# Patient Record
Sex: MALE | Race: WHITE | HISPANIC OR LATINO | Employment: OTHER | URBAN - METROPOLITAN AREA
[De-identification: names, ages, dates, MRNs, and addresses within clinical notes are randomized per-mention and may not be internally consistent; named-entity substitution may affect disease eponyms.]

---

## 2017-02-12 ENCOUNTER — APPOINTMENT (EMERGENCY)
Dept: RADIOLOGY | Facility: HOSPITAL | Age: 42
End: 2017-02-12
Payer: COMMERCIAL

## 2017-02-12 ENCOUNTER — HOSPITAL ENCOUNTER (EMERGENCY)
Facility: HOSPITAL | Age: 42
Discharge: HOME | End: 2017-02-13
Attending: EMERGENCY MEDICINE | Admitting: EMERGENCY MEDICINE
Payer: COMMERCIAL

## 2017-02-12 VITALS
BODY MASS INDEX: 26.63 KG/M2 | RESPIRATION RATE: 18 BRPM | WEIGHT: 170 LBS | SYSTOLIC BLOOD PRESSURE: 128 MMHG | TEMPERATURE: 98.6 F | HEART RATE: 61 BPM | DIASTOLIC BLOOD PRESSURE: 75 MMHG | OXYGEN SATURATION: 96 %

## 2017-02-12 DIAGNOSIS — K52.9 ENTERITIS: Primary | ICD-10-CM

## 2017-02-12 LAB
ALBUMIN SERPL BCP-MCNC: 3.5 G/DL (ref 3.5–5)
ALP SERPL-CCNC: 75 U/L (ref 46–116)
ALT SERPL W P-5'-P-CCNC: 17 U/L (ref 12–78)
ANION GAP SERPL CALCULATED.3IONS-SCNC: 8 MMOL/L (ref 4–13)
AST SERPL W P-5'-P-CCNC: 8 U/L (ref 5–45)
BACTERIA UR QL AUTO: ABNORMAL /HPF
BASOPHILS # BLD AUTO: 0.05 THOUSANDS/ΜL (ref 0–0.1)
BASOPHILS NFR BLD AUTO: 0 % (ref 0–1)
BILIRUB SERPL-MCNC: 0.42 MG/DL (ref 0.2–1)
BILIRUB UR QL STRIP: NEGATIVE
BUN SERPL-MCNC: 16 MG/DL (ref 5–25)
CALCIUM SERPL-MCNC: 8.8 MG/DL (ref 8.3–10.1)
CHLORIDE SERPL-SCNC: 110 MMOL/L (ref 100–108)
CLARITY UR: CLEAR
CO2 SERPL-SCNC: 24 MMOL/L (ref 21–32)
COLOR UR: YELLOW
CREAT SERPL-MCNC: 1.49 MG/DL (ref 0.6–1.3)
EOSINOPHIL # BLD AUTO: 0.66 THOUSAND/ΜL (ref 0–0.61)
EOSINOPHIL NFR BLD AUTO: 6 % (ref 0–6)
ERYTHROCYTE [DISTWIDTH] IN BLOOD BY AUTOMATED COUNT: 15.1 % (ref 11.6–15.1)
GFR SERPL CREATININE-BSD FRML MDRD: 52.5 ML/MIN/1.73SQ M
GLUCOSE SERPL-MCNC: 101 MG/DL (ref 65–140)
GLUCOSE UR STRIP-MCNC: NEGATIVE MG/DL
HCT VFR BLD AUTO: 40.6 % (ref 36.5–49.3)
HGB BLD-MCNC: 13.7 G/DL (ref 12–17)
HGB UR QL STRIP.AUTO: ABNORMAL
HYALINE CASTS #/AREA URNS LPF: ABNORMAL /LPF
KETONES UR STRIP-MCNC: ABNORMAL MG/DL
LEUKOCYTE ESTERASE UR QL STRIP: NEGATIVE
LIPASE SERPL-CCNC: 118 U/L (ref 73–393)
LYMPHOCYTES # BLD AUTO: 3.04 THOUSANDS/ΜL (ref 0.6–4.47)
LYMPHOCYTES NFR BLD AUTO: 26 % (ref 14–44)
MCH RBC QN AUTO: 28.8 PG (ref 26.8–34.3)
MCHC RBC AUTO-ENTMCNC: 33.7 G/DL (ref 31.4–37.4)
MCV RBC AUTO: 86 FL (ref 82–98)
MONOCYTES # BLD AUTO: 0.74 THOUSAND/ΜL (ref 0.17–1.22)
MONOCYTES NFR BLD AUTO: 6 % (ref 4–12)
NEUTROPHILS # BLD AUTO: 7.29 THOUSANDS/ΜL (ref 1.85–7.62)
NEUTS SEG NFR BLD AUTO: 62 % (ref 43–75)
NITRITE UR QL STRIP: NEGATIVE
NON-SQ EPI CELLS URNS QL MICRO: ABNORMAL /HPF
NRBC BLD AUTO-RTO: 0 /100 WBCS
PH UR STRIP.AUTO: 7 [PH] (ref 4.5–8)
PLATELET # BLD AUTO: 330 THOUSANDS/UL (ref 149–390)
PMV BLD AUTO: 9.2 FL (ref 8.9–12.7)
POTASSIUM SERPL-SCNC: 4 MMOL/L (ref 3.5–5.3)
PROT SERPL-MCNC: 6.9 G/DL (ref 6.4–8.2)
PROT UR STRIP-MCNC: NEGATIVE MG/DL
RBC # BLD AUTO: 4.75 MILLION/UL (ref 3.88–5.62)
RBC #/AREA URNS AUTO: ABNORMAL /HPF
SODIUM SERPL-SCNC: 142 MMOL/L (ref 136–145)
SP GR UR STRIP.AUTO: 1.02 (ref 1–1.03)
SPECIMEN SOURCE: NORMAL
TROPONIN I BLD-MCNC: 0 NG/ML (ref 0–0.08)
UROBILINOGEN UR QL STRIP.AUTO: 0.2 E.U./DL
WBC # BLD AUTO: 11.8 THOUSAND/UL (ref 4.31–10.16)
WBC #/AREA URNS AUTO: ABNORMAL /HPF

## 2017-02-12 PROCEDURE — 80053 COMPREHEN METABOLIC PANEL: CPT

## 2017-02-12 PROCEDURE — 81001 URINALYSIS AUTO W/SCOPE: CPT

## 2017-02-12 PROCEDURE — 96361 HYDRATE IV INFUSION ADD-ON: CPT

## 2017-02-12 PROCEDURE — 84484 ASSAY OF TROPONIN QUANT: CPT

## 2017-02-12 PROCEDURE — 96374 THER/PROPH/DIAG INJ IV PUSH: CPT

## 2017-02-12 PROCEDURE — 85025 COMPLETE CBC W/AUTO DIFF WBC: CPT

## 2017-02-12 PROCEDURE — 36415 COLL VENOUS BLD VENIPUNCTURE: CPT

## 2017-02-12 PROCEDURE — 87086 URINE CULTURE/COLONY COUNT: CPT

## 2017-02-12 PROCEDURE — 74176 CT ABD & PELVIS W/O CONTRAST: CPT

## 2017-02-12 PROCEDURE — 96375 TX/PRO/DX INJ NEW DRUG ADDON: CPT

## 2017-02-12 PROCEDURE — 83690 ASSAY OF LIPASE: CPT

## 2017-02-12 RX ORDER — ONDANSETRON 2 MG/ML
4 INJECTION INTRAMUSCULAR; INTRAVENOUS ONCE
Status: COMPLETED | OUTPATIENT
Start: 2017-02-12 | End: 2017-02-12

## 2017-02-12 RX ADMIN — ONDANSETRON 4 MG: 2 INJECTION INTRAMUSCULAR; INTRAVENOUS at 22:25

## 2017-02-12 RX ADMIN — SODIUM CHLORIDE 1000 ML: 0.9 INJECTION, SOLUTION INTRAVENOUS at 22:25

## 2017-02-12 RX ADMIN — HYDROMORPHONE HYDROCHLORIDE 1 MG: 1 INJECTION, SOLUTION INTRAMUSCULAR; INTRAVENOUS; SUBCUTANEOUS at 22:25

## 2017-02-13 PROCEDURE — 99284 EMERGENCY DEPT VISIT MOD MDM: CPT

## 2017-02-13 RX ORDER — DICYCLOMINE HCL 20 MG
20 TABLET ORAL EVERY 6 HOURS
Qty: 120 TABLET | Refills: 0 | Status: SHIPPED | OUTPATIENT
Start: 2017-02-13 | End: 2017-03-15

## 2017-02-13 RX ORDER — ACETAMINOPHEN 500 MG
500 TABLET ORAL EVERY 6 HOURS PRN
Qty: 30 TABLET | Refills: 0 | Status: SHIPPED | OUTPATIENT
Start: 2017-02-13 | End: 2017-03-15

## 2017-02-13 RX ORDER — DICYCLOMINE HCL 20 MG
20 TABLET ORAL ONCE
Status: DISCONTINUED | OUTPATIENT
Start: 2017-02-13 | End: 2017-02-13

## 2017-02-14 LAB — BACTERIA UR CULT: NORMAL

## 2017-03-21 ENCOUNTER — HOSPITAL ENCOUNTER (EMERGENCY)
Facility: HOSPITAL | Age: 42
Discharge: HOME | End: 2017-03-21
Attending: EMERGENCY MEDICINE
Payer: COMMERCIAL

## 2017-03-21 ENCOUNTER — APPOINTMENT (EMERGENCY)
Dept: RADIOLOGY | Facility: HOSPITAL | Age: 42
End: 2017-03-21
Payer: COMMERCIAL

## 2017-03-21 VITALS
BODY MASS INDEX: 25.06 KG/M2 | DIASTOLIC BLOOD PRESSURE: 57 MMHG | RESPIRATION RATE: 18 BRPM | HEART RATE: 58 BPM | SYSTOLIC BLOOD PRESSURE: 99 MMHG | WEIGHT: 160 LBS | OXYGEN SATURATION: 99 % | TEMPERATURE: 96.1 F

## 2017-03-21 DIAGNOSIS — R10.9 ABDOMINAL PAIN: Primary | ICD-10-CM

## 2017-03-21 DIAGNOSIS — R10.9 LEFT FLANK PAIN: ICD-10-CM

## 2017-03-21 LAB
ALBUMIN SERPL BCP-MCNC: 3.3 G/DL (ref 3.5–5)
ALP SERPL-CCNC: 77 U/L (ref 46–116)
ALT SERPL W P-5'-P-CCNC: 25 U/L (ref 12–78)
ANION GAP SERPL CALCULATED.3IONS-SCNC: 5 MMOL/L (ref 4–13)
AST SERPL W P-5'-P-CCNC: 9 U/L (ref 5–45)
BACTERIA UR QL AUTO: ABNORMAL /HPF
BASOPHILS # BLD AUTO: 0.03 THOUSANDS/ΜL (ref 0–0.1)
BASOPHILS NFR BLD AUTO: 0 % (ref 0–1)
BILIRUB SERPL-MCNC: 0.14 MG/DL (ref 0.2–1)
BILIRUB UR QL STRIP: NEGATIVE
BUN SERPL-MCNC: 15 MG/DL (ref 5–25)
CALCIUM SERPL-MCNC: 8.9 MG/DL (ref 8.3–10.1)
CHLORIDE SERPL-SCNC: 109 MMOL/L (ref 100–108)
CLARITY UR: CLEAR
CLARITY, POC: CLEAR
CO2 SERPL-SCNC: 29 MMOL/L (ref 21–32)
COLOR UR: YELLOW
COLOR, POC: YELLOW
CREAT SERPL-MCNC: 1.44 MG/DL (ref 0.6–1.3)
EOSINOPHIL # BLD AUTO: 0.24 THOUSAND/ΜL (ref 0–0.61)
EOSINOPHIL NFR BLD AUTO: 3 % (ref 0–6)
ERYTHROCYTE [DISTWIDTH] IN BLOOD BY AUTOMATED COUNT: 14.7 % (ref 11.6–15.1)
GFR SERPL CREATININE-BSD FRML MDRD: 54.6 ML/MIN/1.73SQ M
GLUCOSE SERPL-MCNC: 81 MG/DL (ref 65–140)
GLUCOSE UR STRIP-MCNC: NEGATIVE MG/DL
HCT VFR BLD AUTO: 43.3 % (ref 36.5–49.3)
HGB BLD-MCNC: 14.3 G/DL (ref 12–17)
HGB UR QL STRIP.AUTO: ABNORMAL
HYALINE CASTS #/AREA URNS LPF: ABNORMAL /LPF
KETONES UR STRIP-MCNC: NEGATIVE MG/DL
LEUKOCYTE ESTERASE UR QL STRIP: NEGATIVE
LIPASE SERPL-CCNC: 136 U/L (ref 73–393)
LYMPHOCYTES # BLD AUTO: 2.66 THOUSANDS/ΜL (ref 0.6–4.47)
LYMPHOCYTES NFR BLD AUTO: 36 % (ref 14–44)
MCH RBC QN AUTO: 28.3 PG (ref 26.8–34.3)
MCHC RBC AUTO-ENTMCNC: 33 G/DL (ref 31.4–37.4)
MCV RBC AUTO: 86 FL (ref 82–98)
MONOCYTES # BLD AUTO: 0.43 THOUSAND/ΜL (ref 0.17–1.22)
MONOCYTES NFR BLD AUTO: 6 % (ref 4–12)
NEUTROPHILS # BLD AUTO: 4.05 THOUSANDS/ΜL (ref 1.85–7.62)
NEUTS SEG NFR BLD AUTO: 55 % (ref 43–75)
NITRITE UR QL STRIP: NEGATIVE
NON-SQ EPI CELLS URNS QL MICRO: ABNORMAL /HPF
NRBC BLD AUTO-RTO: 0 /100 WBCS
PH UR STRIP.AUTO: 6 [PH] (ref 4.5–8)
PLATELET # BLD AUTO: 276 THOUSANDS/UL (ref 149–390)
PMV BLD AUTO: 9.2 FL (ref 8.9–12.7)
POTASSIUM SERPL-SCNC: 4.2 MMOL/L (ref 3.5–5.3)
PROT SERPL-MCNC: 7.1 G/DL (ref 6.4–8.2)
PROT UR STRIP-MCNC: NEGATIVE MG/DL
RBC # BLD AUTO: 5.06 MILLION/UL (ref 3.88–5.62)
RBC #/AREA URNS AUTO: ABNORMAL /HPF
SODIUM SERPL-SCNC: 143 MMOL/L (ref 136–145)
SP GR UR STRIP.AUTO: 1.02 (ref 1–1.03)
UROBILINOGEN UR QL STRIP.AUTO: 0.2 E.U./DL
WBC # BLD AUTO: 7.42 THOUSAND/UL (ref 4.31–10.16)
WBC #/AREA URNS AUTO: ABNORMAL /HPF

## 2017-03-21 PROCEDURE — 96376 TX/PRO/DX INJ SAME DRUG ADON: CPT

## 2017-03-21 PROCEDURE — 36415 COLL VENOUS BLD VENIPUNCTURE: CPT | Performed by: EMERGENCY MEDICINE

## 2017-03-21 PROCEDURE — 81001 URINALYSIS AUTO W/SCOPE: CPT

## 2017-03-21 PROCEDURE — 96361 HYDRATE IV INFUSION ADD-ON: CPT

## 2017-03-21 PROCEDURE — 74176 CT ABD & PELVIS W/O CONTRAST: CPT

## 2017-03-21 PROCEDURE — 85025 COMPLETE CBC W/AUTO DIFF WBC: CPT | Performed by: EMERGENCY MEDICINE

## 2017-03-21 PROCEDURE — 83690 ASSAY OF LIPASE: CPT | Performed by: EMERGENCY MEDICINE

## 2017-03-21 PROCEDURE — 96374 THER/PROPH/DIAG INJ IV PUSH: CPT

## 2017-03-21 PROCEDURE — 81002 URINALYSIS NONAUTO W/O SCOPE: CPT | Performed by: EMERGENCY MEDICINE

## 2017-03-21 PROCEDURE — 80053 COMPREHEN METABOLIC PANEL: CPT | Performed by: EMERGENCY MEDICINE

## 2017-03-21 PROCEDURE — 96375 TX/PRO/DX INJ NEW DRUG ADDON: CPT

## 2017-03-21 PROCEDURE — 99284 EMERGENCY DEPT VISIT MOD MDM: CPT

## 2017-03-21 PROCEDURE — 87086 URINE CULTURE/COLONY COUNT: CPT

## 2017-03-21 RX ORDER — DICYCLOMINE HCL 20 MG
20 TABLET ORAL 2 TIMES DAILY
Qty: 60 TABLET | Refills: 0 | Status: SHIPPED | OUTPATIENT
Start: 2017-03-21 | End: 2018-08-15

## 2017-03-21 RX ORDER — ONDANSETRON 4 MG/1
4 TABLET, FILM COATED ORAL EVERY 6 HOURS
Qty: 120 TABLET | Refills: 0 | Status: SHIPPED | OUTPATIENT
Start: 2017-03-21 | End: 2018-08-15

## 2017-03-21 RX ORDER — LIDOCAINE 50 MG/G
1 PATCH TOPICAL ONCE
Status: COMPLETED | OUTPATIENT
Start: 2017-03-21 | End: 2017-03-21

## 2017-03-21 RX ORDER — ONDANSETRON 2 MG/ML
4 INJECTION INTRAMUSCULAR; INTRAVENOUS ONCE
Status: COMPLETED | OUTPATIENT
Start: 2017-03-21 | End: 2017-03-21

## 2017-03-21 RX ORDER — ACETAMINOPHEN 325 MG/1
650 TABLET ORAL ONCE
Status: COMPLETED | OUTPATIENT
Start: 2017-03-21 | End: 2017-03-21

## 2017-03-21 RX ADMIN — SODIUM CHLORIDE 1000 ML: 0.9 INJECTION, SOLUTION INTRAVENOUS at 19:26

## 2017-03-21 RX ADMIN — ONDANSETRON 4 MG: 2 INJECTION INTRAMUSCULAR; INTRAVENOUS at 22:36

## 2017-03-21 RX ADMIN — ONDANSETRON 4 MG: 2 INJECTION INTRAMUSCULAR; INTRAVENOUS at 19:27

## 2017-03-21 RX ADMIN — ACETAMINOPHEN 650 MG: 325 TABLET, FILM COATED ORAL at 20:26

## 2017-03-21 RX ADMIN — HYDROMORPHONE HYDROCHLORIDE 1 MG: 1 INJECTION, SOLUTION INTRAMUSCULAR; INTRAVENOUS; SUBCUTANEOUS at 22:37

## 2017-03-21 RX ADMIN — LIDOCAINE 1 PATCH: 50 PATCH CUTANEOUS at 20:26

## 2017-03-22 LAB — BACTERIA UR CULT: NORMAL

## 2017-05-04 ENCOUNTER — HOSPITAL ENCOUNTER (EMERGENCY)
Facility: HOSPITAL | Age: 42
Discharge: HOME | End: 2017-05-04
Admitting: EMERGENCY MEDICINE
Payer: COMMERCIAL

## 2017-05-04 ENCOUNTER — APPOINTMENT (EMERGENCY)
Dept: RADIOLOGY | Facility: HOSPITAL | Age: 42
End: 2017-05-04
Payer: COMMERCIAL

## 2017-05-04 VITALS
TEMPERATURE: 97.8 F | RESPIRATION RATE: 18 BRPM | WEIGHT: 160 LBS | HEART RATE: 97 BPM | SYSTOLIC BLOOD PRESSURE: 113 MMHG | BODY MASS INDEX: 25.06 KG/M2 | OXYGEN SATURATION: 100 % | DIASTOLIC BLOOD PRESSURE: 66 MMHG

## 2017-05-04 DIAGNOSIS — S86.911A STRAIN OF RIGHT KNEE: Primary | ICD-10-CM

## 2017-05-04 PROCEDURE — 99283 EMERGENCY DEPT VISIT LOW MDM: CPT

## 2017-05-04 PROCEDURE — 73564 X-RAY EXAM KNEE 4 OR MORE: CPT

## 2017-05-04 PROCEDURE — 96372 THER/PROPH/DIAG INJ SC/IM: CPT

## 2017-05-04 RX ORDER — KETOROLAC TROMETHAMINE 30 MG/ML
15 INJECTION, SOLUTION INTRAMUSCULAR; INTRAVENOUS ONCE
Status: COMPLETED | OUTPATIENT
Start: 2017-05-04 | End: 2017-05-04

## 2017-05-04 RX ADMIN — KETOROLAC TROMETHAMINE 15 MG: 30 INJECTION, SOLUTION INTRAMUSCULAR at 13:44

## 2017-06-24 ENCOUNTER — HOSPITAL ENCOUNTER (EMERGENCY)
Facility: HOSPITAL | Age: 42
Discharge: HOME | End: 2017-06-24
Attending: EMERGENCY MEDICINE
Payer: COMMERCIAL

## 2017-06-24 VITALS
WEIGHT: 170 LBS | TEMPERATURE: 97.9 F | HEART RATE: 80 BPM | RESPIRATION RATE: 16 BRPM | BODY MASS INDEX: 26.63 KG/M2 | DIASTOLIC BLOOD PRESSURE: 54 MMHG | OXYGEN SATURATION: 97 % | SYSTOLIC BLOOD PRESSURE: 102 MMHG

## 2017-06-24 DIAGNOSIS — K08.89 TOOTHACHE: Primary | ICD-10-CM

## 2017-06-24 PROCEDURE — 99282 EMERGENCY DEPT VISIT SF MDM: CPT

## 2017-06-24 RX ORDER — NAPROXEN 500 MG/1
500 TABLET ORAL
Qty: 30 TABLET | Refills: 0 | Status: SHIPPED | OUTPATIENT
Start: 2017-06-24 | End: 2018-08-15

## 2017-06-24 RX ORDER — IBUPROFEN 600 MG/1
600 TABLET ORAL ONCE
Status: COMPLETED | OUTPATIENT
Start: 2017-06-24 | End: 2017-06-24

## 2017-06-24 RX ORDER — BUPIVACAINE HYDROCHLORIDE 5 MG/ML
5 INJECTION, SOLUTION EPIDURAL; INTRACAUDAL ONCE
Status: COMPLETED | OUTPATIENT
Start: 2017-06-24 | End: 2017-06-24

## 2017-06-24 RX ADMIN — IBUPROFEN 600 MG: 600 TABLET, FILM COATED ORAL at 22:38

## 2017-06-24 RX ADMIN — BUPIVACAINE HYDROCHLORIDE 5 ML: 5 INJECTION, SOLUTION EPIDURAL; INTRACAUDAL at 22:38

## 2017-09-09 ENCOUNTER — HOSPITAL ENCOUNTER (EMERGENCY)
Facility: HOSPITAL | Age: 42
Discharge: HOME | End: 2017-09-09
Admitting: EMERGENCY MEDICINE
Payer: COMMERCIAL

## 2017-09-09 ENCOUNTER — APPOINTMENT (EMERGENCY)
Dept: RADIOLOGY | Facility: HOSPITAL | Age: 42
End: 2017-09-09
Payer: COMMERCIAL

## 2017-09-09 VITALS
TEMPERATURE: 97.9 F | HEART RATE: 86 BPM | OXYGEN SATURATION: 98 % | DIASTOLIC BLOOD PRESSURE: 56 MMHG | WEIGHT: 165 LBS | HEIGHT: 67 IN | SYSTOLIC BLOOD PRESSURE: 106 MMHG | BODY MASS INDEX: 25.9 KG/M2 | RESPIRATION RATE: 17 BRPM

## 2017-09-09 DIAGNOSIS — M77.10 LATERAL EPICONDYLITIS  OF ELBOW: Primary | ICD-10-CM

## 2017-09-09 PROCEDURE — 73080 X-RAY EXAM OF ELBOW: CPT

## 2017-09-09 PROCEDURE — 99283 EMERGENCY DEPT VISIT LOW MDM: CPT

## 2017-09-09 RX ORDER — IBUPROFEN 600 MG/1
600 TABLET ORAL EVERY 6 HOURS PRN
Qty: 30 TABLET | Refills: 0 | Status: SHIPPED | OUTPATIENT
Start: 2017-09-09 | End: 2018-08-15

## 2017-10-20 ENCOUNTER — ALLSCRIPTS OFFICE VISIT (OUTPATIENT)
Dept: OTHER | Facility: OTHER | Age: 42
End: 2017-10-20

## 2017-10-20 DIAGNOSIS — M77.12 LATERAL EPICONDYLITIS OF LEFT ELBOW: ICD-10-CM

## 2017-10-20 DIAGNOSIS — M77.11 LATERAL EPICONDYLITIS OF RIGHT ELBOW: ICD-10-CM

## 2018-01-09 NOTE — MISCELLANEOUS
Message  Return to work or school:   Angel Wesley is under my professional care  He was seen in my office on 10/20/17       Avoid repetitive bilateral elbow and wrist motion and avoid lifting, pulling pushing over 25 pounds  Gaby Hylton       Signatures   Electronically signed by :  WILLA Turk ; Oct 20 2017 11:54AM EST                       (Author)
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
## 2018-01-13 NOTE — MISCELLANEOUS
Message   Recorded as Task   Date: 11/16/2016 03:28 PM, Created By: Simuel Hashimoto   Task Name: Med Renewal   Assigned To: Sheldon nurse,Team   Regarding Patient: Mark Napoles, Status: Active   CommentJigna Calloway - 16 Nov 2016 3:28 PM     TASK CREATED  PATIENT NEEDS THIS MED  HE SAYS HE IS IN ALOT OF PAIN AND DR ANDRE INFORMED HE HAS TO CALL US TO GET THIS MED REFILLED  HE INFORMS HE JUST WANTS THEM TO HOLD HIM OFF UNTIL THE SURGERY  PATIENT IS VERY UPSET  DR Simin Robbins STATED HE WAS GOING TO CALL OUR CLINIC  WE HAVE NOT YET RECIEVED A CALL  CAN WE REFILL UNTIL DATE OF SURGERY? Simuel Hashimoto - 61 Nov 2016 3:29 PM     TASK EDITED   Carlosroland VermaGlobe - 16 Nov 2016 4:13 PM     TASK REPLIED TO: Previously Assigned To Sheldon nurse,Team                      As per discussion,and after revie of urologist records patient was advised no more pain meds, was given a script that was supposed to last untl day of surgery 11/23  I agree no more narcotics as will need after surgery, no tramadol as is on  meds  Pt reports too much pain but in office reported to me was moving all about the office  I agree to try toradol 10 mg pill to bridge until surgery  Michael Chahal - 16 Nov 2016 4:47 PM     TASK EDITED               [facility] on [address]  and Toradol is not covered by insurance and would be around $52 out of pocket  Celestine Wagoner made aware of this and pt  reports unable to afford the Toradol  Celestine Wagoner willing to prescribed 7 pills of Percocet with directions to take 1 daily  Pt  made aware of this and that script will not be written for any more pills and that this will be the last script Celestine Wagoner will be writing for pain medication  This was interpreted in [place] by [place]  I went back to the nurse station as Celestine Wagoner was writing Rx for Percocet and pt  go through to the back and was demanding to speak with the doctor and get other medicaton prescribed   Pt  made aware that he was not permitted back here and that I will need to call security if he does not go back out into the waiting room  Pt  did walk back out to the waiting room with me, but continued to be loud, and was cursing, and slamming his hands on the counter  Pt  stated, "call security, I don't care I will go to alf " Pt  made aware if this behavior continues I may need to call security  Script for Percocet given to pt  Pt  provided me with his photo ID and signed for Rx  Pt  reminded of pre-op clearance appt  on Monday with Merle Horton  Pt  reminded that he will not be getting any further pain medication from us  Pt  made aware again Rx is for 1 pill once a day  Pt  walked out of office yelling something in [place]  Active Problems    1  Bowel trouble (569 9) (K63 9)   2  Depression with anxiety (300 4) (F41 8)   3  Hydronephrosis (591) (N13 30)   4  Mental disorder (300 9) (F99)   5  Need for influenza vaccination (V04 81) (Z23)   6  Other mechanical complication of nephrostomy catheter, subsequent encounter (996 39)   (T83 092D)   7  Renal mass (593 9) (N28 89)   8  Right flank pain (789 09) (R10 9)   9  Stomach problems (536 9) (K31 9)    Current Meds   1  Hydrocodone-Acetaminophen 5-325 MG Oral Tablet (Norco); One or 2 tablets every 4-6   hours as needed for pain the;   Therapy: 46SSA4431 to (Evaluate:25Nov2016); Last Rx:10Nov2016 Ordered   2  Hydrocodone-Acetaminophen 5-325 MG Oral Tablet; TAKE 1 TABLET Bedtime PRN   pain; Therapy: 38YFR2291 to (Evaluate:23Nov2016); Last Rx:16Nov2016 Ordered   3  Ketorolac Tromethamine 10 MG Oral Tablet; TAKE 1 TABLET EVERY 8 HOURS AS   NEEDED FOR PAIN;   Therapy: 93YBH5359 to (Amanda Arauz)  Requested for: 90GXB6578; Last   Rx:16Nov2016 Ordered    Allergies    1   No Known Drug Allergies    Signatures   Electronically signed by : Robert Hassan, ; Nov 17 2016  9:14AM EST                       (Author)

## 2018-01-14 VITALS
SYSTOLIC BLOOD PRESSURE: 129 MMHG | WEIGHT: 165 LBS | HEART RATE: 82 BPM | DIASTOLIC BLOOD PRESSURE: 85 MMHG | HEIGHT: 67 IN | BODY MASS INDEX: 25.9 KG/M2
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
## 2018-01-15 NOTE — MISCELLANEOUS
Message   Recorded as Task   Date: 11/27/2016 02:20 PM, Created By: System   Task Name: Hospital LELIA   Assigned To: Mavis Chan   Regarding Patient: Margo Clayton, Status: Active   Comment:    System - 27 Nov 2016 2:20 PM     Patient discharged from hospital   Patient Name: Rayne Trimble  Patient YOB: 1975  Discharge Date: 11/27/2016  Facility: [facility] - 28 Nov 2016 8:11 AM     TASK REASSIGNED: Previously Assigned To Chris Paul - 57 Nov 2016 4:21 PM     TASK EDITED  One Chiki Denson  11/23 - 11/27  Dx: Right renal mass    Pt  had laparoscopic right nephrectomy on 11/23  Called pt  using Spontaneously  # [phone]  Spoke with pt  Pt  immediately stated that his medication was not helping him  When asked what medication pt  reported by Hydrocodone  Pt  made aware he needs to call Dr Rebecca Valle office to discuss pain medication as they did the surgery and prescribed it to him  Pt  made aware our office will NOT be prescribing him any pain medication  Pt  is aware of f/u appt  with Dr Uzma Valera on 12/6 @ 11:30 am  Pt  reports was able to get his abx Augmentin at pharmacy and is taking it as prescribed  Pt  c/o of feeling cold, and pain to his right side  Pt  denies fevers  Pt  made aware if he does start with fevers and chills pt  instructed to call Dr Sincere Merritt office  Pt  did not have any further questions or concerns at this time  No LELIA f/u needed with PCP at this time  Active Problems    1  Bowel trouble (569 9) (K63 9)   2  Depression with anxiety (300 4) (F41 8)   3  Hydronephrosis (591) (N13 30)   4  Mental disorder (300 9) (F99)   5  Need for influenza vaccination (V04 81) (Z23)   6  Other mechanical complication of nephrostomy catheter, subsequent encounter (996 39)   (T83 092D)   7  Pre-operative exam (V72 84) (Z01 818)   8  Renal mass (593 9) (N28 89)   9  Right flank pain (789 09) (R10 9)   10  Stomach problems (536 9) (K31 9)    Current Meds   1  Hydrocodone-Acetaminophen 5-325 MG Oral Tablet (Norco); One or 2 tablets every 4-6   hours as needed for pain the;   Therapy: 93MGS8669 to (Evaluate:25Nov2016); Last Rx:10Nov2016 Ordered   2  Hydrocodone-Acetaminophen 5-325 MG Oral Tablet; TAKE 1 TABLET Bedtime PRN   pain; Therapy: 64LNO8649 to (Evaluate:23Nov2016); Last Rx:16Nov2016 Ordered   3  Ketorolac Tromethamine 10 MG Oral Tablet; TAKE 1 TABLET EVERY 8 HOURS AS   NEEDED FOR PAIN;   Therapy: 51PAA3447 to (Jackson Reason)  Requested for: 98DDK6559; Last   Rx:16Nov2016 Ordered   4  Oxycodone-Acetaminophen 5-325 MG Oral Tablet (Percocet); TAKE 1 TO 2 TABLETS   EVERY 4 HOURS AS NEEDED FOR PAIN  NO MORE THAN 8 TABLETS PER   DAY; Therapy: 44ICZ4337 to (Evaluate:24Nov2016); Last Rx:21Nov2016 Ordered    Allergies    1   No Known Drug Allergies    Signatures   Electronically signed by : Laci Drummond, ; Nov 28 2016  4:21PM EST                       (Author)

## 2018-01-16 NOTE — MISCELLANEOUS
Message   Recorded as Task   Date: 09/29/2016 02:59 PM, Created By: System   Task Name: Hospital LELIA   Assigned To: Mavis Chan   Regarding Patient: Mariely Rico, Status: In Progress   Comment:    System - 29 Sep 2016 2:59 PM     Patient discharged from hospital   Patient Name: Destiny Ryan  Patient YOB: 1975  Discharge Date: 9/29/2016  [facility]ility: [facility] - 74 Sep 2016 9:16 AM     TASK REASSIGNED: Previously Assigned To Nj Soria - 30 Sep 2016 9:54 AM     TASK REASSIGNED: Previously Assigned To Natalie Rendon - 94 Oct 2016 3:15 PM     TASK EDITED                 Called pt  no answer  Message left to call office  I also left message reminding pt  of appt  tomorrow 10/4 @ 8:25 am  (urology appt )    [facility]  9/25 - 9/29  Dx: Renal contusion    S/P ureteral stent    Pt  needs to f/u with urology and PCP    abnormal CT scan     Pt  given abx  Mavis Chan - 03 Oct 2016 3:15 PM     TASK IN PROGRESS   [place] - 04 Oct 2016 9:17 AM     TASK EDITED                 Pt  in office today for urology appt  pt  is in alot of pain and does not want to talk anymore  Urologist is sending pt  back to hospital to the ER  (Please see urology office note) Pt  made aware in [place] by [facility] that he needs to schedule a f/u appt  with Marie Bennett his PCP  Pt  does not want to schedule appt  now as he does not know how long he will be in the hospital  Pt  reports will call office to schedule f/u appt  with Kathy  Active Problems    1  Acute exacerbation of chronic low back pain (724 2,338 19,338 29) (M54 5,G89 29)   2  Acute shoulder pain, left (719 41) (M25 512)   3  Depression with anxiety (300 4) (F41 8)   4  Dyspepsia (536 8) (K30)   5  Fatigue (780 79) (R53 83)   6  Heart burn (787 1) (R12)   7  Need for influenza vaccination (V04 81) (Z23)   8   Sciatica of left side associated with disorder of lumbosacral spine (724 3) (M53 9)    Current Meds   1  Citalopram Hydrobromide 20 MG Oral Tablet; TAKE 1 TABLET DAILY AS DIRECTED; Therapy: 86IAU6150 to (Evaluate:12Apr2015)  Requested for: 11XJD8232; Last   Rx:46Opj7683 Ordered   2  Cyclobenzaprine HCl - 10 MG Oral Tablet; TAKE ONE TABLET BY MOUTH AT BEDTIME   AS NEEDED FOR MUSCLE SPASM; Therapy: 50VAO0267 to (Raynell Duane)  Requested for: 72BNL5640; Last   Rx:85Zae9886 Ordered   3  Hydrocodone-Acetaminophen 7 5-325 MG Oral Tablet; TAKE 1 TABLET Bedtime; Therapy: 88NAM0606 to (Evaluate:20Oct2015); Last Rx:42Njs6793 Ordered   4  Naproxen 500 MG Oral Tablet; TAKE 1 TABLET EVERY 12 HOURS WITH FOOD AS   NEEDED; Therapy: 57KXY9020 to (Norvel Bence)  Requested for: 86XVJ5511; Last   Rx:12Wwc7875 Ordered   5  Naproxen 500 MG Oral Tablet; TAKE 1 TABLET EVERY 12 HOURS WITH FOOD AS   NEEDED; Therapy: 92AKH3022 to (Fareed Mcnally)  Requested for: 81FLU9481; Last   Rx:[id number] Ordered    Allergies    1   No Known Drug Allergies    Signatures   Electronically signed by : Rosalee Ferguson, ; Oct  4 2016  9:17AM EST                       (Author)

## 2018-08-15 ENCOUNTER — HOSPITAL ENCOUNTER (EMERGENCY)
Facility: HOSPITAL | Age: 43
Discharge: HOME | End: 2018-08-15
Attending: EMERGENCY MEDICINE | Admitting: EMERGENCY MEDICINE
Payer: COMMERCIAL

## 2018-08-15 VITALS
HEART RATE: 93 BPM | RESPIRATION RATE: 20 BRPM | TEMPERATURE: 96.5 F | WEIGHT: 160 LBS | SYSTOLIC BLOOD PRESSURE: 123 MMHG | DIASTOLIC BLOOD PRESSURE: 79 MMHG | BODY MASS INDEX: 25.06 KG/M2 | OXYGEN SATURATION: 98 %

## 2018-08-15 DIAGNOSIS — R10.9 CHRONIC LEFT FLANK PAIN: Primary | ICD-10-CM

## 2018-08-15 DIAGNOSIS — K21.00 REFLUX ESOPHAGITIS: ICD-10-CM

## 2018-08-15 DIAGNOSIS — G89.29 CHRONIC LEFT FLANK PAIN: Primary | ICD-10-CM

## 2018-08-15 LAB
ALBUMIN SERPL BCP-MCNC: 3.5 G/DL (ref 3.5–5)
ALP SERPL-CCNC: 84 U/L (ref 46–116)
ALT SERPL W P-5'-P-CCNC: 37 U/L (ref 12–78)
ANION GAP SERPL CALCULATED.3IONS-SCNC: 7 MMOL/L (ref 4–13)
AST SERPL W P-5'-P-CCNC: 16 U/L (ref 5–45)
ATRIAL RATE: 79 BPM
BASOPHILS # BLD AUTO: 0.04 THOUSANDS/ΜL (ref 0–0.1)
BASOPHILS NFR BLD AUTO: 1 % (ref 0–1)
BILIRUB SERPL-MCNC: 0.22 MG/DL (ref 0.2–1)
BUN SERPL-MCNC: 14 MG/DL (ref 5–25)
CALCIUM SERPL-MCNC: 9.2 MG/DL (ref 8.3–10.1)
CHLORIDE SERPL-SCNC: 107 MMOL/L (ref 100–108)
CO2 SERPL-SCNC: 24 MMOL/L (ref 21–32)
CREAT SERPL-MCNC: 1.31 MG/DL (ref 0.6–1.3)
EOSINOPHIL # BLD AUTO: 0.33 THOUSAND/ΜL (ref 0–0.61)
EOSINOPHIL NFR BLD AUTO: 4 % (ref 0–6)
ERYTHROCYTE [DISTWIDTH] IN BLOOD BY AUTOMATED COUNT: 14.6 % (ref 11.6–15.1)
GFR SERPL CREATININE-BSD FRML MDRD: 68 ML/MIN/1.73SQ M
GLUCOSE SERPL-MCNC: 92 MG/DL (ref 65–140)
HCT VFR BLD AUTO: 47.8 % (ref 36.5–49.3)
HGB BLD-MCNC: 15.4 G/DL (ref 12–17)
IMM GRANULOCYTES # BLD AUTO: 0.04 THOUSAND/UL (ref 0–0.2)
IMM GRANULOCYTES NFR BLD AUTO: 1 % (ref 0–2)
LIPASE SERPL-CCNC: 199 U/L (ref 73–393)
LYMPHOCYTES # BLD AUTO: 2.7 THOUSANDS/ΜL (ref 0.6–4.47)
LYMPHOCYTES NFR BLD AUTO: 31 % (ref 14–44)
MCH RBC QN AUTO: 28.5 PG (ref 26.8–34.3)
MCHC RBC AUTO-ENTMCNC: 32.2 G/DL (ref 31.4–37.4)
MCV RBC AUTO: 89 FL (ref 82–98)
MONOCYTES # BLD AUTO: 0.85 THOUSAND/ΜL (ref 0.17–1.22)
MONOCYTES NFR BLD AUTO: 10 % (ref 4–12)
NEUTROPHILS # BLD AUTO: 4.84 THOUSANDS/ΜL (ref 1.85–7.62)
NEUTS SEG NFR BLD AUTO: 53 % (ref 43–75)
NRBC BLD AUTO-RTO: 0 /100 WBCS
P AXIS: 40 DEGREES
PLATELET # BLD AUTO: 334 THOUSANDS/UL (ref 149–390)
PMV BLD AUTO: 9.1 FL (ref 8.9–12.7)
POTASSIUM SERPL-SCNC: 3.9 MMOL/L (ref 3.5–5.3)
PR INTERVAL: 154 MS
PROT SERPL-MCNC: 7.6 G/DL (ref 6.4–8.2)
QRS AXIS: 46 DEGREES
QRSD INTERVAL: 92 MS
QT INTERVAL: 370 MS
QTC INTERVAL: 424 MS
RBC # BLD AUTO: 5.4 MILLION/UL (ref 3.88–5.62)
SODIUM SERPL-SCNC: 138 MMOL/L (ref 136–145)
T WAVE AXIS: 54 DEGREES
VENTRICULAR RATE: 79 BPM
WBC # BLD AUTO: 8.8 THOUSAND/UL (ref 4.31–10.16)

## 2018-08-15 PROCEDURE — 85025 COMPLETE CBC W/AUTO DIFF WBC: CPT | Performed by: EMERGENCY MEDICINE

## 2018-08-15 PROCEDURE — 99284 EMERGENCY DEPT VISIT MOD MDM: CPT

## 2018-08-15 PROCEDURE — 93005 ELECTROCARDIOGRAM TRACING: CPT

## 2018-08-15 PROCEDURE — 83690 ASSAY OF LIPASE: CPT | Performed by: EMERGENCY MEDICINE

## 2018-08-15 PROCEDURE — 93010 ELECTROCARDIOGRAM REPORT: CPT | Performed by: INTERNAL MEDICINE

## 2018-08-15 PROCEDURE — 36415 COLL VENOUS BLD VENIPUNCTURE: CPT

## 2018-08-15 PROCEDURE — 80053 COMPREHEN METABOLIC PANEL: CPT | Performed by: EMERGENCY MEDICINE

## 2018-08-15 RX ORDER — SUCRALFATE 1 G/1
1 TABLET ORAL 4 TIMES DAILY
Qty: 120 TABLET | Refills: 0 | Status: SHIPPED | OUTPATIENT
Start: 2018-08-15 | End: 2019-04-24

## 2018-08-15 RX ORDER — OMEPRAZOLE 20 MG/1
40 CAPSULE, DELAYED RELEASE ORAL DAILY
Qty: 60 CAPSULE | Refills: 0 | Status: SHIPPED | OUTPATIENT
Start: 2018-08-15 | End: 2019-04-24

## 2018-08-15 RX ORDER — LIDOCAINE 50 MG/G
1 PATCH TOPICAL DAILY
Qty: 10 PATCH | Refills: 0 | Status: SHIPPED | OUTPATIENT
Start: 2018-08-15 | End: 2019-04-24

## 2018-08-15 RX ORDER — SUCRALFATE ORAL 1 G/10ML
1000 SUSPENSION ORAL ONCE
Status: COMPLETED | OUTPATIENT
Start: 2018-08-15 | End: 2018-08-15

## 2018-08-15 RX ORDER — MAGNESIUM HYDROXIDE/ALUMINUM HYDROXICE/SIMETHICONE 120; 1200; 1200 MG/30ML; MG/30ML; MG/30ML
30 SUSPENSION ORAL ONCE
Status: COMPLETED | OUTPATIENT
Start: 2018-08-15 | End: 2018-08-15

## 2018-08-15 RX ADMIN — ALUMINUM HYDROXIDE, MAGNESIUM HYDROXIDE, AND SIMETHICONE 30 ML: 200; 200; 20 SUSPENSION ORAL at 14:17

## 2018-08-15 RX ADMIN — SUCRALFATE 1000 MG: 1 SUSPENSION ORAL at 14:18

## 2018-08-15 NOTE — DISCHARGE INSTRUCTIONS
Dolor abdominal, cuidados ambulatorios   INFORMACIÓN GENERAL:   El dolor abdominal  puede ser sordo, molesto o [place]  Puede sentir dolor en jose david ryan del abdomen o en todo el abdomen  El dolor puede deberse a ciertos estados nathan estreñimiento, sensibilidad o intoxicación alimentaria, infección o jose david obstrucción  Asimismo, el dolor abdominal puede deberse a jose david hernia, apendicitis o úlcera  La causa del dolor abdominal puede ser desconocida  Busque cuidados inmediatos para los siguientes síntomas:   · [place] dolor de pecho o falta de aliento    · Dolor pulsátil en el abdomen superior o en la parte inferior de la espalda que de repente es danika    · Dolor que se localiza en el abdomen inferior derecho y empeora con el movimiento    · Fiebre por encima de 100 4°F (38°C) o escalofríos pablo    · Vómito de todo lo que usted come y pily    · Dolor que no mejora o más wendie empeora lee las siguientes 8 a 12 horas    · Edgard en el vómito o heces que se daniela negras y alquitranadas    · La piel o el barrera de los ojos se vuelven amarillentos    · Grandes cantidades de sangrado vaginal que no es brito periodo menstrual  El tratamiento para el dolor abdominal  puede llegar a incluir medicamentos para calmar briot estómago, prevenir el vómito o disminuir el dolor  Programe jose david claire con brito proveedor de [employer] se le haya indicado: Anote rodolfo preguntas para que se acuerde de hacerlas lee rodolfo visitas  ACUERDOS SOBRE BRITO CUIDADO:   Usted tiene el derecho de participar en la planificación de brito cuidado  Aprenda todo lo que pueda sobre brito condición y nathan darle tratamiento  Discuta con rodolfo médicos rodolfo opciones de tratamiento para juntos decidir el cuidado que usted quiere recibir  Usted siempre tiene el derecho a rechazar brito tratamiento  Esta información es sólo para uso en educación  Brito intención no es darle un consejo médico sobre enfermedades o tratamientos   Colsulte con brito Cherylyn Plough farmacéutico antes de seguir cualquier régimen médico para saber si es seguro y efectivo para usted  © 2014 [address] [employer] is for End User's use only and may not be sold, redistributed or otherwise used for commercial purposes  All illustrations and images included in CareNotes® are the copyrighted property of A D A M , Inc  or Bharat Salter
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
## 2018-08-16 NOTE — ED PROVIDER NOTES
History  Chief Complaint   Patient presents with    Abdominal Pain     Pt c/o upper abdominal pain, trouble sleeping,  burping and burning up his throat  Started 4 days ago  Patient is a 57-year-old male presenting to the emergency department for burning epigastric pain that he feels in his throat as well, he states he has a history of reflux esophagitis, and states that currently this feels exactly like his prior episodes of reflux esophagitis  He states that he has tried antacids and that the to relieve his symptoms to really, however the return  He states that his pain occurs after eating, is worse when he lies flat, relieved with sitting up from bed tonight  He states his symptoms are better when he does not eat dinner  He denies right upper quadrant pain, lower abdominal pain, fever, chills, nausea, vomiting, diarrhea, constipation, melena, hematochezia  He states he does have a cough, it has been chronic, and is associated with a sour taste in his mouth  Patient was also complaining of back pain, states that this has been chronic for him, and ongoing for many years  Today it is no worse than it normally is  Past medical history includes solitary kidney, with nephrectomy due to traumatic injury of the right kidney, history of kidney stones, bipolar, depression, asthma, GERD  The patient is denying chest pain, shortness of breath, unilateral leg swelling, recent travel, he denies IV drug abuse, denies alcohol and NSAID use  The patient has no history of cardiac disease          History provided by:  Patient   used: No    Abdominal Pain   Pain location:  Epigastric  Pain quality: burning    Pain radiates to:  Does not radiate  Pain severity:  Unable to specify  Onset quality:  Gradual  Timing:  Intermittent  Progression:  Waxing and waning  Chronicity:  Chronic  Context: eating    Context: not alcohol use, not awakening from sleep, not diet changes, not laxative use, not medication withdrawal, not previous surgeries, not recent illness, not recent sexual activity, not recent travel, not retching, not sick contacts, not suspicious food intake and not trauma    Relieved by:  Belching, position changes and antacids  Worsened by:  Position changes (Lying flat )  Ineffective treatments:  None tried  Associated symptoms: belching and cough    Associated symptoms: no anorexia, no chest pain, no chills, no constipation, no diarrhea, no dysuria, no fatigue, no fever, no flatus, no hematemesis, no hematochezia, no hematuria, no melena, no nausea, no shortness of breath, no sore throat, no vaginal bleeding, no vaginal discharge and no vomiting    Risk factors: no alcohol abuse, no aspirin use, not elderly, has not had multiple surgeries, no NSAID use, not obese, not pregnant and no recent hospitalization        None       Past Medical History:   Diagnosis Date    Asthma     Back pain     Bipolar 1 disorder (HCC)     Depression     Gastritis     GERD (gastroesophageal reflux disease)     Kidney stones        Past Surgical History:   Procedure Laterality Date    ABDOMINAL SURGERY      CT CYSTOURETHROSCOPY,URETER CATHETER Right 9/27/2016    Procedure: CYSTOSCOPY WITH RETROGRADE PYELOGRAM;  Surgeon: Nowell Crigler, MD;  Location: [facility];  Service: Urology    CT LAP, RADICAL NEPHRECTOMY Right 11/23/2016    Procedure: HAND ASSISTED LAPAROSCOPIC NEPHRECTOMY, converted to open, lysis of adhesions,repair of  vena cava; Surgeon: Nowell Crigler, MD;  Location: [facility];  Service: Urology    URETERAL STENT PLACEMENT Right 9/27/2016    Procedure: INSERTION STENT URETERAL;  Surgeon: Nowell Crigler, MD;  Location: [facility];  Service:    [facility] VASCULAR SURGERY         Family History   Problem Relation Age of Onset    Stroke Mother     Heart disease Mother     HIV Father      I have reviewed and agree with the history as documented      Social History   Substance Use Topics    Smoking status: Current Every Day Smoker     Packs/day: 0 50     Years: 11 00    Smokeless tobacco: Never Used    Alcohol use No        Review of Systems   Constitutional: Negative  Negative for activity change, appetite change, chills, diaphoresis, fatigue, fever and unexpected weight change  HENT: Negative  Negative for congestion, facial swelling, hearing loss, mouth sores, nosebleeds, postnasal drip, rhinorrhea, sore throat, tinnitus, trouble swallowing and voice change  Eyes: Negative  Negative for photophobia and visual disturbance  Respiratory: Positive for cough  Negative for apnea, choking, chest tightness, shortness of breath, wheezing and stridor  Cardiovascular: Negative  Negative for chest pain, palpitations and leg swelling  Gastrointestinal: Positive for abdominal pain  Negative for anorexia, constipation, diarrhea, flatus, hematemesis, hematochezia, melena, nausea and vomiting  Endocrine: Negative  Genitourinary: Negative  Negative for decreased urine volume, difficulty urinating, discharge, dysuria, enuresis, flank pain, frequency, genital sores, hematuria, penile pain, penile swelling, scrotal swelling, testicular pain, urgency, vaginal bleeding and vaginal discharge  Musculoskeletal: Positive for back pain  Negative for arthralgias, gait problem, joint swelling, myalgias, neck pain and neck stiffness  Skin: Negative  Allergic/Immunologic: Negative  Neurological: Negative  Negative for dizziness, tremors, seizures, weakness, light-headedness, numbness and headaches  Hematological: Negative  Psychiatric/Behavioral: Negative          Physical Exam  ED Triage Vitals [08/15/18 1318]   Temperature Pulse Respirations Blood Pressure SpO2   (!) 96 5 °F (35 8 °C) 90 20 111/81 99 %      Temp Source Heart Rate Source Patient Position - Orthostatic VS BP Location FiO2 (%)   Tympanic Monitor Sitting Left arm --      Pain Score       5           Orthostatic Vital Signs  Vitals: 08/15/18 1318 08/15/18 1403 08/15/18 1404   BP: 111/81 113/74 123/79   Pulse: 90 90 93   Patient Position - Orthostatic VS: Sitting Lying Lying       Physical Exam   Constitutional: He is oriented to person, place, and time  Vital signs are normal  He appears well-developed and well-nourished  He is cooperative  Non-toxic appearance  He does not have a sickly appearance  He does not appear ill  No distress  He is not intubated  HENT:   Head: Normocephalic and atraumatic  Head is without raccoon's eyes, without Ro's sign, without laceration, without right periorbital erythema and without left periorbital erythema  Right Ear: Hearing and external ear normal  No hemotympanum  Left Ear: Hearing and external ear normal  No hemotympanum  Nose: Nose normal  No nasal deformity, septal deviation or nasal septal hematoma  No epistaxis  Mouth/Throat: Oropharynx is clear and moist  No oropharyngeal exudate  Eyes: Conjunctivae, EOM and lids are normal  Pupils are equal, round, and reactive to light  Right eye exhibits no discharge  Left eye exhibits no discharge  No scleral icterus  Neck: Normal range of motion  Neck supple  No JVD present  No spinous process tenderness and no muscular tenderness present  No neck rigidity  No tracheal deviation and normal range of motion present  Cardiovascular: Normal rate, regular rhythm, normal heart sounds and intact distal pulses  No extrasystoles are present  Exam reveals no gallop and no friction rub  No murmur heard  Pulses:       Radial pulses are 2+ on the right side, and 2+ on the left side  Femoral pulses are 2+ on the right side, and 2+ on the left side  Pulses equal bilaterally radial and femoral    Pulmonary/Chest: Effort normal and breath sounds normal  No accessory muscle usage or stridor  No apnea, no tachypnea and no bradypnea  He is not intubated  No respiratory distress  He has no decreased breath sounds  He has no wheezes   He has no rhonchi  He has no rales  He exhibits no tenderness  Abdominal: Soft  Normal appearance and bowel sounds are normal  He exhibits no distension and no mass  There is no hepatosplenomegaly  There is no tenderness  There is no rebound and no guarding  No hernia  No right upper quadrant pain, no tenderness to palpation of the epigastric region, no other abdominal pain  No ecchymoses or bruising of the flank or in the periumbilical region  No right lower quadrant pain  Musculoskeletal: Normal range of motion  He exhibits no edema, tenderness or deformity  Neurological: He is alert and oriented to person, place, and time  He has normal strength  No cranial nerve deficit or sensory deficit  He exhibits normal muscle tone  Coordination and gait normal  GCS eye subscore is 4  GCS verbal subscore is 5  GCS motor subscore is 6  Skin: Skin is warm, dry and intact  Capillary refill takes less than 2 seconds  No rash noted  He is not diaphoretic  No erythema  No pallor  Psychiatric: He has a normal mood and affect  His behavior is normal  He is not actively hallucinating  He is attentive  Nursing note and vitals reviewed        ED Medications  Medications   aluminum-magnesium hydroxide-simethicone (MYLANTA) 200-200-20 mg/5 mL oral suspension 30 mL (30 mL Oral Given 8/15/18 1417)   sucralfate (CARAFATE) oral suspension 1,000 mg (1,000 mg Oral Given 8/15/18 1418)       Diagnostic Studies  Results Reviewed     Procedure Component Value Units Date/Time    Comprehensive metabolic panel [[id number]]  (Abnormal) Collected:  08/15/18 1327    Lab Status:  Final result Specimen:  Blood from Arm, Right Updated:  08/15/18 1427     Sodium 138 mmol/L      Potassium 3 9 mmol/L      Chloride 107 mmol/L      CO2 24 mmol/L      Anion Gap 7 mmol/L      BUN 14 mg/dL      Creatinine 1 31 (H) mg/dL      Glucose 92 mg/dL      Calcium 9 2 mg/dL      AST 16 U/L      ALT 37 U/L      Alkaline Phosphatase 84 U/L      Total Protein 7 6 g/dL Albumin 3 5 g/dL      Total Bilirubin 0 22 mg/dL      eGFR 68 ml/min/1 73sq m     Narrative:         National Kidney Disease Education Program recommendations are as follows:  GFR calculation is accurate only with a steady state creatinine  Chronic Kidney disease less than 60 ml/min/1 73 sq  meters  Kidney failure less than 15 ml/min/1 73 sq  meters  Lipase [[id number]]  (Normal) Collected:  08/15/18 1327    Lab Status:  Final result Specimen:  Blood from Arm, Right Updated:  08/15/18 1427     Lipase 199 u/L     CBC and differential [[id number]] Collected:  08/15/18 1327    Lab Status:  Final result Specimen:  Blood from Arm, Right Updated:  08/15/18 1410     WBC 8 80 Thousand/uL      RBC 5 40 Million/uL      Hemoglobin 15 4 g/dL      Hematocrit 47 8 %      MCV 89 fL      MCH 28 5 pg      MCHC 32 2 g/dL      RDW 14 6 %      MPV 9 1 fL      Platelets 357 Thousands/uL      nRBC 0 /100 WBCs      Neutrophils Relative 53 %      Immat GRANS % 1 %      Lymphocytes Relative 31 %      Monocytes Relative 10 %      Eosinophils Relative 4 %      Basophils Relative 1 %      Neutrophils Absolute 4 84 Thousands/µL      Immature Grans Absolute 0 04 Thousand/uL      Lymphocytes Absolute 2 70 Thousands/µL      Monocytes Absolute 0 85 Thousand/µL      Eosinophils Absolute 0 33 Thousand/µL      Basophils Absolute 0 04 Thousands/µL                  No orders to display         Procedures  Procedures      Phone Consults  ED Phone Contact    ED Course                               MDM  Number of Diagnoses or Management Options  Chronic left flank pain: new and requires workup  Reflux esophagitis: new and requires workup  Diagnosis management comments: 1  Patient is a very poor historian, however he states that today his epigastric pain and burning he feels in his throat feels like it is esophageal reflux  He states today it is no different than it has been in the past   He has no cardiac history, no history of immunocompromised state  Carafate and Mylanta  2   Because the patient was complaining of epigastric pain, will obtain lipase, CMP, CBC  Labs are within normal limits  3   Patient complaining of back pain, which he has had chronically for several years  Today is no different than has been in the past   The patient's blood pressure is normotensive, he has bilateral equal pulses femoral and radial   He has no lower extremity weakness, no headache, no vision changes  No family history of aneurysms  4   Patient's symptoms of of epigastric pain, and burning in his 3rd, resolved with Carafate and Mylanta  Spoke to the patient that his labs are normal, and we will discharge home with Carafate, and a prescription for PPI  Told the patient we will also prescribe lidocaine patch for his back pain  At this point the patient became very agitated, and inquired whether not he was going to be receiving any p o  medications for his back pain  The patient has solitary kidney, told the patient that he should take Tylenol for his back pain, and probably avoid NSAIDs at this point due to solitary kidney  The patient became frustrated, it stated that the lighted derm patch probably would not help him  Informed the patient once again he would not be getting any prescription pain medication for his back chronic pain  Patient agrees to this plan  Discharge home w/ PCP follow up          Amount and/or Complexity of Data Reviewed  Clinical lab tests: ordered and reviewed  Tests in the medicine section of CPT®: ordered and reviewed  Review and summarize past medical records: yes  Discuss the patient with other providers: yes      CritCare Time    Disposition  Final diagnoses:   Chronic left flank pain   Reflux esophagitis     Time reflects when diagnosis was documented in both MDM as applicable and the Disposition within this note     Time User Action Codes Description Comment    8/15/2018  2:54 PM Staci Weeks [R13 9  G89 29] Chronic left flank pain 8/15/2018  2:54 PM Staci Adam Add [K21 0] Reflux esophagitis       ED Disposition     ED Disposition Condition Comment    Discharge  [address]  Pranav Mohamud discharge to home/self care  Condition at discharge: Stable        Follow-up Information     Follow up With Specialties Details Why [facility] Emergency Department Emergency Medicine Go to As needed, If symptoms worsen [address] [place] [address] ED, [address] [place] [address], [id number]          Discharge Medication List as of 8/15/2018  2:57 PM      START taking these medications    Details   lidocaine (LIDODERM) 5 % Place 1 patch on the skin daily for 10 days Remove & Discard patch within 12 hours or as directed by MD, Starting Wed 8/15/2018, Until Sat 8/25/2018, Print      omeprazole (PriLOSEC) 20 mg delayed release capsule Take 2 capsules (40 mg total) by mouth daily for 30 days, Starting Wed 8/15/2018, Until Fri 9/14/2018, Print      sucralfate (CARAFATE) 1 g tablet Take 1 tablet (1 g total) by mouth 4 (four) times a day for 30 days, Starting Wed 8/15/2018, Until Fri 9/14/2018, Print           No discharge procedures on file  ED Provider  Attending physically available and evaluated [address] Mohamud  I managed the patient along with the ED Attending      Electronically Signed by         Mar Ragland DO  08/16/18 [id number]

## 2019-01-03 ENCOUNTER — SEXUAL HEALTH (OUTPATIENT)
Dept: SURGERY | Facility: CLINIC | Age: 44
End: 2019-01-03

## 2019-01-03 NOTE — PATIENT INSTRUCTIONS
Urine collected for GC/CT testing  Blood collected for HIV/syphilis testing  Recommend safer sex practices including 100% condom use  Recommend regular STD testing  Follow up 1 week for results  Practice safe sex using a condom for each sexually encounter  Condoms offer the best protection against STD's by acting as a physical barrier to prevent the exchange of semen, vaginal fluids, and blood between partners  Condoms are not a 100% effective in protection  Reducing the number of sexual partners can also help to reduce the risk of the transmission of STD's along with regular STD screening

## 2019-01-03 NOTE — PROGRESS NOTES
Anibal Carr        CHIEF CONCERN(S)  Pt denies any s/s  Wants to be screened            Condom Used: No     Sexual Preference :  Female    Date of Last Sexual Exposure: 3/2018    # of Partners: Last 30 days 0, Last 90 days 0 and Last Year 1    Sexual Practices: Oral and Vaginal      1  CURRENT RISK BEHAVIOR(S)  Unprotected sex  Sex while under the influence        2  SAFER GOAL BEHAVIOR(S)  > PREVIOUS SUCCESSES  Monogamous relationship  Sex while sober            >  SAFER GOAL BEHAVIOR(S)  Protected sex  Monogamous relationship  Sex while sober          3   PERSON ACTION PLAN:  Protected sex  Monogamous relationship  Sex while sober  > BARRIERS:  > BENEFITS:  Less risk of HIV/STD  Less anxiety of HIV/STD            > ACTION STEPS:  Testing today  Screening yearly  Obtained consent for HIV          4  REFERRALS:

## 2019-01-03 NOTE — PROGRESS NOTES
Assessment/Plan:    Urine collected for GC/CT testing  Blood collected for HIV/syphilis testing  Recommend safer sex practices including 100% condom use  Recommend regular STD testing  Follow up 1 week for results  There are no diagnoses linked to this encounter  Subjective:      Patient ID: Chris Agustin is a 41 y o  male  Pt is being seen in the STD clinic today for concerns about STD and HIV  Pt has agreed to be tested for CT/GC, HIV, and syphilis  Pt had sex with a female from the streets prior to going to long-term and pt did not wear a condom  Pt has been  in long-term for 6 months  Pt was last tested for HIV in March 2018 and is worried he might have HIV  Pt denies having any further sexual experiences since going into long-term  Pt asked several questions regarding HIV, how it is transmitted, how the virus replications in the body, symptoms of HIV, treatment for HIV, and the differences between AIDS and HIV, what CD4 and viral load represent  CRNP stressed the importance of how living with HIV can be done safely and many patients do have monogamous relationships with suppressed viral loads  CRNP explained to the pt that someone can have HIV and live with but not die from it as long as they take medication daily for the rest of the life and follow with a physician  Pt reports "this female is out having sex with other people and is doing drugs "  The pt reports "I am going to tell her to come her a get tested  CRNP told pt there are resource and places that can help to get the pt the treatment, support, and services they need should they have HIV  Pt verbalized understanding and satisfaction with conversation  Pt is Bangladeshi speaking and an interpretor was present during the entire visit  The following portions of the patient's history were reviewed and updated as appropriate: allergies and past social history  Review of Systems   Genitourinary: Negative  Objective: There were no vitals taken for this visit  Physical Exam   Constitutional: He is oriented to person, place, and time  He appears well-developed and well-nourished  Musculoskeletal: Normal range of motion  Neurological: He is oriented to person, place, and time  Skin: Skin is dry  Psychiatric: He has a normal mood and affect  His behavior is normal    Nursing note reviewed

## 2019-01-10 ENCOUNTER — APPOINTMENT (OUTPATIENT)
Dept: SURGERY | Facility: CLINIC | Age: 44
End: 2019-01-10

## 2019-01-10 NOTE — PROGRESS NOTES
Visit to receive STI/HIV test results    Results given and explained to patient , all negative      Recommend regular follow up testing

## 2019-04-24 ENCOUNTER — HOSPITAL ENCOUNTER (EMERGENCY)
Facility: HOSPITAL | Age: 44
Discharge: HOME | End: 2019-04-24
Attending: EMERGENCY MEDICINE | Admitting: EMERGENCY MEDICINE

## 2019-04-24 ENCOUNTER — APPOINTMENT (EMERGENCY)
Dept: RADIOLOGY | Facility: HOSPITAL | Age: 44
End: 2019-04-24

## 2019-04-24 VITALS
WEIGHT: 160.05 LBS | OXYGEN SATURATION: 100 % | TEMPERATURE: 98.2 F | DIASTOLIC BLOOD PRESSURE: 69 MMHG | HEART RATE: 85 BPM | BODY MASS INDEX: 25.07 KG/M2 | SYSTOLIC BLOOD PRESSURE: 117 MMHG | RESPIRATION RATE: 16 BRPM

## 2019-04-24 DIAGNOSIS — L03.113 CELLULITIS OF HAND, RIGHT: Primary | ICD-10-CM
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
## 2019-04-24 PROCEDURE — 96372 THER/PROPH/DIAG INJ SC/IM: CPT

## 2019-04-24 PROCEDURE — 90715 TDAP VACCINE 7 YRS/> IM: CPT | Performed by: EMERGENCY MEDICINE

## 2019-04-24 PROCEDURE — 99283 EMERGENCY DEPT VISIT LOW MDM: CPT

## 2019-04-24 PROCEDURE — 73130 X-RAY EXAM OF HAND: CPT

## 2019-04-24 PROCEDURE — 99283 EMERGENCY DEPT VISIT LOW MDM: CPT | Performed by: EMERGENCY MEDICINE

## 2019-04-24 PROCEDURE — 90471 IMMUNIZATION ADMIN: CPT

## 2019-04-24 RX ORDER — DOXYCYCLINE HYCLATE 100 MG/1
100 CAPSULE ORAL ONCE
Status: COMPLETED | OUTPATIENT
Start: 2019-04-24 | End: 2019-04-24

## 2019-04-24 RX ORDER — ONDANSETRON 4 MG/1
4 TABLET, ORALLY DISINTEGRATING ORAL EVERY 6 HOURS PRN
Qty: 12 TABLET | Refills: 0 | Status: SHIPPED | OUTPATIENT
Start: 2019-04-24 | End: 2020-06-06 | Stop reason: ALTCHOICE

## 2019-04-24 RX ORDER — NAPROXEN 500 MG/1
500 TABLET ORAL 2 TIMES DAILY WITH MEALS
Qty: 10 TABLET | Refills: 0 | Status: SHIPPED | OUTPATIENT
Start: 2019-04-24 | End: 2020-06-06 | Stop reason: ALTCHOICE

## 2019-04-24 RX ORDER — KETOROLAC TROMETHAMINE 30 MG/ML
15 INJECTION, SOLUTION INTRAMUSCULAR; INTRAVENOUS ONCE
Status: COMPLETED | OUTPATIENT
Start: 2019-04-24 | End: 2019-04-24

## 2019-04-24 RX ORDER — ONDANSETRON HYDROCHLORIDE 4 MG/5ML
4 SOLUTION ORAL ONCE
Status: COMPLETED | OUTPATIENT
Start: 2019-04-24 | End: 2019-04-24

## 2019-04-24 RX ORDER — ACETAMINOPHEN 325 MG/1
975 TABLET ORAL ONCE
Status: COMPLETED | OUTPATIENT
Start: 2019-04-24 | End: 2019-04-24

## 2019-04-24 RX ORDER — DOXYCYCLINE HYCLATE 100 MG/1
100 CAPSULE ORAL 2 TIMES DAILY
Qty: 20 CAPSULE | Refills: 0 | Status: SHIPPED | OUTPATIENT
Start: 2019-04-24 | End: 2019-05-04

## 2019-04-24 RX ADMIN — ACETAMINOPHEN 975 MG: 325 TABLET, FILM COATED ORAL at 13:10

## 2019-04-24 RX ADMIN — KETOROLAC TROMETHAMINE 15 MG: 30 INJECTION, SOLUTION INTRAMUSCULAR at 13:10

## 2019-04-24 RX ADMIN — DOXYCYCLINE HYCLATE 100 MG: 100 CAPSULE ORAL at 13:44

## 2019-04-24 RX ADMIN — ONDANSETRON HYDROCHLORIDE 4 MG: 4 SOLUTION ORAL at 13:44

## 2019-04-24 RX ADMIN — TETANUS TOXOID, REDUCED DIPHTHERIA TOXOID AND ACELLULAR PERTUSSIS VACCINE, ADSORBED 0.5 ML: 5; 2.5; 8; 8; 2.5 SUSPENSION INTRAMUSCULAR at 13:45

## 2019-09-10 RX ORDER — METHOCARBAMOL 750 MG/1
750 TABLET, FILM COATED ORAL 4 TIMES DAILY PRN
COMMUNITY
Start: 2019-03-30 | End: 2020-06-06 | Stop reason: ALTCHOICE

## 2019-09-10 RX ORDER — DEXAMETHASONE SODIUM PHOSPHATE 4 MG/ML
INJECTION, SOLUTION INTRA-ARTICULAR; INTRALESIONAL; INTRAMUSCULAR; INTRAVENOUS; SOFT TISSUE
COMMUNITY
Start: 2017-10-20 | End: 2020-06-06 | Stop reason: ALTCHOICE

## 2019-09-10 RX ORDER — CYCLOBENZAPRINE HCL 5 MG
5 TABLET ORAL 3 TIMES DAILY PRN
Refills: 0 | COMMUNITY
Start: 2019-07-02 | End: 2020-06-06 | Stop reason: ALTCHOICE

## 2019-09-10 RX ORDER — BUPRENORPHINE AND NALOXONE 8; 2 MG/1; MG/1
FILM, SOLUBLE BUCCAL; SUBLINGUAL
Refills: 0 | COMMUNITY
Start: 2019-09-09 | End: 2020-06-06 | Stop reason: ALTCHOICE

## 2019-09-10 RX ORDER — IBUPROFEN 600 MG/1
600 TABLET ORAL 3 TIMES DAILY PRN
Refills: 0 | COMMUNITY
Start: 2019-07-02 | End: 2020-06-06 | Stop reason: ALTCHOICE

## 2019-09-10 RX ORDER — NALOXONE HYDROCHLORIDE 4 MG/.1ML
SPRAY NASAL
Refills: 0 | COMMUNITY
Start: 2019-09-09 | End: 2020-06-06 | Stop reason: ALTCHOICE

## 2020-01-10 ENCOUNTER — HOSPITAL ENCOUNTER (EMERGENCY)
Facility: HOSPITAL | Age: 45
Discharge: HOME | End: 2020-01-10
Attending: EMERGENCY MEDICINE | Admitting: EMERGENCY MEDICINE
Payer: COMMERCIAL

## 2020-01-10 ENCOUNTER — APPOINTMENT (EMERGENCY)
Dept: RADIOLOGY | Facility: HOSPITAL | Age: 45
End: 2020-01-10
Payer: COMMERCIAL

## 2020-01-10 VITALS
SYSTOLIC BLOOD PRESSURE: 131 MMHG | TEMPERATURE: 97.7 F | BODY MASS INDEX: 26 KG/M2 | OXYGEN SATURATION: 98 % | RESPIRATION RATE: 16 BRPM | WEIGHT: 166.01 LBS | DIASTOLIC BLOOD PRESSURE: 65 MMHG | HEART RATE: 95 BPM

## 2020-01-10 DIAGNOSIS — M25.512 LEFT SHOULDER PAIN: Primary | ICD-10-CM

## 2020-01-10 PROCEDURE — 99284 EMERGENCY DEPT VISIT MOD MDM: CPT | Performed by: PHYSICIAN ASSISTANT

## 2020-01-10 PROCEDURE — 73030 X-RAY EXAM OF SHOULDER: CPT

## 2020-01-10 PROCEDURE — 99283 EMERGENCY DEPT VISIT LOW MDM: CPT

## 2020-01-10 RX ORDER — ACETAMINOPHEN 500 MG
500 TABLET ORAL EVERY 6 HOURS PRN
Qty: 30 TABLET | Refills: 0 | Status: SHIPPED | OUTPATIENT
Start: 2020-01-10 | End: 2020-06-06 | Stop reason: ALTCHOICE

## 2020-01-10 NOTE — ED PROVIDER NOTES
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 Taking? NARCAN 4 MG/0 1ML LIQD   Yes No   Sig: TAKE 1 SPRAY INTRANASALLY ONCE ONE SPRAY IN NOSTRILS FOR SIGNS OF   (REFER TO PRESCRIPTION NOTES)     buprenorphine-naloxone (SUBOXONE) 8-2 mg   Yes No   Sig: dissolve 1 and 1/2 FILMS under the tongue once daily   cyclobenzaprine (FLEXERIL) 5 mg tablet   Yes No   Sig: Take 5 mg by mouth 3 (three) times a day as needed   dexamethasone (DECADRON) 4 mg/mL   Yes No   Sig: Inject as directed   ibuprofen (MOTRIN) 600 mg tablet   Yes No   Sig: Take 600 mg by mouth 3 (three) times a day as needed   methocarbamol (ROBAXIN) 750 mg tablet   Yes No   Sig: Take 750 mg by mouth 4 (four) times a day as needed   naproxen (NAPROSYN) 500 mg tablet   No No   Sig: Take 1 tablet (500 mg total) by mouth 2 (two) times a day with meals for 5 days   ondansetron (ZOFRAN-ODT) 4 mg disintegrating tablet   No No   Sig: Take 1 tablet (4 mg total) by mouth every 6 (six) hours as needed for nausea or vomiting for up to 3 days      Facility-Administered Medications: None       Past Medical History:   Diagnosis Date    Asthma     Back pain     Bipolar 1 disorder ([facility] [address] )     Depression     Gastritis     GERD (gastroesophageal reflux disease)     Kidney stones        Past Surgical History:   Procedure Laterality Date    ABDOMINAL SURGERY      UT CYSTOURETHROSCOPY,URETER CATHETER Right 9/27/2016    Procedure: CYSTOSCOPY WITH RETROGRADE PYELOGRAM;  Surgeon: Manuel Murphy MD;  [facility];  Service: Urology    UT LAP, RADICAL NEPHRECTOMY Right 11/23/2016    Procedure: HAND ASSISTED LAPAROSCOPIC NEPHRECTOMY, converted to open, lysis of adhesions,repair of  vena cava; Surgeon: Manuel Murphy MD;  [facility];  Service: Urology    URETERAL STENT PLACEMENT Right 9/27/2016    Procedure: INSERTION STENT URETERAL;  Surgeon: Manuel Murphy MD;  [facility];  Service:    [facility] VASCULAR SURGERY         Family History   Problem Relation Age of Onset    Stroke Mother     Heart disease Mother     HIV Father      I have reviewed and agree with the history as documented  Social History     Tobacco Use    Smoking status: Current Every Day Smoker     Packs/day: 1 00     Years: 11 00     Pack years: 11 00    Smokeless tobacco: Never Used   Substance Use Topics    Alcohol use: No    Drug use: Yes     Types: Marijuana        Review of Systems   Constitutional: Negative for chills, fatigue and fever  HENT: Negative for congestion, ear pain, rhinorrhea and sore throat  Eyes: Negative for redness  Respiratory: Negative for chest tightness and shortness of breath  Cardiovascular: Negative for chest pain and palpitations  Gastrointestinal: Negative for abdominal pain, nausea and vomiting  Genitourinary: Negative for dysuria and hematuria  Musculoskeletal: Positive for arthralgias  Skin: Negative for rash     Neurological: Negative for dizziness, syncope, light-headedness and numbness  Physical Exam  Physical Exam   Constitutional: He is oriented to person, place, and time  He appears well-developed and well-nourished  HENT:   Head: Normocephalic and atraumatic  Eyes: Pupils are equal, round, and reactive to light  Neck: Normal range of motion  Cardiovascular: Normal rate, regular rhythm and normal heart sounds  Pulmonary/Chest: Effort normal and breath sounds normal    Abdominal: Soft  There is no tenderness  There is no guarding  Musculoskeletal: Normal range of motion  He exhibits tenderness  He exhibits no edema or deformity  Arms:  Lymphadenopathy:     He has no cervical adenopathy  Neurological: He is alert and oriented to person, place, and time  Skin: Skin is warm and dry  Capillary refill takes less than 2 seconds  Psychiatric: He has a normal mood and affect  Nursing note and vitals reviewed  Vital Signs  ED Triage Vitals [01/10/20 0958]   Temperature Pulse Respirations Blood Pressure SpO2   97 7 °F (36 5 °C) 95 16 131/65 98 %      Temp Source Heart Rate Source Patient Position - Orthostatic VS BP Location FiO2 (%)   Tympanic Monitor Sitting Left arm --      Pain Score       5           Vitals:    01/10/20 0958   BP: 131/65   Pulse: 95   Patient Position - Orthostatic VS: Sitting         Visual Acuity      ED Medications  Medications - No data to display    Diagnostic Studies  Results Reviewed     None                 XR shoulder 2+ views LEFT   ED Interpretation by Emely Louise PA-C (01/10 1045)   Calcific tendonitis perhaps vs old avulsion fx superior to humerus  Patient advised to follow with orthopedics  No acute dislocations visualized      Final Result by Jennie Griffin MD (01/10 1108)      Mild osteoarthritis of the left glenohumeral joint without evidence of acute osseous abnormality    Possible loose body versus calcification or ossification ossification in the supraspinatus tendon  Finding noted on prior study as well               Workstation performed: [id number]                    Procedures  Procedures         ED Course                               MDM      Disposition  Final diagnoses:   Left shoulder pain     Time reflects when diagnosis was documented in both MDM as applicable and the Disposition within this note     Time User Action Codes Description Comment    1/10/2020 10:13 AM Negra Alcazar Add [M25 512] Left shoulder pain       ED Disposition     ED Disposition Condition Date/Time Comment    Discharge Good Fri Demar 10, 2020 10:12 AM Tima J  [address] discharge to home/self care              Follow-up Information     Follow up With Specialties Details Why Contact Info    Roslyn Padilla MD Orthopedic Surgery Schedule an appointment as soon as possible for a visit in 2 days  [address]  [phone]            Discharge Medication List as of 1/10/2020 10:24 AM      START taking these medications    Details   acetaminophen (TYLENOL) 500 mg tablet Take 1 tablet (500 mg total) by mouth every 6 (six) hours as needed for mild pain, Starting Fri 1/10/2020, Print         CONTINUE these medications which have NOT CHANGED    Details   buprenorphine-naloxone (SUBOXONE) 8-2 mg dissolve 1 and 1/2 FILMS under the tongue once daily, Historical Med      cyclobenzaprine (FLEXERIL) 5 mg tablet Take 5 mg by mouth 3 (three) times a day as needed, Starting Tue 7/2/2019, Historical Med      dexamethasone (DECADRON) 4 mg/mL Inject as directed, Starting Fri 10/20/2017, Historical Med      ibuprofen (MOTRIN) 600 mg tablet Take 600 mg by mouth 3 (three) times a day as needed, Starting Tue 7/2/2019, Historical Med      methocarbamol (ROBAXIN) 750 mg tablet Take 750 mg by mouth 4 (four) times a day as needed, Starting Sat 3/30/2019, Historical Med      naproxen (NAPROSYN) 500 mg tablet Take 1 tablet (500 mg total) by mouth 2 (two) times a day with meals for 5 days, Starting Wed 4/24/2019, Until Mon 4/29/2019, Print      NARCAN 4 MG/0 1ML LIQD TAKE 1 SPRAY INTRANASALLY ONCE ONE SPRAY IN NOSTRILS FOR SIGNS OF   (REFER TO PRESCRIPTION NOTES)  , Historical Med      ondansetron (ZOFRAN-ODT) 4 mg disintegrating tablet Take 1 tablet (4 mg total) by mouth every 6 (six) hours as needed for nausea or vomiting for up to 3 days, Starting Wed 4/24/2019, Until Sat 4/27/2019, Print           No discharge procedures on file      ED Provider  Electronically Signed by           Nabil Garnett PA-C  01/10/20 [phone]

## 2020-06-06 ENCOUNTER — APPOINTMENT (EMERGENCY)
Dept: CT IMAGING | Facility: HOSPITAL | Age: 45
End: 2020-06-06
Payer: COMMERCIAL

## 2020-06-06 ENCOUNTER — HOSPITAL ENCOUNTER (EMERGENCY)
Facility: HOSPITAL | Age: 45
Discharge: HOME | End: 2020-06-07
Attending: EMERGENCY MEDICINE | Admitting: EMERGENCY MEDICINE
Payer: COMMERCIAL

## 2020-06-06 DIAGNOSIS — N39.0 UTI (URINARY TRACT INFECTION): Primary | ICD-10-CM

## 2020-06-06 LAB
ALBUMIN SERPL BCP-MCNC: 4.3 G/DL (ref 3–5.2)
ALP SERPL-CCNC: 75 U/L (ref 43–122)
ALT SERPL W P-5'-P-CCNC: 14 U/L (ref 9–52)
ANION GAP SERPL CALCULATED.3IONS-SCNC: 9 MMOL/L (ref 5–14)
AST SERPL W P-5'-P-CCNC: 23 U/L (ref 17–59)
BACTERIA UR QL AUTO: ABNORMAL /HPF
BASOPHILS # BLD AUTO: 0.1 THOUSANDS/ΜL (ref 0–0.1)
BASOPHILS NFR BLD AUTO: 1 % (ref 0–1)
BILIRUB SERPL-MCNC: 0.4 MG/DL
BILIRUB UR QL STRIP: NEGATIVE
BUN SERPL-MCNC: 17 MG/DL (ref 5–25)
CALCIUM SERPL-MCNC: 9.9 MG/DL (ref 8.4–10.2)
CHLORIDE SERPL-SCNC: 106 MMOL/L (ref 97–108)
CLARITY UR: ABNORMAL
CO2 SERPL-SCNC: 23 MMOL/L (ref 22–30)
COLOR UR: YELLOW
CREAT SERPL-MCNC: 1.14 MG/DL (ref 0.7–1.5)
EOSINOPHIL # BLD AUTO: 0.2 THOUSAND/ΜL (ref 0–0.4)
EOSINOPHIL NFR BLD AUTO: 1 % (ref 0–6)
ERYTHROCYTE [DISTWIDTH] IN BLOOD BY AUTOMATED COUNT: 15.6 %
GFR SERPL CREATININE-BSD FRML MDRD: 79 ML/MIN/1.73SQ M
GLUCOSE SERPL-MCNC: 98 MG/DL (ref 70–99)
GLUCOSE UR STRIP-MCNC: NEGATIVE MG/DL
HCT VFR BLD AUTO: 41.6 % (ref 41–53)
HGB BLD-MCNC: 14.3 G/DL (ref 13.5–17.5)
HGB UR QL STRIP.AUTO: 250
KETONES UR STRIP-MCNC: ABNORMAL MG/DL
LEUKOCYTE ESTERASE UR QL STRIP: 500
LIPASE SERPL-CCNC: 107 U/L (ref 23–300)
LYMPHOCYTES # BLD AUTO: 2 THOUSANDS/ΜL (ref 0.5–4)
LYMPHOCYTES NFR BLD AUTO: 19 % (ref 25–45)
MCH RBC QN AUTO: 29.5 PG (ref 26–34)
MCHC RBC AUTO-ENTMCNC: 34.4 G/DL (ref 31–36)
MCV RBC AUTO: 86 FL (ref 80–100)
MONOCYTES # BLD AUTO: 0.9 THOUSAND/ΜL (ref 0.2–0.9)
MONOCYTES NFR BLD AUTO: 8 % (ref 1–10)
NEUTROPHILS # BLD AUTO: 7.6 THOUSANDS/ΜL (ref 1.8–7.8)
NEUTS SEG NFR BLD AUTO: 71 % (ref 45–65)
NITRITE UR QL STRIP: NEGATIVE
NON-SQ EPI CELLS URNS QL MICRO: ABNORMAL /HPF
PH UR STRIP.AUTO: 5 [PH]
PLATELET # BLD AUTO: 352 THOUSANDS/UL (ref 150–450)
PMV BLD AUTO: 7.1 FL (ref 8.9–12.7)
POTASSIUM SERPL-SCNC: 4.4 MMOL/L (ref 3.6–5)
PROT SERPL-MCNC: 7.9 G/DL (ref 5.9–8.4)
PROT UR STRIP-MCNC: ABNORMAL MG/DL
RBC # BLD AUTO: 4.86 MILLION/UL (ref 4.5–5.9)
RBC #/AREA URNS AUTO: ABNORMAL /HPF
SODIUM SERPL-SCNC: 138 MMOL/L (ref 137–147)
SP GR UR STRIP.AUTO: 1.02 (ref 1–1.04)
UROBILINOGEN UA: NEGATIVE MG/DL
WBC # BLD AUTO: 10.6 THOUSAND/UL (ref 4.5–11)
WBC #/AREA URNS AUTO: ABNORMAL /HPF

## 2020-06-06 PROCEDURE — 81003 URINALYSIS AUTO W/O SCOPE: CPT | Performed by: PHYSICIAN ASSISTANT

## 2020-06-06 PROCEDURE — 74176 CT ABD & PELVIS W/O CONTRAST: CPT

## 2020-06-06 PROCEDURE — 96374 THER/PROPH/DIAG INJ IV PUSH: CPT

## 2020-06-06 PROCEDURE — 87086 URINE CULTURE/COLONY COUNT: CPT | Performed by: PHYSICIAN ASSISTANT

## 2020-06-06 PROCEDURE — 99284 EMERGENCY DEPT VISIT MOD MDM: CPT

## 2020-06-06 PROCEDURE — 80053 COMPREHEN METABOLIC PANEL: CPT | Performed by: PHYSICIAN ASSISTANT

## 2020-06-06 PROCEDURE — 81001 URINALYSIS AUTO W/SCOPE: CPT | Performed by: PHYSICIAN ASSISTANT

## 2020-06-06 PROCEDURE — 99284 EMERGENCY DEPT VISIT MOD MDM: CPT | Performed by: PHYSICIAN ASSISTANT

## 2020-06-06 PROCEDURE — 85025 COMPLETE CBC W/AUTO DIFF WBC: CPT | Performed by: PHYSICIAN ASSISTANT

## 2020-06-06 PROCEDURE — 83690 ASSAY OF LIPASE: CPT | Performed by: PHYSICIAN ASSISTANT

## 2020-06-06 PROCEDURE — 36415 COLL VENOUS BLD VENIPUNCTURE: CPT | Performed by: PHYSICIAN ASSISTANT

## 2020-06-06 PROCEDURE — 96361 HYDRATE IV INFUSION ADD-ON: CPT

## 2020-06-06 RX ORDER — KETOROLAC TROMETHAMINE 30 MG/ML
15 INJECTION, SOLUTION INTRAMUSCULAR; INTRAVENOUS ONCE
Status: COMPLETED | OUTPATIENT
Start: 2020-06-06 | End: 2020-06-06

## 2020-06-06 RX ADMIN — SODIUM CHLORIDE 1000 ML: 0.9 INJECTION, SOLUTION INTRAVENOUS at 22:06

## 2020-06-06 RX ADMIN — KETOROLAC TROMETHAMINE 15 MG: 30 INJECTION, SOLUTION INTRAMUSCULAR at 22:07

## 2020-06-07 VITALS
DIASTOLIC BLOOD PRESSURE: 69 MMHG | TEMPERATURE: 96.3 F | SYSTOLIC BLOOD PRESSURE: 121 MMHG | HEART RATE: 71 BPM | WEIGHT: 145.44 LBS | RESPIRATION RATE: 18 BRPM | OXYGEN SATURATION: 98 % | BODY MASS INDEX: 22.78 KG/M2

## 2020-06-07 RX ORDER — CEPHALEXIN 250 MG/1
500 CAPSULE ORAL 4 TIMES DAILY
Qty: 56 CAPSULE | Refills: 0 | Status: SHIPPED | OUTPATIENT
Start: 2020-06-07 | End: 2020-06-14

## 2020-06-08 LAB — BACTERIA UR CULT: NORMAL

## 2021-11-29 ENCOUNTER — HOSPITAL ENCOUNTER (EMERGENCY)
Facility: HOSPITAL | Age: 46
Discharge: HOME | End: 2021-11-29
Attending: EMERGENCY MEDICINE
Payer: COMMERCIAL

## 2021-11-29 VITALS
OXYGEN SATURATION: 98 % | RESPIRATION RATE: 18 BRPM | HEIGHT: 67 IN | BODY MASS INDEX: 24.45 KG/M2 | SYSTOLIC BLOOD PRESSURE: 120 MMHG | HEART RATE: 112 BPM | WEIGHT: 155.8 LBS | DIASTOLIC BLOOD PRESSURE: 75 MMHG | TEMPERATURE: 101 F

## 2021-11-29 DIAGNOSIS — R50.9 FEVER: Primary | ICD-10-CM

## 2021-11-29 PROCEDURE — 99283 EMERGENCY DEPT VISIT LOW MDM: CPT

## 2021-11-29 PROCEDURE — U0005 INFEC AGEN DETEC AMPLI PROBE: HCPCS | Performed by: EMERGENCY MEDICINE

## 2021-11-29 PROCEDURE — 99284 EMERGENCY DEPT VISIT MOD MDM: CPT | Performed by: EMERGENCY MEDICINE

## 2021-11-29 PROCEDURE — U0003 INFECTIOUS AGENT DETECTION BY NUCLEIC ACID (DNA OR RNA); SEVERE ACUTE RESPIRATORY SYNDROME CORONAVIRUS 2 (SARS-COV-2) (CORONAVIRUS DISEASE [COVID-19]), AMPLIFIED PROBE TECHNIQUE, MAKING USE OF HIGH THROUGHPUT TECHNOLOGIES AS DESCRIBED BY CMS-2020-01-R: HCPCS | Performed by: EMERGENCY MEDICINE

## 2021-11-29 RX ORDER — MULTIVIT WITH MINERALS/LUTEIN
1000 TABLET ORAL 2 TIMES DAILY
Qty: 30 TABLET | Refills: 0 | Status: SHIPPED | OUTPATIENT
Start: 2021-11-29

## 2021-11-29 RX ORDER — IBUPROFEN 600 MG/1
600 TABLET ORAL ONCE
Status: COMPLETED | OUTPATIENT
Start: 2021-11-29 | End: 2021-11-29

## 2021-11-29 RX ORDER — ACETAMINOPHEN 325 MG/1
650 TABLET ORAL ONCE
Status: COMPLETED | OUTPATIENT
Start: 2021-11-29 | End: 2021-11-29

## 2021-11-29 RX ORDER — MELATONIN
1000 DAILY
Qty: 30 TABLET | Refills: 0 | Status: SHIPPED | OUTPATIENT
Start: 2021-11-29

## 2021-11-29 RX ORDER — ALBUTEROL SULFATE 90 UG/1
2 AEROSOL, METERED RESPIRATORY (INHALATION) ONCE
Status: COMPLETED | OUTPATIENT
Start: 2021-11-29 | End: 2021-11-29

## 2021-11-29 RX ADMIN — ACETAMINOPHEN 650 MG: 325 TABLET ORAL at 12:45

## 2021-11-29 RX ADMIN — IBUPROFEN 600 MG: 600 TABLET, FILM COATED ORAL at 12:46

## 2021-11-29 RX ADMIN — ALBUTEROL SULFATE 2 PUFF: 90 AEROSOL, METERED RESPIRATORY (INHALATION) at 12:48

## 2021-11-30 LAB — SARS-COV-2 RNA RESP QL NAA+PROBE: NEGATIVE

## 2021-12-01 ENCOUNTER — APPOINTMENT (EMERGENCY)
Dept: CT IMAGING | Facility: HOSPITAL | Age: 46
End: 2021-12-01
Payer: COMMERCIAL

## 2021-12-01 ENCOUNTER — HOSPITAL ENCOUNTER (EMERGENCY)
Facility: HOSPITAL | Age: 46
Discharge: HOME | End: 2021-12-01
Attending: EMERGENCY MEDICINE
Payer: COMMERCIAL

## 2021-12-01 VITALS
OXYGEN SATURATION: 95 % | RESPIRATION RATE: 12 BRPM | TEMPERATURE: 98.8 F | SYSTOLIC BLOOD PRESSURE: 102 MMHG | DIASTOLIC BLOOD PRESSURE: 55 MMHG | HEART RATE: 73 BPM

## 2021-12-01 DIAGNOSIS — J02.0 STREP PHARYNGITIS: ICD-10-CM

## 2021-12-01 DIAGNOSIS — J36 PERITONSILLAR ABSCESS: Primary | ICD-10-CM

## 2021-12-01 LAB
ALBUMIN SERPL BCP-MCNC: 3.9 G/DL (ref 3–5.2)
ALP SERPL-CCNC: 87 U/L (ref 43–122)
ALT SERPL W P-5'-P-CCNC: 26 U/L
ANION GAP SERPL CALCULATED.3IONS-SCNC: 4 MMOL/L (ref 5–14)
AST SERPL W P-5'-P-CCNC: 28 U/L (ref 17–59)
BILIRUB SERPL-MCNC: 0.46 MG/DL
BUN SERPL-MCNC: 13 MG/DL (ref 5–25)
CALCIUM SERPL-MCNC: 9.1 MG/DL (ref 8.4–10.2)
CHLORIDE SERPL-SCNC: 100 MMOL/L (ref 97–108)
CO2 SERPL-SCNC: 29 MMOL/L (ref 22–30)
CREAT SERPL-MCNC: 1.16 MG/DL (ref 0.7–1.5)
ERYTHROCYTE [DISTWIDTH] IN BLOOD BY AUTOMATED COUNT: 16 %
GFR SERPL CREATININE-BSD FRML MDRD: 76 ML/MIN/1.73SQ M
GLUCOSE SERPL-MCNC: 105 MG/DL (ref 70–99)
HCT VFR BLD AUTO: 36.1 % (ref 41–53)
HGB BLD-MCNC: 11.9 G/DL (ref 13.5–17.5)
LYMPHOCYTES # BLD AUTO: 19 % (ref 25–45)
LYMPHOCYTES # BLD AUTO: 2.95 THOUSAND/UL (ref 0.5–4)
MCH RBC QN AUTO: 28.8 PG (ref 26–34)
MCHC RBC AUTO-ENTMCNC: 32.9 G/DL (ref 31–36)
MCV RBC AUTO: 88 FL (ref 80–100)
MONOCYTES # BLD AUTO: 0.93 THOUSAND/UL (ref 0.2–0.9)
MONOCYTES NFR BLD AUTO: 6 % (ref 1–10)
NEUTS BAND NFR BLD MANUAL: 2 % (ref 0–8)
NEUTS SEG # BLD: 11.63 THOUSAND/UL (ref 1.8–7.8)
NEUTS SEG NFR BLD AUTO: 73 %
PLATELET # BLD AUTO: 313 THOUSANDS/UL (ref 150–450)
PLATELET BLD QL SMEAR: ADEQUATE
PMV BLD AUTO: 7.3 FL (ref 8.9–12.7)
POTASSIUM SERPL-SCNC: 4.7 MMOL/L (ref 3.6–5)
PROT SERPL-MCNC: 7.5 G/DL (ref 5.9–8.4)
RBC # BLD AUTO: 4.13 MILLION/UL (ref 4.5–5.9)
RBC MORPH BLD: NORMAL
S PYO DNA THROAT QL NAA+PROBE: DETECTED
SODIUM SERPL-SCNC: 133 MMOL/L (ref 137–147)
TOTAL CELLS COUNTED SPEC: 100
WBC # BLD AUTO: 15.5 THOUSAND/UL (ref 4.5–11)

## 2021-12-01 PROCEDURE — 96361 HYDRATE IV INFUSION ADD-ON: CPT

## 2021-12-01 PROCEDURE — 80053 COMPREHEN METABOLIC PANEL: CPT | Performed by: EMERGENCY MEDICINE

## 2021-12-01 PROCEDURE — 99284 EMERGENCY DEPT VISIT MOD MDM: CPT | Performed by: EMERGENCY MEDICINE

## 2021-12-01 PROCEDURE — 99284 EMERGENCY DEPT VISIT MOD MDM: CPT

## 2021-12-01 PROCEDURE — 36415 COLL VENOUS BLD VENIPUNCTURE: CPT | Performed by: EMERGENCY MEDICINE

## 2021-12-01 PROCEDURE — 70491 CT SOFT TISSUE NECK W/DYE: CPT

## 2021-12-01 PROCEDURE — 85027 COMPLETE CBC AUTOMATED: CPT | Performed by: EMERGENCY MEDICINE

## 2021-12-01 PROCEDURE — 85007 BL SMEAR W/DIFF WBC COUNT: CPT | Performed by: EMERGENCY MEDICINE

## 2021-12-01 PROCEDURE — 96375 TX/PRO/DX INJ NEW DRUG ADDON: CPT

## 2021-12-01 PROCEDURE — 96365 THER/PROPH/DIAG IV INF INIT: CPT

## 2021-12-01 PROCEDURE — 87651 STREP A DNA AMP PROBE: CPT | Performed by: EMERGENCY MEDICINE

## 2021-12-01 RX ORDER — CLINDAMYCIN PHOSPHATE 600 MG/50ML
600 INJECTION INTRAVENOUS ONCE
Status: COMPLETED | OUTPATIENT
Start: 2021-12-01 | End: 2021-12-01

## 2021-12-01 RX ORDER — IBUPROFEN 600 MG/1
600 TABLET ORAL EVERY 6 HOURS PRN
Qty: 30 TABLET | Refills: 0 | Status: SHIPPED | OUTPATIENT
Start: 2021-12-01

## 2021-12-01 RX ORDER — KETOROLAC TROMETHAMINE 30 MG/ML
15 INJECTION, SOLUTION INTRAMUSCULAR; INTRAVENOUS ONCE
Status: COMPLETED | OUTPATIENT
Start: 2021-12-01 | End: 2021-12-01

## 2021-12-01 RX ORDER — CLINDAMYCIN HYDROCHLORIDE 300 MG/1
300 CAPSULE ORAL EVERY 8 HOURS SCHEDULED
Qty: 21 CAPSULE | Refills: 0 | Status: SHIPPED | OUTPATIENT
Start: 2021-12-01 | End: 2021-12-08

## 2021-12-01 RX ORDER — DEXAMETHASONE SODIUM PHOSPHATE 4 MG/ML
10 INJECTION, SOLUTION INTRA-ARTICULAR; INTRALESIONAL; INTRAMUSCULAR; INTRAVENOUS; SOFT TISSUE ONCE
Status: COMPLETED | OUTPATIENT
Start: 2021-12-01 | End: 2021-12-01

## 2021-12-01 RX ADMIN — SODIUM CHLORIDE 1000 ML: 0.9 INJECTION, SOLUTION INTRAVENOUS at 09:38

## 2021-12-01 RX ADMIN — IOHEXOL 100 ML: 350 INJECTION, SOLUTION INTRAVENOUS at 10:54

## 2021-12-01 RX ADMIN — CLINDAMYCIN IN 5 PERCENT DEXTROSE 600 MG: 12 INJECTION, SOLUTION INTRAVENOUS at 09:38

## 2021-12-01 RX ADMIN — KETOROLAC TROMETHAMINE 15 MG: 30 INJECTION, SOLUTION INTRAMUSCULAR; INTRAVENOUS at 09:38

## 2021-12-01 RX ADMIN — DEXAMETHASONE SODIUM PHOSPHATE 10 MG: 4 INJECTION, SOLUTION INTRAMUSCULAR; INTRAVENOUS at 09:38

## 2022-08-21 ENCOUNTER — HOSPITAL ENCOUNTER (INPATIENT)
Facility: HOSPITAL | Age: 47
LOS: 1 days | Discharge: HOME | DRG: 917 | End: 2022-08-22
Attending: ANESTHESIOLOGY | Admitting: ANESTHESIOLOGY
Payer: COMMERCIAL

## 2022-08-21 ENCOUNTER — APPOINTMENT (OUTPATIENT)
Dept: RADIOLOGY | Facility: HOSPITAL | Age: 47
End: 2022-08-21
Payer: COMMERCIAL

## 2022-08-21 ENCOUNTER — HOSPITAL ENCOUNTER (EMERGENCY)
Facility: HOSPITAL | Age: 47
End: 2022-08-21
Attending: STUDENT IN AN ORGANIZED HEALTH CARE EDUCATION/TRAINING PROGRAM
Payer: COMMERCIAL

## 2022-08-21 VITALS
RESPIRATION RATE: 18 BRPM | TEMPERATURE: 97.9 F | DIASTOLIC BLOOD PRESSURE: 74 MMHG | BODY MASS INDEX: 21.82 KG/M2 | OXYGEN SATURATION: 98 % | SYSTOLIC BLOOD PRESSURE: 125 MMHG | WEIGHT: 139.33 LBS | HEART RATE: 81 BPM

## 2022-08-21 DIAGNOSIS — J96.90 RESPIRATORY FAILURE REQUIRING INTUBATION (HCC): Primary | ICD-10-CM

## 2022-08-21 DIAGNOSIS — R06.03 RESPIRATORY DISTRESS: ICD-10-CM

## 2022-08-21 DIAGNOSIS — T50.901A ACCIDENTAL OVERDOSE, INITIAL ENCOUNTER: ICD-10-CM

## 2022-08-21 DIAGNOSIS — R09.02 HYPOXIA: Primary | ICD-10-CM

## 2022-08-21 DIAGNOSIS — T50.901A OVERDOSE: ICD-10-CM

## 2022-08-21 LAB
ALBUMIN SERPL BCP-MCNC: 4.1 G/DL (ref 3.5–5)
ALP SERPL-CCNC: 59 U/L (ref 43–122)
ALT SERPL W P-5'-P-CCNC: 19 U/L
ANION GAP SERPL CALCULATED.3IONS-SCNC: 13 MMOL/L (ref 5–14)
ARTERIAL PATENCY WRIST A: YES
AST SERPL W P-5'-P-CCNC: 22 U/L (ref 17–59)
ATRIAL RATE: 91 BPM
BASE EXCESS BLDA CALC-SCNC: -3.1 MMOL/L
BASOPHILS # BLD AUTO: 0.01 THOUSANDS/ΜL (ref 0–0.1)
BASOPHILS NFR BLD AUTO: 0 % (ref 0–1)
BILIRUB SERPL-MCNC: 0.3 MG/DL (ref 0.2–1)
BUN SERPL-MCNC: 9 MG/DL (ref 5–25)
CALCIUM SERPL-MCNC: 8.5 MG/DL (ref 8.4–10.2)
CARDIAC TROPONIN I PNL SERPL HS: 3 NG/L
CHLORIDE SERPL-SCNC: 97 MMOL/L (ref 96–108)
CO2 SERPL-SCNC: 24 MMOL/L (ref 21–32)
CREAT SERPL-MCNC: 1.37 MG/DL (ref 0.7–1.5)
EOSINOPHIL # BLD AUTO: 0.03 THOUSAND/ΜL (ref 0–0.61)
EOSINOPHIL NFR BLD AUTO: 0 % (ref 0–6)
ERYTHROCYTE [DISTWIDTH] IN BLOOD BY AUTOMATED COUNT: 14.6 % (ref 11.6–15.1)
GFR SERPL CREATININE-BSD FRML MDRD: 62 ML/MIN/1.73SQ M
GLUCOSE SERPL-MCNC: 321 MG/DL (ref 70–99)
HCO3 BLDA-SCNC: 24.8 MMOL/L (ref 22–28)
HCT VFR BLD AUTO: 44 % (ref 36.5–49.3)
HGB BLD-MCNC: 13.2 G/DL (ref 12–17)
IMM GRANULOCYTES # BLD AUTO: 0.04 THOUSAND/UL (ref 0–0.2)
IMM GRANULOCYTES NFR BLD AUTO: 1 % (ref 0–2)
LYMPHOCYTES # BLD AUTO: 1.14 THOUSANDS/ΜL (ref 0.6–4.47)
LYMPHOCYTES NFR BLD AUTO: 15 % (ref 14–44)
MAGNESIUM SERPL-MCNC: 1.8 MG/DL (ref 1.6–2.3)
MCH RBC QN AUTO: 27.7 PG (ref 26.8–34.3)
MCHC RBC AUTO-ENTMCNC: 30 G/DL (ref 31.4–37.4)
MCV RBC AUTO: 92 FL (ref 82–98)
MONOCYTES # BLD AUTO: 0.39 THOUSAND/ΜL (ref 0.17–1.22)
MONOCYTES NFR BLD AUTO: 5 % (ref 4–12)
NEUTROPHILS # BLD AUTO: 5.82 THOUSANDS/ΜL (ref 1.85–7.62)
NEUTS SEG NFR BLD AUTO: 79 % (ref 43–75)
NRBC BLD AUTO-RTO: 0 /100 WBCS
O2 CT BLDA-SCNC: 18 ML/DL (ref 16–23)
OXYHGB MFR BLDA: 92.7 % (ref 94–97)
P AXIS: 55 DEGREES
PCO2 BLDA: 56.8 MM HG (ref 36–44)
PH BLDA: 7.26 [PH] (ref 7.35–7.45)
PLATELET # BLD AUTO: 273 THOUSANDS/UL (ref 149–390)
PMV BLD AUTO: 8.1 FL (ref 8.9–12.7)
PO2 BLDA: 84.5 MM HG (ref 75–129)
POTASSIUM SERPL-SCNC: 3.6 MMOL/L (ref 3.5–5.3)
PR INTERVAL: 138 MS
PROT SERPL-MCNC: 7 G/DL (ref 6.4–8.4)
QRS AXIS: 61 DEGREES
QRSD INTERVAL: 96 MS
QT INTERVAL: 380 MS
QTC INTERVAL: 467 MS
RBC # BLD AUTO: 4.76 MILLION/UL (ref 3.88–5.62)
SARS-COV-2 RNA RESP QL NAA+PROBE: NEGATIVE
SODIUM SERPL-SCNC: 134 MMOL/L (ref 135–147)
SPECIMEN SOURCE: ABNORMAL
T WAVE AXIS: 57 DEGREES
VENTRICULAR RATE: 91 BPM
WBC # BLD AUTO: 7.43 THOUSAND/UL (ref 4.31–10.16)

## 2022-08-21 PROCEDURE — U0005 INFEC AGEN DETEC AMPLI PROBE: HCPCS | Performed by: PHYSICIAN ASSISTANT

## 2022-08-21 PROCEDURE — 31500 INSERT EMERGENCY AIRWAY: CPT

## 2022-08-21 PROCEDURE — 99291 CRITICAL CARE FIRST HOUR: CPT | Performed by: PHYSICIAN ASSISTANT

## 2022-08-21 PROCEDURE — 36600 WITHDRAWAL OF ARTERIAL BLOOD: CPT

## 2022-08-21 PROCEDURE — 31500 INSERT EMERGENCY AIRWAY: CPT | Performed by: PHYSICIAN ASSISTANT

## 2022-08-21 PROCEDURE — 96375 TX/PRO/DX INJ NEW DRUG ADDON: CPT

## 2022-08-21 PROCEDURE — 80053 COMPREHEN METABOLIC PANEL: CPT | Performed by: PHYSICIAN ASSISTANT

## 2022-08-21 PROCEDURE — 93010 ELECTROCARDIOGRAM REPORT: CPT | Performed by: INTERNAL MEDICINE

## 2022-08-21 PROCEDURE — 85025 COMPLETE CBC W/AUTO DIFF WBC: CPT | Performed by: PHYSICIAN ASSISTANT

## 2022-08-21 PROCEDURE — U0003 INFECTIOUS AGENT DETECTION BY NUCLEIC ACID (DNA OR RNA); SEVERE ACUTE RESPIRATORY SYNDROME CORONAVIRUS 2 (SARS-COV-2) (CORONAVIRUS DISEASE [COVID-19]), AMPLIFIED PROBE TECHNIQUE, MAKING USE OF HIGH THROUGHPUT TECHNOLOGIES AS DESCRIBED BY CMS-2020-01-R: HCPCS | Performed by: PHYSICIAN ASSISTANT

## 2022-08-21 PROCEDURE — 36415 COLL VENOUS BLD VENIPUNCTURE: CPT | Performed by: PHYSICIAN ASSISTANT

## 2022-08-21 PROCEDURE — 94760 N-INVAS EAR/PLS OXIMETRY 1: CPT

## 2022-08-21 PROCEDURE — 94002 VENT MGMT INPAT INIT DAY: CPT

## 2022-08-21 PROCEDURE — 93005 ELECTROCARDIOGRAM TRACING: CPT

## 2022-08-21 PROCEDURE — 82805 BLOOD GASES W/O2 SATURATION: CPT | Performed by: STUDENT IN AN ORGANIZED HEALTH CARE EDUCATION/TRAINING PROGRAM

## 2022-08-21 PROCEDURE — 71045 X-RAY EXAM CHEST 1 VIEW: CPT

## 2022-08-21 PROCEDURE — 94640 AIRWAY INHALATION TREATMENT: CPT

## 2022-08-21 PROCEDURE — 83735 ASSAY OF MAGNESIUM: CPT | Performed by: PHYSICIAN ASSISTANT

## 2022-08-21 PROCEDURE — 96365 THER/PROPH/DIAG IV INF INIT: CPT

## 2022-08-21 PROCEDURE — 84484 ASSAY OF TROPONIN QUANT: CPT | Performed by: PHYSICIAN ASSISTANT

## 2022-08-21 PROCEDURE — 99291 CRITICAL CARE FIRST HOUR: CPT

## 2022-08-21 PROCEDURE — 5A1935Z RESPIRATORY VENTILATION, LESS THAN 24 CONSECUTIVE HOURS: ICD-10-PCS | Performed by: FAMILY MEDICINE

## 2022-08-21 RX ORDER — ETOMIDATE 2 MG/ML
20 INJECTION INTRAVENOUS ONCE
Status: COMPLETED | OUTPATIENT
Start: 2022-08-21 | End: 2022-08-21

## 2022-08-21 RX ORDER — ROCURONIUM BROMIDE 10 MG/ML
INJECTION, SOLUTION INTRAVENOUS
Status: COMPLETED
Start: 2022-08-21 | End: 2022-08-21

## 2022-08-21 RX ORDER — MIDAZOLAM HYDROCHLORIDE 2 MG/2ML
5 INJECTION, SOLUTION INTRAMUSCULAR; INTRAVENOUS ONCE
Status: COMPLETED | OUTPATIENT
Start: 2022-08-21 | End: 2022-08-21

## 2022-08-21 RX ORDER — FENTANYL CITRATE 50 UG/ML
50 INJECTION, SOLUTION INTRAMUSCULAR; INTRAVENOUS ONCE
Status: COMPLETED | OUTPATIENT
Start: 2022-08-21 | End: 2022-08-21

## 2022-08-21 RX ORDER — MAGNESIUM SULFATE HEPTAHYDRATE 40 MG/ML
INJECTION, SOLUTION INTRAVENOUS
Status: COMPLETED
Start: 2022-08-21 | End: 2022-08-21

## 2022-08-21 RX ORDER — PROPOFOL 10 MG/ML
5-50 INJECTION, EMULSION INTRAVENOUS
Status: DISCONTINUED | OUTPATIENT
Start: 2022-08-21 | End: 2022-08-21 | Stop reason: HOSPADM

## 2022-08-21 RX ORDER — NALOXONE HYDROCHLORIDE 1 MG/ML
2 INJECTION INTRAMUSCULAR; INTRAVENOUS; SUBCUTANEOUS ONCE
Status: COMPLETED | OUTPATIENT
Start: 2022-08-21 | End: 2022-08-21

## 2022-08-21 RX ORDER — IPRATROPIUM BROMIDE AND ALBUTEROL SULFATE 2.5; .5 MG/3ML; MG/3ML
3 SOLUTION RESPIRATORY (INHALATION) ONCE
Status: COMPLETED | OUTPATIENT
Start: 2022-08-21 | End: 2022-08-21

## 2022-08-21 RX ORDER — METHYLPREDNISOLONE SODIUM SUCCINATE 125 MG/2ML
125 INJECTION, POWDER, LYOPHILIZED, FOR SOLUTION INTRAMUSCULAR; INTRAVENOUS ONCE
Status: COMPLETED | OUTPATIENT
Start: 2022-08-21 | End: 2022-08-21

## 2022-08-21 RX ORDER — ONDANSETRON 2 MG/ML
4 INJECTION INTRAMUSCULAR; INTRAVENOUS ONCE
Status: COMPLETED | OUTPATIENT
Start: 2022-08-21 | End: 2022-08-21

## 2022-08-21 RX ORDER — CEFTRIAXONE 1 G/50ML
1000 INJECTION, SOLUTION INTRAVENOUS ONCE
Status: COMPLETED | OUTPATIENT
Start: 2022-08-21 | End: 2022-08-21

## 2022-08-21 RX ORDER — MAGNESIUM SULFATE HEPTAHYDRATE 40 MG/ML
2 INJECTION, SOLUTION INTRAVENOUS ONCE
Status: COMPLETED | OUTPATIENT
Start: 2022-08-21 | End: 2022-08-21

## 2022-08-21 RX ORDER — NALOXONE HYDROCHLORIDE 1 MG/ML
1 INJECTION PARENTERAL ONCE
Status: COMPLETED | OUTPATIENT
Start: 2022-08-21 | End: 2022-08-21

## 2022-08-21 RX ORDER — NALOXONE HYDROCHLORIDE 1 MG/ML
INJECTION INTRAMUSCULAR; INTRAVENOUS; SUBCUTANEOUS
Status: COMPLETED
Start: 2022-08-21 | End: 2022-08-21

## 2022-08-21 RX ADMIN — FENTANYL CITRATE 50 MCG: 50 INJECTION, SOLUTION INTRAMUSCULAR; INTRAVENOUS at 22:29

## 2022-08-21 RX ADMIN — ROCURONIUM BROMIDE 50 MG: 10 INJECTION, SOLUTION INTRAVENOUS at 21:11

## 2022-08-21 RX ADMIN — METHYLPREDNISOLONE SODIUM SUCCINATE 125 MG: 125 INJECTION, POWDER, FOR SOLUTION INTRAMUSCULAR; INTRAVENOUS at 20:31

## 2022-08-21 RX ADMIN — ROCURONIUM BROMIDE 10 MG: 10 INJECTION, SOLUTION INTRAVENOUS at 21:11

## 2022-08-21 RX ADMIN — MIDAZOLAM HYDROCHLORIDE 5 MG: 1 INJECTION, SOLUTION INTRAMUSCULAR; INTRAVENOUS at 22:31

## 2022-08-21 RX ADMIN — IPRATROPIUM BROMIDE AND ALBUTEROL SULFATE 3 ML: 2.5; .5 SOLUTION RESPIRATORY (INHALATION) at 20:35

## 2022-08-21 RX ADMIN — MAGNESIUM SULFATE HEPTAHYDRATE 2 G: 40 INJECTION, SOLUTION INTRAVENOUS at 20:34

## 2022-08-21 RX ADMIN — CEFTRIAXONE 1000 MG: 1 INJECTION, SOLUTION INTRAVENOUS at 20:51

## 2022-08-21 RX ADMIN — ETOMIDATE 20 MG: 20 INJECTION, SOLUTION INTRAVENOUS at 21:10

## 2022-08-21 RX ADMIN — MAGNESIUM SULFATE IN WATER 2 G: 40 INJECTION, SOLUTION INTRAVENOUS at 20:34

## 2022-08-21 RX ADMIN — PROPOFOL 20 MCG/KG/MIN: 10 INJECTION, EMULSION INTRAVENOUS at 21:13

## 2022-08-21 RX ADMIN — ONDANSETRON 4 MG: 2 INJECTION INTRAMUSCULAR; INTRAVENOUS at 20:36

## 2022-08-21 RX ADMIN — NALOXONE HYDROCHLORIDE 2 MG: 1 INJECTION PARENTERAL at 20:33

## 2022-08-22 ENCOUNTER — APPOINTMENT (OUTPATIENT)
Dept: RADIOLOGY | Facility: HOSPITAL | Age: 47
DRG: 917 | End: 2022-08-22
Payer: COMMERCIAL

## 2022-08-22 VITALS
DIASTOLIC BLOOD PRESSURE: 71 MMHG | SYSTOLIC BLOOD PRESSURE: 126 MMHG | OXYGEN SATURATION: 97 % | HEART RATE: 73 BPM | TEMPERATURE: 99.14 F | RESPIRATION RATE: 20 BRPM

## 2022-08-22 PROBLEM — T50.901A OVERDOSE: Status: ACTIVE | Noted: 2022-08-22

## 2022-08-22 PROBLEM — J69.0 ASPIRATION PNEUMONITIS (HCC): Status: ACTIVE | Noted: 2022-08-22

## 2022-08-22 PROBLEM — J96.91 RESPIRATORY FAILURE WITH HYPOXIA (HCC): Status: ACTIVE | Noted: 2022-08-22

## 2022-08-22 PROBLEM — R65.10 SIRS (SYSTEMIC INFLAMMATORY RESPONSE SYNDROME) (HCC): Status: ACTIVE | Noted: 2022-08-22

## 2022-08-22 LAB
2HR DELTA HS TROPONIN: -7 NG/L
4HR DELTA HS TROPONIN: -7 NG/L
ALBUMIN SERPL BCP-MCNC: 3.3 G/DL (ref 3.5–5)
ALP SERPL-CCNC: 58 U/L (ref 46–116)
ALT SERPL W P-5'-P-CCNC: 14 U/L (ref 12–78)
AMPHETAMINES SERPL QL SCN: NEGATIVE
ANION GAP SERPL CALCULATED.3IONS-SCNC: 10 MMOL/L (ref 4–13)
ARTERIAL PATENCY WRIST A: YES
AST SERPL W P-5'-P-CCNC: 14 U/L (ref 5–45)
BARBITURATES UR QL: NEGATIVE
BASE EXCESS BLDA CALC-SCNC: -0.3 MMOL/L
BASOPHILS # BLD AUTO: 0 THOUSANDS/ΜL (ref 0–0.1)
BASOPHILS NFR BLD AUTO: 0 % (ref 0–1)
BENZODIAZ UR QL: POSITIVE
BILIRUB SERPL-MCNC: 0.37 MG/DL (ref 0.2–1)
BUN SERPL-MCNC: 8 MG/DL (ref 5–25)
CA-I BLD-SCNC: 1.11 MMOL/L (ref 1.12–1.32)
CALCIUM ALBUM COR SERPL-MCNC: 9 MG/DL (ref 8.3–10.1)
CALCIUM SERPL-MCNC: 8.4 MG/DL (ref 8.3–10.1)
CARDIAC TROPONIN I PNL SERPL HS: 14 NG/L
CARDIAC TROPONIN I PNL SERPL HS: 14 NG/L
CARDIAC TROPONIN I PNL SERPL HS: 21 NG/L
CHLORIDE SERPL-SCNC: 102 MMOL/L (ref 96–108)
CO2 SERPL-SCNC: 25 MMOL/L (ref 21–32)
COCAINE UR QL: POSITIVE
CREAT SERPL-MCNC: 1.23 MG/DL (ref 0.6–1.3)
EOSINOPHIL # BLD AUTO: 0 THOUSAND/ΜL (ref 0–0.61)
EOSINOPHIL NFR BLD AUTO: 0 % (ref 0–6)
ERYTHROCYTE [DISTWIDTH] IN BLOOD BY AUTOMATED COUNT: 14.8 % (ref 11.6–15.1)
GFR SERPL CREATININE-BSD FRML MDRD: 70 ML/MIN/1.73SQ M
GLUCOSE SERPL-MCNC: 125 MG/DL (ref 65–140)
GLUCOSE SERPL-MCNC: 98 MG/DL (ref 65–140)
HCO3 BLDA-SCNC: 23.4 MMOL/L (ref 22–28)
HCT VFR BLD AUTO: 40.8 % (ref 36.5–49.3)
HGB BLD-MCNC: 13.4 G/DL (ref 12–17)
HOROWITZ INDEX BLDA+IHG-RTO: 45 MM[HG]
I-TIME: 0.9
IMM GRANULOCYTES # BLD AUTO: 0.04 THOUSAND/UL (ref 0–0.2)
IMM GRANULOCYTES NFR BLD AUTO: 0 % (ref 0–2)
INR PPP: 1.04 (ref 0.84–1.19)
LACTATE SERPL-SCNC: 1.1 MMOL/L (ref 0.5–2)
LYMPHOCYTES # BLD AUTO: 0.31 THOUSANDS/ΜL (ref 0.6–4.47)
LYMPHOCYTES NFR BLD AUTO: 3 % (ref 14–44)
MAGNESIUM SERPL-MCNC: 2.2 MG/DL (ref 1.6–2.6)
MCH RBC QN AUTO: 29 PG (ref 26.8–34.3)
MCHC RBC AUTO-ENTMCNC: 32.8 G/DL (ref 31.4–37.4)
MCV RBC AUTO: 88 FL (ref 82–98)
METHADONE UR QL: NEGATIVE
MONOCYTES # BLD AUTO: 0.63 THOUSAND/ΜL (ref 0.17–1.22)
MONOCYTES NFR BLD AUTO: 6 % (ref 4–12)
NEUTROPHILS # BLD AUTO: 8.87 THOUSANDS/ΜL (ref 1.85–7.62)
NEUTS SEG NFR BLD AUTO: 91 % (ref 43–75)
NRBC BLD AUTO-RTO: 0 /100 WBCS
O2 CT BLDA-SCNC: 19 ML/DL (ref 16–23)
OPIATES UR QL SCN: NEGATIVE
OXYCODONE+OXYMORPHONE UR QL SCN: NEGATIVE
OXYHGB MFR BLDA: 96.1 % (ref 94–97)
PCO2 BLDA: 35.5 MM HG (ref 36–44)
PCP UR QL: NEGATIVE
PEEP RESPIRATORY: 8 CM[H2O]
PH BLDA: 7.44 [PH] (ref 7.35–7.45)
PHOSPHATE SERPL-MCNC: 2.1 MG/DL (ref 2.7–4.5)
PLATELET # BLD AUTO: 269 THOUSANDS/UL (ref 149–390)
PMV BLD AUTO: 8.3 FL (ref 8.9–12.7)
PO2 BLDA: 104.5 MM HG (ref 75–129)
POTASSIUM SERPL-SCNC: 3.8 MMOL/L (ref 3.5–5.3)
PROCALCITONIN SERPL-MCNC: 1.08 NG/ML
PROT SERPL-MCNC: 6.4 G/DL (ref 6.4–8.4)
PROTHROMBIN TIME: 13.4 SECONDS (ref 11.6–14.5)
RBC # BLD AUTO: 4.62 MILLION/UL (ref 3.88–5.62)
SODIUM SERPL-SCNC: 137 MMOL/L (ref 135–147)
SPECIMEN SOURCE: ABNORMAL
THC UR QL: NEGATIVE
VENT AC: 18
VENT- AC: AC
WBC # BLD AUTO: 9.85 THOUSAND/UL (ref 4.31–10.16)

## 2022-08-22 PROCEDURE — 94003 VENT MGMT INPAT SUBQ DAY: CPT

## 2022-08-22 PROCEDURE — 85025 COMPLETE CBC W/AUTO DIFF WBC: CPT | Performed by: NURSE PRACTITIONER

## 2022-08-22 PROCEDURE — 82330 ASSAY OF CALCIUM: CPT | Performed by: NURSE PRACTITIONER

## 2022-08-22 PROCEDURE — 36600 WITHDRAWAL OF ARTERIAL BLOOD: CPT

## 2022-08-22 PROCEDURE — 83605 ASSAY OF LACTIC ACID: CPT | Performed by: NURSE PRACTITIONER

## 2022-08-22 PROCEDURE — 84100 ASSAY OF PHOSPHORUS: CPT | Performed by: NURSE PRACTITIONER

## 2022-08-22 PROCEDURE — 84484 ASSAY OF TROPONIN QUANT: CPT | Performed by: NURSE PRACTITIONER

## 2022-08-22 PROCEDURE — 87081 CULTURE SCREEN ONLY: CPT | Performed by: NURSE PRACTITIONER

## 2022-08-22 PROCEDURE — 85610 PROTHROMBIN TIME: CPT | Performed by: NURSE PRACTITIONER

## 2022-08-22 PROCEDURE — 87205 SMEAR GRAM STAIN: CPT | Performed by: NURSE PRACTITIONER

## 2022-08-22 PROCEDURE — 84145 PROCALCITONIN (PCT): CPT | Performed by: NURSE PRACTITIONER

## 2022-08-22 PROCEDURE — 82948 REAGENT STRIP/BLOOD GLUCOSE: CPT

## 2022-08-22 PROCEDURE — 94760 N-INVAS EAR/PLS OXIMETRY 1: CPT

## 2022-08-22 PROCEDURE — 83735 ASSAY OF MAGNESIUM: CPT | Performed by: NURSE PRACTITIONER

## 2022-08-22 PROCEDURE — NC001 PR NO CHARGE: Performed by: NURSE PRACTITIONER

## 2022-08-22 PROCEDURE — 99291 CRITICAL CARE FIRST HOUR: CPT | Performed by: NURSE PRACTITIONER

## 2022-08-22 PROCEDURE — 82805 BLOOD GASES W/O2 SATURATION: CPT | Performed by: NURSE PRACTITIONER

## 2022-08-22 PROCEDURE — 71045 X-RAY EXAM CHEST 1 VIEW: CPT

## 2022-08-22 PROCEDURE — 87070 CULTURE OTHR SPECIMN AEROBIC: CPT | Performed by: NURSE PRACTITIONER

## 2022-08-22 PROCEDURE — 80053 COMPREHEN METABOLIC PANEL: CPT | Performed by: NURSE PRACTITIONER

## 2022-08-22 PROCEDURE — 80307 DRUG TEST PRSMV CHEM ANLYZR: CPT | Performed by: NURSE PRACTITIONER

## 2022-08-22 PROCEDURE — 87040 BLOOD CULTURE FOR BACTERIA: CPT | Performed by: NURSE PRACTITIONER

## 2022-08-22 RX ORDER — MELATONIN
1000 DAILY
Status: DISCONTINUED | OUTPATIENT
Start: 2022-08-22 | End: 2022-08-22 | Stop reason: HOSPADM

## 2022-08-22 RX ORDER — SODIUM CHLORIDE, SODIUM GLUCONATE, SODIUM ACETATE, POTASSIUM CHLORIDE, MAGNESIUM CHLORIDE, SODIUM PHOSPHATE, DIBASIC, AND POTASSIUM PHOSPHATE .53; .5; .37; .037; .03; .012; .00082 G/100ML; G/100ML; G/100ML; G/100ML; G/100ML; G/100ML; G/100ML
125 INJECTION, SOLUTION INTRAVENOUS CONTINUOUS
Status: DISCONTINUED | OUTPATIENT
Start: 2022-08-22 | End: 2022-08-22

## 2022-08-22 RX ORDER — POTASSIUM CHLORIDE 20 MEQ/1
40 TABLET, EXTENDED RELEASE ORAL ONCE
Status: COMPLETED | OUTPATIENT
Start: 2022-08-22 | End: 2022-08-22

## 2022-08-22 RX ORDER — ASCORBIC ACID 500 MG
1000 TABLET ORAL 2 TIMES DAILY
Status: DISCONTINUED | OUTPATIENT
Start: 2022-08-22 | End: 2022-08-22 | Stop reason: HOSPADM

## 2022-08-22 RX ORDER — FUROSEMIDE 10 MG/ML
40 INJECTION INTRAMUSCULAR; INTRAVENOUS ONCE
Status: COMPLETED | OUTPATIENT
Start: 2022-08-22 | End: 2022-08-22

## 2022-08-22 RX ORDER — HEPARIN SODIUM 5000 [USP'U]/ML
5000 INJECTION, SOLUTION INTRAVENOUS; SUBCUTANEOUS EVERY 8 HOURS SCHEDULED
Status: DISCONTINUED | OUTPATIENT
Start: 2022-08-22 | End: 2022-08-22 | Stop reason: HOSPADM

## 2022-08-22 RX ORDER — CEFEPIME HYDROCHLORIDE 2 G/50ML
2000 INJECTION, SOLUTION INTRAVENOUS EVERY 12 HOURS
Status: DISCONTINUED | OUTPATIENT
Start: 2022-08-22 | End: 2022-08-22 | Stop reason: HOSPADM

## 2022-08-22 RX ORDER — NALOXONE HYDROCHLORIDE 4 MG/.1ML
SPRAY NASAL
Qty: 1 EACH | Refills: 0 | Status: SHIPPED | OUTPATIENT
Start: 2022-08-22

## 2022-08-22 RX ORDER — PROPOFOL 10 MG/ML
5-50 INJECTION, EMULSION INTRAVENOUS
Status: DISCONTINUED | OUTPATIENT
Start: 2022-08-22 | End: 2022-08-22

## 2022-08-22 RX ADMIN — PROPOFOL 25 MCG/KG/MIN: 10 INJECTION, EMULSION INTRAVENOUS at 00:17

## 2022-08-22 RX ADMIN — HEPARIN SODIUM 5000 UNITS: 5000 INJECTION INTRAVENOUS; SUBCUTANEOUS at 00:25

## 2022-08-22 RX ADMIN — SODIUM CHLORIDE, SODIUM GLUCONATE, SODIUM ACETATE, POTASSIUM CHLORIDE, MAGNESIUM CHLORIDE, SODIUM PHOSPHATE, DIBASIC, AND POTASSIUM PHOSPHATE 125 ML/HR: .53; .5; .37; .037; .03; .012; .00082 INJECTION, SOLUTION INTRAVENOUS at 00:25

## 2022-08-22 RX ADMIN — OXYCODONE HYDROCHLORIDE AND ACETAMINOPHEN 1000 MG: 500 TABLET ORAL at 10:30

## 2022-08-22 RX ADMIN — FUROSEMIDE 40 MG: 10 INJECTION, SOLUTION INTRAMUSCULAR; INTRAVENOUS at 12:58

## 2022-08-22 RX ADMIN — CEFEPIME HYDROCHLORIDE 2000 MG: 2 INJECTION, SOLUTION INTRAVENOUS at 00:25

## 2022-08-22 RX ADMIN — POTASSIUM CHLORIDE 40 MEQ: 1500 TABLET, EXTENDED RELEASE ORAL at 12:58

## 2022-08-22 RX ADMIN — PROPOFOL 25 MCG/KG/MIN: 10 INJECTION, EMULSION INTRAVENOUS at 02:12

## 2022-08-22 RX ADMIN — HEPARIN SODIUM 5000 UNITS: 5000 INJECTION INTRAVENOUS; SUBCUTANEOUS at 13:00

## 2022-08-22 RX ADMIN — CEFEPIME HYDROCHLORIDE 2000 MG: 2 INJECTION, SOLUTION INTRAVENOUS at 12:57

## 2022-08-22 RX ADMIN — Medication 1000 UNITS: at 10:30

## 2022-08-22 RX ADMIN — HEPARIN SODIUM 5000 UNITS: 5000 INJECTION INTRAVENOUS; SUBCUTANEOUS at 05:39

## 2022-08-22 NOTE — RESPIRATORY THERAPY NOTE
RT Protocol Note  Nikki Topete 42 y o  male MRN: [id number]  Unit/Bed#: ED 01 Encounter: [id number]    Assessment    Active Problems:    * No active hospital problems  *      Home Pulmonary Medications:  none       Past Medical History:   Diagnosis Date    Asthma     Back pain     Bipolar 1 disorder (HCC)     Depression     Gastritis     GERD (gastroesophageal reflux disease)     Kidney stones      Social History     Socioeconomic History    Marital status: Single     Spouse name: None    Number of children: None    Years of education: None    Highest education level: None   Occupational History    None   Tobacco Use    Smoking status: Current Every Day Smoker     Packs/day: 1 00     Years: 11 00     Pack years: 11 00    Smokeless tobacco: Never Used   Substance and Sexual Activity    Alcohol use: No    Drug use: Yes     Types: Cocaine     Comment: occassional    Sexual activity: Yes     Partners: Female   Other Topics Concern    None   Social History Narrative    None     Social Determinants of Health     Financial Resource Strain: Not on file   Food Insecurity: Not on file   Transportation Needs: Not on file   Physical Activity: Not on file   Stress: Not on file   Social Connections: Not on file   Intimate Partner Violence: Not on file   Housing Stability: Not on file       Subjective         Objective    Physical Exam:   Assessment Type: (P) Assess only  General Appearance: (P) Sedated  Respiratory Pattern: (P) Tachypneic  Chest Assessment: (P) Chest expansion symmetrical  Bilateral Breath Sounds: (P) Expiratory wheezes  O2 Device: bipap    Vitals:  Blood pressure 132/84, pulse 100, temperature 97 9 °F (36 6 °C), temperature source Oral, resp  rate 16, weight 63 2 kg (139 lb 5 3 oz), SpO2 99 %            Imaging and other studies:    08/21/22 2130   Respiratory Assessment   Assessment Type Assess only   General Appearance Sedated   Respiratory Pattern Tachypneic   Chest Assessment Chest expansion symmetrical   Bilateral Breath Sounds Expiratory wheezes   Resp Comments Called to ER 3 to place pt on Bipap  He was on a cont  neb at 74%  BS E/wheezing  Pt said he had asthma when he was younger  but nothing since then  Does not use any inhalers for asthma  No problemes  Placed Pt on Bipap 12/6 100%  Pulse ox came up to 96%  Pt vomit while on Bipap a short while  Placed on NRB 86%  and went up to 92% but not holding  Xrays shows aspiration, intubating to protect his airway  Intubated with 8ETT at 24 at the lip  Color change good  Xrays done at bedside to confirm placement  Placed on AC 16,400, 100% +8  Titrate FIO2 as needed  Additional Assessments   Pulse 100  (sinus tach)   Respirations 16   SpO2 99 %       O2 Device: bipap     Plan    Respiratory Plan: Vent/NIV/HFNC        Resp Comments: (P) Called to ER 3 to place pt on Bipap  He was on a cont  neb at 74%  BS E/wheezing  Pt said he had asthma when he was younger  but nothing since then  Does not use any inhalers for asthma  No problemes  Placed Pt on Bipap 12/6 100%  Pulse ox came up to 96%  Pt vomit while on Bipap a short while  Placed on NRB 86%  and went up to 92% but not holding  Xrays shows aspiration, intubating to protect his airway  Intubated with 8ETT at 24 at the lip  Color change good  Xrays done at bedside to confirm placement  Placed on AC 16,400, 100% +8  Titrate FIO2 as needed

## 2022-08-22 NOTE — ED NOTES
Staff at bedside: Joanna Montanez RN, Otoniel Gomes MD, Lenore Scott PA-C, Carmen Valero RN, Ines Lowe RN, Afsaneh Villavicencio respiratory, Christiano Powers [place] Breath tech, Kelly Campos RN  08/21/22 5963
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
## 2022-08-22 NOTE — EMTALA/ACUTE CARE TRANSFER
[facility] EMERGENCY DEPARTMENT  [facility] [address]  [phone]  Dept: [address] TRANSFER CONSENT    NAME Eric Martinez                                         1975                              MRN [id number]    I have been informed of my rights regarding examination, treatment, and transfer   by Dr Ian Johnson MD    Benefits: Specialized equipment and/or services available at the receiving facility (Include comment)________________________    Risks: Potential for delay in receiving treatment      Transfer Request   I acknowledge that my medical condition has been evaluated and explained to me by the emergency department physician or other qualified medical person and/or my attending physician who has recommended and offered to me further medical examination and treatment  I understand the Hospital's obligation with respect to the treatment and stabilization of my emergency medical condition  I nevertheless request to be transferred  I release the Hospital, the doctor, and any other persons caring for me from all responsibility or liability for any injury or ill effects that may result from my transfer and agree to accept all responsibility for the consequences of my choice to transfer, rather than receive stabilizing treatment at the Hospital  I understand that because the transfer is my request, my insurance may not provide reimbursement for the services  The Hospital will assist and direct me and my family in how to make arrangements for transfer, but the hospital is not liable for any fees charged by the transport service  In spite of this understanding, I refuse to consent to further medical examination and treatment which has been offered to me, and request transfer to  [address], [address] : [facility]   I authorize the performance of emergency medical procedures and treatments upon me in both transit and upon arrival at the receiving facility  Additionally, I authorize the release of any and all medical records to the receiving facility and request they be transported with me, if possible  I authorize the performance of emergency medical procedures and treatments upon me in both transit and upon arrival at the receiving facility  Additionally, I authorize the release of any and all medical records to the receiving facility and request they be transported with me, if possible  I understand that the safest mode of transportation during a medical emergency is an ambulance and that the Hospital advocates the use of this mode of transport  Risks of traveling to the receiving facility by car, including absence of medical control, life sustaining equipment, such as oxygen, and medical personnel has been explained to me and I fully understand them  (ROYAL CORRECT BOX BELOW)  [  ]  I consent to the stated transfer and to be transported by ambulance/helicopter  [  ]  I consent to the stated transfer, but refuse transportation by ambulance and accept full responsibility for my transportation by car  I understand the risks of non-ambulance transfers and I exonerate the Hospital and its staff from any deterioration in my condition that results from this refusal     X___________________________________________    DATE  22  TIME________  Signature of patient or legally responsible individual signing on patient behalf           RELATIONSHIP TO PATIENT_________________________          Provider Certification    NAME Tyrell Orosco                                        [facility] 1975                              MRN [id number]    A medical screening exam was performed on the above named patient  Based on the examination:    Condition Necessitating Transfer The primary encounter diagnosis was Hypoxia   Diagnoses of Accidental overdose, initial encounter and Respiratory distress were also pertinent to this visit  Patient Condition: The patient has been stabilized such that within reasonable medical probability, no material deterioration of the patient condition or the condition of the unborn child(levi) is likely to result from the transfer    Reason for Transfer: Level of Care needed not available at this facility    Transfer Requirements: [place]   · Space available and qualified personnel available for treatment as acknowledged by    · Agreed to accept transfer and to provide appropriate medical treatment as acknowledged by       santy  · Appropriate medical records of the examination and treatment of the patient are provided at the time of transfer   [address] [facility], [address] _______  · Transfer will be performed by qualified personnel from    and appropriate transfer equipment as required, including the use of necessary and appropriate life support measures  Provider Certification: I have examined the patient and explained the following risks and benefits of being transferred/refusing transfer to the patient/family:  General risk, such as traffic hazards, adverse weather conditions, rough terrain or turbulence, possible failure of equipment (including vehicle or aircraft), or consequences of actions of persons outside the control of the transport personnel      Based on these reasonable risks and benefits to the patient and/or the unborn child(levi), and based upon the information available at the time of the patients examination, I certify that the medical benefits reasonably to be expected from the provision of appropriate medical treatments at another medical facility outweigh the increasing risks, if any, to the individuals medical condition, and in the case of labor to the unborn child, from effecting the transfer      X____________________________________________ DATE 08/21/22        TIME_______      ORIGINAL - SEND TO MEDICAL RECORDS   COPY - SEND WITH PATIENT DURING TRANSFER

## 2022-08-22 NOTE — ED NOTES
Dr Shahzad Tracy and Agueda [facility] at bedside  Pt being prepared for intubation  Continuous cardiac, BP, and SPO2 monitoring  Pt remains on NRB mask  Time out performed by staff        Erma Watts RN  08/21/22 7492

## 2022-08-22 NOTE — ED NOTES
Outcome pt intubated  Vent settings 16 resp, 400, 100%, peep 8  Currently sinus rhythm on monitor with stable BP and O2        Faye Davis RN  08/21/22 0717

## 2022-08-22 NOTE — SEDATION DOCUMENTATION
Etomidate 20 mg administered IVP by Gaston Pritchett RN to [facility]

## 2022-08-22 NOTE — ASSESSMENT & PLAN NOTE
Presumed opioid overdose given immediate response to Narcan  Will check rapid urine drug screen   Intent is currently unknown   Consult to case management for rehabilitation services

## 2022-08-22 NOTE — ED NOTES
Attempted to call report at [phone] for 4th time with no answer        Jose Joseph RN  08/21/22 6810

## 2022-08-22 NOTE — ED NOTES
Etomidate 20mg administered IVP by Yuval Grant in [facility] Bor[facility] RN  08/21/22 0794

## 2022-08-22 NOTE — RESPIRATORY THERAPY NOTE
RT Ventilator Management Note  Hollie Flores 42 y o  male MRN: [id number]  Unit/Bed#:  Encounter: [id number]      Daily Screen         8/21/2022 2350 8/22/2022  0116          Patient safety screen outcome[de-identified] Passed Passed                Physical Exam:   Assessment Type: (P) Assess only  General Appearance: (P) Drowsy  Respiratory Pattern: (P) Assisted  Chest Assessment: (P) Chest expansion symmetrical  Bilateral Breath Sounds: (P) Coarse  O2 Device: (P) Vent      Resp Comments: (P) pt's vent settings adjusted based off of ABG results     08/22/22 0116   Respiratory Assessment   Assessment Type Assess only   General Appearance Drowsy   Respiratory Pattern Assisted   Chest Assessment Chest expansion symmetrical   Bilateral Breath Sounds Coarse   Resp Comments pt's vent settings adjusted based off of ABG results   O2 Device Vent   Vent Information   Vent ID Esperanza   Vent type Drager   Drager Vent Mode AC/VC+   $ Pulse Oximetry Spot Check Charge Completed   AC/VC+ Settings   Resp Rate (BPM) (S)  16 BPM   VT (mL) (S)  400 mL   Insp Time (S) (S)  0 9 S   FIO2 (%) (S)  40 %   PEEP (cmH2O) (S)  6 cmH2O   Rise Time (%) (S)  20 %   Trigger Sensitivity Flow (LPM) (S)  2 LPM   Trigger Sensitivity Pressure (cmH2O) (S)  2 cmH2O   Humidification (S)  Heater   Heater Temp (S)  98 6 °F (37 °C)   AC/VC+ Actuals   Resp Rate (BPM) 18 BPM   VT (mL) 367 mL   MV (Obs) 7 61   MAP (cmH2O) 12 cmH2O   Peak Pressure (cmH2O) 22 cmH2O   I:E Ratio (Obs) 1:3 2   Static Compliance (mL/cmH20) 53 mL/cmH2O   Plateau Pressure (cm H2O) 21 7 cm H2O   Heater Temperature (Obs) 98 6 °F (37 °C)   AC/VC+ ALARMS   High Peak Pressure (cmH2O) 45 cmH2O   High Resp Rate (BPM) 24 BPM   High MV (L/min) 12 L/min   Low MV (L/min) 3 L/min   High VT (mL) 900 mL   Maintenance   Alarm (pink) cable attached Yes   Resuscitation bag with peep valve at bedside Yes   Water bag changed No   Circuit changed No   Daily Screen   Patient safety screen outcome: Passed IHI Ventilator Associated Pneumonia Bundle   Daily Assessment of Readiness to Extubate Yes   Head of Bed Elevated HOB 30   ETT  8 mm   Placement Date/Time: 08/21/22 2112   Mask Ventilation: (c)   Preoxygenated: Yes  Technique: Video laryngoscopy  Tube Size: 8 mm  Laryngoscope: GlideScope  Location: Oral  Insertion: Atraumatic  Insertion attempts: 1  Secured at (cm): 24  Placed By: (c     Secured at (cm) 24   Measured from [address] by Commercial tube white   Site Condition Cool;Dry   Cuff Pressure (cm H2O) 26 cm H2O   HI-LO Suction  Other (Comment)

## 2022-08-22 NOTE — ED NOTES
Patient vomited multiple times while on bipap therapy, unable to keep saturations  bipap removed and pt placed on NRB  Provider and attending at bedside        Meryl Joseph RN  08/21/22 2033

## 2022-08-22 NOTE — ASSESSMENT & PLAN NOTE
Evidenced by tachypnea, tachycardia, and hypoxia, respiratory failure  Secondary to overdose and subsequent aspiration event  Check sputum, MRSA, pro calcitonin   covid PCR negative   Trend temperature curve, WBCs and procal  Cefepime for aspiration coverage
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
## 2022-08-22 NOTE — RESPIRATORY THERAPY NOTE
08/22/22 0739   Respiratory Assessment   Assessment Type Assess only   General Appearance Sedated   Respiratory Pattern Assisted   Chest Assessment Chest expansion symmetrical   Bilateral Breath Sounds Rhonchi   O2 Device VENT   Vent Information   Vent ID CANDELARIA   Vent type Drager   Drager Vent Mode CPAP/PS Spont   $ Vent Daily Charge-Subsequent Yes   $ Pulse Oximetry Spot Check Charge Completed   SpO2 99 %   CPAP/PS Spont Settings   FIO2 (%) 40 %   PEEP (cmH2O) 6 cmH2O   Pressure Support (cmH2O) 6 cmH20   Humidification Heater   Heater Temp 100 4 °F (38 °C)   CPAP/PS Spont Actuals   Resp Rate (BPM) 10 BPM   VT (mL) 368 mL   MV (Obs) 4 46   MAP (cmH2O) 7 4 cmH2O   Peak Pressure (cmH2O) 13 cmH2O   Heater Temperature (Obs) 98 6 °F (37 °C)   CPAP/PS Spont Alarms   High Peak Pressure (cmH20) 45 cmH2O   High Resp Rate (BPM) 35 BPM   High MV (L/min) 14 L/min   Low MV (L/min) 3 L/min   High Shari VTE (mL) 900 mL   Low Shari VTE (mL) 220 mL   Maintenance   Alarm (pink) cable attached Yes   Resuscitation bag with peep valve at bedside Yes   Water bag changed No   Circuit changed No   Daily Screen   Patient safety screen outcome: Passed   IHI Ventilator Associated Pneumonia Bundle   Daily Awakening Trials Performed Yes   Daily Assessment of Readiness to Extubate Yes   Head of Bed Elevated HOB 30   ETT  8 mm   Placement Date/Time: 08/21/22 2112   Mask Ventilation: (c)   Preoxygenated: Yes  Technique: Video laryngoscopy  Tube Size: 8 mm  Laryngoscope: GlideScope  Location: Oral  Insertion: Atraumatic  Insertion attempts: 1  Secured at (cm): 24  Placed By: (c     Secured at (cm) 24   Measured from Teeth   Secured Location Right   Site Condition Cool;Dry   Cuff Pressure (cm H2O) 26 cm H2O   RT Ventilator Management Note  Leonor Burnett 42 y o  male MRN: [id number]  Unit/Bed#:  Encounter: [id number]      Daily Screen         8/22/2022 0116 8/22/2022  0739          Patient safety screen outcome[de-identified] Passed Passed Physical Exam:   Assessment Type: Assess only  General Appearance: Sedated  Respiratory Pattern: Assisted  Chest Assessment: Chest expansion symmetrical  Bilateral Breath Sounds: Rhonchi  O2 Device: VENT      Resp Comments: pt's vent settings adjusted based off of ABG results

## 2022-08-22 NOTE — ED NOTES
Attempted to call [facility] to give report on pt en route to their hospital x3 with no answer  Will attempt again        Bon Conrad, EFREN  08/21/22 7393

## 2022-08-22 NOTE — RESPIRATORY THERAPY NOTE
RT Ventilator Management Note  Stephan Lane 42 y o  male MRN: [id number]  Unit/Bed#:  Encounter: [id number]      Daily Screen         8/21/2022  2350             Patient safety screen outcome[de-identified] Passed (P)               Physical Exam:   Assessment Type: (P) Assess only  General Appearance: (P) Drowsy  Respiratory Pattern: (P) Assisted  Chest Assessment: (P) Chest expansion symmetrical  Bilateral Breath Sounds: (P) Coarse  O2 Device: (P) Vent      Resp Comments: (P) received intubated patient from [facility], pt was placed on Drager vent and is tolerating it well  Patients tube remains in place with a commercial tube white, no apparent skin breakdown or discoloration is noted  08/21/22 2350   Respiratory Assessment   Assessment Type Assess only   General Appearance Drowsy   Respiratory Pattern Assisted   Chest Assessment Chest expansion symmetrical   Bilateral Breath Sounds Coarse   Resp Comments received intubated patient from [facility], pt was placed on Drager vent and is tolerating it well  Patients tube remains in place with a commercial tube white, no apparent skin breakdown or discoloration is noted     O2 Device Vent   Vent Information   Vent ID Den[place]   Vent type Drager   Drager Vent Mode AC/VC+   $ Pulse Oximetry Spot Check Charge Completed   AC/VC+ Settings   Resp Rate (BPM) 18 BPM   VT (mL) 430 mL   Insp Time (S) 0 9 S   FIO2 (%) 45 %   PEEP (cmH2O) 8 cmH2O   Rise Time (%) 20 %   Trigger Sensitivity Flow (LPM) 2 LPM   Trigger Sensitivity Pressure (cmH2O) 2 cmH2O   Humidification Heater   Heater Temp 98 6 °F (37 °C)   AC/VC+ Actuals   Resp Rate (BPM) 18 BPM   VT (mL) 506 mL   MV (Obs) 7 02   MAP (cmH2O) 12 cmH2O   Peak Pressure (cmH2O) 22 cmH2O   I:E Ratio (Obs) 1:2 7   Static Compliance (mL/cmH20) 34 mL/cmH2O   Plateau Pressure (cm H2O) 21 2 cm H2O   Heater Temperature (Obs) 97 2 °F (36 2 °C)   AC/VC+ ALARMS   High Peak Pressure (cmH2O) 45 cmH2O   High Resp Rate (BPM) 24 BPM   High MV (L/min) 12 L/min   Low MV (L/min) 3 L/min   High VT (mL) 900 mL   Maintenance   Alarm (pink) cable attached Yes   Resuscitation bag with peep valve at bedside Yes   Water bag changed No   Circuit changed No   Daily Screen   Patient safety screen outcome: Passed   IHI Ventilator Associated Pneumonia Bundle   Daily Assessment of Readiness to Extubate Yes   Head of Bed Elevated HOB 30   ETT  8 mm   Placement Date/Time: 08/21/22 2112   Mask Ventilation: (c)   Preoxygenated: Yes  Technique: Video laryngoscopy  Tube Size: 8 mm  Laryngoscope: GlideScope  Location: Oral  Insertion: Atraumatic  Insertion attempts: 1  Secured at (cm): 24  Placed By: (c     Secured at (cm) 24   Measured from [address] by Commercial tube white   Site Condition Cool;Dry   Cuff Pressure (cm H2O) 26 cm H2O   HI-LO Suction  Other (Comment)  (no HI-LO)

## 2022-08-22 NOTE — ASSESSMENT & PLAN NOTE
· Likely secondary to aspiration pneumonitis in the setting of potential drug overdose  · Patient presented [facility] emergency department after being found unresponsive by EMS, he responded immediately to 2 mg narcan   · He was hypoxic in the emergency department and was placed on BIPAP    He had an episode of vomiting while on BIPAP and ultimately required intubation   · He was transferred to [facility] ICU for further management and care  · Maintain mechanical ventilation   · SAT/SBT in AM  · Check sputum culture, MRSA  · Check blood cultures and start cefepime for aspiration   · Propofol for RASS -2

## 2022-08-22 NOTE — H&P
[address] [place] [address]  H&P- [address] 1975, 42 y o  male MRN: [id number]  Unit/Bed#:  Encounter: [id number]  Primary Care Provider: Sofia Dennison MD   Date and time admitted to hospital: 8/21/2022 11:44 PM    * Respiratory failure with hypoxia [facility])  Assessment & Plan  · Likely secondary to aspiration pneumonitis in the setting of potential drug overdose  · Patient presented [facility] emergency department after being found unresponsive by EMS, he responded immediately to 2 mg narcan   · He was hypoxic in the emergency department and was placed on BIPAP  He had an episode of vomiting while on BIPAP and ultimately required intubation   · He was transferred to [facility] ICU for further management and care  · Maintain mechanical ventilation   · SAT/SBT in AM  · Check sputum culture, MRSA  · Check blood cultures and start cefepime for aspiration   · Propofol for RASS -2       Aspiration pneumonitis [facility])  Assessment & Plan  Please see above plan     SIRS (systemic inflammatory response syndrome) (HCC)  Assessment & Plan  Evidenced by tachypnea, tachycardia, and hypoxia, respiratory failure  Secondary to overdose and subsequent aspiration event  Check sputum, MRSA, pro calcitonin   covid PCR negative   Trend temperature curve, WBCs and procal  Cefepime for aspiration coverage     Overdose  Assessment & Plan  Presumed opioid overdose given immediate response to Narcan  Will check rapid urine drug screen   Intent is currently unknown   Consult to case management for rehabilitation services       ------------------------------------------------------------------------------------------------------------  Chief Complaint: respiratory failure secondary to aspiration event    History of Present Illness   HX and PE limited by: intubated and sedated   [address] is a 42 y o  male who presents with a past medical history of astma    Per review of the medical chart, the patient was found at home by EMS unresponsive  It is unclear who activated EMS  He was given 2 mg narcan with immediate return to consciousness  He initially denied illicit drug use  However, he later stated that " he smoked something"  In the [facility] emergency department, he was noted to have hypoxia and subjective respiratory fatigue for which he was placed on BIPAP  He had an episode of emesis while on the BIPAP  He remained persistently hypoxic after this event and was ultimately intubated  He is transferred to [facility] ICU for further management and care  Unfortunately, there is no documented next of kin or emergency contact to obtain any collateral information at this time  History obtained from chart review  -------------------------------------------------------------------------------------------------------------  Dispo: Admit to Critical Care     Code Status: Level 1 - Full Code  --------------------------------------------------------------------------------------------------------------  Review of Systems   Unable to perform ROS: Intubated       A 12-point, complete review of systems was reviewed and negative except as stated above     Physical Exam  Constitutional:       Interventions: He is sedated and intubated  HENT:      Head: Normocephalic and atraumatic  Eyes:      General: Lids are normal       Extraocular Movements: Extraocular movements intact  Conjunctiva/sclera: Conjunctivae normal    Neck:      Trachea: Trachea normal    Cardiovascular:      Rate and Rhythm: Normal rate  Pulses: Normal pulses  Pulmonary:      Effort: He is intubated  Breath sounds: Rhonchi present  Abdominal:      General: Abdomen is flat  Palpations: Abdomen is soft  Tenderness: There is no abdominal tenderness  Musculoskeletal:      Cervical back: Normal range of motion  Right lower leg: No edema  Left lower leg: No edema     Skin:     General: Skin is warm and dry    Neurological:      General: No focal deficit present  GCS: GCS eye subscore is 2  GCS verbal subscore is 1  GCS motor subscore is 6  Sensory: Sensation is intact  Motor: Motor function is intact        ------------------------------------------------------------------------------------------------------------  Vitals: There were no vitals filed for this visit  Temp  Min: 97 9 °F (36 6 °C)  Max: 97 9 °F (36 6 °C)        There is no height or weight on file to calculate BMI      Laboratory and Diagnostics:  Results from last 7 days   Lab Units 08/21/22 2032   WBC Thousand/uL 7 43   HEMOGLOBIN g/dL 13 2   HEMATOCRIT % 44 0   PLATELETS Thousands/uL 273   NEUTROS PCT % 79*   MONOS PCT % 5     Results from last 7 days   Lab Units 08/21/22 2032   SODIUM mmol/L 134*   POTASSIUM mmol/L 3 6   CHLORIDE mmol/L 97   CO2 mmol/L 24   ANION GAP mmol/L 13   BUN mg/dL 9   CREATININE mg/dL 1 37   CALCIUM mg/dL 8 5   GLUCOSE RANDOM mg/dL 321*   ALT U/L 19   AST U/L 22   ALK PHOS U/L 59   ALBUMIN g/dL 4 1   TOTAL BILIRUBIN mg/dL 0 30     Results from last 7 days   Lab Units 08/21/22 2032   MAGNESIUM mg/dL 1 8                   ABG:  Results from last 7 days   Lab Units 08/21/22 2209   PH ART  7 258*   PCO2 ART mm Hg 56 8*   PO2 ART mm Hg 84 5   HCO3 ART mmol/L 24 8   BASE EXC ART mmol/L -3 1   ABG SOURCE  Radial, Left     VBG:  Results from last 7 days   Lab Units 08/21/22 2209   ABG SOURCE  Radial, Left           Micro:        EKG: NSR   Imaging:   No orders to display         Historical Information   Past Medical History:   Diagnosis Date    Asthma     Back pain     Bipolar 1 disorder ([facility] )     Depression     Gastritis     GERD (gastroesophageal reflux disease)     Kidney stones      Past Surgical History:   Procedure Laterality Date    ABDOMINAL SURGERY      [place] CYSTOURETHROSCOPY,URETER CATHETER Right 9/27/2016    Procedure: CYSTOSCOPY WITH RETROGRADE PYELOGRAM;  Surgeon: Crista Watkins MD; [facility];  Service: Urology    OK LAP, RADICAL NEPHRECTOMY Right 11/23/2016    Procedure: HAND ASSISTED LAPAROSCOPIC NEPHRECTOMY, converted to open, lysis of adhesions,repair of  vena cava; Surgeon: Dora Salvador MD;  [facility];  Service: Urology    URETERAL STENT PLACEMENT Right 9/27/2016    Procedure: INSERTION STENT URETERAL;  Surgeon: Dora Salvador MD;  [facility];  Service:    [facility] VASCULAR SURGERY       Social History   Social History     Substance and Sexual Activity   Alcohol Use No     Social History     Substance and Sexual Activity   Drug Use Yes    Types: Cocaine    Comment: occassional     Social History     Tobacco Use   Smoking Status Current Every Day Smoker    Packs/day: 1 00    Years: 11 00    Pack years: 11 00   Smokeless Tobacco Never Used     Family History:   Family History   Problem Relation Age of Onset    Stroke Mother     Heart disease Mother    [facility] HIV Father      I have reviewed this patient's family history and commented on sigificant items within the HPI    Medications:  Current Facility-Administered Medications   Medication Dose Route Frequency    cefepime (MAXIPIME) IVPB (premix in dextrose) 2,000 mg 50 mL  2,000 mg Intravenous Q12H    heparin (porcine) subcutaneous injection 5,000 Units  5,000 Units Subcutaneous Q8H [facility]    multi-electrolyte (PLASMALYTE-A/ISOLYTE-S PH 7 4) IV solution  125 mL/hr Intravenous Continuous    propofol (DIPRIVAN) 1000 mg in 100 mL infusion (premix)  5-50 mcg/kg/min Intravenous Titrated     Home medications:  Prior to Admission Medications   Prescriptions Last Dose Informant Patient Reported? Taking?    Ascorbic Acid (vitamin C) 1000 MG tablet   No No   Sig: Take 1 tablet (1,000 mg total) by mouth 2 (two) times a day   Patient not taking: Reported on 12/1/2021    cholecalciferol (VITAMIN D3) 1,000 units tablet   No No   Sig: Take 1 tablet (1,000 Units total) by mouth daily   Patient not taking: Reported on 12/1/2021    ibuprofen (MOTRIN) 600 mg tablet   No No   Sig: Take 1 tablet (600 mg total) by mouth every 6 (six) hours as needed for mild pain      Facility-Administered Medications: None     Allergies:  No Known Allergies    ------------------------------------------------------------------------------------------------------------  Advance Directive and Living Will:      Power of :    POLST:    ------------------------------------------------------------------------------------------------------------  Anticipated Length of Stay is > 2 midnights    Care Time Delivered:   Upon my evaluation, this patient had a high probability of imminent or life-threatening deterioration due to acute hypoxic respiratory failure, which required my direct attention, intervention, and personal management  I have personally provided 37 minutes ([phone] to [phone]) of critical care time, exclusive of procedures, teaching, family meetings, and any prior time recorded by providers other than myself  OLGA Ward        Portions of the record may have been created with voice recognition software  Occasional wrong word or "sound a like" substitutions may have occurred due to the inherent limitations of voice recognition software    Read the chart carefully and recognize, using context, where substitutions have occurred

## 2022-08-22 NOTE — ED PROVIDER NOTES
History  Chief Complaint   Patient presents with    Overdose - Accidental     Brought in via EMS, per EMS patient found unresponsive at home  2mg IV narcan administered by EMS  Patient A&O X3 on arrival  SPO2 at 80%  Respiratory and provider called to bedside  80-year-old male with history of asthma presents via EMS after being found unresponsive at home  Upon EMS arrival patient was given 2 mg of IV Narcan and immediately became responsive  Patient initially stated I did not do anything however later told me that he believed he smokes something but was unsure what it was  Upon arrival to the emergency department patient was satting 74% on room air and continued to state I feel short of breath, Im tired" Denies chest pain or  Painful breathing  Denies recent cough or fever  Denies any other complaints at this time other than shortness of breath  When asked how long he has been short of breath patient states "I do not remember Denies any other complaints      History provided by:  Patient   used: No        Prior to Admission Medications   Prescriptions Last Dose Informant Patient Reported? Taking?    Ascorbic Acid (vitamin C) 1000 MG tablet   No No   Sig: Take 1 tablet (1,000 mg total) by mouth 2 (two) times a day   Patient not taking: Reported on 12/1/2021    cholecalciferol (VITAMIN D3) 1,000 units tablet   No No   Sig: Take 1 tablet (1,000 Units total) by mouth daily   Patient not taking: Reported on 12/1/2021    ibuprofen (MOTRIN) 600 mg tablet   No No   Sig: Take 1 tablet (600 mg total) by mouth every 6 (six) hours as needed for mild pain      Facility-Administered Medications: None       Past Medical History:   Diagnosis Date    Asthma     Back pain     Bipolar 1 disorder (HCC)     Depression     Gastritis     GERD (gastroesophageal reflux disease)     Kidney stones        Past Surgical History:   Procedure Laterality Date    ABDOMINAL SURGERY      [place] CYSTOURETHROSCOPY,URETER CATHETER Right 9/27/2016    Procedure: CYSTOSCOPY WITH RETROGRADE PYELOGRAM;  Surgeon: Esteban Reveles MD;  [facility];  Service: Urology    [place] LAP, RADICAL NEPHRECTOMY Right 11/23/2016    Procedure: HAND ASSISTED LAPAROSCOPIC NEPHRECTOMY, converted to open, lysis of adhesions,repair of  vena cava; Surgeon: Esteban Reveles MD;  [facility];  Service: Urology    URETERAL STENT PLACEMENT Right 9/27/2016    Procedure: INSERTION STENT URETERAL;  Surgeon: Esteban Reveles MD;  [facility];  Service:    Maribeth Boyd VASCULAR SURGERY         Family History   Problem Relation Age of Onset    Stroke Mother     Heart disease Mother     HIV Father      I have reviewed and agree with the history as documented  E-Cigarette/Vaping     E-Cigarette/Vaping Substances     Social History     Tobacco Use    Smoking status: Current Every Day Smoker     Packs/day: 1 00     Years: 11 00     Pack years: 11 00    Smokeless tobacco: Never Used   Substance Use Topics    Alcohol use: No    Drug use: Yes     Types: Cocaine     Comment: occassional       Review of Systems   Constitutional: Negative  Negative for chills and fatigue  HENT: Negative for ear pain and sore throat  Eyes: Negative for photophobia and redness  Respiratory: Positive for cough, shortness of breath and wheezing  Negative for apnea  Cardiovascular: Negative for chest pain  Gastrointestinal: Negative for abdominal pain, nausea and vomiting  Genitourinary: Negative for dysuria  Musculoskeletal: Negative for arthralgias, neck pain and neck stiffness  Skin: Negative for rash  Neurological: Negative for dizziness, tremors, syncope and weakness  Psychiatric/Behavioral: Negative for suicidal ideas  Physical Exam  Physical Exam  Constitutional:       General: He is in acute distress  Appearance: He is well-developed  He is ill-appearing and diaphoretic     Eyes:      Pupils: Pupils are equal, round, and reactive to light  Cardiovascular:      Rate and Rhythm: Normal rate and regular rhythm  Pulmonary:      Effort: Respiratory distress present  Breath sounds: Wheezing, rhonchi and rales present  Comments: Frequent shallow respirations   Abdominal:      General: Bowel sounds are normal  There is no distension  Palpations: Abdomen is soft  Musculoskeletal:         General: Normal range of motion  Cervical back: Normal range of motion and neck supple  Skin:     General: Skin is warm  Neurological:      Mental Status: He is alert and oriented to person, place, and time           Vital Signs  ED Triage Vitals [08/21/22 2024]   Temperature Pulse Respirations Blood Pressure SpO2   97 9 °F (36 6 °C) 97 16 113/99 (!) 74 %      Temp Source Heart Rate Source Patient Position - Orthostatic VS BP Location FiO2 (%)   Oral Monitor Lying Right arm --      Pain Score       --           Vitals:    08/21/22 2200 08/21/22 2212 08/21/22 2215 08/21/22 2230   BP: 128/78 146/83 130/87 125/74   Pulse: 83 91 84 81   Patient Position - Orthostatic VS: Lying  Lying Lying         Visual Acuity      ED Medications  Medications   naloxone (FOR EMS ONLY) (NARCAN) 2 MG/2ML injection 2 mg (0 mg Does not apply Given to EMS 8/21/22 2035)   ipratropium-albuterol (DUO-NEB) 0 5-2 5 mg/3 mL inhalation solution 3 mL (3 mL Nebulization Given 8/21/22 2035)   methylPREDNISolone sodium succinate (Solu-MEDROL) injection 125 mg (125 mg Intravenous Given 8/21/22 2031)   naloxone [facility]) injection 2 mg (2 mg Intravenous Given 8/21/22 2033)   magnesium sulfate 2 g/50 mL IVPB (premix) 2 g (0 g Intravenous Stopped 8/21/22 2051)   ondansetron (ZOFRAN) injection 4 mg (4 mg Intravenous Given 8/21/22 2036)   cefTRIAXone (ROCEPHIN) IVPB (premix in dextrose) 1,000 mg 50 mL (0 mg Intravenous Stopped 8/21/22 2100)   rocuronium (ZEMURON) 50 mg/5 mL injection **ADS Override Pull** (50 mg  Given 8/21/22 2111)   rocuronium (ZEMURON) 50 mg/5 mL injection **ADS Override Pull** (10 mg  Given 8/21/22 2111)   etomidate (AMIDATE) 2 mg/mL injection 20 mg (20 mg Intravenous Given 8/21/22 2110)   midazolam (VERSED) injection 5 mg (5 mg Intravenous Given 8/21/22 2231)   fentanyl citrate (PF) 100 MCG/2ML 50 mcg (50 mcg Intravenous Given 8/21/22 2229)       Diagnostic Studies  Results Reviewed     Procedure Component Value Units Date/Time    HS Troponin I 4hr [[id number]]     Lab Status: No result Specimen: Blood     Blood gas, arterial [[id number]]  (Abnormal) Collected: 08/21/22 2209    Lab Status: Final result Specimen: Blood, Arterial from Radial, Left Updated: 08/21/22 2220     pH, Arterial 7 258     pCO2, Arterial 56 8 mm Hg      pO2, Arterial 84 5 mm Hg      HCO3, Arterial 24 8 mmol/L      Base Excess, Arterial -3 1 mmol/L      O2 Content, Arterial 18 0 mL/dL      O2 HGB,Arterial  92 7 %      SOURCE Radial, Left     JUSTINE TEST Yes    COVID only [[id number]]  (Normal) Collected: 08/21/22 2034    Lab Status: Final result Specimen: Nares from Nose Updated: 08/21/22 2129     SARS-CoV-2 Negative    Narrative:      FOR PEDIATRIC PATIENTS - copy/paste COVID Guidelines URL to browser: [email or web address]    SARS-CoV-2 assay is a Nucleic Acid Amplification assay intended for the  qualitative detection of nucleic acid from SARS-CoV-2 in nasopharyngeal  swabs  Results are for the presumptive identification of SARS-CoV-2 RNA  Positive results are indicative of infection with SARS-CoV-2, the virus  causing COVID-19, but do not rule out bacterial infection or co-infection  with other viruses  Laboratories within the [place] and its  territories are required to report all positive results to the appropriate  public health authorities  Negative results do not preclude SARS-CoV-2  infection and should not be used as the sole basis for treatment or other  patient management decisions   Negative results must be combined with  clinical observations, patient history, and epidemiological information  This test has not been FDA cleared or approved  This test has been authorized by FDA under an Emergency Use Authorization  (EUA)  This test is only authorized for the duration of time the  declaration that circumstances exist justifying the authorization of the  emergency use of an in vitro diagnostic tests for detection of SARS-CoV-2  virus and/or diagnosis of COVID-19 infection under section 564(b)(1) of  the Act, 21 U  S C  258IAC-6(T)(5), unless the authorization is terminated  or revoked sooner  The test has been validated but independent review by FDA  and CLIA is pending  Test performed using [employer] Gene[employer]pert: This RT-PCR assay targets N2,  a region unique to SARS-CoV-2  A conserved region in the E-gene was chosen  for pan-Sarbecovirus detection which includes SARS-CoV-2      HS Troponin 0hr (reflex protocol) [[id number]]  (Normal) Collected: 08/21/22 2032    Lab Status: Final result Specimen: Blood from Arm, Right Updated: 08/21/22 2102     hs TnI 0hr 3 ng/L     HS Troponin I 2hr [[id number]]     Lab Status: No result Specimen: Blood     Comprehensive metabolic panel [[id number]]  (Abnormal) Collected: 08/21/22 2032    Lab Status: Final result Specimen: Blood from Arm, Right Updated: 08/21/22 2056     Sodium 134 mmol/L      Potassium 3 6 mmol/L      Chloride 97 mmol/L      CO2 24 mmol/L      ANION GAP 13 mmol/L      BUN 9 mg/dL      Creatinine 1 37 mg/dL      Glucose 321 mg/dL      Calcium 8 5 mg/dL      AST 22 U/L      ALT 19 U/L      Alkaline Phosphatase 59 U/L      Total Protein 7 0 g/dL      Albumin 4 1 g/dL      Total Bilirubin 0 30 mg/dL      eGFR 62 ml/min/1 73sq m     Narrative:      Christin guidelines for Chronic Kidney Disease (CKD):     Stage 1 with normal or high GFR (GFR > 90 mL/min/1 73 square meters)    Stage 2 Mild CKD (GFR = 60-89 mL/min/1 73 square meters)    Stage 3A Moderate CKD (GFR = 45-59 mL/min/1 73 square meters)    Stage 3B Moderate CKD (GFR = 30-44 mL/min/1 73 square meters)    Stage 4 Severe CKD (GFR = 15-29 mL/min/1 73 square meters)    Stage 5 End Stage CKD (GFR <15 mL/min/1 73 square meters)  Note: GFR calculation is accurate only with a steady state creatinine    Magnesium [[id number]]  (Normal) Collected: 08/21/22 2032    Lab Status: Final result Specimen: Blood from Arm, Right Updated: 08/21/22 2056     Magnesium 1 8 mg/dL     CBC and differential [[id number]]  (Abnormal) Collected: 08/21/22 2032    Lab Status: Final result Specimen: Blood from Arm, Right Updated: 08/21/22 2040     WBC 7 43 Thousand/uL      RBC 4 76 Million/uL      Hemoglobin 13 2 g/dL      Hematocrit 44 0 %      MCV 92 fL      MCH 27 7 pg      MCHC 30 0 g/dL      RDW 14 6 %      MPV 8 1 fL      Platelets 594 Thousands/uL      nRBC 0 /100 WBCs      Neutrophils Relative 79 %      Immat GRANS % 1 %      Lymphocytes Relative 15 %      Monocytes Relative 5 %      Eosinophils Relative 0 %      Basophils Relative 0 %      Neutrophils Absolute 5 82 Thousands/µL      Immature Grans Absolute 0 04 Thousand/uL      Lymphocytes Absolute 1 14 Thousands/µL      Monocytes Absolute 0 39 Thousand/µL      Eosinophils Absolute 0 03 Thousand/µL      Basophils Absolute 0 01 Thousands/µL                  XR chest 1 view portable    (Results Pending)   XR chest 1 view portable    (Results Pending)              Procedures  ECG 12 Lead Documentation Only    Date/Time: 8/21/2022 8:30 PM  Performed by: Jose J Molina PA-C  Authorized by: Jose J Molina PA-C     Indications / Diagnosis:  SOB  ECG reviewed by me, the ED Provider: yes    Patient location:  ED  Interpretation:     Interpretation: normal    Rate:     ECG rate:  91    ECG rate assessment: normal    Rhythm:     Rhythm: sinus rhythm    Ectopy:     Ectopy: none    QRS:     QRS axis:  Normal    QRS intervals:  Normal  Conduction:     Conduction: normal ST segments:     ST segments:  Normal  T waves:     T waves: normal    Intubation    Date/Time: 8/21/2022 9:10 PM  Performed by: David Coko PA-C  Authorized by: David Cook PA-C     Patient location:  ED  Consent:     Consent obtained:  Verbal    Consent given by:  Patient    Risks discussed:  Aspiration, death and hypoxia  Universal protocol:     Procedure explained and questions answered to patient or proxy's satisfaction: yes      Relevant documents present and verified: yes      Test results available and properly labeled: yes      Radiology Images displayed and confirmed  If images not available, report reviewed: yes      Required blood products, implants, devices, and special equipment available: yes      Site/side marked: yes      Immediately prior to procedure, a time out was called: yes      Patient identity confirmed:  Verbally with patient  Pre-procedure details:     Patient status:  Awake    Mallampati score:  1    Pretreatment medications:  Etomidate    Paralytics:  Rocuronium  Indications:     Indications for intubation: respiratory distress and hypoxemia    Procedure details:     Preoxygenation:  Nonrebreather mask    CPR in progress: no      Intubation method:  Oral    Oral intubation technique:  Glidescope    Nasal intubation technique:  Fiber optic    Laryngoscope blade: Mac 4    Tube size (mm):  8 0    Tube type:  Cuffed    Number of attempts:  1  Placement assessment:     ETT to lip:  24    Tube secured with:  ETT white    Breath sounds:  Equal    Placement verification: chest rise, colorimetric ETCO2 device, CXR verification, direct visualization and equal breath sounds      CXR findings:  ETT in proper place    Ventilator settings:  400, 100% 16 resp, PEEP 8   Post-procedure details:     Patient tolerance of procedure:   Tolerated well, no immediate complications  CriticalCare Time  Performed by: David Cook PA-C  Authorized by: David Cook PA-C     [facility] care provider statement:     Critical care time (minutes):  60    Critical care start time:  8/21/2022 8:45 PM    Critical care end time:  8/21/2022 9:45 PM    Critical care time was exclusive of:  Separately billable procedures and treating other patients    Critical care was necessary to treat or prevent imminent or life-threatening deterioration of the following conditions:  Respiratory failure    Critical care was time spent personally by me on the following activities:  Development of treatment plan with patient or surrogate, examination of patient, re-evaluation of patient's condition, ordering and review of laboratory studies and ordering and review of radiographic studies    I assumed direction of critical care for this patient from another provider in my specialty: no               ED Course                                             MDM  Number of Diagnoses or Management Options  Accidental overdose, initial encounter: new and requires workup  Hypoxia: new and requires workup  Respiratory distress: new and requires workup  Diagnosis management comments: Patient initially presented via EMS after a suspected opiate overdose  Patient was found unresponsive and was given 2 mg of IV Narcan at which point patient became responsive and alert however continued to have hypoxia  Patient arrived with saturation of 74% on room air as well as shallow and frequent respirations  Patient began to have respiratory fatigue and distress  Patient was found to have wheezing as well as rhonchi and rales on exam   Suspected asthma exacerbation with possible aspiration pneumonia and underlying opiate overdose  Patient was given a nebulizer in room without relief  Patient continued to sat approximately 79-82% on nebulizer  Patient was then placed on BiPAP and initially tolerated well with saturations around 96-97%  Patient then began vomiting on BiPAP despite antiemetics and could no longer tolerate continuous pressure  Without BiPAP patient continued to desaturate down into the low 80s  Patient is noted to have respiratory fatigue at this time  Shared decision making discussion was had between myself patient attending with plan to intubate the patient  Patient agreement with plan and demonstrates understanding of need for intubation and risks  Patient was intubated by myself and procedure documented, well tolerated  Patient was then transferred to [facility] for further evaluation  Stable at time of transfer       Amount and/or Complexity of Data Reviewed  Clinical lab tests: ordered and reviewed  Tests in the radiology section of CPT®: ordered and reviewed  Discuss the patient with other providers: yes    Risk of Complications, Morbidity, and/or Mortality  Presenting problems: high  Diagnostic procedures: high  Management options: high    Patient Progress  Patient progress: stable      Disposition  Final diagnoses:   Hypoxia   Accidental overdose, initial encounter   Respiratory distress     Time reflects when diagnosis was documented in both MDM as applicable and the Disposition within this note     Time User Action Codes Description Comment    8/21/2022  9:58 PM Ashish Salas Add [R09 02] Hypoxia     8/21/2022  9:58 PM Arlen Leblanc9 K 66 Accidental overdose, initial encounter     8/21/2022  9:58 PM Ashish Salas Add [R06 03] Respiratory distress       ED Disposition     ED Disposition   Transfer to Another Facility-In Network    Condition   --    Date/Time   Sun Aug 21, 2022  9:57 PM    Comment   [place] [facility] Documentation    Alexa Spence Most Recent Value   Patient Condition The patient has been stabilized such that within reasonable medical probability, no material deterioration of the patient condition or the condition of the unborn child(levi) is likely to result from the transfer   Reason for Transfer Level of Care needed not available at this facility   Benefits of Transfer Specialized equipment and/or services available at the receiving facility (Include comment)________________________   Risks of Transfer Potential for delay in receiving treatment   Accepting Physician santy   Accepting Facility Name, [facility]   Sending [facility]   Provider Certification General risk, such as traffic hazards, adverse weather conditions, rough terrain or turbulence, possible failure of equipment (including vehicle or aircraft), or consequences of actions of persons outside the control of the transport personnel      RN Documentation    Flowsheet Row Most [facility] Name, [facility]   Report Given to tano      Follow-up Information    None         Discharge Medication List as of 8/21/2022 11:15 PM      CONTINUE these medications which have NOT CHANGED    Details   Ascorbic Acid (vitamin C) 1000 MG tablet Take 1 tablet (1,000 mg total) by mouth 2 (two) times a day, Starting Mon 11/29/2021, Normal      cholecalciferol (VITAMIN D3) 1,000 units tablet Take 1 tablet (1,000 Units total) by mouth daily, Starting Mon 11/29/2021, Normal      ibuprofen (MOTRIN) 600 mg tablet Take 1 tablet (600 mg total) by mouth every 6 (six) hours as needed for mild pain, Starting Wed 12/1/2021, Print             No discharge procedures on file      PDMP Review     None          ED Provider  Electronically Signed by           Maranda Garrett PA-C  08/21/22 1080

## 2022-08-23 LAB — MRSA NOSE QL CULT: NORMAL

## 2022-08-23 NOTE — UTILIZATION REVIEW
Initial Clinical Review    Admission: Date/Time/Statement:   Admission Orders (From admission, onward)     Ordered        08/22/22 0007  Inpatient Admission  Once                      Orders Placed This Encounter   Procedures    Inpatient Admission     Standing Status:   Standing     Number of Occurrences:   1     Order Specific Question:   Level of Care     Answer:   Critical Care [15]     Order Specific Question:   Estimated length of stay     Answer:   More than 2 Midnights     Order Specific Question:   Certification     Answer:   I certify that inpatient services are medically necessary for this patient for a duration of greater than two midnights  See H&P and MD Progress Notes for additional information about the patient's course of treatment  Initial Presentation: 42 y o  male pt was intubated at [facility] campus transferred to [facility] ICU   On propofol gtt during transfer  On arrival to ED at [facility] found unresponsive , presumed overdose and remained hypoxic in ED   Given duoneb , placed on bipap   Sat into 90 s   Unfortunately, patient subsequently vomited with the BiPAP mask on  I immediately went to bedside and re-evaluated the patient  He had at least 2 episodes of emesis, and the BiPAP mask was quickly removed  A non-rebreather was placed, however the patient's oxygen saturations remained in the 80s  pt was intubated   PE: wheezing , rhonchi and rales, diaphoretic     8/22 Critical Care note   Cefepime was initiated   the patient was weaned to extubate am 8/22  DC 'd IVF and started diet   Pt asing to go home  O2 sat 97 % RA   Had IV lasix x1   Pt adamantly wishes to go home   Given script for TTE and access for PCP    Pt denied any suicide attempt         Temperature Pulse Respirations Blood Pressure SpO2   08/22/22 0022 08/22/22 0000 08/22/22 0000 08/22/22 0000 08/21/22 2350   99 86 °F (37 7 °C) 73 18 122/73 100 %      Temp Source Heart Rate Source Patient Position - Orthostatic VS BP Location FiO2 (%)   08/22/22 0022 08/22/22 0000 08/22/22 0000 08/22/22 0000 08/22/22 0000   Bladder Monitor Lying Right arm 45      Pain Score       08/22/22 0800       No Pain          Wt Readings from Last 1 Encounters:   08/21/22 63 2 kg (139 lb 5 3 oz)     Additional Vital Signs:   08/22/22 1000 -- 73 20 -- -- 97 % -- None (Room air) --   08/22/22 0900 -- 75 29 Abnormal  126/71 92 95 % -- -- --   08/22/22 0800 99 14 °F (37 3 °C) 64 25 Abnormal  133/78 100 100 % -- -- --   08/22/22 0739 -- -- -- -- -- 99 % -- -- --   08/22/22 0700 99 86 °F (37 7 °C) 71 16 99/57 72 97 % -- -- --   08/22/22 0600 99 68 °F (37 6 °C) 66 27 Abnormal  111/64 82 98 % -- -- --   08/22/22 0500 99 5 °F (37 5 °C) 69 24 Abnormal  113/69 86 100 % -- -- --   08/22/22 0400 99 68 °F (37 6 °C) 69 16 124/77 95 100 % -- -- --   08/22/22 0304 99 86 °F (37 7 °C) 72 21 121/80 93 99 % -- -- --   08/22/22 0300 99 86 °F (37 7 °C) 75 20 -- -- 100 % -- -- --   08/22/22 0200 99 68 °F (37 6 °C) 71 16 104/61 75 99 % -- -- --   08/22/22 0100 99 68 °F (37 6 °C) 69 19 -- -- 100 % -- -- --   08/22/22 0022 99 86 °F (37 7 °C) 72 18 -- -- 99 % -- -- --   08/22/22 0000 -- 73 18 122/73 93 98 % 45 Ventilator        Pertinent Labs/Diagnostic Test Results:   8/22 EKG NSR   XR chest portable ICU   Final Result by Danelle Lauren MD (08/22 1225)      Significant interval improvement in previously extensive bilateral airspace opacities, with only mild residual right perihilar airspace opacities now                     Workstation performed: [id number]           Results from last 7 days   Lab Units 08/21/22 2034   SARS-COV-2  Negative     Results from last 7 days   Lab Units 08/22/22  0047 08/21/22 2032   WBC Thousand/uL 9 85 7 43   HEMOGLOBIN g/dL 13 4 13 2   HEMATOCRIT % 40 8 44 0   PLATELETS Thousands/uL 269 273   NEUTROS ABS Thousands/µL 8 87* 5 82     Results from last 7 days   Lab Units 08/22/22  0047 08/21/22 2032   SODIUM mmol/L 137 134*   POTASSIUM mmol/L 3 8 3 6   CHLORIDE mmol/L 102 97   CO2 mmol/L 25 24   ANION GAP mmol/L 10 13   BUN mg/dL 8 9   CREATININE mg/dL 1 23 1 37   EGFR ml/min/1 73sq m 70 62   CALCIUM mg/dL 8 4 8 5   CALCIUM, IONIZED mmol/L 1 11*  --    MAGNESIUM mg/dL 2 2 1 8   PHOSPHORUS mg/dL 2 1*  --      Results from last 7 days   Lab Units 08/22/22  0047 08/21/22 2032   AST U/L 14 22   ALT U/L 14 19   ALK PHOS U/L 58 59   TOTAL PROTEIN g/dL 6 4 7 0   ALBUMIN g/dL 3 3* 4 1   TOTAL BILIRUBIN mg/dL 0 37 0 30     Results from last 7 days   Lab Units 08/22/22  0601   POC GLUCOSE mg/dl 125     Results from last 7 days   Lab Units 08/22/22  0047 08/21/22 2032   GLUCOSE RANDOM mg/dL 98 321*   No results found for: BETA-HYDROXYBUTYRATE   Results from last 7 days   Lab Units 08/22/22  0040 08/21/22  2209   PH ART  7 437 7 258*   PCO2 ART mm Hg 35 5* 56 8*   PO2 ART mm Hg 104 5 84 5   HCO3 ART mmol/L 23 4 24 8   BASE EXC ART mmol/L -0 3 -3 1   O2 CONTENT ART mL/dL 19 0 18 0   O2 HGB, ARTERIAL % 96 1 92 7*   ABG SOURCE  Radial, Right Radial, Left     Results from last 7 days   Lab Units 08/22/22  0359 08/22/22  0157 08/22/22  0047 08/21/22 2032   HS TNI 0HR ng/L  --   --  21 3   HS TNI 2HR ng/L  --  14  --   --    HSTNI D2 ng/L  --  -7  --   --    HS TNI 4HR ng/L 14  --   --   --    HSTNI D4 ng/L -7  --   --   --      Results from last 7 days   Lab Units 08/22/22  0047   PROTIME seconds 13 4   INR  1 04     Results from last 7 days   Lab Units 08/22/22  0047   PROCALCITONIN ng/ml 1 08*     Results from last 7 days   Lab Units 08/22/22  0047   LACTIC ACID mmol/L 1 1       Results from last 7 days   Lab Units 08/22/22  0107   AMPH/METH  Negative   BARBITURATE UR  Negative   BENZODIAZEPINE UR  Positive*   COCAINE UR  Positive*   METHADONE URINE  Negative   OPIATE UR  Negative   PCP UR  Negative   THC UR  Negative     Results from last 7 days   Lab Units 08/22/22  0134 08/22/22  0108 08/22/22  0048   BLOOD CULTURE   --  No Growth at 24 hrs  No Growth at 24 hrs     SPUTUM CULTURE  No growth  --   --    GRAM STAIN RESULT  2+ Polys*  1+ Gram positive cocci in pairs*  Rare Epithelial Cells*  --   --        Past Medical History:   Diagnosis Date    Asthma     Back pain     Bipolar 1 disorder (HCC)     Depression     Gastritis     GERD (gastroesophageal reflux disease)     Kidney stones      Present on Admission:  **None**      Admitting Diagnosis: Pneumonia [J18 9]  Age/Sex: 42 y o  male  Admission Orders:  Scheduled Medications: Iv lasix 40 mg x1  Po kdur x1  Po vit C BID   Po Vit D qd  IV NSS @ 125/hr  Iv cefepime q12h   Iv propofol gtt   Sq heparin q8hr     I&O   PT OT eval   Neuro checks   NPO  To reg diet     IP CONSULT TO CASE MANAGEMENT    Network Utilization Review Department  ATTENTION: Please call with any questions or concerns to [phone] and carefully listen to the prompts so that you are directed to the right person  All voicemails are confidential   Fili Alcantar all requests for admission clinical reviews, approved or denied determinations and any other requests to dedicated fax number below belonging to the campus where the patient is receiving treatment   List of dedicated fax numbers for the Facilities:  [address] DENIALS (Administrative/Medical Necessity) [phone]   [address] (Maternity/NICU/Pediatrics) [phone]   [address]  [phone]   Isabella Joshi [address] [address] [place] [address] [address] 1014 [facility] [phone]

## 2022-08-23 NOTE — UTILIZATION REVIEW
Inpatient Admission Authorization Request   NOTIFICATION OF INPATIENT ADMISSION/INPATIENT AUTHORIZATION REQUEST   SERVICING FACILITY:   [address] [place] [address]  Tax ID: [id number]  NPI: [id number]  Place of Service: Inpatient [address] [facility]  60[address]  Place of Service Code: 24     ATTENDING PROVIDER:  Attending Name and NPI#: Nettie Jones Md [[id number]]  Address: [address] [place] [address]  Phone: [phone]     UTILIZATION REVIEW CONTACT:  Marcia Zarco Utilization   Network Utilization Review Department  Phone: [phone]  Fax [phone]  Email: [email or web address]     PHYSICIAN ADVISORY SERVICES:  FOR DTOI-VS-EUYJ REVIEW - MEDICAL NECESSITY DENIAL  Phone: [phone]  Fax: [phone]  Email: [email or web address]     TYPE OF REQUEST:  Inpatient Status     ADMISSION INFORMATION:  ADMISSION DATE/TIME: 8/21/22 11:44 PM  PATIENT DIAGNOSIS CODE/DESCRIPTION:  Pneumonia [J18 9]  DISCHARGE DATE/TIME: 8/22/2022  4:30 PM   IMPORTANT INFORMATION:  Please contact Marcia Zarco directly with any questions or concerns regarding this request  Department voicemails are confidential     Send requests for admission clinical reviews, concurrent reviews, approvals, and administrative denials due to lack of clinical to fax [phone]
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
## 2022-08-25 LAB
BACTERIA SPT RESP CULT: ABNORMAL
GRAM STN SPEC: ABNORMAL

## 2022-08-27 LAB
BACTERIA BLD CULT: NORMAL
BACTERIA BLD CULT: NORMAL

## 2022-12-02 ENCOUNTER — HOSPITAL ENCOUNTER (EMERGENCY)
Facility: HOSPITAL | Age: 47
End: 2022-12-04
Attending: EMERGENCY MEDICINE

## 2022-12-02 DIAGNOSIS — J10.1 INFLUENZA A: ICD-10-CM

## 2022-12-02 DIAGNOSIS — J45.901 ASTHMA EXACERBATION: Primary | ICD-10-CM

## 2022-12-02 DIAGNOSIS — R45.851 SUICIDAL IDEATIONS: ICD-10-CM

## 2022-12-02 LAB
ETHANOL EXG-MCNC: 0 MG/DL
FLUAV RNA RESP QL NAA+PROBE: POSITIVE
FLUBV RNA RESP QL NAA+PROBE: NEGATIVE
RSV RNA RESP QL NAA+PROBE: NEGATIVE
SARS-COV-2 RNA RESP QL NAA+PROBE: NEGATIVE

## 2022-12-02 RX ORDER — QUETIAPINE FUMARATE 100 MG/1
200 TABLET, FILM COATED ORAL ONCE
Status: DISCONTINUED | OUTPATIENT
Start: 2022-12-02 | End: 2022-12-02

## 2022-12-02 RX ORDER — OSELTAMIVIR PHOSPHATE 75 MG/1
75 CAPSULE ORAL EVERY 12 HOURS SCHEDULED
Status: DISCONTINUED | OUTPATIENT
Start: 2022-12-02 | End: 2022-12-04 | Stop reason: HOSPADM

## 2022-12-02 RX ORDER — QUETIAPINE FUMARATE 100 MG/1
200 TABLET, FILM COATED ORAL
Status: DISCONTINUED | OUTPATIENT
Start: 2022-12-02 | End: 2022-12-04 | Stop reason: HOSPADM

## 2022-12-02 RX ORDER — IBUPROFEN 600 MG/1
600 TABLET ORAL ONCE
Status: COMPLETED | OUTPATIENT
Start: 2022-12-02 | End: 2022-12-02

## 2022-12-02 RX ORDER — IPRATROPIUM BROMIDE AND ALBUTEROL SULFATE 2.5; .5 MG/3ML; MG/3ML
3 SOLUTION RESPIRATORY (INHALATION) ONCE
Status: COMPLETED | OUTPATIENT
Start: 2022-12-02 | End: 2022-12-02

## 2022-12-02 RX ORDER — LORATADINE 10 MG/1
10 TABLET ORAL ONCE
Status: COMPLETED | OUTPATIENT
Start: 2022-12-02 | End: 2022-12-02

## 2022-12-02 RX ADMIN — QUETIAPINE FUMARATE 200 MG: 100 TABLET ORAL at 21:53

## 2022-12-02 RX ADMIN — IBUPROFEN 600 MG: 600 TABLET ORAL at 21:53

## 2022-12-02 RX ADMIN — LORATADINE 10 MG: 10 TABLET ORAL at 21:53

## 2022-12-02 RX ADMIN — PREDNISONE 50 MG: 20 TABLET ORAL at 19:01

## 2022-12-02 RX ADMIN — IPRATROPIUM BROMIDE AND ALBUTEROL SULFATE 3 ML: .5; 3 SOLUTION RESPIRATORY (INHALATION) at 19:05

## 2022-12-02 RX ADMIN — OSELTAMIVIR PHOSPHATE 75 MG: 75 CAPSULE ORAL at 21:53
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
## 2022-12-03 DIAGNOSIS — F31.9 BIPOLAR 1 DISORDER (HCC): Primary | ICD-10-CM

## 2022-12-03 LAB
AMPHETAMINES SERPL QL SCN: NEGATIVE
BARBITURATES UR QL: NEGATIVE
BENZODIAZ UR QL: NEGATIVE
COCAINE UR QL: POSITIVE
METHADONE UR QL: NEGATIVE
OPIATES UR QL SCN: NEGATIVE
OXYCODONE+OXYMORPHONE UR QL SCN: NEGATIVE
PCP UR QL: NEGATIVE
THC UR QL: NEGATIVE

## 2022-12-03 RX ORDER — IBUPROFEN 600 MG/1
600 TABLET ORAL EVERY 6 HOURS PRN
Status: CANCELLED | OUTPATIENT
Start: 2022-12-03

## 2022-12-03 RX ORDER — TRAZODONE HYDROCHLORIDE 50 MG/1
50 TABLET ORAL
Status: CANCELLED | OUTPATIENT
Start: 2022-12-03

## 2022-12-03 RX ORDER — OLANZAPINE 2.5 MG/1
2.5 TABLET ORAL
Status: CANCELLED | OUTPATIENT
Start: 2022-12-03

## 2022-12-03 RX ORDER — ALBUTEROL SULFATE 90 UG/1
2 AEROSOL, METERED RESPIRATORY (INHALATION) EVERY 4 HOURS PRN
Status: DISCONTINUED | OUTPATIENT
Start: 2022-12-03 | End: 2022-12-04 | Stop reason: HOSPADM

## 2022-12-03 RX ORDER — MIRTAZAPINE 15 MG/1
7.5 TABLET, FILM COATED ORAL
Status: CANCELLED | OUTPATIENT
Start: 2022-12-03

## 2022-12-03 RX ORDER — BENZTROPINE MESYLATE 1 MG/ML
1 INJECTION INTRAMUSCULAR; INTRAVENOUS
Status: CANCELLED | OUTPATIENT
Start: 2022-12-03

## 2022-12-03 RX ORDER — NICOTINE 21 MG/24HR
1 PATCH, TRANSDERMAL 24 HOURS TRANSDERMAL DAILY
Status: CANCELLED | OUTPATIENT
Start: 2022-12-04

## 2022-12-03 RX ORDER — BENZTROPINE MESYLATE 0.5 MG/1
0.5 TABLET ORAL
Status: CANCELLED | OUTPATIENT
Start: 2022-12-03

## 2022-12-03 RX ORDER — ACETAMINOPHEN 325 MG/1
975 TABLET ORAL ONCE
Status: COMPLETED | OUTPATIENT
Start: 2022-12-03 | End: 2022-12-03

## 2022-12-03 RX ORDER — LANOLIN ALCOHOL/MO/W.PET/CERES
3 CREAM (GRAM) TOPICAL
Status: CANCELLED | OUTPATIENT
Start: 2022-12-03

## 2022-12-03 RX ORDER — OLANZAPINE 5 MG/1
5 TABLET ORAL
Status: CANCELLED | OUTPATIENT
Start: 2022-12-03

## 2022-12-03 RX ORDER — IBUPROFEN 400 MG/1
400 TABLET ORAL EVERY 6 HOURS PRN
Status: CANCELLED | OUTPATIENT
Start: 2022-12-03

## 2022-12-03 RX ORDER — LORAZEPAM 2 MG/ML
1 INJECTION INTRAMUSCULAR
Status: CANCELLED | OUTPATIENT
Start: 2022-12-03

## 2022-12-03 RX ORDER — HYDROXYZINE 50 MG/1
50 TABLET, FILM COATED ORAL
Status: CANCELLED | OUTPATIENT
Start: 2022-12-03

## 2022-12-03 RX ORDER — ACETAMINOPHEN 325 MG/1
650 TABLET ORAL EVERY 6 HOURS PRN
Status: CANCELLED | OUTPATIENT
Start: 2022-12-03

## 2022-12-03 RX ORDER — PROPRANOLOL HYDROCHLORIDE 10 MG/1
5 TABLET ORAL EVERY 8 HOURS PRN
Status: CANCELLED | OUTPATIENT
Start: 2022-12-03

## 2022-12-03 RX ORDER — OLANZAPINE 10 MG/1
5 INJECTION, POWDER, LYOPHILIZED, FOR SOLUTION INTRAMUSCULAR
Status: CANCELLED | OUTPATIENT
Start: 2022-12-03

## 2022-12-03 RX ORDER — HYDROXYZINE HYDROCHLORIDE 25 MG/1
25 TABLET, FILM COATED ORAL
Status: CANCELLED | OUTPATIENT
Start: 2022-12-03

## 2022-12-03 RX ADMIN — OSELTAMIVIR PHOSPHATE 75 MG: 75 CAPSULE ORAL at 21:27

## 2022-12-03 RX ADMIN — QUETIAPINE FUMARATE 200 MG: 100 TABLET ORAL at 21:27

## 2022-12-03 RX ADMIN — OSELTAMIVIR PHOSPHATE 75 MG: 75 CAPSULE ORAL at 09:45

## 2022-12-03 RX ADMIN — ACETAMINOPHEN 975 MG: 325 TABLET ORAL at 18:23
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
## 2022-12-03 NOTE — ED NOTES
Patient is accepted at [facility]  Patient is accepted by Cirilo Padilla MD      Patient may go to the floor after 1900   Transportation is arranged with [facility]  Transportation is scheduled 12/4 at 2130  Nany Linton in Intake is aware and will inform the unit       Nurse report is to be called to [phone] prior to patient transfer
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
## 2022-12-03 NOTE — ED NOTES
Pt brought sandwich, chips, pretzels, and ice water        [facility], RN  12/02/22 9980

## 2022-12-03 NOTE — ED CARE HANDOFF
Emergency Department Sign Out Note        Sign out and transfer of care from Dr Nelly Conti  See Separate Emergency Department note  The patient, Dilia Bailey, was evaluated by the previous provider for psych  Workup Completed:  See previous ED notes    ED Course / Workup Pending (followup):                                  ED Course as of 12/03/22 1602   Sat Dec 03, 2022   9006 Sign out: 12 for suicidal ideation  Influenza    1221 Patient evaluated by psychiatry; no acute changes recommended at this time  Patient reported intermittent SI with a plan  Will continue bedsearch for inpatient psychiatric stabilization  Continue safety plan   1505 COCAINE URINE(!): Positive     Procedures  MDM        Disposition  Final diagnoses:   Asthma exacerbation   Suicidal ideations     Time reflects when diagnosis was documented in both MDM as applicable and the Disposition within this note     Time User Action Codes Description Comment    12/2/2022  6:48 PM Kavya Marie Add [J45 901] Asthma exacerbation     12/2/2022  6:48 PM Kavya Marie Add [P41 642] Suicidal ideations       ED Disposition     ED Disposition   Transfer to [address] [place] [address]   --    Date/Time   Fri Dec 2, 2022  9:01 PM    Comment   Dilia Bailey should be transferred out to [facility] and has been medically cleared  MD Documentation    Jimena Marinelli Most Recent Value   Sending MD DR ATILIO ANDERSON      Follow-up Information    None       Patient's Medications   Discharge Prescriptions    No medications on file     No discharge procedures on file         ED Provider  Electronically Signed by     Bertha Villaseñor MD  12/03/22 1326

## 2022-12-03 NOTE — ED NOTES
This RN took over patients care at this time, he is resting comfortably on stretcher  No distress noted       Sarahy Mccann, EFREN  12/03/22 0025

## 2022-12-03 NOTE — ED NOTES
Patient has [employer] [facility] Medicare Rep as his primary insurance  CIS EVS'd patient and per EVS pt is MIGUEL ANGEL hickman- [facility] as his secondary insurance      Juan Diego Garcia Crisis Intervention Specialist II

## 2022-12-03 NOTE — ED NOTES
CIS was informed by ED Nurse that patient is Positive for Influenza  CIS faxed [phone] to [facility] Specialist for possible bed placement in network      Lea Seip Crisis Intervention Specialist II

## 2022-12-04 ENCOUNTER — HOSPITAL ENCOUNTER (INPATIENT)
Facility: HOSPITAL | Age: 47
LOS: 5 days | Discharge: HOME | End: 2022-12-09
Attending: PSYCHIATRY & NEUROLOGY | Admitting: GENERAL PRACTICE

## 2022-12-04 VITALS
WEIGHT: 130.5 LBS | SYSTOLIC BLOOD PRESSURE: 127 MMHG | RESPIRATION RATE: 18 BRPM | BODY MASS INDEX: 20.44 KG/M2 | TEMPERATURE: 98.3 F | HEART RATE: 59 BPM | OXYGEN SATURATION: 98 % | DIASTOLIC BLOOD PRESSURE: 76 MMHG

## 2022-12-04 DIAGNOSIS — F33.2 MDD (MAJOR DEPRESSIVE DISORDER), RECURRENT EPISODE, SEVERE (HCC): Primary | ICD-10-CM

## 2022-12-04 DIAGNOSIS — T50.901A OVERDOSE: ICD-10-CM

## 2022-12-04 DIAGNOSIS — J45.909 ASTHMA: Chronic | ICD-10-CM

## 2022-12-04 DIAGNOSIS — R63.4 WEIGHT LOSS: ICD-10-CM

## 2022-12-04 DIAGNOSIS — Z72.0 TOBACCO ABUSE: ICD-10-CM

## 2022-12-04 DIAGNOSIS — E43 SEVERE PROTEIN-CALORIE MALNUTRITION (HCC): ICD-10-CM

## 2022-12-04 DIAGNOSIS — E53.8 VITAMIN B12 DEFICIENCY: ICD-10-CM

## 2022-12-04 DIAGNOSIS — F31.9 BIPOLAR 1 DISORDER (HCC): ICD-10-CM

## 2022-12-04 PROBLEM — F14.10 COCAINE ABUSE (HCC): Status: ACTIVE | Noted: 2022-12-04

## 2022-12-04 PROBLEM — F11.10 OPIATE ABUSE, EPISODIC (HCC): Status: ACTIVE | Noted: 2022-12-04

## 2022-12-04 RX ORDER — CLONIDINE HYDROCHLORIDE 0.1 MG/1
0.1 TABLET ORAL 3 TIMES DAILY PRN
Status: DISCONTINUED | OUTPATIENT
Start: 2022-12-04 | End: 2022-12-09 | Stop reason: HOSPADM

## 2022-12-04 RX ORDER — OLANZAPINE 5 MG/1
5 TABLET ORAL
Status: DISCONTINUED | OUTPATIENT
Start: 2022-12-04 | End: 2022-12-09 | Stop reason: HOSPADM

## 2022-12-04 RX ORDER — MAGNESIUM HYDROXIDE/ALUMINUM HYDROXICE/SIMETHICONE 120; 1200; 1200 MG/30ML; MG/30ML; MG/30ML
30 SUSPENSION ORAL EVERY 4 HOURS PRN
Status: DISCONTINUED | OUTPATIENT
Start: 2022-12-04 | End: 2022-12-09 | Stop reason: HOSPADM

## 2022-12-04 RX ORDER — IPRATROPIUM BROMIDE AND ALBUTEROL SULFATE 2.5; .5 MG/3ML; MG/3ML
3 SOLUTION RESPIRATORY (INHALATION) EVERY 6 HOURS PRN
Status: DISCONTINUED | OUTPATIENT
Start: 2022-12-04 | End: 2022-12-09 | Stop reason: HOSPADM

## 2022-12-04 RX ORDER — DIPHENHYDRAMINE HCL 25 MG
25 TABLET ORAL ONCE
Status: COMPLETED | OUTPATIENT
Start: 2022-12-04 | End: 2022-12-04

## 2022-12-04 RX ORDER — IBUPROFEN 600 MG/1
600 TABLET ORAL ONCE
Status: COMPLETED | OUTPATIENT
Start: 2022-12-04 | End: 2022-12-04

## 2022-12-04 RX ORDER — PROPRANOLOL HYDROCHLORIDE 10 MG/1
5 TABLET ORAL EVERY 8 HOURS PRN
Status: DISCONTINUED | OUTPATIENT
Start: 2022-12-04 | End: 2022-12-04

## 2022-12-04 RX ORDER — OLANZAPINE 2.5 MG/1
2.5 TABLET ORAL
Status: DISCONTINUED | OUTPATIENT
Start: 2022-12-04 | End: 2022-12-09 | Stop reason: HOSPADM

## 2022-12-04 RX ORDER — ONDANSETRON 4 MG/1
4 TABLET, ORALLY DISINTEGRATING ORAL EVERY 6 HOURS PRN
Status: DISCONTINUED | OUTPATIENT
Start: 2022-12-04 | End: 2022-12-09 | Stop reason: HOSPADM

## 2022-12-04 RX ORDER — IBUPROFEN 400 MG/1
400 TABLET ORAL EVERY 6 HOURS PRN
Status: DISCONTINUED | OUTPATIENT
Start: 2022-12-04 | End: 2022-12-09 | Stop reason: HOSPADM

## 2022-12-04 RX ORDER — ALBUTEROL SULFATE 90 UG/1
2 AEROSOL, METERED RESPIRATORY (INHALATION) EVERY 4 HOURS PRN
Status: DISCONTINUED | OUTPATIENT
Start: 2022-12-04 | End: 2022-12-09 | Stop reason: HOSPADM

## 2022-12-04 RX ORDER — BENZTROPINE MESYLATE 0.5 MG/1
0.5 TABLET ORAL
Status: DISCONTINUED | OUTPATIENT
Start: 2022-12-04 | End: 2022-12-09 | Stop reason: HOSPADM

## 2022-12-04 RX ORDER — LANOLIN ALCOHOL/MO/W.PET/CERES
3 CREAM (GRAM) TOPICAL
Status: DISCONTINUED | OUTPATIENT
Start: 2022-12-04 | End: 2022-12-09 | Stop reason: HOSPADM

## 2022-12-04 RX ORDER — OLANZAPINE 10 MG/1
5 INJECTION, POWDER, LYOPHILIZED, FOR SOLUTION INTRAMUSCULAR
Status: DISCONTINUED | OUTPATIENT
Start: 2022-12-04 | End: 2022-12-09 | Stop reason: HOSPADM

## 2022-12-04 RX ORDER — MIRTAZAPINE 15 MG/1
7.5 TABLET, FILM COATED ORAL
Status: DISCONTINUED | OUTPATIENT
Start: 2022-12-04 | End: 2022-12-04

## 2022-12-04 RX ORDER — NICOTINE 21 MG/24HR
1 PATCH, TRANSDERMAL 24 HOURS TRANSDERMAL DAILY
Status: DISCONTINUED | OUTPATIENT
Start: 2022-12-04 | End: 2022-12-09 | Stop reason: HOSPADM

## 2022-12-04 RX ORDER — HYDROXYZINE HYDROCHLORIDE 25 MG/1
25 TABLET, FILM COATED ORAL
Status: DISCONTINUED | OUTPATIENT
Start: 2022-12-04 | End: 2022-12-05

## 2022-12-04 RX ORDER — ACETAMINOPHEN 325 MG/1
650 TABLET ORAL EVERY 6 HOURS PRN
Status: DISCONTINUED | OUTPATIENT
Start: 2022-12-04 | End: 2022-12-09 | Stop reason: HOSPADM

## 2022-12-04 RX ORDER — POLYETHYLENE GLYCOL 3350 17 G/17G
17 POWDER, FOR SOLUTION ORAL DAILY PRN
Status: DISCONTINUED | OUTPATIENT
Start: 2022-12-04 | End: 2022-12-09 | Stop reason: HOSPADM

## 2022-12-04 RX ORDER — HYDROXYZINE 50 MG/1
50 TABLET, FILM COATED ORAL
Status: DISCONTINUED | OUTPATIENT
Start: 2022-12-04 | End: 2022-12-05

## 2022-12-04 RX ORDER — QUETIAPINE FUMARATE 200 MG/1
200 TABLET, FILM COATED ORAL
Status: DISCONTINUED | OUTPATIENT
Start: 2022-12-04 | End: 2022-12-09 | Stop reason: HOSPADM

## 2022-12-04 RX ORDER — TRAZODONE HYDROCHLORIDE 50 MG/1
50 TABLET ORAL
Status: DISCONTINUED | OUTPATIENT
Start: 2022-12-04 | End: 2022-12-07

## 2022-12-04 RX ORDER — GUAIFENESIN/DEXTROMETHORPHAN 100-10MG/5
10 SYRUP ORAL EVERY 4 HOURS PRN
Status: DISCONTINUED | OUTPATIENT
Start: 2022-12-04 | End: 2022-12-09 | Stop reason: HOSPADM

## 2022-12-04 RX ORDER — CLONIDINE HYDROCHLORIDE 0.1 MG/1
0.2 TABLET ORAL ONCE
Status: COMPLETED | OUTPATIENT
Start: 2022-12-04 | End: 2022-12-04

## 2022-12-04 RX ORDER — IBUPROFEN 600 MG/1
600 TABLET ORAL EVERY 6 HOURS PRN
Status: DISCONTINUED | OUTPATIENT
Start: 2022-12-04 | End: 2022-12-06

## 2022-12-04 RX ORDER — LANOLIN ALCOHOL/MO/W.PET/CERES
6 CREAM (GRAM) TOPICAL
Status: DISCONTINUED | OUTPATIENT
Start: 2022-12-04 | End: 2022-12-04 | Stop reason: HOSPADM

## 2022-12-04 RX ORDER — BENZTROPINE MESYLATE 1 MG/ML
1 INJECTION INTRAMUSCULAR; INTRAVENOUS
Status: DISCONTINUED | OUTPATIENT
Start: 2022-12-04 | End: 2022-12-09 | Stop reason: HOSPADM

## 2022-12-04 RX ORDER — OSELTAMIVIR PHOSPHATE 75 MG/1
75 CAPSULE ORAL EVERY 12 HOURS SCHEDULED
Status: COMPLETED | OUTPATIENT
Start: 2022-12-04 | End: 2022-12-07

## 2022-12-04 RX ORDER — LORAZEPAM 2 MG/ML
1 INJECTION INTRAMUSCULAR
Status: DISCONTINUED | OUTPATIENT
Start: 2022-12-04 | End: 2022-12-09 | Stop reason: HOSPADM

## 2022-12-04 RX ADMIN — HYDROXYZINE HYDROCHLORIDE 50 MG: 50 TABLET, FILM COATED ORAL at 21:08

## 2022-12-04 RX ADMIN — CLONIDINE HYDROCHLORIDE 0.2 MG: 0.1 TABLET ORAL at 00:33

## 2022-12-04 RX ADMIN — IBUPROFEN 600 MG: 600 TABLET ORAL at 10:45

## 2022-12-04 RX ADMIN — IBUPROFEN 600 MG: 600 TABLET ORAL at 00:22

## 2022-12-04 RX ADMIN — HYDROXYZINE HYDROCHLORIDE 50 MG: 50 TABLET, FILM COATED ORAL at 10:46

## 2022-12-04 RX ADMIN — QUETIAPINE 200 MG: 200 TABLET ORAL at 21:06

## 2022-12-04 RX ADMIN — OSELTAMIVIR PHOSPHATE 75 MG: 75 CAPSULE ORAL at 09:11

## 2022-12-04 RX ADMIN — Medication 3 MG: at 21:06

## 2022-12-04 RX ADMIN — MELATONIN TAB 3 MG 6 MG: 3 TAB at 00:22

## 2022-12-04 RX ADMIN — CLONIDINE HYDROCHLORIDE 0.1 MG: 0.1 TABLET ORAL at 20:17

## 2022-12-04 RX ADMIN — DIPHENHYDRAMINE HCL 25 MG: 25 TABLET ORAL at 00:33

## 2022-12-04 RX ADMIN — OSELTAMAVIR PHOSPHATE 75 MG: 75 CAPSULE ORAL at 21:06

## 2022-12-04 NOTE — NURSING NOTE
Patient reassessed at approximately [phone] for suicidal thoughts  He endorses passive thoughts  He does deny having a plan while on the unit but when asked if he could agree not to harm himself while here on the unit, his responses were vague and evasive  Patient unable to agree he will not harm himself at this time  1:1 remains in effect for safety

## 2022-12-04 NOTE — CMS CERTIFICATION NOTE
Recertification: Based upon physical, mental and social evaluations, I certify that inpatient psychiatric services continue to be medically necessary for this patient for a duration of greater than 2 midnights for the treatment of  Mood disorder ([facility] )    Gambia C Holter

## 2022-12-04 NOTE — ED NOTES
Patient remains calm and cooperative  No distress noted  Patient admits to [facility] with no plan  Denies HI/AH/VH  Q15min checks remain in place        Colby Swanson RN  12/03/22 1508

## 2022-12-04 NOTE — ED NOTES
Insurance COB for admission:   Phone call placed to [facility]  Phone number: [phone]  Spoke to Lamin Davies  Level of care: IP  Call with discharge information  Authorization # Not required

## 2022-12-04 NOTE — NURSING NOTE
Patient admitted to [facility] from [facility] ED under 201  Patient had been having suicidal thoughts with plan to jump off a bridge prior to arrival  Upon assessment, patient endorses current suicidal thoughts with no plan  Unable to agree not to hurt himself at this time  Patient placed on continual 1:1 observation for safety and suicidal precautions  Patient is positive for influenza type A  He is lethargic and drowsy  VSS at this time  Reports SOB  Expiratory wheezes noted upon auscultation as well as ronchi that cleared with coughing  Patient reports having a productive cough  Patient endorses high anxiety and depression at this time  Was not interested in assessment questions at this time due to feeling unwell  Patient requesting to go to his room to rest   600mg ibuprofen administered at 1045 along with 50mg Atarax  Upon reassessment at [phone], patient resting in bed  Appears comfortable and without pain and anxiety  Will continue monitoring

## 2022-12-04 NOTE — TREATMENT PLAN
TREATMENT PLAN REVIEW - [address] 41 y o  1975 male MRN: [id number]    [facility]  Room / Bed: [facility]  [facility] Encounter: [id number]          Admit Date/Time:  12/4/2022 10:28 AM    Treatment Team: Attending Provider: Alyssa Chatman MD; Registered Nurse: Quan Anthony RN; Consulting Physician: Nandini Chavez MD; Patient Care Assistant: Alessandra Morocho; Certified Nursing Assistant: Елена Drummond    Diagnosis: Principal Problem:    Mood disorder [facility])  Active Problems:    Opiate abuse, episodic ([facility] )    Cocaine abuse ([facility] )      Patient Strengths/Assets: average or above intelligence, capable of independent living, good past treatment response, patient is on a voluntary commitment, patient is willing to work on problems    Patient Barriers/Limitations: lack of financial means, lack of social/family support, lack of stable employment, limited education, limited support system, noncompliant with medication, noncompliant with treatment, poor insight, poor physical health, poor self-care, substance abuse    Short Term Goals: decrease in depressive symptoms, decrease in anxiety symptoms, decrease in psychotic symptoms, decrease in suicidal thoughts, improvement in insight, improvement in reality testing, sleep improvement, improvement in appetite, mood stabilization, increase in group attendance, increase in socialization with peers on the unit, acceptance of need for psychiatric treatment, acceptance of psychiatric medications    Long Term Goals: improvement in depression, improvement in anxiety, resolution of depressive symptoms, stabilization of mood, free of suicidal thoughts, resolution of psychotic symptoms, improvement in reality testing, improved insight, acceptance of need for psychiatric medications, acceptance of need for psychiatric treatment, acceptance of need for psychiatric follow up after discharge, acceptance of psychiatric diagnosis, appropriate interaction with peers, stable living arrangements upon discharge, establishment of family support upon discharge    Progress Towards Goals: starting psychiatric medications as prescribed, starting opioid detoxification protocol, continue psychiatric medications as prescribed    Recommended Treatment: medication management, patient medication education, group therapy, milieu therapy, continued Behavioral Health psychiatric evaluation/assessment process    Treatment Frequency: daily medication monitoring, group and milieu therapy daily, monitoring through interdisciplinary rounds, monitoring through weekly patient care conferences    Expected Discharge Date:  TBD    Discharge Plan: discharge to a homeless shelter, return to previous living arrangement    Treatment Plan Created/Updated By: Gambia C Holter, DO

## 2022-12-04 NOTE — ED NOTES
Insurance Authorization for admission:   Phone call placed to [employer]  Phone number: [phone]  Spoke to Sal[place]ón  5 days approved, LCD 12/8  Level of care: IP  Review on 12/8  Authorization # [id number]  Insurance Authorization for Transportation:    Not Required for CTS

## 2022-12-04 NOTE — PROGRESS NOTES
Pt arrived with:    Remains w/ pt:  Maria Luz Rodriguez slides  Maroon shorts  2 pairs socks  White tshirt  [employer] thermal  Black "Trust Nobody" tshirt  Black tshirt with blue writing    Locked:   Draw marvel bag   for electric razor  2 cologne bottles  3 cords  Flashlight  2 pairs sweatpants  2 black hoodies  Dowling zip up Dianelys baseball hat  Ski mask  Green sneakers    Contraband:  Cell phone  Cell phone   3 lighters and electric razor wrapped in security bag  [employer] mail  1 key  1 charging stick

## 2022-12-04 NOTE — EMTALA/ACUTE CARE TRANSFER
[facility] EMERGENCY DEPARTMENT  [address] [facility] [address]  [phone]  Dept: [phone]      EMTALA TRANSFER CONSENT    NAME Harshal ZIEGLER 1975                              MRN [id number]    I have been informed of my rights regarding examination, treatment, and transfer   by Dr Rachna Mcgee: Continuity of care    Risks: Potential for delay in receiving treatment      Consent for Transfer:  I acknowledge that my medical condition has been evaluated and explained to me by the emergency department physician or other qualified medical person and/or my attending physician, who has recommended that I be transferred to the service of  Accepting Physician: Juan C Holland MD at [address] [place] [address] Name, [address] : Saundra Lemus [facility], [address] [id number]  The above potential benefits of such transfer, the potential risks associated with such transfer, and the probable risks of not being transferred have been explained to me, and I fully understand them  The doctor has explained that, in my case, the benefits of transfer outweigh the risks  I agree to be transferred  I authorize the performance of emergency medical procedures and treatments upon me in both transit and upon arrival at the receiving facility  Additionally, I authorize the release of any and all medical records to the receiving facility and request they be transported with me, if possible  I understand that the safest mode of transportation during a medical emergency is an ambulance and that the Hospital advocates the use of this mode of transport  Risks of traveling to the receiving facility by car, including absence of medical control, life sustaining equipment, such as oxygen, and medical personnel has been explained to me and I fully understand them      (ROYAL CORRECT BOX BELOW)  [  ]  I consent to the stated transfer and to be transported by ambulance/helicopter  [  ]  I consent to the stated transfer, but refuse transportation by ambulance and accept full responsibility for my transportation by car  I understand the risks of non-ambulance transfers and I exonerate the Hospital and its staff from any deterioration in my condition that results from this refusal     X___________________________________________    DATE  22  TIME________  Signature of patient or legally responsible individual signing on patient behalf           RELATIONSHIP TO PATIENT_________________________          Provider Certification    NAME Cynthia Jiménez                                        [facility] 1975                              MRN [id number]    A medical screening exam was performed on the above named patient  Based on the examination:    Condition Necessitating Transfer The primary encounter diagnosis was Asthma exacerbation  A diagnosis of Suicidal ideations was also pertinent to this visit  Patient Condition: The patient has been stabilized such that within reasonable medical probability, no material deterioration of the patient condition or the condition of the unborn child(levi) is likely to result from the transfer    Reason for Transfer: No bed available at level of patient's needs    Transfer Requirements: [address] [place] [address], [facility] 99, [facility]   · Space available and qualified personnel available for treatment as acknowledged by Yael Lopez [phone]  · Agreed to accept transfer and to provide appropriate medical treatment as acknowledged by       Sherman Santiago MD  · Appropriate medical records of the examination and treatment of the patient are provided at the time of transfer   [address] [facility], [address] _______  · Transfer will be performed by qualified personnel from    and appropriate transfer equipment as required, including the use of necessary and appropriate life support measures      Provider Certification: I have examined the patient and explained the following risks and benefits of being transferred/refusing transfer to the patient/family:  General risk, such as traffic hazards, adverse weather conditions, rough terrain or turbulence, possible failure of equipment (including vehicle or aircraft), or consequences of actions of persons outside the control of the transport personnel      Based on these reasonable risks and benefits to the patient and/or the unborn child(levi), and based upon the information available at the time of the patient’s examination, I certify that the medical benefits reasonably to be expected from the provision of appropriate medical treatments at another medical facility outweigh the increasing risks, if any, to the individual’s medical condition, and in the case of labor to the unborn child, from effecting the transfer      X____________________________________________ DATE 12/04/22        TIME_______      ORIGINAL - SEND TO MEDICAL RECORDS   COPY - SEND WITH PATIENT DURING TRANSFER

## 2022-12-04 NOTE — ED NOTES
Patient requesting suboxone for fentanyl withdrawal  Patient reports he has pain all over his body, just medicated now  Will speak with provider regarding his request for suboxone        Jojo Higgins RN  12/04/22 004

## 2022-12-04 NOTE — PLAN OF CARE
Problem: SELF HARM/SUICIDALITY  Goal: Will have no self-injury during hospital stay  Description: INTERVENTIONS:  - Q 15 MINUTES: Routine safety checks  - Q WAKING SHIFT & PRN: Assess risk to determine if routine checks are adequate to maintain patient safety  - Encourage patient to participate actively in care by formulating a plan to combat response to suicidal ideation, identify supports and resources  Outcome: Not Progressing     Problem: DEPRESSION  Goal: Will be euthymic at discharge  Description: INTERVENTIONS:  - Administer medication as ordered  - Provide emotional support via 1:1 interaction with staff  - Encourage involvement in milieu/groups/activities  - Monitor for social isolation  Outcome: Not Progressing     Care plan initiated, monitoring is ongoing
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
## 2022-12-04 NOTE — ED NOTES
Patient sleeping, no distress noted  Will assess patient when he awakens        Venkatesh Landrum RN  12/03/22 8381

## 2022-12-04 NOTE — H&P
Psychiatric Evaluation - Department of [address] [place] [address] 41 y o  male MRN: [id number]  Unit/Bed#: [facility] 203-01 Encounter: [id number]    ASSESSMENT & PLAN     DSM-5 Diagnoses:   • Mood disorder, unspecified; consideration for bipolar affective disorder, current episode depressed verus major depressive disorder, recurrent versus substance induced mood disorder  • Opiate abuse  • Cocaine abuse    Treatment Recommendations/Precautions:  • Admission labs evaluated; see below  • Continue medical management per medicine  • Collaborate with collaterals for baseline assessment and disposition  • Continue behavioral bubba checks q 7 minutes  • Continue vitals per behavioral health unit protocol  • Continue to promote participation in individual, social and group therapeutic milieu  • Resume previously prescribed psychotropic medication regimen including use of p r n  Clonidine for withdrawal signs/symptoms; see below  • Discharge date per primary team      Medications Risks/Benefits:    Risks, benefits, and possible side effects of medications explained to Anil and he verbalizes understanding and agreement for treatment       Chief Complaint: worsening dysphoric mood including depression and depressive signs/symptoms including suicidal ideation; "I haven't been feeling right "    HISTORY OF PRESENT ILLNESS (HPI)     Damian Moncada is a 41 y o , overtly appearing , single male, possessing pertinent psychiatric history of bipolar affective disorder versus major depressive disorder and attention deficit hyperactivity disorder in addition to polysubstance abuse, medically complicated by chronic pain and various ailments relating to the kidney including previous hydronephrosis, ABIGAIL, etc , presenting to [place] [address] older adult inpatient behavioral health as a 201, initially presenting to [address] [place], [place] [address] [facility] emergency department, subsequent to worsening depression/sadness and depressive signs/symptoms like: isolative behavior, hopelessness/helplessness, decreased sleep, diminished energy, decreased concentration, diminished appetite and suicidal ideation including particular plan to "jump off of a bridge", in the setting of opiate abuse including heroin, admitting to use of approximately 1-2 bags preceding admission as a means of self-medication secondary to worsening psychosocial stressors including financial instability, unstable residence and limited support system, despite admitting to prior signs/symptoms of humberto/hypomania including: racing thoughts, increased energy despite decreased sleep, distractibility, impulsivity and persistently irritable mood for "a few days", although patient is uncertain as to if he was using illicit substances throughout this time  Also, patient admits to intermittent instances of command auditory hallucinations that are negative and derogatory in content, promoting self-harm  Presently, patient denies suicidal/homicidal ideation in addition to thoughts of self-injury, jimmie for safety, receptive to crisis planning provided by this writer, although is scant/sparse in interaction, admitting to active signs/symptoms of opiate withdrawal including: nausea, diarrhea, diaphoresis, abdominal discomfort/cramping   He admits to sad/depressed and anxious mood, denying panic attacks, humberto/hypomania, although continues to complaint of intermittent auditory hallucinations, admitting to the ability to redirect himself, requesting to "rest", agreeable to continue Seroquel in addition to use of p r n  medications to address withdrawal     PSYCHIATRIC REVIEW OF SYSTEMS     Appetite: yes  Adverse eating: no  Weight changes: no  Insomnia/sleeplessness: yes  Fatigue/anergy: yes  Anhedonia/lack of interest: yes  Attention/concentration: yes  Psychomotor agitation/retardation: no  Somatic symptoms: no  Anxiety/panic attack: yes  Rebecca/hypomania: no, past manic episodes, past manic symptoms, history of periods of irritable mood, history of mood swings, lasting several days in a row  Hopelessness/helplessness/worthlessness: yes  Self-injurious behavior/high-risk behavior: no  Suicidal ideation: no  Homicidal ideation: no  Auditory hallucinations: yes, auditory hallucinations  Visual hallucinations: no  Other perceptual disturbances: no  Delusional thinking: no  Obsessive/compulsive symptoms: no    REVIEW OF SYSTEMS   Pertinent items are noted in HPI  HISTORICAL INFORMATION     Past Psychiatric History:   Past Psychiatric management: admits to previous instances of inpatient behavioral health admission, admitting to prior outpatient psychiatric management through [facility]   Past Suicide attempts/self-injurious behavior: denies  Past Violent behavior: denies  Past Psychiatric medications: yes, multiple    Substance Abuse History:  I spent time with patient in counseling and education on risk of substance abuse  Assessed him motivation and encouraged patient for treatment  Brief intervention done  Social History     Tobacco History     Smoking Status  Every Day Smoking Frequency  1 pack/day for 11 00 years (11 00 pk-yrs) Smoking Tobacco Type  Cigarettes    Smokeless Tobacco Use  Never          Alcohol History     Alcohol Use Status  No          Drug Use     Drug Use Status  Yes Types  Cocaine, Heroin Comment  occassional          Sexual Activity     Sexually Active  Yes Partners  Female          Activities of Daily Living    Not Asked               Additional Substance Use Detail     Questions Responses    Problems Due to Past Use of Alcohol? No    Problems Due to Past Use of Substances?  Yes    Heroin Method Smoke    Heroin 1st Use unknown    Heroin Last Use & Amount 11/30 (1 bag)    Heroin Longest Abstinence unknown    Last reviewed by Madhu Vivar RN on 12/4/2022        I have assessed this patient for substance use within the past 12 months    Family Psychiatric History:   Denies    Social History:  Education: elementary school  Learning Disabilities: denies  Marital history: single  Living arrangement, social support: resides alone; homeless  Occupational History: unemployed  Functioning Relationships: poor support system    Other Pertinent History: None      Traumatic History:   Abuse: denies  Other Traumatic Events: denies    OBJECTIVE     Past Medical History:   Diagnosis Date   • Addiction to drug [facility])    • Asthma    • Back pain    • Bipolar 1 disorder ([place] [address] )    • Depression    • Gastritis    • GERD (gastroesophageal reflux disease)    • Kidney stones    • Substance abuse ([place] [address] )        Meds/Allergies   all current active meds have been reviewed, current meds:   Current Facility-Administered Medications   Medication Dose Route Frequency   • acetaminophen (TYLENOL) tablet 650 mg  650 mg Oral Q6H PRN   • albuterol (PROVENTIL HFA,VENTOLIN HFA) inhaler 2 puff  2 puff Inhalation Q4H PRN   • aluminum-magnesium hydroxide-simethicone (MYLANTA) oral suspension 30 mL  30 mL Oral Q4H PRN   • benztropine (COGENTIN) injection 1 mg  1 mg Intramuscular Q4H PRN Max 6/day   • benztropine (COGENTIN) tablet 0 5 mg  0 5 mg Oral Q4H PRN Max 6/day   • cloNIDine (CATAPRES) tablet 0 1 mg  0 1 mg Oral TID PRN   • dextromethorphan-guaiFENesin (ROBITUSSIN DM) oral syrup 10 mL  10 mL Oral Q4H PRN   • hydrOXYzine HCL (ATARAX) tablet 25 mg  25 mg Oral Q6H PRN Max 4/day   • hydrOXYzine HCL (ATARAX) tablet 50 mg  50 mg Oral Q6H PRN Max 4/day   • ibuprofen (MOTRIN) tablet 400 mg  400 mg Oral Q6H PRN   • ibuprofen (MOTRIN) tablet 600 mg  600 mg Oral Q6H PRN   • ipratropium-albuterol (DUO-NEB) 0 5-2 5 mg/3 mL inhalation solution 3 mL  3 mL Nebulization Q6H PRN   • LORazepam (ATIVAN) injection 1 mg  1 mg Intramuscular Q6H PRN Max 3/day   • melatonin tablet 3 mg  3 mg Oral HS   • mirtazapine (REMERON) tablet 7 5 mg  7 5 mg Oral HS PRN   • nicotine (NICODERM CQ) 14 mg/24hr TD 24 hr patch 1 patch  1 patch Transdermal Daily   • OLANZapine (ZyPREXA) IM injection 5 mg  5 mg Intramuscular Q3H PRN Max 3/day   • OLANZapine (ZyPREXA) tablet 2 5 mg  2 5 mg Oral Q4H PRN Max 6/day   • OLANZapine (ZyPREXA) tablet 5 mg  5 mg Oral Q4H PRN Max 3/day   • OLANZapine (ZyPREXA) tablet 5 mg  5 mg Oral Q3H PRN Max 3/day   • ondansetron (ZOFRAN-ODT) dispersible tablet 4 mg  4 mg Oral Q6H PRN   • oseltamivir (TAMIFLU) capsule 75 mg  75 mg Oral Q12H [facility]   • polyethylene glycol (MIRALAX) packet 17 g  17 g Oral Daily PRN   • propranolol (INDERAL) tablet 5 mg  5 mg Oral Q8H PRN   • QUEtiapine (SEROquel) tablet 200 mg  200 mg Oral HS   • traZODone (DESYREL) tablet 50 mg  50 mg Oral Q6H PRN Max 3/day    and PTA meds:   Prior to Admission Medications   Prescriptions Last Dose Informant Patient Reported? Taking?   naloxone (NARCAN) 4 mg/0 1 mL nasal spray Unknown  No No   Sig: Administer 1 spray into a nostril  If no response after 2-3 minutes, give another dose in the other nostril using a new spray  Facility-Administered Medications: None     No Known Allergies    Vital signs in last 24 hours:  Temp:  [97 4 °F (36 3 °C)-98 3 °F (36 8 °C)] 97 4 °F (36 3 °C)  HR:  [59-68] 64  Resp:  [18-22] 18  BP: (114-127)/(73-81) 114/76  No intake or output data in the 24 hours ending 12/04/22 1131    Laboratory results:    I have personally reviewed all pertinent laboratory/tests results    Most Recent Labs:   Lab Results   Component Value Date    WBC 9 85 08/22/2022    RBC 4 62 08/22/2022    HGB 13 4 08/22/2022    HCT 40 8 08/22/2022     08/22/2022    RDW 14 8 08/22/2022    TOTANEUTABS 5 79 11/22/2016    NEUTROABS 8 87 (H) 08/22/2022    SODIUM 137 08/22/2022    K 3 8 08/22/2022     08/22/2022    CO2 25 08/22/2022    BUN 8 08/22/2022    CREATININE 1 23 08/22/2022    GLUC 98 08/22/2022    CALCIUM 8 4 08/22/2022    AST 14 08/22/2022    ALT 14 08/22/2022    ALKPHOS 58 08/22/2022    TP 6 4 08/22/2022 ALB 3 3 (L) 08/22/2022    TBILI 0 37 08/22/2022    [id number] 0 934 03/20/2015     Labs in last 72 hours: No results for input(s): WBC, RBC, HGB, HCT, PLT, RDW, TOTANEUTABS, NEUTROABS, SODIUM, K, CL, CO2, BUN, CREATININE, GLUC, GLUF, CALCIUM, AST, ALT, ALKPHOS, TP, ALB, TBILI, CHOLESTEROL, HDL, TRIG, LDLCALC, VALPROICTOT, CARBAMAZEPIN, LITHIUM, AMMONIA, [id number], FREET4, T3FREE, PREGTESTUR, PREGSERUM, HCG, HCGQUANT, RPR in the last 72 hours  Admission Labs:   Admission on 12/02/2022, Discharged on 12/04/2022   Component Date Value   • Amph/Meth UR 12/03/2022 Negative    • Barbiturate Ur 12/03/2022 Negative    • Benzodiazepine Urine 12/03/2022 Negative    • Cocaine Urine 12/03/2022 Positive (A)    • Methadone Urine 12/03/2022 Negative    • Opiate Urine 12/03/2022 Negative    • PCP Ur 12/03/2022 Negative    • THC Urine 12/03/2022 Negative    • Oxycodone Urine 12/03/2022 Negative    • EXTBreath Alcohol 12/02/2022 0 0    • SARS-CoV-2 12/02/2022 Negative    • INFLUENZA A PCR 12/02/2022 Positive (A)    • INFLUENZA B PCR 12/02/2022 Negative    • RSV PCR 12/02/2022 Negative        Imaging Studies: see pertinent studies  EKG, Pathology, and Other Studies: see pertinent studies; UDS positive cocaine  Mental Status Evaluation:  Appearance:  age appropriate, bearded, casually dressed, disheveled and marginal grooming/hygiene   Behavior:  calm, predominantly cooperative, slightly guarded, intermittent eye contact   Speech:  soft   Mood:  anxious, depressed and dysthymic   Affect:  constricted   Language: naming objects and repeating phrases   Thought Process:  concrete   Thought Content:  no overt obsessions or delusions   Perceptual Disturbances:  Auditory hallucinations with commands to harm self, negative/derrogatory in content   Risk Potential: Suicidal Ideations none, Homicidal Ideations none and Potential for Aggression No   Sensorium:  person, place and time/date   Cognition:  recent and remote memory grossly intact   Consciousness:  alert and awake    Attention: attention span appeared shorter than expected for age   Intellect: within normal limits   Fund of Knowledge: vocabulary: appropriate   Insight:  limited   Judgment: limited   Muscle Strength and Tone: appropriate   Gait/Station: not assessed; sitting in bed   Motor Activity: no abnormal movements     Memory: Short and long term memory  fair     Risks / Benefits of Treatment:     Risks, benefits, and possible side effects of medications explained to patient  The patient verbalizes understanding and agreement for treatment  Counseling / Coordination of Care:     Patient's presentation on admission and proposed treatment plan discussed with treatment team   Diagnosis, medication changes and treatment plan reviewed with patient  Recent stressors discussed with patient     Precipitating episode leading to admission reviewed with patient  Importance of medication and treatment compliance reviewed with patient  Inpatient Psychiatric Certification:     Certification: Estimated length of stay: More than 2 midnights  Note Share: This note was not shared with the patient due to reasonable likelihood of causing patient harm    This note has been constructed using a voice recognition system  There may be translation, syntax,  or grammatical errors  If you have any questions, please contact the dictating provider  Gambia C Holter, D O

## 2022-12-04 NOTE — ED CARE HANDOFF
Emergency Department Sign Out Note         Sign out and transfer of care from Dr Carlton Jenkins  See Separate Emergency Department note  The patient, Cynthia Jiménez, was evaluated by the previous provider for psych  Workup Completed:  See previous ED notes    ED Course / Workup Pending (followup):                                  ED Course as of 12/04/22 1108   Sat Dec 03, 2022   9984 Sign out: 12 for suicidal ideation  Influenza    1221 Patient evaluated by psychiatry; no acute changes recommended at this time  Patient reported intermittent SI with a plan  Will continue bedsearch for inpatient psychiatric stabilization  Continue safety plan   1505 COCAINE URINE(!): Positive   Sun Dec 04, 2022   7354 Sign out: 12 for SI accepted at [facility] later today  + Influenza  Continue safety plan  8646 Patient discharged with CTS for transport to [facility] in stable condition  Procedures  MDM        Disposition  Final diagnoses:   Asthma exacerbation   Suicidal ideations     Time reflects when diagnosis was documented in both MDM as applicable and the Disposition within this note     Time User Action Codes Description Comment    12/2/2022  6:48 PM Edwige Stewart Add [I85 509] Asthma exacerbation     12/2/2022  6:48 PM Edwige Stewart Add [D56 [id number]] Suicidal ideations       ED Disposition     ED Disposition   Transfer to [address] [place] [address]   --    Date/Time   Fri Dec 2, 2022  9:01 PM    Comment   Cynthia Jiménez should be transferred out to [facility] and has been medically cleared             MD Documentation    [address] Most Recent Value   Patient Condition The patient has been stabilized such that within reasonable medical probability, no material deterioration of the patient condition or the condition of the unborn child(levi) is likely to result from the transfer   Reason for Transfer No bed available at level of patient's needs   Benefits of Transfer Continuity of care   Risks of Transfer Potential for delay in receiving treatment   Accepting Physician Cirilo Padilla MD   Accepting Facility Name, [address] [place] [address]    (Name & Tel number) Dominga Espinoza [phone]   Sending MD Ayesha Correia MD   Provider Certification General risk, such as traffic hazards, adverse weather conditions, rough terrain or turbulence, possible failure of equipment (including vehicle or aircraft), or consequences of actions of persons outside the control of the transport personnel      RN Documentation    Flowsheet Row Most [address] [facility] Name, [address] [place] [address] 56    (Name & Tel number) Madison Malik Charlie/ [phone]   Transport Mode Other (Comment)   Level of Care Other (Comment)   Patient Belongings Disposition Sent with patient   Transfer Date 12/04/22   Transfer Time 2130      Follow-up Information    None       Discharge Medication List as of 12/4/2022  9:48 AM      CONTINUE these medications which have NOT CHANGED    Details   naloxone (NARCAN) 4 mg/0 1 mL nasal spray Administer 1 spray into a nostril  If no response after 2-3 minutes, give another dose in the other nostril using a new spray , Normal           No discharge procedures on file         ED Provider  Electronically Signed by     Sara Cao MD  12/04/22 3853

## 2022-12-05 PROBLEM — F33.2 MDD (MAJOR DEPRESSIVE DISORDER), RECURRENT EPISODE, SEVERE (HCC): Status: ACTIVE | Noted: 2022-12-05

## 2022-12-05 PROBLEM — E43 SEVERE PROTEIN-CALORIE MALNUTRITION (HCC): Status: ACTIVE | Noted: 2022-12-05

## 2022-12-05 LAB
25(OH)D3 SERPL-MCNC: 18.8 NG/ML (ref 30–100)
ALBUMIN SERPL BCP-MCNC: 3.6 G/DL (ref 3.5–5)
ALP SERPL-CCNC: 54 U/L (ref 34–104)
ALT SERPL W P-5'-P-CCNC: 17 U/L (ref 7–52)
ANION GAP SERPL CALCULATED.3IONS-SCNC: 8 MMOL/L (ref 4–13)
AST SERPL W P-5'-P-CCNC: 18 U/L (ref 13–39)
BASOPHILS # BLD AUTO: 0.03 THOUSANDS/ÂΜL (ref 0–0.1)
BASOPHILS NFR BLD AUTO: 0 % (ref 0–1)
BILIRUB SERPL-MCNC: 0.23 MG/DL (ref 0.2–1)
BUN SERPL-MCNC: 13 MG/DL (ref 5–25)
CALCIUM SERPL-MCNC: 9.6 MG/DL (ref 8.4–10.2)
CHLORIDE SERPL-SCNC: 106 MMOL/L (ref 96–108)
CHOLEST SERPL-MCNC: 171 MG/DL
CO2 SERPL-SCNC: 27 MMOL/L (ref 21–32)
CREAT SERPL-MCNC: 1.05 MG/DL (ref 0.6–1.3)
EOSINOPHIL # BLD AUTO: 0.1 THOUSAND/ÂΜL (ref 0–0.61)
EOSINOPHIL NFR BLD AUTO: 1 % (ref 0–6)
ERYTHROCYTE [DISTWIDTH] IN BLOOD BY AUTOMATED COUNT: 14.6 % (ref 11.6–15.1)
FOLATE SERPL-MCNC: 3.6 NG/ML (ref 3.1–17.5)
GFR SERPL CREATININE-BSD FRML MDRD: 85 ML/MIN/1.73SQ M
GLUCOSE P FAST SERPL-MCNC: 100 MG/DL (ref 65–99)
GLUCOSE SERPL-MCNC: 100 MG/DL (ref 65–140)
HCT VFR BLD AUTO: 43.4 % (ref 36.5–49.3)
HDLC SERPL-MCNC: 38 MG/DL
HGB BLD-MCNC: 13.9 G/DL (ref 12–17)
IMM GRANULOCYTES # BLD AUTO: 0.04 THOUSAND/UL (ref 0–0.2)
IMM GRANULOCYTES NFR BLD AUTO: 0 % (ref 0–2)
LDLC SERPL CALC-MCNC: 107 MG/DL (ref 0–100)
LYMPHOCYTES # BLD AUTO: 2.38 THOUSANDS/ÂΜL (ref 0.6–4.47)
LYMPHOCYTES NFR BLD AUTO: 17 % (ref 14–44)
MCH RBC QN AUTO: 27 PG (ref 26.8–34.3)
MCHC RBC AUTO-ENTMCNC: 32 G/DL (ref 31.4–37.4)
MCV RBC AUTO: 84 FL (ref 82–98)
MONOCYTES # BLD AUTO: 1.18 THOUSAND/ÂΜL (ref 0.17–1.22)
MONOCYTES NFR BLD AUTO: 8 % (ref 4–12)
NEUTROPHILS # BLD AUTO: 10.48 THOUSANDS/ÂΜL (ref 1.85–7.62)
NEUTS SEG NFR BLD AUTO: 74 % (ref 43–75)
NRBC BLD AUTO-RTO: 0 /100 WBCS
PLATELET # BLD AUTO: 498 THOUSANDS/UL (ref 149–390)
PMV BLD AUTO: 9 FL (ref 8.9–12.7)
POTASSIUM SERPL-SCNC: 4.2 MMOL/L (ref 3.5–5.3)
PROT SERPL-MCNC: 7.1 G/DL (ref 6.4–8.4)
RBC # BLD AUTO: 5.14 MILLION/UL (ref 3.88–5.62)
SODIUM SERPL-SCNC: 141 MMOL/L (ref 135–147)
T4 FREE SERPL-MCNC: 0.97 NG/DL (ref 0.76–1.46)
TRIGL SERPL-MCNC: 129 MG/DL
TSH SERPL DL<=0.05 MIU/L-ACNC: 0.09 UIU/ML (ref 0.45–4.5)
VIT B12 SERPL-MCNC: 313 PG/ML (ref 100–900)
WBC # BLD AUTO: 14.21 THOUSAND/UL (ref 4.31–10.16)

## 2022-12-05 RX ORDER — HYDROXYZINE 50 MG/1
50 TABLET, FILM COATED ORAL
Status: DISCONTINUED | OUTPATIENT
Start: 2022-12-05 | End: 2022-12-09 | Stop reason: HOSPADM

## 2022-12-05 RX ORDER — MIRTAZAPINE 15 MG/1
7.5 TABLET, FILM COATED ORAL
Status: DISCONTINUED | OUTPATIENT
Start: 2022-12-05 | End: 2022-12-06

## 2022-12-05 RX ORDER — CLONIDINE HYDROCHLORIDE 0.1 MG/1
0.1 TABLET ORAL 2 TIMES DAILY
Status: DISCONTINUED | OUTPATIENT
Start: 2022-12-05 | End: 2022-12-07

## 2022-12-05 RX ORDER — LORAZEPAM 0.5 MG/1
0.5 TABLET ORAL EVERY 8 HOURS PRN
Status: DISCONTINUED | OUTPATIENT
Start: 2022-12-05 | End: 2022-12-05

## 2022-12-05 RX ORDER — CYANOCOBALAMIN 1000 UG/ML
1000 INJECTION, SOLUTION INTRAMUSCULAR; SUBCUTANEOUS
Status: DISCONTINUED | OUTPATIENT
Start: 2022-12-05 | End: 2022-12-09 | Stop reason: HOSPADM

## 2022-12-05 RX ORDER — GABAPENTIN 100 MG/1
100 CAPSULE ORAL 2 TIMES DAILY
Status: DISCONTINUED | OUTPATIENT
Start: 2022-12-05 | End: 2022-12-06

## 2022-12-05 RX ORDER — LORAZEPAM 1 MG/1
1 TABLET ORAL EVERY 8 HOURS PRN
Status: DISCONTINUED | OUTPATIENT
Start: 2022-12-05 | End: 2022-12-09 | Stop reason: HOSPADM

## 2022-12-05 RX ADMIN — OSELTAMAVIR PHOSPHATE 75 MG: 75 CAPSULE ORAL at 08:41

## 2022-12-05 RX ADMIN — IBUPROFEN 600 MG: 600 TABLET ORAL at 08:44

## 2022-12-05 RX ADMIN — QUETIAPINE 200 MG: 200 TABLET ORAL at 21:43

## 2022-12-05 RX ADMIN — CLONIDINE HYDROCHLORIDE 0.1 MG: 0.1 TABLET ORAL at 12:56

## 2022-12-05 RX ADMIN — Medication 3 MG: at 21:43

## 2022-12-05 RX ADMIN — NICOTINE 1 PATCH: 14 PATCH, EXTENDED RELEASE TRANSDERMAL at 08:41

## 2022-12-05 RX ADMIN — MIRTAZAPINE 7.5 MG: 15 TABLET, FILM COATED ORAL at 21:43

## 2022-12-05 RX ADMIN — CYANOCOBALAMIN 1000 MCG: 1000 INJECTION INTRAMUSCULAR; SUBCUTANEOUS at 12:45

## 2022-12-05 RX ADMIN — LORAZEPAM 1 MG: 2 INJECTION INTRAMUSCULAR; INTRAVENOUS at 14:32

## 2022-12-05 RX ADMIN — GABAPENTIN 100 MG: 100 CAPSULE ORAL at 17:07

## 2022-12-05 RX ADMIN — OSELTAMAVIR PHOSPHATE 75 MG: 75 CAPSULE ORAL at 21:43

## 2022-12-05 RX ADMIN — CLONIDINE HYDROCHLORIDE 0.1 MG: 0.1 TABLET ORAL at 21:43

## 2022-12-05 NOTE — PLAN OF CARE
Problem: Ineffective Coping  Goal: Participates in unit activities  Description: Interventions:  - Provide therapeutic environment   - Provide required programming   - Redirect inappropriate behaviors   Outcome: Not Progressing   Patietn not feeling well has flu and is withdrawing from cocaine and fentanyl

## 2022-12-05 NOTE — PROGRESS NOTES
Progress Note - Behavioral Health   This note was not shared with the patient due to reasonable likelihood of causing patient harm     Hollie Flores 41 y o  male MRN: [id number]   Unit/Bed#: [facility] 203-01 Encounter: [id number]    Behavior over the last 24 hours: minimal improvement  Lori Quiroz was seen for continuing care  He reports feeling slightly better but continues feeling anxious, restless with moderate withdrawal symptoms  Patient states that he was homeless for the past few months and lost significant weight  He is also preoccupied about his SS ID being stolen  Patient agreed to be compliant with medication and treatment plan in the unit  Sleep: slept better  Appetite: fair  Medication side effects: denies  ROS: all other systems are negative, except mild withdrawal symptoms    Mental Status Evaluation:    Appearance:  age appropriate   Behavior:  cooperative   Speech:  normal volume, fluent in Ukrainian   Mood:  depressed, anxious   Affect:  mood-congruent   Thought Process:  goal directed   Associations: intact associations   Thought Content:  no overt delusions   Perceptual Disturbances: none   Risk Potential: Suicidal ideation - Yes, passive death wish  Homicidal ideation - None   Sensorium:  oriented to person, place and time/date   Memory:  recent and remote memory grossly intact   Consciousness:  alert and awake   Attention: attention span and concentration are age appropriate   Insight:  limited   Judgment: limited   Gait/Station: in bed   Motor Activity: no abnormal movements     Vital signs in last 24 hours:    Temp:  [98 8 °F (37 1 °C)-99 4 °F (37 4 °C)] 98 8 °F (37 1 °C)  HR:  [56-63] 61  Resp:  [16-18] 16  BP: (102-117)/(63-71) 115/71    Laboratory results:   I have personally reviewed all pertinent laboratory/tests results    Most Recent Labs:   Lab Results   Component Value Date    WBC 14 21 (H) 12/05/2022    RBC 5 14 12/05/2022    HGB 13 9 12/05/2022    HCT 43 4 12/05/2022     (H) 12/05/2022    RDW 14 6 12/05/2022    TOTANEUTABS 5 79 11/22/2016    NEUTROABS 10 48 (H) 12/05/2022    SODIUM 141 12/05/2022    K 4 2 12/05/2022     12/05/2022    CO2 27 12/05/2022    BUN 13 12/05/2022    CREATININE 1 05 12/05/2022    GLUC 100 12/05/2022    GLUF 100 (H) 12/05/2022    CALCIUM 9 6 12/05/2022    AST 18 12/05/2022    ALT 17 12/05/2022    ALKPHOS 54 12/05/2022    TP 7 1 12/05/2022    ALB 3 6 12/05/2022    TBILI 0 23 12/05/2022    CHOLESTEROL 171 12/05/2022    HDL 38 (L) 12/05/2022    TRIG 129 12/05/2022    LDLCALC 107 (H) 12/05/2022    [id number] 0 092 (L) 12/05/2022       Assessment/Plan   Principal Problem:    MDD (major depressive disorder), recurrent episode, severe (HCC)  Active Problems:    Hydronephrosis of right kidney    Mood disorder ([address] )    Anxiety    Asthma    Opiate abuse, episodic (HCC)    Cocaine abuse ([address] )    Recommended Treatment:     Planned medication and treatment changes:     All current active medications have been reviewed  Encourage group therapy, milieu therapy and occupational therapy  Behavioral Health checks every 7 minutes   Remeron 7 5 mg hs, Neurontin 100 ng bid    Current Facility-Administered Medications   Medication Dose Route Frequency Provider Last Rate   • acetaminophen  650 mg Oral Q6H PRN Zoie Bowden MD     • albuterol  2 puff Inhalation Q4H PRN Roberta Cast PA-C     • aluminum-magnesium hydroxide-simethicone  30 mL Oral Q4H PRN Roberta Cast PA-C     • benztropine  1 mg Intramuscular Q4H PRN Max 6/day Zoie Bowden MD     • benztropine  0 5 mg Oral Q4H PRN Max 6/day Zoie Bowden MD     • cloNIDine  0 1 mg Oral TID PRN [place] C Holter, DO     • cloNIDine  0 1 mg Oral BID Tye Alvarado MD     • cyanocobalamin  1,000 mcg Intramuscular Q30 Days Sahil Haile MD     • dextromethorphan-guaiFENesin  10 mL Oral Q4H PRN Roberta Cast PA-C     • gabapentin  100 mg Oral BID Tye Alvarado MD     • hydrOXYzine HCL  25 mg Oral Q6H PRN Max 4/day Sulma Samia Reeves MD     • hydrOXYzine HCL  50 mg Oral Q6H PRN Max 4/day Aguilar Mtz MD     • ibuprofen  400 mg Oral Q6H PRN Aguilar Mtz MD     • ibuprofen  600 mg Oral Q6H PRN Aguilar Mtz MD     • ipratropium-albuterol  3 mL Nebulization Q6H PRN Roberta Cast PA-C     • LORazepam  1 mg Intramuscular Q6H PRN Max 3/day Aguilar Mtz MD     • melatonin  3 mg Oral HS Aguilar Mtz MD     • mirtazapine  7 5 mg Oral HS Edilberto Posadas MD     • naloxone nasal- Given to patient by provider at discharge  4 mg Does not apply Daily PRN Bartolo Mendez MD     • nicotine  1 patch Transdermal Daily Aguilar Mtz MD     • OLANZapine  5 mg Intramuscular Q3H PRN Max 3/day Aguilar Mtz MD     • OLANZapine  2 5 mg Oral Q4H PRN Max 6/day Aguilar Mtz MD     • OLANZapine  5 mg Oral Q4H PRN Max 3/day Aguilar Mtz MD     • OLANZapine  5 mg Oral Q3H PRN Max 3/day Aguilar Mtz MD     • ondansetron  4 mg Oral Q6H PRN Roberta Cast PA-C     • oseltamivir  75 mg Oral Q12H [address] Roberta Cast PA-C     • polyethylene glycol  17 g Oral Daily PRN Roberta Cast PA-C     • QUEtiapine  200 mg Oral HS Jordan C Holter, DO     • traZODone  50 mg Oral Q6H PRN Max 3/day Aguilar Mtz MD         Risks / Benefits of Treatment:    Risks, benefits, and possible side effects of medications explained to patient and patient verbalizes understanding and agreement for treatment  Counseling / Coordination of Care:    Patient's progress discussed with staff in treatment team meeting  Medications, treatment progress and treatment plan reviewed with patient  Group attendance encouraged      Rachna Garcia MD 12/05/22

## 2022-12-05 NOTE — MALNUTRITION/BMI
This medical record reflects one or more clinical indicators suggestive of malnutrition and/or morbid obesity  Malnutrition Findings:   Adult Malnutrition type: Chronic illness  Adult Degree of Malnutrition: Other severe protein calorie malnutrition  Malnutrition Characteristics: Weight loss, Fat loss, Muscle loss, Inadequate energy              360 Statement: Malnutrition related to inadequate energy intake as evidenced by <75% energy intake compared to estimated needs >1 month, 26#(19%) weight loss from 8/21/# to 12/4/# and 42#(27%) weight loss from 11/29/# to 12/4/#, temporal wasting, sunken orbitals  Regular diet thin liquids with strawberry ensure plus high protein TID  BMI Findings:  Adult BMI Classifications: Underweight < 18 5        Body mass index is 17 73 kg/m²  See Nutrition note dated 12/5/2022 for additional details  Completed nutrition assessment is viewable in the nutrition documentation

## 2022-12-05 NOTE — PROGRESS NOTES
12/05/22 1345   Activity/Group Checklist   Group Admission/Discharge   Attendance Other (Comment)  (Pt not feeling well will attempt again at another time )

## 2022-12-05 NOTE — NUTRITION
22 1534   Biochemical Data,Medical Tests, and Procedures   Biochemical Data/Medical Tests/Procedures Lab values reviewed; Meds reviewed   Labs (Comment)  B, HDL:38, LDL:107, WBC:14 21, TSH:0 092   Influenza A positive  12/3 positive cocaine   Meds (Comment) cogentin, catapres, melatonin, remeron, zyprexa, zofran, tamiflu, seroquel, desyrel   Nutrition-Focused Physical Exam   Nutrition-Focused Physical Exam Findings RN skin assessment reviewed; No skin issues documented   Nutrition-Focused Physical Exam Findings smoker; Temporal wasting, sunken orbitals (unable to perform physical nutrition assessment as patient was covered in blankets)  Medical-Related Concerns drug addiction, bipolar 1 diesorder, GERD, depression, gastritis   Adequacy of Intake   Nutrition Modality PO   Feeding Route   PO Independent   Current PO Intake   Current Diet Order Regular diet thin liquids   Current Meal Intake %   Estimated calorie intake compared to estimated need Anticipate nutrient needs will be met  PES Statement   Problem Clinical   Weight (3) Unintended weight loss NC-3 2   Related to Inadequate intake; Other (Comment)  (drug use)   As evidenced by: Intake < estimated needs;Weight loss   Recommendations/Interventions   Malnutrition/BMI Present Yes   Adult Malnutrition type Chronic illness   Adult Degree of Malnutrition Other severe protein calorie malnutrition   Malnutrition Characteristics Weight loss; Fat loss;Muscle loss; Inadequate energy   Adult BMI Classifications Underweight < 18 5   360 Statement Malnutrition related to inadequate energy intake as evidenced by <75% energy intake compared to estimated needs >1 month, 26#(19%) weight loss from # to # and 42#(27%) weight loss from # to #, temporal wasting, sunken orbitals  Summary Consult: low BMI, weight loss  Regular diet thin liquids  Meal completions %   Patient with minimal participation in conversation  He reports his appetite is better than it was prior to admission  Per chart patient is homeless  12/4/#; 8/21/#, 26#(19%) significant loss in 4 months; 11/29/#, 42#(27%) significant weight loss in 1 year  Skin intact  Temporal wasting, sunken orbitals (unable to perform physical nutrition assessment as patient was covered in blankets)  Discussed nutrition supplements, he is agreeable to strawberry ensure     Interventions/Recommendations Continue current diet order;Supplement initiate   Intervention Comments strawberrry ensure plus high protein TID   Education Assessment   Education Education not indicated at this time;Patient/caregiver not appropriate for education at this time   Patient Nutrition Goals   Goal Avoid weight loss;Meet PO needs   Goal Status Initiated   Timeframe to complete goal by next f/u   Nutrition Complexity Risk   Nutrition complexity level High risk   Follow up date 12/09/22

## 2022-12-05 NOTE — SOCIAL WORK
CM met with PT, completed psycho soc, PT reported reason for admission was due to increase in anxiety and depression SI with plan; stressors include being homeless and financial, denies current SI/HI/AH/VH, reports anxiety and depression are high, denies strengths; limitations mental and financial; coping skills include work on homes, car; HX of bipolar and adhd; reports multiple past psychiatric hospitalizations; multiple psychiatric medications; reports compliance; reports prior SA by OD and drugs; denies access to firearms; denies HX of violence to self and to others; reports family HX of mental health include mother with depression; denies family hx of suicide/homicide/subtance abuse/dementia; reports use of heroin cocaine and fetnyl on a daily basis; smokes 1 pack of cigarettes a day; HX of fines and incarceration; denies current legal problems; heterosexul; single; two children not in his care in care pf mother-safe; no pets; mother lives in [place] and father is ; no siblings; homeless; graduated high school; 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

## 2022-12-05 NOTE — NURSING NOTE
Patient med complaint with PM medications  Patient ate dinner in his room  Patient stated that he really likes the strawberry ensure shake  Patient stated that the PRN 1mg Ativan was effective for him  Patient is currently resting, appears comfortable  No complaints or concerns voiced by this patient at this time  Will continue to monitor  Q7min checks are in progress

## 2022-12-05 NOTE — NURSING NOTE
Patient told nurse that he is very depressed and that he wants to go back to [facility]  This nurse tried to get the patient to elaborate but he requested something to calm him down  Patient requested Lorazepam  This nurse made the patient aware that he only had a Lorazepam IM order and he stated "whatever, I don't care"  This nurse administered PRN 1mg Ativan IM at [phone] for a Zhang of 28  Patient is currently resting in bed with no other issues or complaints  Will continue to monitor  Q7min checks are in progress

## 2022-12-05 NOTE — PROGRESS NOTES
12/05/22 0703   Activity/Group Checklist   Group Community meeting  (Pt check in and coffee offered due to flu on unit )   Attendance Attended   Attendance Duration (min) 31-45   Interactions Interacted appropriately   Affect/Mood Appropriate   Goals Achieved Identified feelings; Able to listen to others; Able to engage in interactions; Able to self-disclose; Able to recieve feedback

## 2022-12-05 NOTE — NURSING NOTE
Patient was admin atarax 50mg for anxiety, will CTM  HAM 21 continuous patient safety checks ongoing

## 2022-12-05 NOTE — PROGRESS NOTES
Progress Note - Eric Martinez 41 y o  male MRN: [id number]    Unit/Bed#: Claude[facility] 203-01 Encounter: [id number]        Subjective:   Patient seen and examined at bedside after reviewing the chart and discussing the case with the caring staff  Patient examined at bedside  Patient has no acute issues  Patient claims that he has history of opiate abuse with fentanyl patch and was on Suboxone which he wants us to restart  I discussed that with psych team   We did not found any history of Suboxone on the patient  Dr Katiana Bhatia will make a determination about putting the patient on Suboxone  On review of patient's labs it was found that patient's vitamin B12 level was low 313  Patient's vitamin-D level is still pending  Physical Exam   Vitals: Blood pressure 102/63, pulse 61, temperature 98 8 °F (37 1 °C), temperature source Temporal, resp  rate 16, height 5' 7" (1 702 m), weight 51 3 kg (113 lb 3 2 oz), SpO2 96 %  ,Body mass index is 17 73 kg/m²  Constitutional: He appears well-developed  HEENT: PERR, EOMI, MMM  Cardiovascular: Normal rate and regular rhythm  Pulmonary/Chest: Effort normal and breath sounds normal    Abdomen: Soft, + BS, NT    Assessment/Plan:  Eric Martinez is a(n) 41 y o  male with bipolar disorder      1  Tobacco use  Patient is on nicotine transdermal patch 14 mg/24 hr    2  GERD  Mylanta as needed  3  Asthma  Albuterol/DuoNeb as needed  4  Influenza A  Continue Tamiflu 75 mg twice daily x 5 days  Robitussin, Zofran, Tylenol/ibuprofen as needed  5  History of opiate abuse  Psych team will decide about putting the patient on Suboxone  Patient may get Narcan p r n  6  New vitamin B12 deficiency  I will put the patient on monthly B12 injections

## 2022-12-05 NOTE — PROGRESS NOTES
12/05/22 1300   Team Meeting   Meeting Type Tx Team Meeting   Team Members Present   Team Members Present Physician;Nurse;   Physician Team Member Dr Jyoti Pena MD   Nursing Team Member China Dalton RN   Care Management Team Member Josie Flores MS, [facility] [place] [facility], [facility]   Patient/Family Present   Patient Present Yes   Patient's Family Present No   Reviewed TX plan and goals, all in agreement and signed

## 2022-12-05 NOTE — NURSING NOTE
Patient is withdrawn to room  Presents flat and depressed  Patient endorses SI with no plan  Patient will not agree to not harm himself at this time  Patient rates his anxiety and depression a 7/10  He states "Feel no good" and rates his pain 7/10  PRN 600mg Ibuprofen given at 0844 for generalized pain and body aches  Patient remains on contact/droplet precautions  No complaints or issues voiced by this patient at this time  Will continue to monitor  1:1 remains in effect at this time for patient safety

## 2022-12-05 NOTE — PLAN OF CARE
Problem: SELF HARM/SUICIDALITY  Goal: Will have no self-injury during hospital stay  Description: INTERVENTIONS:  - Q 15 MINUTES: Routine safety checks  - Q WAKING SHIFT & PRN: Assess risk to determine if routine checks are adequate to maintain patient safety  - Encourage patient to participate actively in care by formulating a plan to combat response to suicidal ideation, identify supports and resources  Outcome: Progressing     Problem: SUBSTANCE USE/ABUSE  Goal: By discharge, patient will have ongoing treatment plan addressing chemical dependency  Description: INTERVENTIONS:  - Assist patient with resources and/or appointments for ongoing recovery based living  Outcome: Progressing

## 2022-12-05 NOTE — NURSING NOTE
Patient is withdrawn to room upon assessment  Patient is stating he is having SI and is unwilling to contract for safety at this time  Patient stated he had high A/D, patient said he wanted meds to help w/ fentanyl withdrawal  Patient endorses AH of voices telling him to leave and use fentanyl  Denies VH, and HI  Admin clonidine 0 1 @ 2017 Will CTM  Continuous patient safety checks ongoing

## 2022-12-06 RX ORDER — METHOCARBAMOL 500 MG/1
500 TABLET, FILM COATED ORAL EVERY 6 HOURS PRN
Status: DISCONTINUED | OUTPATIENT
Start: 2022-12-06 | End: 2022-12-09 | Stop reason: HOSPADM

## 2022-12-06 RX ORDER — GABAPENTIN 300 MG/1
300 CAPSULE ORAL 2 TIMES DAILY
Status: DISCONTINUED | OUTPATIENT
Start: 2022-12-06 | End: 2022-12-08

## 2022-12-06 RX ORDER — MIRTAZAPINE 15 MG/1
15 TABLET, FILM COATED ORAL
Status: DISCONTINUED | OUTPATIENT
Start: 2022-12-06 | End: 2022-12-07

## 2022-12-06 RX ORDER — NAPROXEN 500 MG/1
500 TABLET ORAL 2 TIMES DAILY PRN
Status: DISCONTINUED | OUTPATIENT
Start: 2022-12-06 | End: 2022-12-09 | Stop reason: HOSPADM

## 2022-12-06 RX ADMIN — OSELTAMAVIR PHOSPHATE 75 MG: 75 CAPSULE ORAL at 21:11

## 2022-12-06 RX ADMIN — GABAPENTIN 300 MG: 300 CAPSULE ORAL at 17:00

## 2022-12-06 RX ADMIN — OSELTAMAVIR PHOSPHATE 75 MG: 75 CAPSULE ORAL at 08:54

## 2022-12-06 RX ADMIN — NICOTINE 1 PATCH: 14 PATCH, EXTENDED RELEASE TRANSDERMAL at 08:56

## 2022-12-06 RX ADMIN — GABAPENTIN 100 MG: 100 CAPSULE ORAL at 08:54

## 2022-12-06 RX ADMIN — CLONIDINE HYDROCHLORIDE 0.1 MG: 0.1 TABLET ORAL at 21:11

## 2022-12-06 RX ADMIN — Medication 3 MG: at 21:11

## 2022-12-06 RX ADMIN — NAPROXEN 500 MG: 500 TABLET ORAL at 22:30

## 2022-12-06 RX ADMIN — IBUPROFEN 600 MG: 600 TABLET ORAL at 09:00

## 2022-12-06 RX ADMIN — QUETIAPINE 200 MG: 200 TABLET ORAL at 21:11

## 2022-12-06 RX ADMIN — MIRTAZAPINE 15 MG: 15 TABLET, FILM COATED ORAL at 21:11

## 2022-12-06 NOTE — PROGRESS NOTES
Progress Note - Behavioral Health   This note was not shared with the patient due to reasonable likelihood of causing patient harm  Rika Tracey 41 y o  male MRN: [id number]   Unit/Bed#: Nubia Ruiz 203-01 Encounter: [id number]    Behavior over the last 24 hours: unchanged  Arlene Gimenez was seen for continuing care  He remains anxious, restless but continues requesting his Adderall and Suboxone  Med education was provided and the need to be taper off from any controlled medication in order for him to be referred for rehab  Patient showed limited understanding but agreed with the plan  No significant withdrawal symptoms noted at this time except increased anxiety  Staff report poor partcipation in groups and on the unit  Sleep: slept off and on  Appetite: fair  Medication side effects: denies  ROS: all other systems are negative for acute changes    Mental Status Evaluation:    Appearance:  age appropriate, underweight   Behavior:  cooperative   Speech:  normal rate and volume   Mood:  depressed, anxious   Affect:  constricted   Thought Process:  goal directed   Associations: intact associations   Thought Content:  no overt delusions   Perceptual Disturbances: denies auditory hallucinations when asked   Risk Potential: Suicidal ideation - Yes, passive death wish  Homicidal ideation - None at present   Sensorium:  oriented to person, place and time/date   Memory:  recent and remote memory grossly intact   Consciousness:  alert and awake   Attention: attention span and concentration are age appropriate   Insight:  limited   Judgment: limited   Gait/Station: normal gait/station   Motor Activity: no abnormal movements     Vital signs in last 24 hours:    Temp:  [98 6 °F (37 °C)-100 1 °F (37 8 °C)] 98 6 °F (37 °C)  HR:  [71-85] 71  Resp:  [16-18] 16  BP: (103-118)/(70-75) 103/70    Laboratory results:   I have personally reviewed all pertinent laboratory/tests results    Most Recent Labs:   Lab Results   Component Value Date    WBC 14 21 (H) 12/05/2022    RBC 5 14 12/05/2022    HGB 13 9 12/05/2022    HCT 43 4 12/05/2022     (H) 12/05/2022    RDW 14 6 12/05/2022    TOTANEUTABS 5 79 11/22/2016    NEUTROABS 10 48 (H) 12/05/2022    SODIUM 141 12/05/2022    K 4 2 12/05/2022     12/05/2022    CO2 27 12/05/2022    BUN 13 12/05/2022    CREATININE 1 05 12/05/2022    GLUC 100 12/05/2022    GLUF 100 (H) 12/05/2022    CALCIUM 9 6 12/05/2022    AST 18 12/05/2022    ALT 17 12/05/2022    ALKPHOS 54 12/05/2022    TP 7 1 12/05/2022    ALB 3 6 12/05/2022    TBILI 0 23 12/05/2022    CHOLESTEROL 171 12/05/2022    HDL 38 (L) 12/05/2022    TRIG 129 12/05/2022    LDLCALC 107 (H) 12/05/2022    [id number] 0 092 (L) 12/05/2022    FREET4 0 97 12/05/2022       Assessment/Plan   Principal Problem:    MDD (major depressive disorder), recurrent episode, severe (HCC)  Active Problems:    Hydronephrosis of right kidney    Mood disorder ([facility] )    Anxiety    Asthma    Opiate abuse, episodic (HCC)    Cocaine abuse ([facility] )    Severe protein[facility]calorie malnutrition ([facility] )    Recommended Treatment:     Planned medication and treatment changes:     All current active medications have been reviewed  Encourage group therapy, milieu therapy and occupational therapy  Behavioral Health checks every 7 minutes   Increase Remeron 15 mg po hs and Neurontin 300 mg bid    Current Facility-Administered Medications   Medication Dose Route Frequency Provider Last Rate   • acetaminophen  650 mg Oral Q6H PRN Soni Manley MD     • albuterol  2 puff Inhalation Q4H PRN Roberta Cast PA-C     • aluminum-magnesium hydroxide-simethicone  30 mL Oral Q4H PRN Roberta Cast PA-C     • benztropine  1 mg Intramuscular Q4H PRN Max 6/day Soni Manley MD     • benztropine  0 5 mg Oral Q4H PRN Max 6/day Soni Manley MD     • cloNIDine  0 1 mg Oral TID PRN [place] C Holter, DO     • cloNIDine  0 1 mg Oral BID Jessica Fernández MD     • cyanocobalamin  1,000 mcg Intramuscular Q30 Days Tala Laureano MD     • dextromethorphan-guaiFENesin  10 mL Oral Q4H PRN Roberta Cast PA-C     • gabapentin  100 mg Oral BID Rubi Dan MD     • hydrOXYzine HCL  50 mg Oral Q6H PRN Max 4/day Rubi Dan MD     • ibuprofen  400 mg Oral Q6H PRN Jigar Cote MD     • ibuprofen  600 mg Oral Q6H PRN Jigar Cote MD     • ipratropium-albuterol  3 mL Nebulization Q6H PRN Roberta Cast PA-C     • LORazepam  1 mg Intramuscular Q6H PRN Max 3/day Jigar Cote MD     • LORazepam  1 mg Oral Q8H PRN Rubi Dan MD     • melatonin  3 mg Oral HS Jigar Cote MD     • mirtazapine  7 5 mg Oral HS Rubi Dan MD     • naloxone nasal- Given to patient by provider at discharge  4 mg Does not apply Daily PRN Tala Laureano MD     • nicotine  1 patch Transdermal Daily Jigar Cote MD     • OLANZapine  5 mg Intramuscular Q3H PRN Max 3/day Jigar Cote MD     • OLANZapine  2 5 mg Oral Q4H PRN Max 6/day Jigar Cote MD     • OLANZapine  5 mg Oral Q4H PRN Max 3/day Jigar Cote MD     • OLANZapine  5 mg Oral Q3H PRN Max 3/day Jigar Ctoe MD     • ondansetron  4 mg Oral Q6H PRN Roberta Cast PA-C     • oseltamivir  75 mg Oral Q12H [facility] Roberta Cast PA-C     • polyethylene glycol  17 g Oral Daily PRN Roberta Cast PA-C     • QUEtiapine  200 mg Oral HS Jordan C Holter, DO     • traZODone  50 mg Oral Q6H PRN Max 3/day Jigar Cote MD         Risks / Benefits of Treatment:    Risks, benefits, and possible side effects of medications explained to patient and patient verbalizes understanding and agreement for treatment  Counseling / Coordination of Care:    Patient's progress discussed with staff in treatment team meeting  Medications, treatment progress and treatment plan reviewed with patient  Supportive therapy provided to patient  Group attendance encouraged      Baron Fitzgerald MD 12/06/22

## 2022-12-06 NOTE — PROGRESS NOTES
12/06/22 1100   Activity/Group Checklist   Group Admission/Discharge   Attendance Attended   Attendance Duration (min) 16-30   Interactions Interacted appropriately   Affect/Mood Appropriate  (pt seemed to struggle to understand what was all being asked of patient )   Goals Achieved Identified feelings; Identified triggers; Identified relapse prevention strategies; Discussed coping strategies; Discussed discharge plans; Able to listen to others; Able to engage in interactions; Able to reflect/comment on own behavior;Able to self-disclose; Able to recieve feedback   Patient was agreeable to meeting with RT and completing his self assessment and relapse prevention plan  Patient was open to conversation but struggled to understand what was being asked completely  Patient is struggling with flu symptoms and withdrawal from drug use  Patient is hopeful to get into an inpatient rehab and then a residential house  Patient is currently homeless and this is his biggest worry; he does not want to be out on the streets or in a shelter  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

## 2022-12-06 NOTE — PLAN OF CARE
Problem: SELF HARM/SUICIDALITY  Goal: Will have no self-injury during hospital stay  Description: INTERVENTIONS:  - Q 15 MINUTES: Routine safety checks  - Q WAKING SHIFT & PRN: Assess risk to determine if routine checks are adequate to maintain patient safety  - Encourage patient to participate actively in care by formulating a plan to combat response to suicidal ideation, identify supports and resources  Outcome: Progressing     Problem: Ineffective Coping  Goal: Participates in unit activities  Description: Interventions:  - Provide therapeutic environment   - Provide required programming   - Redirect inappropriate behaviors   Outcome: Progressing     Problem: Anxiety  Goal: Anxiety is at manageable level  Description: Interventions:  - Assess and monitor patient's anxiety level  - Monitor for signs and symptoms (heart palpitations, chest pain, shortness of breath, headaches, nausea, feeling jumpy, restlessness, irritable, apprehensive)  - Collaborate with interdisciplinary team and initiate plan and interventions as ordered  - Hayes patient to unit/surroundings  - Explain treatment plan  - Encourage participation in care  - Encourage verbalization of concerns/fears  - Identify coping mechanisms  - Assist in developing anxiety-reducing skills  - Administer/offer alternative therapies  - Limit or eliminate stimulants  Outcome: Progressing     Problem: Nutrition/Hydration-ADULT  Goal: Nutrient/Hydration intake appropriate for improving, restoring or maintaining nutritional needs  Description: Monitor and assess patient's nutrition/hydration status for malnutrition  Collaborate with interdisciplinary team and initiate plan and interventions as ordered  Monitor patient's weight and dietary intake as ordered or per policy  Utilize nutrition screening tool and intervene as necessary  Determine patient's food preferences and provide high-protein, high-caloric foods as appropriate       INTERVENTIONS:  - Monitor oral intake, urinary output, labs, and treatment plans  - Assess nutrition and hydration status and recommend course of action  - Evaluate amount of meals eaten  - Assist patient with eating if necessary   - Allow adequate time for meals  - Recommend/ encourage appropriate diets, oral nutritional supplements, and vitamin/mineral supplements  - Order, calculate, and assess calorie counts as needed  - Recommend, monitor, and adjust tube feedings and TPN/PPN based on assessed needs  - Assess need for intravenous fluids  - Provide specific nutrition/hydration education as appropriate  - Include patient/family/caregiver in decisions related to nutrition  Outcome: Progressing

## 2022-12-06 NOTE — PROGRESS NOTES
12/06/22 0791   Activity/Group Checklist   Group Community meeting   Attendance Other (Comment)  (pt sleeping/ flu positive)

## 2022-12-06 NOTE — NURSING NOTE
Patient was observed to be visible in the community this evening  He is quiet and keeps to himself  Appears to be mildly restless  Endorses anxiety and depression both 7/10, denies SI, HI and hallucinations  Denies any pain  Patient was medication compliant at HS  Will CTM  Q7 minute safety checks in progress

## 2022-12-06 NOTE — PROGRESS NOTES
Progress Note - Silvina Rain 41 y o  male MRN: [id number]    Unit/Bed#: Radha Fernández 203-01 Encounter: [id number]        Subjective:   Patient seen and examined at bedside after reviewing the chart and discussing the case with the caring staff  Patient examined at bedside  Patient continues to have generalized body aches which are not well controlled with ibuprofen and Tylenol  On review of patient's labs it was found that patient's vitamin-D level was found to be low 18 8  Physical Exam   Vitals: Blood pressure 103/70, pulse 71, temperature 98 6 °F (37 °C), temperature source Temporal, resp  rate 16, height 5' 7" (1 702 m), weight 51 3 kg (113 lb 3 2 oz), SpO2 98 %  ,Body mass index is 17 73 kg/m²  Constitutional: He appears well-developed  HEENT: PERR, EOMI, MMM  Cardiovascular: Normal rate and regular rhythm  Pulmonary/Chest: Effort normal and breath sounds normal    Abdomen: Soft, + BS, NT    Assessment/Plan:  Silvina Rain is a(n) 41 y o  male with bipolar disorder      1  Tobacco use  Patient is on nicotine transdermal patch 14 mg/24 hr    2  GERD  Mylanta as needed  3  Asthma  Albuterol/DuoNeb as needed  4  Influenza A  Continue Tamiflu 75 mg twice daily x 5 days  Robitussin, Zofran, Tylenol/ibuprofen as needed  5  History of opiate abuse  Psych team will decide about putting the patient on Suboxone  Patient may get Narcan p r n  6  New vitamin B12 deficiency  I will put the patient on monthly B12 injections  7  New vitamin-D deficiency  I will put the patient on vitamin-D bolus doses for 10 weeks followed by vitamin D3 1000 units daily  8  Generalized body aches/headache an arthralgia  Patient may continue to receive Tylenol for mild pain, ibuprofen for moderate pain and naproxen for severe pain

## 2022-12-06 NOTE — NURSING NOTE
Patient observed in bed eating during am med pass  Patient calm, cooperative, and pleasant  Med complaint  Denies SI/HI and AH/VH  Patient endorses high anxiety and depression  Patient rates his generalized pain as high  PRN 600mg ibuprofen given at 0900  PRN medication was minimally effective  Currently patient is in his room resting  No complaints or issues voiced by this patient at this time  Will continue to monitor  Q7min checks are in progress

## 2022-12-06 NOTE — PROGRESS NOTES
12/06/22     Team Meeting   Meeting Type Daily Rounds   Team Members Present   Team Members Present Physician;Nurse;   Physician Team Member Dr Sherry Mcgee MD; Bryce Grant, [address] [place] [address]   Nursing Team Member Joey Estevez RN   Care Management Team Member MS [place], [facility], [facility] [place], [place]   OT Team Member Shameka Gamble [place]   Patient/Family Present   Patient Present No   Patient's Family Present No   Poor sleep, high anxiety and depression, denies si/hi, prns, ativan im, medication adjustment

## 2022-12-06 NOTE — PROGRESS NOTES
12/05/22 4205   Team Meeting   Meeting Type Daily Rounds   Team Members Present   Team Members Present Physician;Nurse   Physician Team Member Dr Kashif Marsh MD   Nursing Team Member Henry Montoya RN   Care Management Team Member Flakito Pozo   OT Team Member Chloe De La O, [place]   Patient/Family Present   Patient Present No   Patient's Family Present No   New admission, 14, positive for meth, homeless, withdrawn, medication compliant, on 1 to 1, contract for safety, depression, anxiety

## 2022-12-06 NOTE — PLAN OF CARE
Problem: SELF HARM/SUICIDALITY  Goal: Will have no self-injury during hospital stay  Description: INTERVENTIONS:  - Q 15 MINUTES: Routine safety checks  - Q WAKING SHIFT & PRN: Assess risk to determine if routine checks are adequate to maintain patient safety  - Encourage patient to participate actively in care by formulating a plan to combat response to suicidal ideation, identify supports and resources  Outcome: Progressing     Problem: SUBSTANCE USE/ABUSE  Goal: By discharge, will develop insight into their chemical dependency and sustain motivation to continue in recovery  Description: INTERVENTIONS:  - Attends all daily group sessions and scheduled AA groups  - Actively practices coping skills through participation in the therapeutic community and adherence to program rules  - Reviews and completes assignments from individual treatment plan  - Assist patient development of understanding of their personal cycle of addiction and relapse triggers  Outcome: Progressing  Goal: By discharge, patient will have ongoing treatment plan addressing chemical dependency  Description: INTERVENTIONS:  - Assist patient with resources and/or appointments for ongoing recovery based living  Outcome: Progressing     Problem: Nutrition/Hydration-ADULT  Goal: Nutrient/Hydration intake appropriate for improving, restoring or maintaining nutritional needs  Description: Monitor and assess patient's nutrition/hydration status for malnutrition  Collaborate with interdisciplinary team and initiate plan and interventions as ordered  Monitor patient's weight and dietary intake as ordered or per policy  Utilize nutrition screening tool and intervene as necessary  Determine patient's food preferences and provide high-protein, high-caloric foods as appropriate       INTERVENTIONS:  - Monitor oral intake, urinary output, labs, and treatment plans  - Assess nutrition and hydration status and recommend course of action  - Evaluate amount of meals eaten  - Assist patient with eating if necessary   - Allow adequate time for meals  - Recommend/ encourage appropriate diets, oral nutritional supplements, and vitamin/mineral supplements  - Order, calculate, and assess calorie counts as needed  - Recommend, monitor, and adjust tube feedings and TPN/PPN based on assessed needs  - Assess need for intravenous fluids  - Provide specific nutrition/hydration education as appropriate  - Include patient/family/caregiver in decisions related to nutrition  Outcome: Progressing

## 2022-12-06 NOTE — PROGRESS NOTES
12/05/22 1300   Activity/Group Checklist   Group   ([facility])   Attendance Did not attend  (AT group offered, PT elected to remain in room)

## 2022-12-06 NOTE — NURSING NOTE
Patient appears to have slept through the overnight hours without issue  No acute behaviors observed  No complaints voiced  He remains in bed sleeping at this time  Droplet/ contact precautions in place d/t influenza diagnosis  Continuous safety rounding in progress

## 2022-12-07 LAB
ANION GAP SERPL CALCULATED.3IONS-SCNC: 7 MMOL/L (ref 4–13)
ATRIAL RATE: 98 BPM
ATRIAL RATE: 99 BPM
BASOPHILS # BLD AUTO: 0.04 THOUSANDS/ÂΜL (ref 0–0.1)
BASOPHILS NFR BLD AUTO: 0 % (ref 0–1)
BUN SERPL-MCNC: 26 MG/DL (ref 5–25)
CALCIUM SERPL-MCNC: 9.4 MG/DL (ref 8.4–10.2)
CHLORIDE SERPL-SCNC: 106 MMOL/L (ref 96–108)
CO2 SERPL-SCNC: 25 MMOL/L (ref 21–32)
CREAT SERPL-MCNC: 0.96 MG/DL (ref 0.6–1.3)
EOSINOPHIL # BLD AUTO: 0.27 THOUSAND/ÂΜL (ref 0–0.61)
EOSINOPHIL NFR BLD AUTO: 3 % (ref 0–6)
ERYTHROCYTE [DISTWIDTH] IN BLOOD BY AUTOMATED COUNT: 14.6 % (ref 11.6–15.1)
GFR SERPL CREATININE-BSD FRML MDRD: 95 ML/MIN/1.73SQ M
GLUCOSE P FAST SERPL-MCNC: 98 MG/DL (ref 65–99)
GLUCOSE SERPL-MCNC: 98 MG/DL (ref 65–140)
HCT VFR BLD AUTO: 42.9 % (ref 36.5–49.3)
HGB BLD-MCNC: 13.4 G/DL (ref 12–17)
IMM GRANULOCYTES # BLD AUTO: 0.05 THOUSAND/UL (ref 0–0.2)
IMM GRANULOCYTES NFR BLD AUTO: 1 % (ref 0–2)
LYMPHOCYTES # BLD AUTO: 3.19 THOUSANDS/ÂΜL (ref 0.6–4.47)
LYMPHOCYTES NFR BLD AUTO: 29 % (ref 14–44)
MCH RBC QN AUTO: 27 PG (ref 26.8–34.3)
MCHC RBC AUTO-ENTMCNC: 31.2 G/DL (ref 31.4–37.4)
MCV RBC AUTO: 86 FL (ref 82–98)
MONOCYTES # BLD AUTO: 1.07 THOUSAND/ÂΜL (ref 0.17–1.22)
MONOCYTES NFR BLD AUTO: 10 % (ref 4–12)
NEUTROPHILS # BLD AUTO: 6.29 THOUSANDS/ÂΜL (ref 1.85–7.62)
NEUTS SEG NFR BLD AUTO: 57 % (ref 43–75)
NRBC BLD AUTO-RTO: 0 /100 WBCS
P AXIS: 66 DEGREES
P AXIS: 68 DEGREES
PLATELET # BLD AUTO: 495 THOUSANDS/UL (ref 149–390)
PMV BLD AUTO: 8.5 FL (ref 8.9–12.7)
POTASSIUM SERPL-SCNC: 4 MMOL/L (ref 3.5–5.3)
PR INTERVAL: 122 MS
PR INTERVAL: 124 MS
QRS AXIS: 33 DEGREES
QRS AXIS: 36 DEGREES
QRSD INTERVAL: 86 MS
QRSD INTERVAL: 86 MS
QT INTERVAL: 364 MS
QT INTERVAL: 366 MS
QTC INTERVAL: 467 MS
QTC INTERVAL: 467 MS
RBC # BLD AUTO: 4.97 MILLION/UL (ref 3.88–5.62)
SODIUM SERPL-SCNC: 138 MMOL/L (ref 135–147)
T WAVE AXIS: 57 DEGREES
T WAVE AXIS: 62 DEGREES
VENTRICULAR RATE: 98 BPM
VENTRICULAR RATE: 99 BPM
WBC # BLD AUTO: 10.91 THOUSAND/UL (ref 4.31–10.16)

## 2022-12-07 RX ORDER — NALOXONE HYDROCHLORIDE 4 MG/.1ML
SPRAY NASAL
Qty: 2 EACH | Refills: 0 | Status: SHIPPED | OUTPATIENT
Start: 2022-12-07 | End: 2022-12-08 | Stop reason: SDUPTHER

## 2022-12-07 RX ORDER — TRAZODONE HYDROCHLORIDE 100 MG/1
100 TABLET ORAL
Status: DISCONTINUED | OUTPATIENT
Start: 2022-12-07 | End: 2022-12-08

## 2022-12-07 RX ORDER — ACETAMINOPHEN 325 MG/1
975 TABLET ORAL EVERY 6 HOURS PRN
Status: DISCONTINUED | OUTPATIENT
Start: 2022-12-07 | End: 2022-12-09 | Stop reason: HOSPADM

## 2022-12-07 RX ORDER — MIRTAZAPINE 15 MG/1
30 TABLET, FILM COATED ORAL
Status: DISCONTINUED | OUTPATIENT
Start: 2022-12-07 | End: 2022-12-09 | Stop reason: HOSPADM

## 2022-12-07 RX ORDER — TRAZODONE HYDROCHLORIDE 50 MG/1
50 TABLET ORAL
Status: DISCONTINUED | OUTPATIENT
Start: 2022-12-07 | End: 2022-12-08

## 2022-12-07 RX ADMIN — METHOCARBAMOL 500 MG: 500 TABLET ORAL at 20:34

## 2022-12-07 RX ADMIN — MIRTAZAPINE 30 MG: 15 TABLET, FILM COATED ORAL at 20:32

## 2022-12-07 RX ADMIN — BENZTROPINE MESYLATE 0.5 MG: 0.5 TABLET ORAL at 21:44

## 2022-12-07 RX ADMIN — GABAPENTIN 300 MG: 300 CAPSULE ORAL at 08:36

## 2022-12-07 RX ADMIN — TRAZODONE HYDROCHLORIDE 50 MG: 50 TABLET ORAL at 20:33

## 2022-12-07 RX ADMIN — QUETIAPINE 200 MG: 200 TABLET ORAL at 20:24

## 2022-12-07 RX ADMIN — GABAPENTIN 300 MG: 300 CAPSULE ORAL at 17:21

## 2022-12-07 RX ADMIN — OSELTAMAVIR PHOSPHATE 75 MG: 75 CAPSULE ORAL at 08:36

## 2022-12-07 RX ADMIN — Medication 3 MG: at 20:24

## 2022-12-07 RX ADMIN — CLONIDINE HYDROCHLORIDE 0.1 MG: 0.1 TABLET ORAL at 08:36

## 2022-12-07 RX ADMIN — NICOTINE 1 PATCH: 14 PATCH, EXTENDED RELEASE TRANSDERMAL at 08:36

## 2022-12-07 NOTE — PROGRESS NOTES
Progress Note - Behavioral Health   This note was not shared with the patient due to reasonable likelihood of causing patient harm  Brady Arzate 41 y o  male MRN: [id number]   Unit/Bed#: [facility] 203-01 Encounter: [id number]    Behavior over the last 24 hours: minimal improvement  Tahir Esparza was seen for continuing care  Patient continues anxious, depressed and preoccupied about his ongoing physical and cognitive function decline  States he lost too much weight but still focus on getting some medication such as Klonopin and Adderall  Patient has been compliant with medication and denied any current side effects  Staff report limited paricipation in groups and on the unit  Sleep: slept off and on  Appetite: increased  Medication side effects: denies  ROS: all other systems are negative    Mental Status Evaluation:    Appearance:  disheveled, underweight   Behavior:  restless   Speech:  decreased rate   Mood:  depressed, anxious   Affect:  mood-congruent   Thought Process:  perseverative, negative thinking   Associations: intact associations   Thought Content:  focused in getting Adderall   Perceptual Disturbances: none   Risk Potential: Suicidal ideation - None  Homicidal ideation - None   Sensorium:  oriented to person, place and time/date   Memory:  recent and remote memory grossly intact   Consciousness:  alert and awake   Attention: attention span and concentration appear shorter than expected for age   Insight:  poor   Judgment: limited   Gait/Station: in bed   Motor Activity: no abnormal movements     Vital signs in last 24 hours:    Temp:  [97 8 °F (36 6 °C)-99 1 °F (37 3 °C)] 98 7 °F (37 1 °C)  HR:  [] 65  Resp:  [16-18] 16  BP: ()/(51-68) 113/67    Laboratory results:   I have personally reviewed all pertinent laboratory/tests results    Most Recent Labs:   Lab Results   Component Value Date    WBC 10 91 (H) 12/07/2022    RBC 4 97 12/07/2022    HGB 13 4 12/07/2022    HCT 42 9 12/07/2022  (H) 12/07/2022    RDW 14 6 12/07/2022    TOTANEUTABS 5 79 11/22/2016    NEUTROABS 6 29 12/07/2022    SODIUM 138 12/07/2022    K 4 0 12/07/2022     12/07/2022    CO2 25 12/07/2022    BUN 26 (H) 12/07/2022    CREATININE 0 96 12/07/2022    GLUC 98 12/07/2022    GLUF 98 12/07/2022    CALCIUM 9 4 12/07/2022    AST 18 12/05/2022    ALT 17 12/05/2022    ALKPHOS 54 12/05/2022    TP 7 1 12/05/2022    ALB 3 6 12/05/2022    TBILI 0 23 12/05/2022    CHOLESTEROL 171 12/05/2022    HDL 38 (L) 12/05/2022    TRIG 129 12/05/2022    LDLCALC 107 (H) 12/05/2022    AUD1VHERSIYH 0 092 (L) 12/05/2022    FREET4 0 97 12/05/2022         Assessment/Plan   Principal Problem:    MDD (major depressive disorder), recurrent episode, severe (HCC)  Active Problems:    Hydronephrosis of right kidney    Mood disorder ([facility] )    Anxiety    Asthma    Opiate abuse, episodic (HCC)    Cocaine abuse ([facility] )    Severe protein-calorie malnutrition ([facility] )    Recommended Treatment:     Planned medication and treatment changes:     All current active medications have been reviewed  Encourage group therapy, milieu therapy and occupational therapy  Behavioral Health checks every 7 minutes   Increase Remeron to 30 mg po hs, Trazodone 50 mg po hs    Current Facility-Administered Medications   Medication Dose Route Frequency Provider Last Rate   • acetaminophen  650 mg Oral Q6H PRN Zoie Bowden MD     • albuterol  2 puff Inhalation Q4H PRN Roberta Cast PA-C     • aluminum-magnesium hydroxide-simethicone  30 mL Oral Q4H PRN Roberta Cast PA-C     • benztropine  1 mg Intramuscular Q4H PRN Max 6/day Zoie Bowden MD     • benztropine  0 5 mg Oral Q4H PRN Max 6/day Zoie Bowden MD     • cloNIDine  0 1 mg Oral TID PRN [place] C Holter, DO     • cloNIDine  0 1 mg Oral BID Tye Alvarado MD     • cyanocobalamin  1,000 mcg Intramuscular Q30 Days Sahil Haile MD     • dextromethorphan-guaiFENesin  10 mL Oral Q4H PRN Roberta Cast PA-C     • gabapentin  300 mg Oral BID Jignesh Padilla MD     • hydrOXYzine HCL  50 mg Oral Q6H PRN Max 4/day Jignesh Padilla MD     • ibuprofen  400 mg Oral Q6H PRN Farhana Shea MD     • ipratropium-albuterol  3 mL Nebulization Q6H PRN Roberta Cast PA-C     • LORazepam  1 mg Intramuscular Q6H PRN Max 3/day Farhana Shea MD     • LORazepam  1 mg Oral Q8H PRN Jignesh Padilla MD     • melatonin  3 mg Oral HS Farhana Shea MD     • methocarbamol  500 mg Oral Q6H PRN Roberta Cast PA-C     • mirtazapine  15 mg Oral HS Jignesh Padilla MD     • naloxone nasal- Given to patient by provider at discharge  4 mg Does not apply Once Shanae Gracia MD     • naproxen  500 mg Oral BID PRN Shanae Gracia MD     • nicotine  1 patch Transdermal Daily Farhana Shea MD     • OLANZapine  5 mg Intramuscular Q3H PRN Max 3/day Farhana Shea MD     • OLANZapine  2 5 mg Oral Q4H PRN Max 6/day Farhana Shea MD     • OLANZapine  5 mg Oral Q4H PRN Max 3/day Farhana Shea MD     • OLANZapine  5 mg Oral Q3H PRN Max 3/day Farhana Shea MD     • ondansetron  4 mg Oral Q6H PRN Roberta Cast PA-C     • polyethylene glycol  17 g Oral Daily PRN Roberta Cast PA-C     • QUEtiapine  200 mg Oral HS Jordan C Holter, DO     • traZODone  50 mg Oral Q6H PRN Max 3/day Farhana Shea MD         Risks / Benefits of Treatment:    Risks, benefits, and possible side effects of medications explained to patient  Patient has limited understanding of risks and benefits of treatment at this time, but agrees to take medications as prescribed  Counseling / Coordination of Care:    Patient's progress discussed with staff in treatment team meeting  Medications, treatment progress and treatment plan reviewed with patient  Supportive therapy provided to patient  Group attendance encouraged      Maximus Rainey MD 12/07/22

## 2022-12-07 NOTE — NURSING NOTE
Patient was observed to be visible in the community this evening for brief periods  He is calm and cooperative during staff interactions  He is quiet and withdrawn to self  He endorses both anxiety and depression as high, denies SI, HI and hallucinations  Denies any pain at time of assessment  No complaints voiced  He states he is hoping to be discharged soon, preferably tomorrow  Verbal support offered  Patient was medication compliant at HS  New mask provided to patient for wear while in the milieu  Contact/ droplet precautions in place d/t influenza diagnosis  Continuous safety rounding in progress

## 2022-12-07 NOTE — SOCIAL WORK
CM faxed referral to [address] [place] [address] for case management services  Confirmation received  [phone]

## 2022-12-07 NOTE — PROGRESS NOTES
Progress Note - Sandy Denny 41 y o  male MRN: [id number]    Unit/Bed#: [facility]-01 Encounter: [id number]        Subjective:   Patient seen and examined at bedside after reviewing the chart and discussing the case with the caring staff  Patient examined at bedside  Patient reports methocarbamol is helping with his generalized pains  Denies any complaints other than wanting to be discharged  Physical Exam   Vitals: Blood pressure 113/67, pulse 65, temperature 98 7 °F (37 1 °C), temperature source Temporal, resp  rate 16, height 5' 7" (1 702 m), weight 51 3 kg (113 lb 3 2 oz), SpO2 99 %  ,Body mass index is 17 73 kg/m²  Constitutional: Patient in no acute distress  HEENT: PERR, EOMI, MMM  Cardiovascular: Normal rate and regular rhythm  Pulmonary/Chest: Effort normal and breath sounds normal    Abdomen: Non distended  Assessment/Plan:  Sandy Denny is a(n) 41 y o  male with bipolar disorder      1  Tobacco use  Patient is on nicotine transdermal patch 14 mg/24 hr    2  GERD  Mylanta as needed  3  Asthma  Albuterol/DuoNeb as needed  4  Hx of opioid abuse  Patient may get Narcan on discharge  5  Vitamin B12 deficiency  Patient started on monthly B12 injections  6  Vitamin-D deficiency  Patient started on vitamin-D bolus doses for 10 weeks followed by vitamin D3 1000 units daily  7  Generalized myalgias/arthralgias  Patient may take Tylenol for mild pain, ibuprofen for moderate pain, naproxen for severe pain, Robaxin as needed  The patient was discussed with Dr Chirag Carter and he is in agreement with the above note

## 2022-12-07 NOTE — PROGRESS NOTES
12/07/22 1030   Activity/Group Checklist   Group Wellness   Attendance Did not attend  (Pt remained in his room due to flu symptoms)

## 2022-12-07 NOTE — NURSING NOTE
Patient resting quietly in his bedroom at the time of assessment  He is minimal in conversation  Appears flat, depressed  Cooperative  He continues to endorse high anxiety- frequently speaks about being discharged to [facility]  He denies SI/HI and hallucinations  He denies pain at this time  He remains on contact and droplet precautions due to influenza pt denies any current symptoms and remains afebrile  Plan of care continues  Q7 minute safety checks in progress

## 2022-12-07 NOTE — PLAN OF CARE
Problem: SELF HARM/SUICIDALITY  Goal: Will have no self-injury during hospital stay  Description: INTERVENTIONS:  - Q 15 MINUTES: Routine safety checks  - Q WAKING SHIFT & PRN: Assess risk to determine if routine checks are adequate to maintain patient safety  - Encourage patient to participate actively in care by formulating a plan to combat response to suicidal ideation, identify supports and resources  Outcome: Progressing     Problem: Anxiety  Goal: Anxiety is at manageable level  Description: Interventions:  - Assess and monitor patient's anxiety level  - Monitor for signs and symptoms (heart palpitations, chest pain, shortness of breath, headaches, nausea, feeling jumpy, restlessness, irritable, apprehensive)  - Collaborate with interdisciplinary team and initiate plan and interventions as ordered  - [place] patient to unit/surroundings  - Explain treatment plan  - Encourage participation in care  - Encourage verbalization of concerns/fears  - Identify coping mechanisms  - Assist in developing anxiety-reducing skills  - Administer/offer alternative therapies  - Limit or eliminate stimulants  Outcome: Progressing     Problem: Nutrition/Hydration-ADULT  Goal: Nutrient/Hydration intake appropriate for improving, restoring or maintaining nutritional needs  Description: Monitor and assess patient's nutrition/hydration status for malnutrition  Collaborate with interdisciplinary team and initiate plan and interventions as ordered  Monitor patient's weight and dietary intake as ordered or per policy  Utilize nutrition screening tool and intervene as necessary  Determine patient's food preferences and provide high-protein, high-caloric foods as appropriate       INTERVENTIONS:  - Monitor oral intake, urinary output, labs, and treatment plans  - Assess nutrition and hydration status and recommend course of action  - Evaluate amount of meals eaten  - Assist patient with eating if necessary   - Allow adequate time for meals  - Recommend/ encourage appropriate diets, oral nutritional supplements, and vitamin/mineral supplements  - Order, calculate, and assess calorie counts as needed  - Recommend, monitor, and adjust tube feedings and TPN/PPN based on assessed needs  - Assess need for intravenous fluids  - Provide specific nutrition/hydration education as appropriate  - Include patient/family/caregiver in decisions related to nutrition  Outcome: Progressing

## 2022-12-07 NOTE — NURSING NOTE
Patient observed to be sleeping in bed at this time  No obvious signs of distress or discomfort noted  Breathing is easy and non-labored on room air  Monitoring ongoing

## 2022-12-07 NOTE — NURSING NOTE
Patient c/o generalized aches and pain, including chest pain  He rates his overall pain 10/10 and requests PRN pain analgesia  Administered PRN Naprosyn as per order with minimal relief  Contacted on-call medical [facility] to inform  VS charted accordingly: 97 3-130-/56-95 RA  New order given for EKG which was completed and sent to on-call medical for review  Patient also c/o leg cramping for which a new order was given for PRN Robaxin  Patient is currently in bed resting; appears to be sleeping  No obvious signs of distress or discomfort observed, however we will CTM

## 2022-12-07 NOTE — PLAN OF CARE
Problem: SELF HARM/SUICIDALITY  Goal: Will have no self-injury during hospital stay  Description: INTERVENTIONS:  - Q 15 MINUTES: Routine safety checks  - Q WAKING SHIFT & PRN: Assess risk to determine if routine checks are adequate to maintain patient safety  - Encourage patient to participate actively in care by formulating a plan to combat response to suicidal ideation, identify supports and resources  Outcome: Progressing     Problem: DISCHARGE PLANNING - CARE MANAGEMENT  Goal: Discharge to post-acute care or home with appropriate resources  Description: INTERVENTIONS:  - Conduct assessment to determine patient/family and health care team treatment goals, and need for post-acute services based on payer coverage, community resources, and patient preferences, and barriers to discharge  - Address psychosocial, clinical, and financial barriers to discharge as identified in assessment in conjunction with the patient/family and health care team  - Arrange appropriate level of post-acute services according to patient’s   needs and preference and payer coverage in collaboration with the physician and health care team  - Communicate with and update the patient/family, physician, and health care team regarding progress on the discharge plan  - Arrange appropriate transportation to post-acute venues  Outcome: Progressing     Problem: Nutrition/Hydration-ADULT  Goal: Nutrient/Hydration intake appropriate for improving, restoring or maintaining nutritional needs  Description: Monitor and assess patient's nutrition/hydration status for malnutrition  Collaborate with interdisciplinary team and initiate plan and interventions as ordered  Monitor patient's weight and dietary intake as ordered or per policy  Utilize nutrition screening tool and intervene as necessary  Determine patient's food preferences and provide high-protein, high-caloric foods as appropriate       INTERVENTIONS:  - Monitor oral intake, urinary output, labs, and treatment plans  - Assess nutrition and hydration status and recommend course of action  - Evaluate amount of meals eaten  - Assist patient with eating if necessary   - Allow adequate time for meals  - Recommend/ encourage appropriate diets, oral nutritional supplements, and vitamin/mineral supplements  - Order, calculate, and assess calorie counts as needed  - Recommend, monitor, and adjust tube feedings and TPN/PPN based on assessed needs  - Assess need for intravenous fluids  - Provide specific nutrition/hydration education as appropriate  - Include patient/family/caregiver in decisions related to nutrition  Outcome: Progressing  see note

## 2022-12-07 NOTE — PLAN OF CARE
Problem: SELF HARM/SUICIDALITY  Goal: Will have no self-injury during hospital stay  Description: INTERVENTIONS:  - Q 15 MINUTES: Routine safety checks  - Q WAKING SHIFT & PRN: Assess risk to determine if routine checks are adequate to maintain patient safety  - Encourage patient to participate actively in care by formulating a plan to combat response to suicidal ideation, identify supports and resources  Outcome: Progressing     Problem: Anxiety  Goal: Anxiety is at manageable level  Description: Interventions:  - Assess and monitor patient's anxiety level  - Monitor for signs and symptoms (heart palpitations, chest pain, shortness of breath, headaches, nausea, feeling jumpy, restlessness, irritable, apprehensive)  - Collaborate with interdisciplinary team and initiate plan and interventions as ordered  - [place] patient to unit/surroundings  - Explain treatment plan  - Encourage participation in care  - Encourage verbalization of concerns/fears  - Identify coping mechanisms  - Assist in developing anxiety-reducing skills  - Administer/offer alternative therapies  - Limit or eliminate stimulants  Outcome: Progressing     Problem: Nutrition/Hydration-ADULT  Goal: Nutrient/Hydration intake appropriate for improving, restoring or maintaining nutritional needs  Description: Monitor and assess patient's nutrition/hydration status for malnutrition  Collaborate with interdisciplinary team and initiate plan and interventions as ordered  Monitor patient's weight and dietary intake as ordered or per policy  Utilize nutrition screening tool and intervene as necessary  Determine patient's food preferences and provide high-protein, high-caloric foods as appropriate       INTERVENTIONS:  - Monitor oral intake, urinary output, labs, and treatment plans  - Assess nutrition and hydration status and recommend course of action  - Evaluate amount of meals eaten  - Assist patient with eating if necessary   - Allow adequate time for meals  - Recommend/ encourage appropriate diets, oral nutritional supplements, and vitamin/mineral supplements  - Provide specific nutrition/hydration education as appropriate  - Include patient/family/caregiver in decisions related to nutrition  Outcome: Progressing

## 2022-12-07 NOTE — PROGRESS NOTES
12/07/22    Team Meeting   Meeting Type Daily Rounds   Team Members Present   Team Members Present Physician;Nurse;;Occupational Therapist   Physician Team Member Dr Thiago Gonzales MD; Christopher Richard, [address] [place] [address]   Nursing Team Member Leann Gutierrez, EFREN   Care Management Team Member Davy Delaney MS, [facility], [facility]   OT Team Member Davy Delaney MS, [facility], [facility]   Patient/Family Present   Patient Present No   Patient's Family Present No   Generalized pain, chest ekg completed ok, prns, medication adjustment, lab work pending, quiet, withdrawn, denies si/hi  Will be rehab once stable

## 2022-12-07 NOTE — NURSING NOTE
Patient remains in bed, appears to be sleeping  No further complaints of pain voiced by patient  He appears to be comfortable and in no acute distress  Monitoring ongoing

## 2022-12-07 NOTE — PLAN OF CARE
Problem: Ineffective Coping  Goal: Participates in unit activities  Description: Interventions:  - Provide therapeutic environment   - Provide required programming   - Redirect inappropriate behaviors   Outcome: Not Progressing   Patient still struggling with flu symptoms and withdrawal

## 2022-12-07 NOTE — DISCHARGE INSTR - OTHER ORDERS
You are being discharged to your cousin Carisa Ya' home located at [address], Phone: [phone]     Triggers you have identified during your hospitalization that led to your admission distressed mood, include regression in physical and mental health, along with financial stressors  Coping skills you have identified during your hospitalization include music, walking, talking with  others  If you are unable to deal with your distressed mood alone please contact your provider at [facility] at [phone]  If that is not effective and you continue to have (ex: suicidal ideation, homicidal ideation, distressed mood, overwhelmed, in crisis) please contact (Crisis #) Mary Mark 107: [phone] dial 911 or go to the nearest emergency center  [facility] Crisis Hotline: 455.103.4384  * Alcohol Anonymous: [address] Drug and Alcohol Commision (841) 9220-780 on [address] [facility] ([facility]) HELPLINE: 521.763.2141/Website: www dick org  *Substance Abuse and [address] [facility] Administration([facility]) [employer], which is a confidential, free, 24-hour-a-day, 365-day-a-year, information service for individuals and family members facing mental health and/or substance use disorders  This service provides referrals to local treatment facilities, support groups, and community-based organizations  Callers can also order free publications and other information  Call 0-505.107.4071/Website: www [facility] gov  *United Way 2-1-1: This is a toll free, confidential, 24-hour-a-day service which connects you to a community  in your area who can help you find services and resources that are available to you locally and provide critical services that can improve and save lives    Call: 211  /Website: https://raz-murray net/     You have declined a referral for primary care, should you wish to schedule please call your insurance directly for an in network provider  [phone]     A referral has been made on your behalf through conference of [facility] for case management services, please follow up with them [phone]  You will be staying with a friend at time of discharge, but have identified as homeless  Need Help? Call Catherine is a free, confidential, non-emergency, comprehensive information and referral service that connects Dakota Gaviria Harriman, Pancho, [address] [facility], and [facility] residents with the health and human services they need  You can reach us by calling 211 or [phone]  SHELTERS    [employer]-Opens November 1, 2022 through April 30, 2023  Winter shelter for single men and single women  Registration 7:00pm to 9:00pm (Only employed guests with written proof of employment may enter station later)  First come, first served  Lights out at 10:30pm  Lights on at 5:45am   Meals will be served Monday through Friday for clients staying there only  Clients are required to wear a mask with the exception of eating and sleeping  [address] [place] [address]  Eligibility: Homeless single men and women 18 years and older who have no other shelter options; Unable to serve families  Must have no open criminal warrants or sexual offender status found on background check through [employer],  and [employer]  Hours: Monday through Sunday, 7:00pm to 7:00am   [phone]  Get Directions  Visit Website  Phone/FAX Numbers  [phone] Main  Intake Procedure: Walk-in during registration hours; No voucher needed   Intake Requirements: Completed intake and code of ethics form   Photo Identification required   Fee Amounts: None   Geographical Area: [address] Monika [address]   Helpful Tips: Entrance is through the [employer] from [address] [facility] night warm place to sleep  Shower and laundry facilities available  Meal served on Tuesday and Wednesday  [address] [address]   Eligibility: Open to adults age 25 and older (no children)  Hours: Seasonal: November 1st through March 31st; 7:00pm to 6:00am seven nights a week  [address]   [place], [address] [place] [address] men and women who have no other shelter options; unable to serve families Service Contact:[phone], [email or web address]  [email or web address]      Advocates for the Homeless of [facility] (UB)  SERVICES(Advocates for the Homeless of [facility] Code [employer])  Provides a warm bed and a hot meal on nights that temperatures are 26 degrees or colder  The shelter opens its doors at 8:15 pm, and Guests can enter the shelter between 8:15 and 11pm  Guests must leave the shelter by Karma Parcel shelter is open November 15- April 15   [address] , [address] [place] [address] Road  [phone]  Advocates for the Homeless of [facility] [address]  [phone] [facility]   [phone] Other   Description of Service:  Provides a warm bed and a hot meal on nights that temperatures are 26 degrees or colder  The shelter opens its doors at 8:15 pm, and Guests can enter the shelter between 8:15 and 11pm  Guests must leave the shelter by Karma Parcel shelter is open November 15th-April 15th  Eligibility: Anyone 18 years or older in need of shelter  Intake RequirementsMust be 25years of age or older  Intake ProcedureWalk in  Visit us on Brideside ([email or web address])  We post our open and close information on this website during the season (November 15th - April 15th)  Call our shelter hotline at [phone] between November 15th -April 15th for open and close information     Hours of Operation 8:15pm to 7:00am  Facility/ADA access not wheelchair accessible  Service Flo Doyle, Director, [phone], [email or web address]    Coalition to [address], [address] [address] Code Blue Shelter   Hours of Operation: Opens at 7:00pm through the night only  Intake Procedure: Call  Transportation to shelter is available  Description of Service:Provides emergency shelter for homeless when temperature is extremely low (code blue) in [facility]  Shelters open when temperatures hit 26 degrees or below, including wind chills  Connect By [address]  [facility]  SERVICES(Connect By [facility]eless [facility])  Provides emergency shelter for men and women in [facility]  Code M D C  Holdings  Provides Rashmi Filter transportation to the shelter, clean linen, a safe place to sleep, staff oversight, snacks and meals depending upon the Restorationist as well as access to daytime case management and day services such as showers, laundry, and various educational programs  [facility] 26, [address] [facility]  [phone]  Connect By [address] [place] [address]  [phone] [facility]   [phone] Other   Description of Service: Provides emergency shelter for men and women in [facility]  Code M D C  Holdings  Provides Rashmi Filter transportation to the shelter, clean linen, a safe place to sleep, staff oversight, snacks and meals depending upon the Restorationist as well as access to daytime case management and day services such as showers, laundry, and various educational programs  EligibilityMust be 25years old and a resident of [facility]  Intake Requirements Must be able to walk and care for own needs  Intake Procedure:  All Guest must sign in and register with a staff person by7:30 PM  CBN transports guests from [employer] rear entrance every evening beginning 9:00 PM  Guests are transported to the host facility and then back to the [facility] the next morning  All referred gusts should have a written referral form in-hand to board the [place]  Gusts will still be accepted even if they do not have a written referral, although a referral form will provide very useful info to the shelter staff  Hours of OperationMonday-Sunday 9PM - 7 AM   Facility/ADA access Not wheelchair accessible  Service Mya Almonte, [phone]  Geographical AreaCounties: [address]; State: [place]; Countries: [address] guests only      [address]  ([facility] SURGICAL [place])  [address]  ([facility]) - Marlena Davis; [address]  ([facility]) - Code [address] [facility] [address] - Men)  [address]!!!   [address] [facility] 2266 [facility] 6, 100 [facility]  [phone]  [address]  ([facility]) - Code [address] [facility] [address] - Men  [phone] Main  Services: Extreme Weather Shelters  [email or web address]  [email or web address]  Description of Service: Provides warming center for men during code blue  Code Blue in [place] is: The Department of Public Safety is responsible for recommending Code Blue emergencies to the [facility] during periods of extreme, life-threatening weather, defined as a wind- chill factor of 20°F or below  Code Blue declarations will be issued for a specific duration, usually based on 24-hour increments, and will end as initially declared unless formally extended by the [facility]  EligibilityAdults 18 years and older residing in [address] [place] [address] Procedure: Must go through [facility] for intake by 211 for shelter other than code blue  Service Contact: Yolis Wilson, , [phone] ext  [facility]: [address];  [address] [facility] Chavo Juarez  [address] Blue Shelter - Men  SERVICE PHONES: [phone]  Main           [phone]  Fax  Description of Service: [address] for Men in [place]  When the air temperature drops below 20°, CODE BLUE is activated by [facility] and the shelter located in [facility] on SquareriBitX is opened  This is a program which provides safe, warm lodging for MEN only during the night time hours for those without homes  Dinner and breakfast are also served to [employer]  Service Contact:Rev Tamie Moreira, [phone], [email or web address]      Deepa[facility] Heart  Code Blue Overnight Emergency [address] Overnight Emergency Center(Code Blue Overnight Emergency [address])  Code RUST Overnight Emergency Center - Only accept individuals from [place] and immediate surrounding areas  [address] [facility]  [phone]  Code Blue Overnight Emergency [address]  Description of Service: Code RUST Overnight Emergency Center - Only accept individuals from [place] [address] and immediate surrounding areas  Eligibility: Only accept individuals from [place] and immediate surrounding areas  Please call ahead to make sure there is available beds, as they fill up quickly  Intake Requirements: Only accept individuals from [place] and immediate surrounding [facility]  Intake Procedure: Call ahead and dependent upon weather  Hours of Operation: Shelter Hours: Seven days a week, 8:00pm to 8:00am only   Mid-November through [facility] [facility], [address] [facility] - Single men or women  Hours of [address]   [address] [place] [address]  Open 8pm - 8am  12 beds - men and women  Serving in the [place] only  Eligibility:12 beds - men and women  Serving in the [place] only        [facility] CT[facility]  [address], [address] Program  Phone [phone] PHONES: [phone]  Main      [phone]  Fax  Hours of Operation:24/7  Intake Procedure:See the [address] [place] [address],[address] Floor at any time, day or night; preferably between 9:00am and 9:00pm  Participants will receive bed assignment and rules upon entry and will meet with the  within one business day of arrival   Description of Service: [address] [place] [address] 53 emergency shelter program that operates throughout the winter months  It provides emergency shelter for an additional  men nightly  Men sleep on bunkbeds in [address] [facility] participants attend nightly evening chapels led by local Baptism partners, immediately followed by dinner  Men in Code Blue also have daily access to Wi-Fi enabled laptops, laundry services, and showers  Eligibility:Single, adult, homeless men  Intake Requirements:A list of rules are provided upon entrance regarding Code Blue participants' conduct, such as zero tolerance for violence as well as drug/alcohol use and contraband on [place] property  Rules participants must know in advances are: a photo identifiction is preferred, no registered sex offenders accepted and SSN must be provided  Languages Spoken:English, Estonian  Geographical Area:[facility]; [employer] [place]  Service [address] [facility], Director of Case Management, [phone], [email or web address]  Capacity Limitations:100  ACCESSIBILITY: Limited Access, Partially Accessible, call for details      [address] [facility] [address]; [address] [place] [address], [address] [place] [address]  Phone [phone] PHONES: [phone]  Main  Intake Procedure: Will provide transport to a code blue shelter for street homeless individuals when a code blue is declared  Description of Service:Provides emergency shelter for homeless when temperature is extremely low (code blue)  Karmen Carlson and [facility] AT [address]  Karmen Carlson and [facility] [address] Program  Hours of Operation: November 1st through March 31st; 8:30pm to 8:00am  Intake Procedure: Vouchers for shelter services are provided by [employer] [phone] during regular business hours or call [phone] during evenings, weekends, and holidays  Description of Service: Winter emergency shelter  Offers a hot dinner, a place to sleep, personal hygiene and clothing items, breakfast, and non-perishable bag lunch items  Guests of the [facility] are offered opportunity to participate in additional [facility] programs such as Youth and Family Counseling, Family Financial Services financial education, [facility] 9 mentoring program and Caring Cases emergency resources  Eligibility:Individuals 25years of age or older and their families  [facility] [facility]  [address] Tamie  Phone  [phone]  Main                    Intake Procedure: Call or visit website  Description of Service: Critical access hospital provides shelter for those who are homeless and would be exposed to extreme conditions throughout the winter months and early spring  Code [employer] will open the doors to the community of [address] [place] [address] South TO BE 20 DEGREES OR BELOW and/or a forecast of 12 inches or more of snow  The shelter is located at [facility], [address] [place] [address]  On a day the shelter is open, community members will be able to enter at 9 pm and the shelter will remain open until 6 am the next morning    [address] [facility]  Service Contact: [phone]                              Terence Hopper or Petros Suárez, cecilio [employer] Nurse Navigators, will be calling you after your discharge, on the phone number that you provided  They will be available as an additional support, if needed  If you wish to speak with one of them, you may contact Sherlyn Bell at [phone] or Lamonte Valenzuela at [phone]

## 2022-12-07 NOTE — SOCIAL WORK
CM met with PT for PT check in  PT reported that he spoke with his cousin Jun Pierson and that he is able to reside with him and will be starting work  Address is [address]  PT denies SI/HI/AH/VH, reports anxiety and depression are moving, but that he continues to not feel well  PT inquiring about discharge, CM reviewed no discharge date at this time, and that PT can review with provider when meeting with him today  PT in agreement  PT agreeable to dual psych and D&A outpatient, declined PCP follow up  CM reviewed recommendation for rehab, PT declined wanting inpatient rehab any longer  Reassurance provided

## 2022-12-07 NOTE — PROGRESS NOTES
12/07/22 0750   Activity/Group Checklist   Group Community meeting   Attendance Other (Comment)  (Pt restricted to room due to flu)

## 2022-12-07 NOTE — SOCIAL WORK
CM placed call to [facility] to notify of PT scheduled D/C at [phone], unable to leave message voicemail full  CM placed call to PCP Dr Kenia Snow office [phone] to notify of PT scheduled D/C  Spoke with [address] [facility] she indicated that PT is not an established PT  CM reflected she understood and call ended mutually  CM placed call to [phone]   To make referral  Spoke with Celestine Short  [facility] clinics available only as appointments are months out  Address: [address] [place] [address]  Phone: [phone]  FGO: [phone]  Walk in Hours:   Mondays and Tuesday 8-2  Wednesday and Thursday 8-3  Call ended mutually

## 2022-12-08 RX ORDER — MIRTAZAPINE 30 MG/1
30 TABLET, FILM COATED ORAL
Qty: 30 TABLET | Refills: 1 | Status: SHIPPED | OUTPATIENT
Start: 2022-12-08

## 2022-12-08 RX ORDER — GABAPENTIN 300 MG/1
300 CAPSULE ORAL 3 TIMES DAILY
Status: DISCONTINUED | OUTPATIENT
Start: 2022-12-08 | End: 2022-12-09 | Stop reason: HOSPADM

## 2022-12-08 RX ORDER — NALOXONE HYDROCHLORIDE 4 MG/.1ML
SPRAY NASAL
Qty: 2 EACH | Refills: 0 | Status: SHIPPED | OUTPATIENT
Start: 2022-12-08

## 2022-12-08 RX ORDER — TRAZODONE HYDROCHLORIDE 100 MG/1
100 TABLET ORAL
Status: DISCONTINUED | OUTPATIENT
Start: 2022-12-08 | End: 2022-12-09 | Stop reason: HOSPADM

## 2022-12-08 RX ORDER — LANOLIN ALCOHOL/MO/W.PET/CERES
3 CREAM (GRAM) TOPICAL
Qty: 30 TABLET | Refills: 1 | Status: SHIPPED | OUTPATIENT
Start: 2022-12-08

## 2022-12-08 RX ORDER — GABAPENTIN 300 MG/1
300 CAPSULE ORAL 3 TIMES DAILY
Qty: 90 CAPSULE | Refills: 1 | Status: SHIPPED | OUTPATIENT
Start: 2022-12-08

## 2022-12-08 RX ORDER — NICOTINE 21 MG/24HR
1 PATCH, TRANSDERMAL 24 HOURS TRANSDERMAL DAILY
Qty: 28 PATCH | Refills: 0 | Status: SHIPPED | OUTPATIENT
Start: 2022-12-09

## 2022-12-08 RX ORDER — ALBUTEROL SULFATE 90 UG/1
2 AEROSOL, METERED RESPIRATORY (INHALATION) EVERY 4 HOURS PRN
Qty: 18 G | Refills: 0 | Status: SHIPPED | OUTPATIENT
Start: 2022-12-08

## 2022-12-08 RX ORDER — QUETIAPINE FUMARATE 200 MG/1
200 TABLET, FILM COATED ORAL
Qty: 30 TABLET | Refills: 1 | Status: SHIPPED | OUTPATIENT
Start: 2022-12-08

## 2022-12-08 RX ADMIN — GABAPENTIN 300 MG: 300 CAPSULE ORAL at 21:23

## 2022-12-08 RX ADMIN — NICOTINE 1 PATCH: 14 PATCH, EXTENDED RELEASE TRANSDERMAL at 08:48

## 2022-12-08 RX ADMIN — NAPROXEN 500 MG: 500 TABLET ORAL at 23:25

## 2022-12-08 RX ADMIN — MIRTAZAPINE 30 MG: 15 TABLET, FILM COATED ORAL at 21:23

## 2022-12-08 RX ADMIN — GABAPENTIN 300 MG: 300 CAPSULE ORAL at 08:47

## 2022-12-08 RX ADMIN — IBUPROFEN 400 MG: 400 TABLET, FILM COATED ORAL at 21:22

## 2022-12-08 RX ADMIN — GABAPENTIN 300 MG: 300 CAPSULE ORAL at 15:35

## 2022-12-08 RX ADMIN — Medication 3 MG: at 21:23

## 2022-12-08 RX ADMIN — METHOCARBAMOL 500 MG: 500 TABLET ORAL at 13:31

## 2022-12-08 RX ADMIN — QUETIAPINE 200 MG: 200 TABLET ORAL at 21:23

## 2022-12-08 RX ADMIN — METHOCARBAMOL 500 MG: 500 TABLET ORAL at 21:23

## 2022-12-08 NOTE — PROGRESS NOTES
Progress Note - Dilia Bailey 41 y o  male MRN: [id number]    [facility] Encounter: [id number]        Subjective:   Patient seen and examined at bedside after reviewing the chart and discussing the case with the caring staff  Patient examined at bedside  Patient has no acute complaints  Patient is a possible discharge tomorrow, Friday 12/9/2022  Physical Exam   Vitals: Blood pressure 109/68, pulse 95, temperature 100 3 °F (37 9 °C), temperature source Temporal, resp  rate 18, height 5' 7" (1 702 m), weight 51 3 kg (113 lb 3 2 oz), SpO2 99 %  ,Body mass index is 17 73 kg/m²  Constitutional: Patient in no acute distress  HEENT: PERR, EOMI, MMM  Cardiovascular: Normal rate and regular rhythm  Pulmonary/Chest: Effort normal and breath sounds normal    Abdomen: Non distended  Assessment/Plan:  Dilia Bailey is a(n) 41 y o  male with bipolar disorder      1  Tobacco use  Patient is on nicotine transdermal patch 14 mg/24 hr    2  GERD  Mylanta as needed  3  Asthma  Albuterol/DuoNeb as needed  4  Hx of opioid abuse  Patient may get Narcan on discharge  5  Vitamin B12 deficiency  Patient started on monthly B12 injections  6  Vitamin-D deficiency  Patient started on vitamin-D bolus doses for 10 weeks followed by vitamin D3 1000 units daily  7  Generalized myalgias/arthralgias  Patient may take Tylenol for mild pain, ibuprofen for moderate pain, naproxen for severe pain, Robaxin as needed  The patient was discussed with Dr Vicente Thorne and he is in agreement with the above note

## 2022-12-08 NOTE — SOCIAL WORK
Provider and CM placed call to PT neftali Singheibailey Ames      with PT permission and reviewed PT status and plan of care along with discharge plan and follow up supports  Luan Romero confirmed PT can reside with him and will be able to pick PT up tomorrow at 1100am  Call ended mutually

## 2022-12-08 NOTE — NURSING NOTE
Patient sleeping,unable to determine full effectiveness of prn cogentin  Will pass on in report to assess in AM

## 2022-12-08 NOTE — PROGRESS NOTES
12/08/22 1400   Activity/Group Checklist   Group   (Pet Therapy and Art Therapy [facility])   Attendance Refused  (Group offered, PT elected to remain in room)

## 2022-12-08 NOTE — NURSING NOTE
Assumed care of this patient from prior shift nurse at [phone]  Patient is currently in bed, appears to be sleeping  No acute behaviors observed  No obvious signs of distress or discomfort noted  Continuous safety rounding in progress

## 2022-12-08 NOTE — NURSING NOTE
Patient c/o feeling 'restless" in legs,prn robaxin administered @ 2030 thinking it may be opiate withdrawal:no relied  Offered and accepted 0 5 mg cogentin po with education on expected therapeutics and SE to report  Will monitor for efficacy

## 2022-12-08 NOTE — PROGRESS NOTES
12/08/22 1298   Activity/Group Checklist   Group Community meeting   Attendance Did not attend  (pt offered grp; pt remained in his bed sleeping)

## 2022-12-08 NOTE — NURSING NOTE
Patient compliant with medications and meals  Pleasant and cooperative  Patient continues to be on contact precautions due to positive FLU results  He needs reminders to use mask when outside his room  He denies any depression or anxiety  Denies any SI/HI  He is preoccupied with discharge and wanting to go home  Q 7 mins checks in place for safety  Will continue to monitor for behaviors and changes

## 2022-12-08 NOTE — PROGRESS NOTES
Progress Note - Behavioral Health   This note was not shared with the patient due to reasonable likelihood of causing patient harm  Velia Pabon 41 y o  male MRN: [id number]   Unit/Bed#: [facility] 203-01 Encounter: [id number]    Behavior over the last 24 hours: improved  Estefany Lombardo was seen for continuing care  Report doing better and is requesting to be discharged to his cousin house  Denies any suicidal ideation consistently  Denies any withdrawal symptoms  Medication education and discharge plan were discussed  Spoke with patient's cousin Shelly Koby at [phone] with patient's consent  Cousin verbalized understanding and agreed to support pt following discharge  He will come to pick pt up tomorrow am     Sleep: improved  Appetite: normal  Medication side effects: denies  ROS: no complaints, all other systems are negative    Mental Status Evaluation:    Appearance:  age appropriate   Behavior:  cooperative   Speech:  normal rate and volume   Mood:  improved   Affect:  brighter   Thought Process:  goal directed   Associations: intact associations   Thought Content:  no overt delusions   Perceptual Disturbances: none   Risk Potential: Suicidal ideation - None  Homicidal ideation - None   Sensorium:  oriented to person, place and time/date   Memory:  recent and remote memory grossly intact   Consciousness:  alert and awake   Attention: attention span and concentration are age appropriate   Insight:  fair   Judgment: fair   Gait/Station: normal gait/station   Motor Activity: no abnormal movements     Vital signs in last 24 hours:    Temp:  [97 9 °F (36 6 °C)-99 1 °F (37 3 °C)] 97 9 °F (36 6 °C)  HR:  [75-86] 75  Resp:  [16-18] 16  BP: ()/(62-76) 96/62    Laboratory results:   I have personally reviewed all pertinent laboratory/tests results    Most Recent Labs:   Lab Results   Component Value Date    WBC 10 91 (H) 12/07/2022    RBC 4 97 12/07/2022    HGB 13 4 12/07/2022    HCT 42 9 12/07/2022     (H) 12/07/2022    RDW 14 6 12/07/2022    TOTANEUTABS 5 79 11/22/2016    NEUTROABS 6 29 12/07/2022    SODIUM 138 12/07/2022    K 4 0 12/07/2022     12/07/2022    CO2 25 12/07/2022    BUN 26 (H) 12/07/2022    CREATININE 0 96 12/07/2022    GLUC 98 12/07/2022    GLUF 98 12/07/2022    CALCIUM 9 4 12/07/2022    AST 18 12/05/2022    ALT 17 12/05/2022    ALKPHOS 54 12/05/2022    TP 7 1 12/05/2022    ALB 3 6 12/05/2022    TBILI 0 23 12/05/2022    CHOLESTEROL 171 12/05/2022    HDL 38 (L) 12/05/2022    TRIG 129 12/05/2022    LDLCALC 107 (H) 12/05/2022    RFD9YMFSYSNA 0 092 (L) 12/05/2022    FREET4 0 97 12/05/2022       Assessment/Plan   Principal Problem:    MDD (major depressive disorder), recurrent episode, severe (HCC)  Active Problems:    Hydronephrosis of right kidney    Mood disorder ([facility] )    Anxiety    Asthma    Opiate abuse, episodic (HCC)    Cocaine abuse ([facility] )    Severe protein-calorie malnutrition ([facility] )    Recommended Treatment:     Planned medication and treatment changes: All current active medications have been reviewed  Encourage group therapy, milieu therapy and occupational therapy  Behavioral Health checks every 7 minutes  Discharge planned for tomorrow   Increase Neurontin to 300 mg tid      Current Facility-Administered Medications   Medication Dose Route Frequency Provider Last Rate   • acetaminophen  650 mg Oral Q6H PRN Boris Vega MD     • acetaminophen  975 mg Oral Q6H PRN Roberta Cast PA-C     • albuterol  2 puff Inhalation Q4H PRN Roberta Cast PA-C     • aluminum-magnesium hydroxide-simethicone  30 mL Oral Q4H PRN Roberta Cast PA-C     • benztropine  1 mg Intramuscular Q4H PRN Max 6/day Boris Vega MD     • benztropine  0 5 mg Oral Q4H PRN Max 6/day Boris Vega MD     • cloNIDine  0 1 mg Oral TID PRN Michelleside C Holter, DO     • cyanocobalamin  1,000 mcg Intramuscular Q30 Days Sean Ruelas MD     • dextromethorphan-guaiFENesin  10 mL Oral Q4H PRN HOLLY DouglasC • gabapentin  300 mg Oral TID Esther Rajan MD     • hydrOXYzine HCL  50 mg Oral Q6H PRN Max 4/day Esther Rajan MD     • ibuprofen  400 mg Oral Q6H PRN Corrinne Pap, MD     • ipratropium-albuterol  3 mL Nebulization Q6H PRN Roberta Cast PA-C     • LORazepam  1 mg Intramuscular Q6H PRN Max 3/day Corrinne Pap, MD     • LORazepam  1 mg Oral Q8H PRN Esther Rajan MD     • melatonin  3 mg Oral HS Corrinne Pap, MD     • methocarbamol  500 mg Oral Q6H PRN Roberta Cast PA-C     • mirtazapine  30 mg Oral HS Esther Rajan MD     • naloxone nasal- Given to patient by provider at discharge  4 mg Does not apply Once Mikaela River MD     • naproxen  500 mg Oral BID PRN Mikaela River MD     • nicotine  1 patch Transdermal Daily Corrinne Pap, MD     • OLANZapine  5 mg Intramuscular Q3H PRN Max 3/day Corrinne Pap, MD     • OLANZapine  2 5 mg Oral Q4H PRN Max 6/day Corrinne Pap, MD     • OLANZapine  5 mg Oral Q4H PRN Max 3/day Corrinne Pap, MD     • OLANZapine  5 mg Oral Q3H PRN Max 3/day Corrinne Pap, MD     • ondansetron  4 mg Oral Q6H PRN Roberta Cast PA-C     • polyethylene glycol  17 g Oral Daily PRN Roberta Cast PA-C     • QUEtiapine  200 mg Oral HS Jordan C Holter, DO     • traZODone  100 mg Oral Q6H PRN Max 3/day Esther Rajan MD     • traZODone  50 mg Oral HS Esther Rajan MD         Risks / Benefits of Treatment:    Risks, benefits, and possible side effects of medications explained to patient and patient verbalizes understanding and agreement for treatment  Counseling / Coordination of Care:    Patient's progress discussed with staff in treatment team meeting  Medications, treatment progress and treatment plan reviewed with patient  Supportive therapy provided to patient  Group attendance encouraged      Leopold Pomfret, MD 12/08/22

## 2022-12-08 NOTE — PROGRESS NOTES
12/08/22    Team Meeting   Meeting Type Daily Rounds   Team Members Present   Team Members Present Physician;Nurse;;Occupational Therapist   Physician Team Member Dr Brian Franco MD; Aviva Bennett, [address] [place] [address]   Nursing Team Member Gera Cid RN   Care Management Team Member MS Grecia, [facility], [facility]   OT Team Member Florentino Bacon, [place]   Patient/Family Present   Patient Present No   Patient's Family Present No   Slept, reports anxiety and depression, d/c scheduled for Monday, pt requesting to leave sooner, will follow up with [facility] and referral made for cm services with conference of [facility]

## 2022-12-08 NOTE — SOCIAL WORK
CM received voicemail from Ashley Venegas with [employer]  [phone] ext [phone]  CM returned call and left message informing of PT scheduled discharge and requesting return call to review referral  Pending response

## 2022-12-08 NOTE — NURSING NOTE
Patient remains in bed sleeping at this time  No complaints voiced  No acute behaviors observed  He appears to have slept through the overnight hours without issue  Continuous safety rounding in progress

## 2022-12-09 VITALS
WEIGHT: 113.2 LBS | SYSTOLIC BLOOD PRESSURE: 99 MMHG | HEART RATE: 66 BPM | BODY MASS INDEX: 17.77 KG/M2 | OXYGEN SATURATION: 100 % | RESPIRATION RATE: 18 BRPM | HEIGHT: 67 IN | DIASTOLIC BLOOD PRESSURE: 63 MMHG | TEMPERATURE: 98.4 F

## 2022-12-09 PROBLEM — F11.10 OPIATE ABUSE, EPISODIC (HCC): Status: RESOLVED | Noted: 2022-12-04 | Resolved: 2022-12-09

## 2022-12-09 PROBLEM — F14.10 COCAINE ABUSE (HCC): Status: RESOLVED | Noted: 2022-12-04 | Resolved: 2022-12-09

## 2022-12-09 PROBLEM — F33.2 MDD (MAJOR DEPRESSIVE DISORDER), RECURRENT EPISODE, SEVERE (HCC): Status: RESOLVED | Noted: 2022-12-05 | Resolved: 2022-12-09

## 2022-12-09 PROBLEM — E43 SEVERE PROTEIN-CALORIE MALNUTRITION (HCC): Status: RESOLVED | Noted: 2022-12-05 | Resolved: 2022-12-09

## 2022-12-09 LAB
BASOPHILS # BLD AUTO: 0.05 THOUSANDS/ÂΜL (ref 0–0.1)
BASOPHILS NFR BLD AUTO: 1 % (ref 0–1)
EOSINOPHIL # BLD AUTO: 0.32 THOUSAND/ÂΜL (ref 0–0.61)
EOSINOPHIL NFR BLD AUTO: 3 % (ref 0–6)
ERYTHROCYTE [DISTWIDTH] IN BLOOD BY AUTOMATED COUNT: 14.7 % (ref 11.6–15.1)
HCT VFR BLD AUTO: 42.7 % (ref 36.5–49.3)
HGB BLD-MCNC: 13.6 G/DL (ref 12–17)
IMM GRANULOCYTES # BLD AUTO: 0.05 THOUSAND/UL (ref 0–0.2)
IMM GRANULOCYTES NFR BLD AUTO: 1 % (ref 0–2)
LYMPHOCYTES # BLD AUTO: 2.92 THOUSANDS/ÂΜL (ref 0.6–4.47)
LYMPHOCYTES NFR BLD AUTO: 28 % (ref 14–44)
MCH RBC QN AUTO: 27.5 PG (ref 26.8–34.3)
MCHC RBC AUTO-ENTMCNC: 31.9 G/DL (ref 31.4–37.4)
MCV RBC AUTO: 86 FL (ref 82–98)
MONOCYTES # BLD AUTO: 1.13 THOUSAND/ÂΜL (ref 0.17–1.22)
MONOCYTES NFR BLD AUTO: 11 % (ref 4–12)
NEUTROPHILS # BLD AUTO: 6.13 THOUSANDS/ÂΜL (ref 1.85–7.62)
NEUTS SEG NFR BLD AUTO: 56 % (ref 43–75)
NRBC BLD AUTO-RTO: 0 /100 WBCS
PLATELET # BLD AUTO: 493 THOUSANDS/UL (ref 149–390)
PMV BLD AUTO: 8.8 FL (ref 8.9–12.7)
RBC # BLD AUTO: 4.94 MILLION/UL (ref 3.88–5.62)
WBC # BLD AUTO: 10.6 THOUSAND/UL (ref 4.31–10.16)

## 2022-12-09 RX ADMIN — GABAPENTIN 300 MG: 300 CAPSULE ORAL at 08:11

## 2022-12-09 RX ADMIN — NICOTINE 1 PATCH: 14 PATCH, EXTENDED RELEASE TRANSDERMAL at 08:11

## 2022-12-09 NOTE — PROGRESS NOTES
12/09/22 0563   Activity/Group Checklist   Group Community meeting   Attendance Did not attend  (Pt offered grp; pt remained in bed)

## 2022-12-09 NOTE — BH TRANSITION RECORD
Contact Information: If you have any questions, concerns, pended studies, tests and/or procedures, or emergencies regarding your inpatient behavioral health visit  Please contact [facility] [phone] and ask to speak to a , nurse or physician  A contact is available 24 hours/ 7 days a week at this number  Summary of Procedures Performed During your Stay:  Below is a list of major procedures performed during your hospital stay and a summary of results:  - No major procedures performed  Pending Studies (From admission, onward)    None        Please follow up on the above pending studies with your PCP and/or referring provider

## 2022-12-09 NOTE — NURSING NOTE
Out in community  Socialization fair  Rated anxiety "big" and depression "Medium"  Denied any current suicidal ideations  Positive about pending discharge  Complaint with HS medications and evening snack  Continues to be somatic with mild medication seeking behaviors  Maintained on q 7 minute checks

## 2022-12-09 NOTE — PROGRESS NOTES
Progress Note - Leax Garcia 41 y o  male MRN: [id number]    Unit/Bed#: Mariya Current 203-01 Encounter: [id number]        Subjective:   Patient seen and examined at bedside after reviewing the chart and discussing the case with the caring staff  Patient examined at bedside  Patient has no acute complaints  Patient is being discharged today, Friday 12/9/2022  Physical Exam   Vitals: Blood pressure 99/63, pulse 66, temperature 98 4 °F (36 9 °C), temperature source Temporal, resp  rate 18, height 5' 7" (1 702 m), weight 51 3 kg (113 lb 3 2 oz), SpO2 100 %  ,Body mass index is 17 73 kg/m²  Constitutional: Patient in no acute distress  HEENT: PERR, EOMI, MMM  Cardiovascular: Normal rate and regular rhythm  Pulmonary/Chest: Effort normal and breath sounds normal    Abdomen: Non distended  Assessment/Plan:  Lexa Garcia is a(n) 41 y o  male with bipolar disorder  MEDICAL CLEARANCE: Patient is medically cleared for discharge  All scripts were sent in for the patient      1  Tobacco use  Patient is on nicotine transdermal patch 14 mg/24 hr    2  GERD  Mylanta as needed  3  Asthma  Albuterol/DuoNeb as needed  4  Hx of opioid abuse  Patient may get Narcan on discharge  5  Vitamin B12 deficiency  Patient started on monthly B12 injections  6  Vitamin-D deficiency  Patient started on vitamin-D bolus doses for 10 weeks followed by vitamin D3 1000 units daily  7  Generalized myalgias/arthralgias  Patient may take Tylenol for mild pain, ibuprofen for moderate pain, naproxen for severe pain, Robaxin as needed  The patient was discussed with Dr Mario Edwards and he is in agreement with the above note

## 2022-12-09 NOTE — NURSING NOTE
Pt discharged home via ambulation via main entrance of hospital accompanied by BHT  Pt transported home via car accompanied by friend & belongings sent with pt

## 2022-12-09 NOTE — PROGRESS NOTES
12/09/22 4176   Team Meeting   Meeting Type Daily Rounds   Team Members Present   Team Members Present Physician;Nurse;;Occupational Therapist   Physician Team Member Dr Meryle Nares, MD; Roosevelt Abreu, [address] [place] [address]   Nursing Team Member Meng Stringer, EFREN   Care Management Team Member MS Grecia, [facility]   OT Team Member Mirella Flores, [place]   Patient/Family Present   Patient Present No   Patient's Family Present No   PT scheduled to d/c to cousins home today, follow up with alyssa bose denies all

## 2022-12-09 NOTE — DISCHARGE SUMMARY
Discharge Summary - [address] [place] [address] 41 y o  male MRN: [id number]  Unit/Bed#: Jeniffer Patient 203-01 Encounter: [id number]  This note was not shared with the patient due to reasonable likelihood of causing patient harm    Admission Date: 12/4/2022         Discharge Date: 12/09/2022    Attending Psychiatrist: Hermelinda Frye MD    Reason for Admission/HPI: Bipolar 1 disorder [facility]) [F31 9]    According to Dr Patel Spearing:    HPI:    Cynthia Jiménez is a 41 y o , overtly appearing , single male, possessing pertinent psychiatric history of bipolar affective disorder versus major depressive disorder and attention deficit hyperactivity disorder in addition to polysubstance abuse, medically complicated by chronic pain and various ailments relating to the kidney including previous hydronephrosis, ABIGAIL, etc , presenting to [address] inpatient behavioral health as a 14, initially presenting to [address] [place], [place] [address] emergency department, subsequent to worsening depression/sadness and depressive signs/symptoms like: isolative behavior, hopelessness/helplessness, decreased sleep, diminished energy, decreased concentration, diminished appetite and suicidal ideation including particular plan to "jump off of a bridge", in the setting of opiate abuse including heroin, admitting to use of approximately 1-2 bags preceding admission as a means of self-medication secondary to worsening psychosocial stressors including financial instability, unstable residence and limited support system, despite admitting to prior signs/symptoms of humberto/hypomania including: racing thoughts, increased energy despite decreased sleep, distractibility, impulsivity and persistently irritable mood for "a few days", although patient is uncertain as to if he was using illicit substances throughout this time   Also, patient admits to intermittent instances of command auditory hallucinations that are negative and derogatory in content, promoting self-harm     Presently, patient denies suicidal/homicidal ideation in addition to thoughts of self-injury, jimmie for safety, receptive to crisis planning provided by this writer, although is scant/sparse in interaction, admitting to active signs/symptoms of opiate withdrawal including: nausea, diarrhea, diaphoresis, abdominal discomfort/cramping  He admits to sad/depressed and anxious mood, denying panic attacks, humberto/hypomania, although continues to complaint of intermittent auditory hallucinations, admitting to the ability to redirect himself, requesting to "rest", agreeable to continue Seroquel in addition to use of p r n  medications to address withdrawal     Hospital Course: The patient was admitted to the inpatient psychiatric unit and started on every 7 minutes precautions  During the hospitalization the patient was attending individual therapy, group therapy, milieu therapy and occupational therapy  Psychiatric medications were titrated over the hospital stay  To address depressive symptoms, mood instability and insomnia the patient was started on antidepressant Remeron, mood stabilizer Neurontin, antipsychotic medication Seroquel and hypnotic medication Melatonin  Medication doses were titrated during the hospital course  Prior to beginning of treatment medications risks and benefits and possible side effects including risk of cardiovascular events in elderly related to treatment with antipsychotic medications, risk of suicidality and serotonin syndrome related to treatment with antidepressants and risks of misuse, abuse or dependence, sedation and respiratory depression related to treatment with benzodiazepine medications were reviewed with the patient  The patient verbalized understanding and agreement for treatment  Patient's symptoms improved gradually over the hospital course  At the end of treatment the patient was doing well   Mood was stable at the time of discharge  The patient denied suicidal ideation, intent or plan at the time of discharge and denied homicidal ideation, intent or plan at the time of discharge  There was no overt psychosis at the time of discharge  Sleep and appetite were improved  The patient was tolerating medications and was not reporting any significant side effects or withdrawal symptoms at the time of discharge  Patient was strongly recommended to continue his outpatient treatment  In addition, medication education was provided related to stop the use of controlled meds including Adderall and Benzodiazepine  Patient agreed to remain abstinent of any opiate at the time of discharge  This provider contacted patient's cousin Flor Young who agreed to support and supervise patient following discharge  Since the patient was doing well at the end of the hospitalization, treatment team felt that the patient could be safely discharged to outpatient care  The outpatient follow up was arranged by the unit  upon discharge      Mental Status at time of Discharge:     Appearance:  age appropriate   Behavior:  cooperative   Speech:  normal volume   Mood:  euthymic   Affect:  mood-congruent   Thought Process:  normal   Thought Content:  no overt delusions   Perceptual Disturbances: None   Risk Potential: Suicidal Ideations none, HI none   Sensorium:  person, place and time/date   Cognition:  recent and remote memory grossly intact   Consciousness:  alert and awake    Attention: attention span and concentration were age appropriate   Insight:  fair   Judgment: fair   Gait/Station: normal gait/station   Motor Activity: no abnormal movements     Admission Diagnosis:Bipolar 1 disorder ([facility] ) [F31 9]    Discharge Diagnosis:   Principal Problem (Resolved):    MDD (major depressive disorder), recurrent episode, severe ([facility] )  Active Problems:    Hydronephrosis of right kidney    Asthma  Resolved Problems:    Mood disorder ([facility] )    Anxiety    Opiate abuse, episodic ([facility] )    Cocaine abuse ([facility] )    Severe protein-calorie malnutrition ([facility] )      Lab results:  Admission on 12/04/2022   Component Date Value   • WBC 12/05/2022 14 21 (H)    • RBC 12/05/2022 5 14    • Hemoglobin 12/05/2022 13 9    • Hematocrit 12/05/2022 43 4    • MCV 12/05/2022 84    • MCH 12/05/2022 27 0    • MCHC 12/05/2022 32 0    • RDW 12/05/2022 14 6    • MPV 12/05/2022 9 0    • Platelets 31/57/2374 498 (H)    • nRBC 12/05/2022 0    • Neutrophils Relative 12/05/2022 74    • Immat GRANS % 12/05/2022 0    • Lymphocytes Relative 12/05/2022 17    • Monocytes Relative 12/05/2022 8    • Eosinophils Relative 12/05/2022 1    • Basophils Relative 12/05/2022 0    • Neutrophils Absolute 12/05/2022 10 48 (H)    • Immature Grans Absolute 12/05/2022 0 04    • Lymphocytes Absolute 12/05/2022 2 38    • Monocytes Absolute 12/05/2022 1 18    • Eosinophils Absolute 12/05/2022 0 10    • Basophils Absolute 12/05/2022 0 03    • Sodium 12/05/2022 141    • Potassium 12/05/2022 4 2    • Chloride 12/05/2022 106    • CO2 12/05/2022 27    • ANION GAP 12/05/2022 8    • BUN 12/05/2022 13    • Creatinine 12/05/2022 1 05    • Glucose 12/05/2022 100    • Glucose, Fasting 12/05/2022 100 (H)    • Calcium 12/05/2022 9 6    • AST 12/05/2022 18    • ALT 12/05/2022 17    • Alkaline Phosphatase 12/05/2022 54    • Total Protein 12/05/2022 7 1    • Albumin 12/05/2022 3 6    • Total Bilirubin 12/05/2022 0 23    • eGFR 12/05/2022 85    • Folate 12/05/2022 3 6    • Vit D, 25-Hydroxy 12/05/2022 18 8 (L)    • Vitamin B-12 12/05/2022 313    • TSH 3RD GENERATON 12/05/2022 0 092 (L)    • Cholesterol 12/05/2022 171    • Triglycerides 12/05/2022 129    • HDL, Direct 12/05/2022 38 (L)    • LDL Calculated 12/05/2022 107 (H)    • Free T4 12/05/2022 0 97    • WBC 12/07/2022 10 91 (H)    • RBC 12/07/2022 4 97    • Hemoglobin 12/07/2022 13 4    • Hematocrit 12/07/2022 42 9    • MCV 12/07/2022 86    • MCH 12/07/2022 27 0    • MCHC 12/07/2022 31 2 (L)    • RDW 12/07/2022 14 6    • MPV 12/07/2022 8 5 (L)    • Platelets 76/85/4391 495 (H)    • nRBC 12/07/2022 0    • Neutrophils Relative 12/07/2022 57    • Immat GRANS % 12/07/2022 1    • Lymphocytes Relative 12/07/2022 29    • Monocytes Relative 12/07/2022 10    • Eosinophils Relative 12/07/2022 3    • Basophils Relative 12/07/2022 0    • Neutrophils Absolute 12/07/2022 6 29    • Immature Grans Absolute 12/07/2022 0 05    • Lymphocytes Absolute 12/07/2022 3 19    • Monocytes Absolute 12/07/2022 1 07    • Eosinophils Absolute 12/07/2022 0 27    • Basophils Absolute 12/07/2022 0 04    • Sodium 12/07/2022 138    • Potassium 12/07/2022 4 0    • Chloride 12/07/2022 106    • CO2 12/07/2022 25    • ANION GAP 12/07/2022 7    • BUN 12/07/2022 26 (H)    • Creatinine 12/07/2022 0 96    • Glucose 12/07/2022 98    • Glucose, Fasting 12/07/2022 98    • Calcium 12/07/2022 9 4    • eGFR 12/07/2022 95    • Ventricular Rate 12/06/2022 99    • Atrial Rate 12/06/2022 99    • FL Interval 12/06/2022 122    • QRSD Interval 12/06/2022 86    • QT Interval 12/06/2022 364    • QTC Interval 12/06/2022 467    • P Axis 12/06/2022 66    • QRS Axis 12/06/2022 33    • T Wave Axis 12/06/2022 62    • Ventricular Rate 12/06/2022 98    • Atrial Rate 12/06/2022 98    • FL Interval 12/06/2022 124    • QRSD Interval 12/06/2022 86    • QT Interval 12/06/2022 366    • QTC Interval 12/06/2022 467    • P Axis 12/06/2022 68    • QRS Axis 12/06/2022 36    • T Wave Axis 12/06/2022 57    • WBC 12/09/2022 10 60 (H)    • RBC 12/09/2022 4 94    • Hemoglobin 12/09/2022 13 6    • Hematocrit 12/09/2022 42 7    • MCV 12/09/2022 86    • MCH 12/09/2022 27 5    • MCHC 12/09/2022 31 9    • RDW 12/09/2022 14 7    • MPV 12/09/2022 8 8 (L)    • Platelets 78/76/2596 493 (H)    • nRBC 12/09/2022 0    • Neutrophils Relative 12/09/2022 56    • Immat GRANS % 12/09/2022 1    • Lymphocytes Relative 12/09/2022 28    • Monocytes Relative 12/09/2022 11    • Eosinophils Relative 12/09/2022 3    • Basophils Relative 12/09/2022 1    • Neutrophils Absolute 12/09/2022 6 13    • Immature Grans Absolute 12/09/2022 0 05    • Lymphocytes Absolute 12/09/2022 2 92    • Monocytes Absolute 12/09/2022 1 13    • Eosinophils Absolute 12/09/2022 0 32    • Basophils Absolute 12/09/2022 0 05        Discharge Medications:  Current Discharge Medication List      START taking these medications    Details   albuterol (PROVENTIL HFA,VENTOLIN HFA) 90 mcg/act inhaler Inhale 2 puffs every 4 (four) hours as needed for shortness of breath  Qty: 18 g, Refills: 0    Comments: Substitution to a formulary equivalent within the same pharmaceutical class is authorized  Associated Diagnoses: Asthma      cyanocobalamin (VITAMIN B-12) 1000 MCG tablet Take 1 tablet (1,000 mcg total) by mouth daily  Qty: 30 tablet, Refills: 0    Associated Diagnoses: Severe protein-calorie malnutrition ([address] ); Vitamin B12 deficiency      gabapentin (NEURONTIN) 300 mg capsule Take 1 capsule (300 mg total) by mouth 3 (three) times a day  Qty: 90 capsule, Refills: 1    Associated Diagnoses: MDD (major depressive disorder), recurrent episode, severe (HCC)      melatonin 3 mg Take 1 tablet (3 mg total) by mouth daily at bedtime  Qty: 30 tablet, Refills: 1    Associated Diagnoses: MDD (major depressive disorder), recurrent episode, severe (HCC)      mirtazapine (REMERON) 30 mg tablet Take 1 tablet (30 mg total) by mouth daily at bedtime  Qty: 30 tablet, Refills: 1    Associated Diagnoses: MDD (major depressive disorder), recurrent episode, severe (HCC)      nicotine (NICODERM CQ) 14 mg/24hr TD 24 hr patch Place 1 patch on the skin daily Do not start before December 9, 2022    Qty: 28 patch, Refills: 0    Associated Diagnoses: Tobacco abuse      QUEtiapine (SEROquel) 200 mg tablet Take 1 tablet (200 mg total) by mouth daily at bedtime  Qty: 30 tablet, Refills: 1    Associated Diagnoses: MDD (major depressive disorder), recurrent episode, severe ([facility] )            Current Discharge Medication List         Current Discharge Medication List      CONTINUE these medications which have CHANGED    Details   naloxone (NARCAN) 4 mg/0 1 mL nasal spray Administer 1 spray into a nostril  If no response after 2-3 minutes, give another dose in the other nostril using a new spray  Qty: 2 each, Refills: 0    Associated Diagnoses: Overdose            Current Discharge Medication List           Discharge instructions/Information to patient and family:   See after visit summary for information provided to patient and family  Provisions for Follow-Up Care:  See after visit summary for information related to follow-up care and any pertinent home health orders  Discharge Statement   I spent 30 minutes discharging the patient  This time was spent on the day of discharge  I had direct contact with the patient on the day of discharge  Additional documentation is required if more than 30 minutes were spent on discharge

## 2022-12-09 NOTE — SOCIAL WORK
CM met with PT for PT check in  PT was pleasant in conversation  Reviewed D/C plan along with follow up care supports, PT in agreement with all  PT feels he is ready for discharge, reviewed IMM, PT declined right to appeal and signed  PT expressed  Gratitude, Reassurance provided  PT denies SI/HI/AH/VH and reports that anxiety and depression are improved

## 2022-12-09 NOTE — PROGRESS NOTES
Patient discharged with the following belongings:    Joo Escobar shorts  2 pair socks  White t-shirt  Red thermal shirt  2 black t-shirts with writing on them  [place] bag  2 bottles of cologne  3 cords  Green sneakers  Flashlight  2 bottles of body wash  2 pairs of sweatpants  2 black hoodies  Will Embs zip up Dianelys baseball hat  Ski mask    Contraband:  Cell phone and   3 lighters  SS admission mail  1 key  1 charging stick  Electric razor with     Belongings reviewed with patient and patient signed sheet

## 2022-12-09 NOTE — NURSING NOTE
Pt up ad clint & gait is steady  Pt c/o mild anxiety due to discharge this AM  Pt denies any hallucinations, suicidal or homicidal ideations  Q 7 min checks maintained to monitor pt's behavior & safety  Pt is cooperative & compliant with medications  Pt refused Nicotine patch this AM due to resuming smoking cigarettes upon discharge  Pt instructed on discharge instructions & medications; & verbalized understanding of instructions  Narcan being sent home with pt upon discharge

## 2022-12-09 NOTE — PLAN OF CARE
Problem: SELF HARM/SUICIDALITY  Goal: Will have no self-injury during hospital stay  Description: INTERVENTIONS:  - Q 15 MINUTES: Routine safety checks  - Q WAKING SHIFT & PRN: Assess risk to determine if routine checks are adequate to maintain patient safety  - Encourage patient to participate actively in care by formulating a plan to combat response to suicidal ideation, identify supports and resources  Outcome: Adequate for Discharge     Problem: DEPRESSION  Goal: Will be euthymic at discharge  Description: INTERVENTIONS:  - Administer medication as ordered  - Provide emotional support via 1:1 interaction with staff  - Encourage involvement in milieu/groups/activities  - Monitor for social isolation  Outcome: Adequate for Discharge     Problem: Ineffective Coping  Goal: Participates in unit activities  Description: Interventions:  - Provide therapeutic environment   - Provide required programming   - Redirect inappropriate behaviors   Outcome: Adequate for Discharge     Problem: Anxiety  Goal: Anxiety is at manageable level  Description: Interventions:  - Assess and monitor patient's anxiety level  - Monitor for signs and symptoms (heart palpitations, chest pain, shortness of breath, headaches, nausea, feeling jumpy, restlessness, irritable, apprehensive)  - Collaborate with interdisciplinary team and initiate plan and interventions as ordered    - [place] patient to unit/surroundings  - Explain treatment plan  - Encourage participation in care  - Encourage verbalization of concerns/fears  - Identify coping mechanisms  - Assist in developing anxiety-reducing skills  - Administer/offer alternative therapies  - Limit or eliminate stimulants  Outcome: Adequate for Discharge     Problem: SUBSTANCE USE/ABUSE  Goal: By discharge, will develop insight into their chemical dependency and sustain motivation to continue in recovery  Description: INTERVENTIONS:  - Attends all daily group sessions and scheduled AA groups  - Actively practices coping skills through participation in the therapeutic community and adherence to program rules  - Reviews and completes assignments from individual treatment plan  - Assist patient development of understanding of their personal cycle of addiction and relapse triggers  Outcome: Adequate for Discharge  Goal: By discharge, patient will have ongoing treatment plan addressing chemical dependency  Description: INTERVENTIONS:  - Assist patient with resources and/or appointments for ongoing recovery based living  Outcome: Adequate for Discharge     Problem: DISCHARGE PLANNING - CARE MANAGEMENT  Goal: Discharge to post-acute care or home with appropriate resources  Description: INTERVENTIONS:  - Conduct assessment to determine patient/family and health care team treatment goals, and need for post-acute services based on payer coverage, community resources, and patient preferences, and barriers to discharge  - Address psychosocial, clinical, and financial barriers to discharge as identified in assessment in conjunction with the patient/family and health care team  - Arrange appropriate level of post-acute services according to patient’s   needs and preference and payer coverage in collaboration with the physician and health care team  - Communicate with and update the patient/family, physician, and health care team regarding progress on the discharge plan  - Arrange appropriate transportation to post-acute venues  Outcome: Adequate for Discharge     Problem: Nutrition/Hydration-ADULT  Goal: Nutrient/Hydration intake appropriate for improving, restoring or maintaining nutritional needs  Description: Monitor and assess patient's nutrition/hydration status for malnutrition  Collaborate with interdisciplinary team and initiate plan and interventions as ordered  Monitor patient's weight and dietary intake as ordered or per policy  Utilize nutrition screening tool and intervene as necessary   Determine patient's food preferences and provide high-protein, high-caloric foods as appropriate       INTERVENTIONS:  - Monitor oral intake, urinary output, labs, and treatment plans  - Assess nutrition and hydration status and recommend course of action  - Evaluate amount of meals eaten  - Assist patient with eating if necessary   - Allow adequate time for meals  - Recommend/ encourage appropriate diets, oral nutritional supplements, and vitamin/mineral supplements  - Order, calculate, and assess calorie counts as needed  - Recommend, monitor, and adjust tube feedings and TPN/PPN based on assessed needs  - Assess need for intravenous fluids  - Provide specific nutrition/hydration education as appropriate  - Include patient/family/caregiver in decisions related to nutrition  Outcome: Adequate for Discharge

## 2022-12-09 NOTE — NURSING NOTE
No further complaints of pain  Slept well during most q 7 minute safety checks  No respiratory distress or changes in medical condition  Sleep has been sound and undisturbed  No suicidal ideations noted  Safety maintained via clutter-free environment, ID band, and given non-skid socks  Will continue to monitor

## 2022-12-28 NOTE — QUICK NOTE
CLARIFY DIAGNOSIS RESPONSE NOTE    Based on the query that was sent to you, please clarify the diagnosis below      Influenza A was detected on teting and added as a diagnosis

## 2023-02-06 ENCOUNTER — HOSPITAL ENCOUNTER (EMERGENCY)
Facility: HOSPITAL | Age: 48
Discharge: HOME | End: 2023-02-06
Attending: EMERGENCY MEDICINE

## 2023-02-06 ENCOUNTER — APPOINTMENT (EMERGENCY)
Dept: CT IMAGING | Facility: HOSPITAL | Age: 48
End: 2023-02-06

## 2023-02-06 VITALS
OXYGEN SATURATION: 97 % | DIASTOLIC BLOOD PRESSURE: 73 MMHG | SYSTOLIC BLOOD PRESSURE: 128 MMHG | HEART RATE: 100 BPM | RESPIRATION RATE: 16 BRPM | TEMPERATURE: 98.1 F

## 2023-02-06 DIAGNOSIS — J36 PERITONSILLAR ABSCESS: Primary | ICD-10-CM

## 2023-02-06 LAB
ALBUMIN SERPL BCP-MCNC: 3.6 G/DL (ref 3.5–5)
ALP SERPL-CCNC: 52 U/L (ref 34–104)
ALT SERPL W P-5'-P-CCNC: 16 U/L (ref 7–52)
ANION GAP SERPL CALCULATED.3IONS-SCNC: 8 MMOL/L (ref 4–13)
AST SERPL W P-5'-P-CCNC: 10 U/L (ref 13–39)
BASOPHILS # BLD AUTO: 0.05 THOUSANDS/ΜL (ref 0–0.1)
BASOPHILS NFR BLD AUTO: 0 % (ref 0–1)
BILIRUB SERPL-MCNC: 0.3 MG/DL (ref 0.2–1)
BUN SERPL-MCNC: 15 MG/DL (ref 5–25)
CALCIUM SERPL-MCNC: 9.1 MG/DL (ref 8.4–10.2)
CHLORIDE SERPL-SCNC: 106 MMOL/L (ref 96–108)
CO2 SERPL-SCNC: 25 MMOL/L (ref 21–32)
CREAT SERPL-MCNC: 0.85 MG/DL (ref 0.6–1.3)
EOSINOPHIL # BLD AUTO: 0.22 THOUSAND/ΜL (ref 0–0.61)
EOSINOPHIL NFR BLD AUTO: 1 % (ref 0–6)
ERYTHROCYTE [DISTWIDTH] IN BLOOD BY AUTOMATED COUNT: 16.2 % (ref 11.6–15.1)
GFR SERPL CREATININE-BSD FRML MDRD: 105 ML/MIN/1.73SQ M
GLUCOSE SERPL-MCNC: 118 MG/DL (ref 65–140)
HCT VFR BLD AUTO: 40.3 % (ref 36.5–49.3)
HGB BLD-MCNC: 12.9 G/DL (ref 12–17)
IMM GRANULOCYTES # BLD AUTO: 0.12 THOUSAND/UL (ref 0–0.2)
IMM GRANULOCYTES NFR BLD AUTO: 1 % (ref 0–2)
LACTATE SERPL-SCNC: 1.7 MMOL/L (ref 0.5–2)
LYMPHOCYTES # BLD AUTO: 1.96 THOUSANDS/ΜL (ref 0.6–4.47)
LYMPHOCYTES NFR BLD AUTO: 10 % (ref 14–44)
MCH RBC QN AUTO: 26.7 PG (ref 26.8–34.3)
MCHC RBC AUTO-ENTMCNC: 32 G/DL (ref 31.4–37.4)
MCV RBC AUTO: 83 FL (ref 82–98)
MONOCYTES # BLD AUTO: 1.96 THOUSAND/ΜL (ref 0.17–1.22)
MONOCYTES NFR BLD AUTO: 10 % (ref 4–12)
NEUTROPHILS # BLD AUTO: 14.51 THOUSANDS/ΜL (ref 1.85–7.62)
NEUTS SEG NFR BLD AUTO: 78 % (ref 43–75)
NRBC BLD AUTO-RTO: 0 /100 WBCS
PLATELET # BLD AUTO: 392 THOUSANDS/UL (ref 149–390)
PMV BLD AUTO: 8.1 FL (ref 8.9–12.7)
POTASSIUM SERPL-SCNC: 3.8 MMOL/L (ref 3.5–5.3)
PROT SERPL-MCNC: 7 G/DL (ref 6.4–8.4)
RBC # BLD AUTO: 4.83 MILLION/UL (ref 3.88–5.62)
SODIUM SERPL-SCNC: 139 MMOL/L (ref 135–147)
WBC # BLD AUTO: 18.82 THOUSAND/UL (ref 4.31–10.16)

## 2023-02-06 RX ORDER — CLINDAMYCIN PHOSPHATE 600 MG/50ML
600 INJECTION INTRAVENOUS ONCE
Status: COMPLETED | OUTPATIENT
Start: 2023-02-06 | End: 2023-02-06

## 2023-02-06 RX ORDER — FENTANYL CITRATE 50 UG/ML
50 INJECTION, SOLUTION INTRAMUSCULAR; INTRAVENOUS ONCE
Status: COMPLETED | OUTPATIENT
Start: 2023-02-06 | End: 2023-02-06

## 2023-02-06 RX ORDER — CLINDAMYCIN HYDROCHLORIDE 150 MG/1
300 CAPSULE ORAL EVERY 6 HOURS SCHEDULED
Qty: 56 CAPSULE | Refills: 0 | Status: SHIPPED | OUTPATIENT
Start: 2023-02-06 | End: 2023-02-13

## 2023-02-06 RX ORDER — DEXAMETHASONE SODIUM PHOSPHATE 4 MG/ML
8 INJECTION, SOLUTION INTRA-ARTICULAR; INTRALESIONAL; INTRAMUSCULAR; INTRAVENOUS; SOFT TISSUE ONCE
Status: COMPLETED | OUTPATIENT
Start: 2023-02-06 | End: 2023-02-06

## 2023-02-06 RX ORDER — LIDOCAINE HYDROCHLORIDE 20 MG/ML
15 SOLUTION OROPHARYNGEAL ONCE
Status: COMPLETED | OUTPATIENT
Start: 2023-02-06 | End: 2023-02-06

## 2023-02-06 RX ORDER — CLINDAMYCIN HYDROCHLORIDE 150 MG/1
300 CAPSULE ORAL EVERY 8 HOURS SCHEDULED
Qty: 42 CAPSULE | Refills: 0 | Status: SHIPPED | OUTPATIENT
Start: 2023-02-06 | End: 2023-02-06 | Stop reason: SDUPTHER

## 2023-02-06 RX ADMIN — LIDOCAINE HYDROCHLORIDE 15 ML: 20 SOLUTION ORAL; TOPICAL at 13:01

## 2023-02-06 RX ADMIN — DEXAMETHASONE SODIUM PHOSPHATE 8 MG: 4 INJECTION INTRA-ARTICULAR; INTRALESIONAL; INTRAMUSCULAR; INTRAVENOUS; SOFT TISSUE at 11:04

## 2023-02-06 RX ADMIN — FENTANYL CITRATE 50 MCG: 50 INJECTION, SOLUTION INTRAMUSCULAR; INTRAVENOUS at 13:01

## 2023-02-06 RX ADMIN — CLINDAMYCIN IN 5 PERCENT DEXTROSE 600 MG: 12 INJECTION, SOLUTION INTRAVENOUS at 11:08

## 2023-02-06 RX ADMIN — IOHEXOL 85 ML: 350 INJECTION, SOLUTION INTRAVENOUS at 11:46

## 2023-02-06 NOTE — ED PROVIDER NOTES
History  Chief Complaint   Patient presents with   • Sore Throat     Possible peritonsilar abscess, reports throat pain  42 yo M brought to ED by [facility] officers for further evaluation of sore throat for 4 days, with exam findings at the [facility] concerning for peritonsillar abscess  He feels chills but is not aware of measured fever  He denies vomiting, he denies cough  History provided by:  Patient      Prior to Admission Medications   Prescriptions Last Dose Informant Patient Reported? Taking? QUEtiapine (SEROquel) 200 mg tablet Not Taking  No No   Sig: Take 1 tablet (200 mg total) by mouth daily at bedtime   Patient not taking: Reported on 2/6/2023   albuterol (PROVENTIL HFA,VENTOLIN HFA) 90 mcg/act inhaler Not Taking  No No   Sig: Inhale 2 puffs every 4 (four) hours as needed for shortness of breath   Patient not taking: Reported on 2/6/2023   cyanocobalamin (VITAMIN B-12) 1000 MCG tablet Not Taking  No No   Sig: Take 1 tablet (1,000 mcg total) by mouth daily   Patient not taking: Reported on 2/6/2023   gabapentin (NEURONTIN) 300 mg capsule Not Taking  No No   Sig: Take 1 capsule (300 mg total) by mouth 3 (three) times a day   Patient not taking: Reported on 2/6/2023   melatonin 3 mg Not Taking  No No   Sig: Take 1 tablet (3 mg total) by mouth daily at bedtime   Patient not taking: Reported on 2/6/2023   mirtazapine (REMERON) 30 mg tablet Not Taking  No No   Sig: Take 1 tablet (30 mg total) by mouth daily at bedtime   Patient not taking: Reported on 2/6/2023   naloxone (NARCAN) 4 mg/0 1 mL nasal spray Not Taking  No No   Sig: Administer 1 spray into a nostril  If no response after 2-3 minutes, give another dose in the other nostril using a new spray  Patient not taking: Reported on 2/6/2023   nicotine (NICODERM CQ) 14 mg/24hr TD 24 hr patch Not Taking  No No   Sig: Place 1 patch on the skin daily Do not start before December 9, 2022     Patient not taking: Reported on 2/6/2023 Facility-Administered Medications: None       Past Medical History:   Diagnosis Date   • Addiction to drug [facility])    • Asthma    • Back pain    • Bipolar 1 disorder ([address] )    • Depression    • Gastritis    • GERD (gastroesophageal reflux disease)    • Kidney stones    • Substance abuse [facility])        Past Surgical History:   Procedure Laterality Date   • ABDOMINAL SURGERY     • TN CYSTO BLADDER W/URETERAL CATHETERIZATION Right 9/27/2016    Procedure: CYSTOSCOPY WITH RETROGRADE PYELOGRAM;  Surgeon: Tayler Bauman MD;  Location: [facility];  Service: Urology   • TN LAPAROSCOPY RADICAL NEPHRECTOMY Right 11/23/2016    Procedure: HAND ASSISTED LAPAROSCOPIC NEPHRECTOMY, converted to open, lysis of adhesions,repair of  vena cava; Surgeon: Tayler Bauman MD;  Location: [facility];  Service: Urology   • URETERAL STENT PLACEMENT Right 9/27/2016    Procedure: INSERTION STENT URETERAL;  Surgeon: Tayler Bauman MD;  Location: [facility];  Service:    • VASCULAR SURGERY         Family History   Problem Relation Age of Onset   • Stroke Mother    • Heart disease Mother    • HIV Father      I have reviewed and agree with the history as documented  E-Cigarette/Vaping   • E-Cigarette Use Never User      E-Cigarette/Vaping Substances     Social History     Tobacco Use   • Smoking status: Every Day     Packs/day: 1 00     Years: 11 00     Pack years: 11 00     Types: Cigarettes   • Smokeless tobacco: Never   Vaping Use   • Vaping Use: Never used   Substance Use Topics   • Alcohol use: No   • Drug use: Yes     Types: Cocaine, Heroin     Comment: occassional       Review of Systems   Constitutional: Positive for chills  HENT: Positive for sore throat  Respiratory: Negative for cough  Gastrointestinal: Negative for nausea  Physical Exam  Physical Exam  Vitals and nursing note reviewed  Constitutional:       Appearance: He is well-developed  HENT:      Head: Normocephalic and atraumatic        Right Ear: External ear normal       Left Ear: External ear normal       Nose: Nose normal       Mouth/Throat:      Mouth: Mucous membranes are moist  Oral lesions: the area of swelling has a small ulceration that may represent spontaneous drainage  Pharynx: Pharyngeal swelling (right oropharyngeal, peritonsillar swelling) present  Eyes:      Conjunctiva/sclera: Conjunctivae normal       Pupils: Pupils are equal, round, and reactive to light  Cardiovascular:      Rate and Rhythm: Normal rate  Pulmonary:      Effort: Pulmonary effort is normal    Abdominal:      Tenderness: There is no abdominal tenderness  Musculoskeletal:         General: Normal range of motion  Cervical back: Normal range of motion and neck supple  Skin:     General: Skin is warm and dry  Neurological:      Mental Status: He is alert and oriented to person, place, and time  Cranial Nerves: No cranial nerve deficit  Coordination: Coordination normal    Psychiatric:         Behavior: Behavior normal          Thought Content:  Thought content normal          Judgment: Judgment normal          Vital Signs  ED Triage Vitals   Temperature Pulse Respirations Blood Pressure SpO2   02/06/23 0955 02/06/23 0955 02/06/23 0955 02/06/23 0959 02/06/23 0955   98 1 °F (36 7 °C) 100 16 128/73 97 %      Temp Source Heart Rate Source Patient Position - Orthostatic VS BP Location FiO2 (%)   02/06/23 0955 02/06/23 0955 -- -- --   Oral Monitor         Pain Score       02/06/23 1301       8           Vitals:    02/06/23 0955 02/06/23 0959   BP:  128/73   Pulse: 100          Visual Acuity      ED Medications  Medications   clindamycin (CLEOCIN) IVPB (premix in dextrose) 600 mg 50 mL (0 mg Intravenous Stopped 2/6/23 1225)   dexamethasone (DECADRON) injection 8 mg (8 mg Intravenous Given 2/6/23 1104)   iohexol (OMNIPAQUE) 350 MG/ML injection (SINGLE-DOSE) 85 mL (85 mL Intravenous Given 2/6/23 1146)   Lidocaine Viscous HCl (XYLOCAINE) 2 % mucosal solution 15 mL (15 mL Swish & Spit Given by Other 2/6/23 1301)   fentanyl citrate (PF) 100 MCG/2ML 50 mcg (50 mcg Intravenous Given 2/6/23 1301)       Diagnostic Studies  Results Reviewed     Procedure Component Value Units Date/Time    Comprehensive metabolic panel [[id number]]  (Abnormal) Collected: 02/06/23 1056    Lab Status: Final result Specimen: Blood from Arm, Left Updated: 02/06/23 1128     Sodium 139 mmol/L      Potassium 3 8 mmol/L      Chloride 106 mmol/L      CO2 25 mmol/L      ANION GAP 8 mmol/L      BUN 15 mg/dL      Creatinine 0 85 mg/dL      Glucose 118 mg/dL      Calcium 9 1 mg/dL      AST 10 U/L      ALT 16 U/L      Alkaline Phosphatase 52 U/L      Total Protein 7 0 g/dL      Albumin 3 6 g/dL      Total Bilirubin 0 30 mg/dL      eGFR 105 ml/min/1 73sq m     Narrative:      [place] guidelines for Chronic Kidney Disease (CKD):   •  Stage 1 with normal or high GFR (GFR > 90 mL/min/1 73 square meters)  •  Stage 2 Mild CKD (GFR = 60-89 mL/min/1 73 square meters)  •  Stage 3A Moderate CKD (GFR = 45-59 mL/min/1 73 square meters)  •  Stage 3B Moderate CKD (GFR = 30-44 mL/min/1 73 square meters)  •  Stage 4 Severe CKD (GFR = 15-29 mL/min/1 73 square meters)  •  Stage 5 End Stage CKD (GFR <15 mL/min/1 73 square meters)  Note: GFR calculation is accurate only with a steady state creatinine    Lactic acid [[id number]]  (Normal) Collected: 02/06/23 1056    Lab Status: Final result Specimen: Blood from Arm, Left Updated: 02/06/23 1125     LACTIC ACID 1 7 mmol/L     Narrative:      Result may be elevated if tourniquet was used during collection      CBC and differential [[id number]]  (Abnormal) Collected: 02/06/23 1056    Lab Status: Final result Specimen: Blood from Arm, Left Updated: 02/06/23 1102     WBC 18 82 Thousand/uL      RBC 4 83 Million/uL      Hemoglobin 12 9 g/dL      Hematocrit 40 3 %      MCV 83 fL      MCH 26 7 pg      MCHC 32 0 g/dL      RDW 16 2 %      MPV 8 1 fL      Platelets 240 Thousands/uL nRBC 0 /100 WBCs      Neutrophils Relative 78 %      Immat GRANS % 1 %      Lymphocytes Relative 10 %      Monocytes Relative 10 %      Eosinophils Relative 1 %      Basophils Relative 0 %      Neutrophils Absolute 14 51 Thousands/µL      Immature Grans Absolute 0 12 Thousand/uL      Lymphocytes Absolute 1 96 Thousands/µL      Monocytes Absolute 1 96 Thousand/µL      Eosinophils Absolute 0 22 Thousand/µL      Basophils Absolute 0 05 Thousands/µL                  CT soft tissue neck   Final Result by Sandrine Flanagan MD (02/06 1211)      Findings suggestive of acute tonsillopharyngitis, uvulitis, and questionable bilateral peritonsillar abscesses which is partially obscured by dental amalgam beam hardening streak artifact  Consider evaluation by ENT  Multiple prominent subcentimeter bilateral cervical lymph nodes, likely reactive  Recommend clinical follow-up to ensure resolution  Additional chronic/incidental findings as detailed above  The study was marked in [facility] for immediate notification  Workstation performed: [id number]                    Procedures  Procedures         ED Course  ED Course as of 02/06/23 1648   Mon Feb 06, 2023   1109 WBC(!): 18 82  Elevated   1217 CT soft tissue neck  Findings suggestive of acute tonsillopharyngitis, uvulitis, and questionable bilateral peritonsillar abscesses which is partially obscured by dental amalgam beam hardening streak artifact  Consider evaluation by ENT  1217 LACTIC ACID: 1 7   1338 D/W Karen at [facility] for his discharge planning  He will be on clindamycin, and requires follow up with [facility]  Patient asked me about Suboxone, which I am not authorized to write and will be up to the discretion of the treating provider at the [facility]  Initial Sepsis Screening     Row Name 02/06/23 1221                Is the patient's history suggestive of a new or worsening infection?  Yes (Proceed)  -MIN Suspected source of infection soft tissue  -RM        Are two or more of the following signs & symptoms of infection both present and new to the patient? Yes (Proceed)  -RM        Indicate SIRS criteria --        If the answer is yes to both questions, suspicion of sepsis is present --        If severe sepsis is present AND tissue hypoperfusion perists in the hour after fluid resuscitation or lactate > 4, the patient meets criteria for SEPTIC SHOCK --        Are any of the following organ dysfunction criteria present within 6 hours of suspected infection and SIRS criteria that are NOT considered to be chronic conditions? No  -RM        Organ dysfunction --        Date of presentation of severe sepsis --        Time of presentation of severe sepsis --        Tissue hypoperfusion persists in the hour after crystalloid fluid administration, evidenced, by either: --        Was hypotension present within one hour of the conclusion of crystalloid fluid administration? --        Date of presentation of septic shock --        Time of presentation of septic shock --              User Key  (r) = Recorded By, (t) = Taken By, (c) = Cosigned By    [address] Name Provider Type    Erik Benavides MD Physician                SBIRT 22yo+    Flowsheet Row Most Recent Value   SBIRT (25 yo +)    In order to provide better care to our patients, we are screening all of our patients for alcohol and drug use  Would it be okay to ask you these screening questions?  No Filed at: 02/06/2023 1047                    MDM    Disposition  Final diagnoses:   Peritonsillar abscess     Time reflects when diagnosis was documented in both MDM as applicable and the Disposition within this note     Time User Action Codes Description Comment    2/6/2023  1:40 PM Carrie Weeks [[id number]] Peritonsillar abscess       ED Disposition     ED Disposition   Discharge    Condition   Good    Date/Time   Mon Feb 6, 2023  1:42 PM    Comment   Carlos Herbert Lor discharge to home/self care  Follow-up Information     Follow up With Specialties Details Why Contact Info    [facility]  Schedule an appointment as soon as possible for a visit  For followup [address] [place] [address] [address]  [address] [place] [address]          Discharge Medication List as of 2/6/2023  1:44 PM      CONTINUE these medications which have CHANGED    Details   clindamycin (CLEOCIN) 150 mg capsule Take 2 capsules (300 mg total) by mouth every 6 (six) hours for 7 days, Starting Mon 2/6/2023, Until Mon 2/13/2023, Print         CONTINUE these medications which have NOT CHANGED    Details   albuterol (PROVENTIL HFA,VENTOLIN HFA) 90 mcg/act inhaler Inhale 2 puffs every 4 (four) hours as needed for shortness of breath, Starting Thu 12/8/2022, Normal      cyanocobalamin (VITAMIN B-12) 1000 MCG tablet Take 1 tablet (1,000 mcg total) by mouth daily, Starting Thu 12/8/2022, Normal      gabapentin (NEURONTIN) 300 mg capsule Take 1 capsule (300 mg total) by mouth 3 (three) times a day, Starting Thu 12/8/2022, Normal      melatonin 3 mg Take 1 tablet (3 mg total) by mouth daily at bedtime, Starting Thu 12/8/2022, Normal      mirtazapine (REMERON) 30 mg tablet Take 1 tablet (30 mg total) by mouth daily at bedtime, Starting Thu 12/8/2022, Normal      naloxone (NARCAN) 4 mg/0 1 mL nasal spray Administer 1 spray into a nostril   If no response after 2-3 minutes, give another dose in the other nostril using a new spray , Normal      nicotine (NICODERM CQ) 14 mg/24hr TD 24 hr patch Place 1 patch on the skin daily Do not start before December 9, 2022 , Starting Fri 12/9/2022, Normal      QUEtiapine (SEROquel) 200 mg tablet Take 1 tablet (200 mg total) by mouth daily at bedtime, Starting Thu 12/8/2022, Normal                 PDMP Review       Value Time User    PDMP Reviewed  Yes 12/4/2022  6:56 AM Debbie Nash MD          ED Provider  Electronically Signed by           Yvetta Fuel Marcy Choi MD  02/06/23 7476

## 2023-02-06 NOTE — ED CARE HANDOFF
Discussed case with Dr James Flores and with ENT  I will drain the abscess  Reviewed labs and CT - including images,     dicussed with guards also about FU with ENT as out pt  Pt asking about starting on suboxone as he would like to quit using drugs - he can start meds in correction - discussed with DR James Flores                       ED Course as of 02/06/23 1359   Mon Feb 06, 2023   1316 Spoke with Henriqueladarius [facility] -w with ENT - will attempt drainage and have him FU     Incision and drain    Date/Time: 2/6/2023 1:19 PM  Performed by: Fabian Gonzalez PA-C  Authorized by: Fabian Gonzalez PA-C   Universal Protocol:  Consent given by: patient      Patient location:  ED  Location:     Type:  Abscess    Location:  Mouth    Mouth location:  Peritonsillar  Anesthesia (see MAR for exact dosages): Anesthesia method:  Topical application    Topical anesthetic:  Lidocaine gel  Procedure details:     Needle aspiration: yes      Needle size:  18 G    Incision types:  Single straight    Incision depth:  Submucosal    Drainage:  Purulent and serosanguinous (1/2  cc)    Wound treatment:  Wound left open  Post-procedure details:     Patient tolerance of procedure: Tolerated well, no immediate complications      Medical Decision Making  Discussed risk vs benefit of draining the abscess with pt - discussed Ct results and reviewed results with pt    Peritonsillar abscess: acute illness or injury  Amount and/or Complexity of Data Reviewed  Labs: ordered  Radiology: ordered  Risk  Prescription drug management                Disposition  Final diagnoses:   Peritonsillar abscess     Time reflects when diagnosis was documented in both MDM as applicable and the Disposition within this note     Time User Action Codes Description Comment    2/6/2023  1:40 PM Jigna Abreu Add [J36] Peritonsillar abscess       ED Disposition     ED Disposition   Discharge    Condition   Good    Date/Time   Mon Feb 6, 2023  1:42 PM    Comment   Gayle Hernandez Lor discharge to home/self care  Follow-up Information     Follow up With Specialties Details Why Contact Info    [facility]  Schedule an appointment as soon as possible for a visit  For followup [address] [place] [address] [address]  [address] [place] [address]        Discharge Medication List as of 2/6/2023  1:44 PM      CONTINUE these medications which have CHANGED    Details   clindamycin (CLEOCIN) 150 mg capsule Take 2 capsules (300 mg total) by mouth every 6 (six) hours for 7 days, Starting Mon 2/6/2023, Until Mon 2/13/2023, Print         CONTINUE these medications which have NOT CHANGED    Details   albuterol (PROVENTIL HFA,VENTOLIN HFA) 90 mcg/act inhaler Inhale 2 puffs every 4 (four) hours as needed for shortness of breath, Starting Thu 12/8/2022, Normal      cyanocobalamin (VITAMIN B-12) 1000 MCG tablet Take 1 tablet (1,000 mcg total) by mouth daily, Starting Thu 12/8/2022, Normal      gabapentin (NEURONTIN) 300 mg capsule Take 1 capsule (300 mg total) by mouth 3 (three) times a day, Starting Thu 12/8/2022, Normal      melatonin 3 mg Take 1 tablet (3 mg total) by mouth daily at bedtime, Starting Thu 12/8/2022, Normal      mirtazapine (REMERON) 30 mg tablet Take 1 tablet (30 mg total) by mouth daily at bedtime, Starting Thu 12/8/2022, Normal      naloxone (NARCAN) 4 mg/0 1 mL nasal spray Administer 1 spray into a nostril   If no response after 2-3 minutes, give another dose in the other nostril using a new spray , Normal      nicotine (NICODERM CQ) 14 mg/24hr TD 24 hr patch Place 1 patch on the skin daily Do not start before December 9, 2022 , Starting Fri 12/9/2022, Normal      QUEtiapine (SEROquel) 200 mg tablet Take 1 tablet (200 mg total) by mouth daily at bedtime, Starting Thu 12/8/2022, Normal                  ED Provider  Electronically Signed by     Le OfficerROBI  02/06/23 1645

## 2023-02-06 NOTE — SEPSIS NOTE
Sepsis Note   Lakeisha Antunez 41 y o  male MRN: [id number]  Unit/Bed#: ED 25 Encounter: [id number]       qSOFA     [address] Name 02/06/23 4386 02/06/23 0955             Altered mental status GCS < 15 -- --       Respiratory Rate > / =22 -- 0       Systolic BP < / =323 0 --       Q Sofa Score 0 --                  Initial Sepsis Screening     Row Name 02/06/23 1221                Is the patient's history suggestive of a new or worsening infection? Yes (Proceed)  -RM        Suspected source of infection soft tissue  -RM        Are two or more of the following signs & symptoms of infection both present and new to the patient? Yes (Proceed)  -RM        Indicate SIRS criteria --        If the answer is yes to both questions, suspicion of sepsis is present --        If severe sepsis is present AND tissue hypoperfusion perists in the hour after fluid resuscitation or lactate > 4, the patient meets criteria for SEPTIC SHOCK --        Are any of the following organ dysfunction criteria present within 6 hours of suspected infection and SIRS criteria that are NOT considered to be chronic conditions?  No  -RM        Organ dysfunction --        Date of presentation of severe sepsis --        Time of presentation of severe sepsis --        Tissue hypoperfusion persists in the hour after crystalloid fluid administration, evidenced, by either: --        Was hypotension present within one hour of the conclusion of crystalloid fluid administration? --        Date of presentation of septic shock --        Time of presentation of septic shock --              User Key  (r) = Recorded By, (t) = Taken By, (c) = Cosigned By    [address] Name Provider Type    Lyn Mayen MD Physician

## 2023-02-06 NOTE — DISCHARGE INSTRUCTIONS
Tonsillitis   WHAT YOU NEED TO KNOW:   Tonsillitis is inflammation of your tonsils  Tonsils are the lumps of tissue on both sides of the back of your throat  Tonsils are part of your immune system  They help you fight infections  Recurrent tonsillitis is when you have tonsillitis many times in 1 year  Chronic tonsillitis is when you have a sore throat that lasts 3 months or longer  Call 911 for the following: You have trouble breathing because your tonsils are swollen  Contact your healthcare provider if:   You have a fever  Your pain gets worse or does not get better after you take pain medicine  Your sore throat is not better after you have finished antibiotic treatment  You have trouble sleeping and wake up trying to catch your breath  You have questions or concerns about your condition or care  Gargle with warm salt water: This may help decrease throat pain  Mix 1 teaspoon of salt in 8 ounces of warm water  Ask how often you should do this  Follow up as directed with primary care or ENT if possible if not resolving

## 2023-10-19 ENCOUNTER — APPOINTMENT (EMERGENCY)
Dept: RADIOLOGY | Facility: HOSPITAL | Age: 48
End: 2023-10-19
Payer: MEDICARE

## 2023-10-19 ENCOUNTER — HOSPITAL ENCOUNTER (EMERGENCY)
Facility: HOSPITAL | Age: 48
Discharge: HOME | End: 2023-10-19
Attending: EMERGENCY MEDICINE
Payer: MEDICARE

## 2023-10-19 VITALS
DIASTOLIC BLOOD PRESSURE: 74 MMHG | WEIGHT: 134.2 LBS | BODY MASS INDEX: 20.11 KG/M2 | TEMPERATURE: 98.1 F | HEART RATE: 85 BPM | OXYGEN SATURATION: 99 % | RESPIRATION RATE: 20 BRPM | SYSTOLIC BLOOD PRESSURE: 131 MMHG

## 2023-10-19 DIAGNOSIS — J18.9 PNEUMONIA: Primary | ICD-10-CM

## 2023-10-19 PROCEDURE — 94640 AIRWAY INHALATION TREATMENT: CPT

## 2023-10-19 PROCEDURE — 71046 X-RAY EXAM CHEST 2 VIEWS: CPT

## 2023-10-19 PROCEDURE — 99284 EMERGENCY DEPT VISIT MOD MDM: CPT

## 2023-10-19 PROCEDURE — 87636 SARSCOV2 & INF A&B AMP PRB: CPT

## 2023-10-19 PROCEDURE — 99285 EMERGENCY DEPT VISIT HI MDM: CPT

## 2023-10-19 RX ORDER — ALBUTEROL SULFATE 90 UG/1
2 AEROSOL, METERED RESPIRATORY (INHALATION) EVERY 6 HOURS PRN
Qty: 8.5 G | Refills: 0 | Status: SHIPPED | OUTPATIENT
Start: 2023-10-19 | End: 2023-10-30

## 2023-10-19 RX ORDER — AMOXICILLIN AND CLAVULANATE POTASSIUM 875; 125 MG/1; MG/1
1 TABLET, FILM COATED ORAL EVERY 12 HOURS SCHEDULED
Qty: 14 TABLET | Refills: 0 | Status: SHIPPED | OUTPATIENT
Start: 2023-10-19 | End: 2023-10-26

## 2023-10-19 RX ORDER — AZITHROMYCIN 250 MG/1
TABLET, FILM COATED ORAL
Qty: 6 TABLET | Refills: 0 | Status: SHIPPED | OUTPATIENT
Start: 2023-10-19 | End: 2023-10-23

## 2023-10-19 RX ORDER — ALBUTEROL SULFATE 90 UG/1
2 AEROSOL, METERED RESPIRATORY (INHALATION) ONCE
Status: COMPLETED | OUTPATIENT
Start: 2023-10-19 | End: 2023-10-19

## 2023-10-19 RX ORDER — IPRATROPIUM BROMIDE AND ALBUTEROL SULFATE 2.5; .5 MG/3ML; MG/3ML
3 SOLUTION RESPIRATORY (INHALATION) ONCE
Status: COMPLETED | OUTPATIENT
Start: 2023-10-19 | End: 2023-10-19

## 2023-10-19 RX ADMIN — IPRATROPIUM BROMIDE AND ALBUTEROL SULFATE 3 ML: 2.5; .5 SOLUTION RESPIRATORY (INHALATION) at 13:34

## 2023-10-19 RX ADMIN — ALBUTEROL SULFATE 2 PUFF: 90 AEROSOL, METERED RESPIRATORY (INHALATION) at 14:51

## 2023-10-19 NOTE — ED PROVIDER NOTES
History  Chief Complaint   Patient presents with    Cough     5 days of a cough and increased shortness of breath related to asthma and no medication      68-year-old male past medical history of asthma, renal CA, GERD, nephrolithiasis, substance abuse, psychiatric disorder presents to emergency department complaining of a cough with sputum production, shortness of breath with coughing for 5 days. Worsened. History provided by:  Patient   used: Yes ([facility])    Cough  Cough characteristics:  Productive  Sputum characteristics:  Yellow  Duration:  5 days  Timing:  Constant  Progression:  Worsening  Context: upper respiratory infection    Associated symptoms: rhinorrhea, shortness of breath and wheezing    Associated symptoms: no chest pain, no chills, no diaphoresis, no ear fullness, no ear pain, no eye discharge, no fever, no headaches, no myalgias, no rash, no sinus congestion and no sore throat    Shortness of breath:     Timing:  Intermittent    Progression:  Partially resolved      Prior to Admission Medications   Prescriptions Last Dose Informant Patient Reported? Taking?    QUEtiapine (SEROquel) 200 mg tablet   No No   Sig: Take 1 tablet (200 mg total) by mouth daily at bedtime   albuterol (PROVENTIL HFA,VENTOLIN HFA) 90 mcg/act inhaler   No No   Sig: Inhale 2 puffs every 4 (four) hours as needed for shortness of breath   Patient not taking: Reported on 2/6/2023   cyanocobalamin (VITAMIN B-12) 1000 MCG tablet   No No   Sig: Take 1 tablet (1,000 mcg total) by mouth daily   Patient not taking: Reported on 2/6/2023   gabapentin (NEURONTIN) 300 mg capsule   No No   Sig: Take 1 capsule (300 mg total) by mouth 3 (three) times a day   Patient not taking: Reported on 2/6/2023   loperamide (IMODIUM) 2 mg capsule   Yes No   Sig: Take 2 mg by mouth 4 (four) times a day as needed for diarrhea   melatonin 3 mg   No No   Sig: Take 1 tablet (3 mg total) by mouth daily at bedtime   Patient not taking: Reported on 2/6/2023   mirtazapine (REMERON) 30 mg tablet   No No   Sig: Take 1 tablet (30 mg total) by mouth daily at bedtime   naloxone (NARCAN) 4 mg/0.1 mL nasal spray   No No   Sig: Administer 1 spray into a nostril. If no response after 2-3 minutes, give another dose in the other nostril using a new spray. Patient not taking: Reported on 2/6/2023   nicotine (NICODERM CQ) 14 mg/24hr TD 24 hr patch   No No   Sig: Place 1 patch on the skin daily Do not start before December 9, 2022. 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[facility])     Asthma     Back pain     Bipolar 1 disorder ([facility])     Depression     Gastritis     GERD (gastroesophageal reflux disease)     Kidney stones     Renal cancer ([facility])     Substance abuse ([facility])        Past Surgical History:   Procedure Laterality Date    ABDOMINAL SURGERY      NEPHRECTOMY      MA CYSTO BLADDER W/URETERAL CATHETERIZATION Right 09/27/2016    Procedure: CYSTOSCOPY WITH RETROGRADE PYELOGRAM;  Surgeon: Jim Aceves MD;  Location: [facility];  Service: Urology    MA LAPAROSCOPY RADICAL NEPHRECTOMY Right 11/23/2016    Procedure: HAND ASSISTED LAPAROSCOPIC NEPHRECTOMY, converted to open, lysis of adhesions,repair of  vena cava; Surgeon: Jim Aceves MD;  Location: [facility];  Service: Urology    URETERAL STENT PLACEMENT Right 09/27/2016    Procedure: INSERTION STENT URETERAL;  Surgeon: Jim Aceves MD;  Location: [facility];  Service:     VASCULAR SURGERY         Family History   Problem Relation Age of Onset    Stroke Mother     Heart disease Mother     HIV Father      I have reviewed and agree with the history as documented.     E-Cigarette/Vaping    E-Cigarette Use Never User      E-Cigarette/Vaping Substances     Social History     Tobacco Use    Smoking status: Former     Packs/day: 1.00     Years: 11.00     Total pack years: 11.00     Types: Cigarettes Quit date: 2023     Years since quittin.7    Smokeless tobacco: Never   Vaping Use    Vaping Use: Never used   Substance Use Topics    Alcohol use: No    Drug use: Not Currently     Types: Cocaine, Heroin     Comment: occassional       Review of Systems   Constitutional:  Negative for chills, diaphoresis and fever. HENT:  Positive for rhinorrhea. Negative for ear pain and sore throat. Eyes:  Negative for discharge and visual disturbance. Respiratory:  Positive for cough, shortness of breath and wheezing. Cardiovascular:  Negative for chest pain, palpitations and leg swelling. Gastrointestinal:  Negative for abdominal pain, nausea and vomiting. Musculoskeletal:  Negative for myalgias and neck pain. Skin:  Negative for rash. Neurological:  Negative for dizziness, syncope, weakness and headaches. All other systems reviewed and are negative. Physical Exam  Physical Exam  Vitals and nursing note reviewed. Constitutional:       General: He is awake. He is not in acute distress. Appearance: Normal appearance. He is not ill-appearing, toxic-appearing or diaphoretic. HENT:      Head: Normocephalic. Nose: Rhinorrhea (clear) present. Mouth/Throat:      Lips: Pink. Mouth: Mucous membranes are moist.      Pharynx: Oropharynx is clear. Eyes:      General: Vision grossly intact. Conjunctiva/sclera: Conjunctivae normal.   Cardiovascular:      Rate and Rhythm: Normal rate and regular rhythm. Heart sounds: Normal heart sounds. Pulmonary:      Effort: Pulmonary effort is normal. No tachypnea, accessory muscle usage, respiratory distress or retractions. Breath sounds: No stridor or decreased air movement. Wheezing (expiratory L) and rhonchi (diffuse) present. No decreased breath sounds or rales.       Comments: Patient in no respiratory distress, speaking in full sentences, managing oral secretions without difficulty, no accessory muscle use, retractions, or belly breathing noted. Abdominal:      General: There is no distension. Palpations: Abdomen is soft. Tenderness: There is no abdominal tenderness. Musculoskeletal:      Right lower leg: No edema. Left lower leg: No edema. Lymphadenopathy:      Cervical: No cervical adenopathy. Skin:     General: Skin is warm and dry. Capillary Refill: Capillary refill takes less than 2 seconds. Findings: No rash. Neurological:      Mental Status: He is alert. Gait: Gait normal.         Vital Signs  ED Triage Vitals [10/19/23 1316]   Temperature Pulse Respirations Blood Pressure SpO2   98.1 °F (36.7 °C) 85 20 131/74 99 %      Temp Source Heart Rate Source Patient Position - Orthostatic VS BP Location FiO2 (%)   Tympanic Monitor Sitting Left arm --      Pain Score       --           Vitals:    10/19/23 1316   BP: 131/74   Pulse: 85   Patient Position - Orthostatic VS: Sitting         Visual Acuity      ED Medications  Medications   ipratropium-albuterol (DUO-NEB) 0.5-2.5 mg/3 mL inhalation solution 3 mL (3 mL Nebulization Given 10/19/23 1334)   albuterol (PROVENTIL HFA,VENTOLIN HFA) inhaler 2 puff (2 puffs Inhalation Given 10/19/23 1451)       Diagnostic Studies  Results Reviewed       Procedure Component Value Units Date/Time    FLU/COVID - if FLU clinically relevant [[id number]] Collected: 10/19/23 1334    Lab Status: In process Specimen: Nares from Nose Updated: 10/19/23 1352                   X-ray chest 2 views   ED Interpretation by Christiano Morales PA-C (10/19 1431)   L lower infiltrate. No PTX. Final Result by Karla Mcleod MD (10/19 1451)      Minimal opacity in the lingula suspect for pneumonia. The study was marked in [facility] for immediate notification.                   Workstation performed: [id number]                    Procedures  Procedures         ED Course  ED Course as of 10/19/23 2237   Thu Oct 19, 2023   1431 Imaging review done by myself and preliminary results were discussed with the patient and family members. Discussion was had with patient and family members that this read is preliminary and therefore discrepancies between this read and the final read by the radiologist are possible. Patient and family members were informed that any discrepancies found by would be relayed by phone call. SBIRT 20yo+      Flowsheet Row Most Recent Value   Initial Alcohol Screen: US AUDIT-C     1. How often do you have a drink containing alcohol? 0 Filed at: 10/19/2023 1317   2. How many drinks containing alcohol do you have on a typical day you are drinking? 0 Filed at: 10/19/2023 1317   3a. Male UNDER 65: How often do you have five or more drinks on one occasion? 0 Filed at: 10/19/2023 1317   Audit-C Score 0 Filed at: 10/19/2023 1317   OLEG: How many times in the past year have you. .. Used an illegal drug or used a prescription medication for non-medical reasons? Never Filed at: 10/19/2023 1317                      Medical Decision Making  DDX includes but not limited to rhinitis, sinusitis, pertussis, bronchitis, PNA, influenza, Covid-19, allergies, asthma, parainfluenza, toxic exposure, foreign body. VSS. Afebrile. No acute respiratory distress or increased work of breathing. Rhonchi, clear with coughing. L sided wheeze. Given worsening, abnormal focal lung findings on exam, will get CXR to evaluate for PNA. COVID-19/influenza test pending. Ambulatory pulse ox within normal limits. CXR Findings concerning for pneumonia on my wet read, plan to treat with antibiotics. Indication for steroids at this time. Well-appearing. Strict return precautions discussed. All imaging and/or lab testing discussed with patient, strict return to ED precautions discussed. Patient recommended to follow up promptly with appropriate outpatient provider. Patient and/or family members verbalizes understanding and agrees with plan.  Patient and/or family members were given opportunity to ask questions, all questions were answered at this time. Patient is stable for discharge. Portions of the record may have been created with voice recognition software. Occasional wrong word or "sound a like" substitutions may have occurred due to the inherent limitations of voice recognition software. Read the chart carefully and recognize, using context, where substitutions have occurred. Amount and/or Complexity of Data Reviewed  Labs: ordered. Radiology: ordered and independent interpretation performed. Risk  Prescription drug management. Disposition  Final diagnoses:   Pneumonia     Time reflects when diagnosis was documented in both MDM as applicable and the Disposition within this note       Time User Action Codes Description Comment    10/19/2023  2:47 PM Jimmie James Add [J18.9] Pneumonia           ED Disposition       ED Disposition   Discharge    Condition   Stable    Date/Time   u Oct 19, 2023 [address] [place] [address] discharge to home/self care. Follow-up Information       Follow up With Specialties Details Why Contact Info    Mare Frias MD Internal Medicine Schedule an appointment as soon as possible for a visit in 2 days For follow up regarding your symptoms [phone] [place] [address]  [phone]              Discharge Medication List as of 10/19/2023  2:55 PM        START taking these medications    Details   !! albuterol (ProAir HFA) 90 mcg/act inhaler Inhale 2 puffs every 6 (six) hours as needed for wheezing, Starting Thu 10/19/2023, Normal      amoxicillin-clavulanate (AUGMENTIN) 875-125 mg per tablet Take 1 tablet by mouth every 12 (twelve) hours for 7 days, Starting Thu 10/19/2023, Until Thu 10/26/2023, Normal      azithromycin (ZITHROMAX) 250 mg tablet Take 2 tablets today then 1 tablet daily x 4 days, Normal       !! - Potential duplicate medications found.  Please discuss with provider. CONTINUE these medications which have NOT CHANGED    Details   !! albuterol (PROVENTIL HFA,VENTOLIN HFA) 90 mcg/act inhaler Inhale 2 puffs every 4 (four) hours as needed for shortness of breath, Starting Thu 12/8/2022, Normal      cyanocobalamin (VITAMIN B-12) 1000 MCG tablet Take 1 tablet (1,000 mcg total) by mouth daily, Starting Thu 12/8/2022, Normal      gabapentin (NEURONTIN) 300 mg capsule Take 1 capsule (300 mg total) by mouth 3 (three) times a day, Starting Thu 12/8/2022, Normal      loperamide (IMODIUM) 2 mg capsule Take 2 mg by mouth 4 (four) times a day as needed for diarrhea, Historical Med      melatonin 3 mg Take 1 tablet (3 mg total) by mouth daily at bedtime, Starting Thu 12/8/2022, Normal      mirtazapine (REMERON) 30 mg tablet Take 1 tablet (30 mg total) by mouth daily at bedtime, Starting Thu 12/8/2022, Normal      naloxone (NARCAN) 4 mg/0.1 mL nasal spray Administer 1 spray into a nostril. If no response after 2-3 minutes, give another dose in the other nostril using a new spray., Normal      nicotine (NICODERM CQ) 14 mg/24hr TD 24 hr patch Place 1 patch on the skin daily Do not start before December 9, 2022., Starting Fri 12/9/2022, Normal      QUEtiapine (SEROquel) 200 mg tablet Take 1 tablet (200 mg total) by mouth daily at bedtime, Starting u 12/8/2022, Normal       !! - Potential duplicate medications found. Please discuss with provider. No discharge procedures on file.     PDMP Review         Value Time User    PDMP Reviewed  Yes 12/4/2022  6:56 AM Paige Dumont MD            ED Provider  Electronically Signed by             Adele Bailey PA-C  10/19/23 7531

## 2023-10-20 LAB
FLUAV RNA RESP QL NAA+PROBE: NEGATIVE
FLUBV RNA RESP QL NAA+PROBE: NEGATIVE
SARS-COV-2 RNA RESP QL NAA+PROBE: NEGATIVE

## 2023-10-30 ENCOUNTER — HOSPITAL ENCOUNTER (EMERGENCY)
Facility: HOSPITAL | Age: 48
Discharge: HOME | End: 2023-10-30
Attending: EMERGENCY MEDICINE
Payer: MEDICARE

## 2023-10-30 ENCOUNTER — APPOINTMENT (EMERGENCY)
Dept: CT IMAGING | Facility: HOSPITAL | Age: 48
End: 2023-10-30
Payer: MEDICARE

## 2023-10-30 VITALS
SYSTOLIC BLOOD PRESSURE: 119 MMHG | OXYGEN SATURATION: 98 % | TEMPERATURE: 98.3 F | DIASTOLIC BLOOD PRESSURE: 84 MMHG | WEIGHT: 144 LBS | BODY MASS INDEX: 21.58 KG/M2 | HEART RATE: 85 BPM | RESPIRATION RATE: 16 BRPM

## 2023-10-30 DIAGNOSIS — K40.90 INGUINAL HERNIA: Primary | ICD-10-CM

## 2023-10-30 LAB
ALBUMIN SERPL BCP-MCNC: 4.1 G/DL (ref 3.5–5)
ALP SERPL-CCNC: 49 U/L (ref 34–104)
ALT SERPL W P-5'-P-CCNC: 6 U/L (ref 7–52)
ANION GAP SERPL CALCULATED.3IONS-SCNC: 8 MMOL/L
AST SERPL W P-5'-P-CCNC: 17 U/L (ref 13–39)
BASOPHILS # BLD AUTO: 0.04 THOUSANDS/ÂΜL (ref 0–0.1)
BASOPHILS NFR BLD AUTO: 0 % (ref 0–1)
BILIRUB SERPL-MCNC: 0.34 MG/DL (ref 0.2–1)
BUN SERPL-MCNC: 14 MG/DL (ref 5–25)
CALCIUM SERPL-MCNC: 9.5 MG/DL (ref 8.4–10.2)
CHLORIDE SERPL-SCNC: 102 MMOL/L (ref 96–108)
CO2 SERPL-SCNC: 26 MMOL/L (ref 21–32)
CREAT SERPL-MCNC: 1.03 MG/DL (ref 0.6–1.3)
EOSINOPHIL # BLD AUTO: 0.05 THOUSAND/ÂΜL (ref 0–0.61)
EOSINOPHIL NFR BLD AUTO: 1 % (ref 0–6)
ERYTHROCYTE [DISTWIDTH] IN BLOOD BY AUTOMATED COUNT: 14.5 % (ref 11.6–15.1)
GFR SERPL CREATININE-BSD FRML MDRD: 86 ML/MIN/1.73SQ M
GLUCOSE SERPL-MCNC: 98 MG/DL (ref 65–140)
HCT VFR BLD AUTO: 42.3 % (ref 36.5–49.3)
HGB BLD-MCNC: 13.5 G/DL (ref 12–17)
IMM GRANULOCYTES # BLD AUTO: 0.02 THOUSAND/UL (ref 0–0.2)
IMM GRANULOCYTES NFR BLD AUTO: 0 % (ref 0–2)
LACTATE SERPL-SCNC: 0.9 MMOL/L (ref 0.5–2)
LIPASE SERPL-CCNC: 7 U/L (ref 11–82)
LYMPHOCYTES # BLD AUTO: 2.18 THOUSANDS/ÂΜL (ref 0.6–4.47)
LYMPHOCYTES NFR BLD AUTO: 22 % (ref 14–44)
MCH RBC QN AUTO: 27.6 PG (ref 26.8–34.3)
MCHC RBC AUTO-ENTMCNC: 31.9 G/DL (ref 31.4–37.4)
MCV RBC AUTO: 87 FL (ref 82–98)
MONOCYTES # BLD AUTO: 0.68 THOUSAND/ÂΜL (ref 0.17–1.22)
MONOCYTES NFR BLD AUTO: 7 % (ref 4–12)
NEUTROPHILS # BLD AUTO: 6.98 THOUSANDS/ÂΜL (ref 1.85–7.62)
NEUTS SEG NFR BLD AUTO: 70 % (ref 43–75)
NRBC BLD AUTO-RTO: 0 /100 WBCS
PLATELET # BLD AUTO: 292 THOUSANDS/UL (ref 149–390)
PMV BLD AUTO: 10 FL (ref 8.9–12.7)
POTASSIUM SERPL-SCNC: 4 MMOL/L (ref 3.5–5.3)
PROT SERPL-MCNC: 7 G/DL (ref 6.4–8.4)
RBC # BLD AUTO: 4.89 MILLION/UL (ref 3.88–5.62)
SODIUM SERPL-SCNC: 136 MMOL/L (ref 135–147)
WBC # BLD AUTO: 9.95 THOUSAND/UL (ref 4.31–10.16)

## 2023-10-30 PROCEDURE — 99283 EMERGENCY DEPT VISIT LOW MDM: CPT

## 2023-10-30 PROCEDURE — 83605 ASSAY OF LACTIC ACID: CPT | Performed by: EMERGENCY MEDICINE

## 2023-10-30 PROCEDURE — G1004 CDSM NDSC: HCPCS

## 2023-10-30 PROCEDURE — 99285 EMERGENCY DEPT VISIT HI MDM: CPT | Performed by: EMERGENCY MEDICINE

## 2023-10-30 PROCEDURE — 85025 COMPLETE CBC W/AUTO DIFF WBC: CPT | Performed by: EMERGENCY MEDICINE

## 2023-10-30 PROCEDURE — 96365 THER/PROPH/DIAG IV INF INIT: CPT

## 2023-10-30 PROCEDURE — 96366 THER/PROPH/DIAG IV INF ADDON: CPT

## 2023-10-30 PROCEDURE — 36415 COLL VENOUS BLD VENIPUNCTURE: CPT | Performed by: EMERGENCY MEDICINE

## 2023-10-30 PROCEDURE — 83690 ASSAY OF LIPASE: CPT | Performed by: EMERGENCY MEDICINE

## 2023-10-30 PROCEDURE — 74177 CT ABD & PELVIS W/CONTRAST: CPT

## 2023-10-30 PROCEDURE — 80053 COMPREHEN METABOLIC PANEL: CPT | Performed by: EMERGENCY MEDICINE

## 2023-10-30 PROCEDURE — 96375 TX/PRO/DX INJ NEW DRUG ADDON: CPT

## 2023-10-30 RX ORDER — HYDROMORPHONE HCL/PF 1 MG/ML
0.5 SYRINGE (ML) INJECTION ONCE
Status: DISCONTINUED | OUTPATIENT
Start: 2023-10-30 | End: 2023-10-30

## 2023-10-30 RX ORDER — KETOROLAC TROMETHAMINE 30 MG/ML
15 INJECTION, SOLUTION INTRAMUSCULAR; INTRAVENOUS ONCE
Status: COMPLETED | OUTPATIENT
Start: 2023-10-30 | End: 2023-10-30

## 2023-10-30 RX ORDER — BUPRENORPHINE HYDROCHLORIDE AND NALOXONE HYDROCHLORIDE DIHYDRATE 8; 2 MG/1; MG/1
1 TABLET SUBLINGUAL DAILY
COMMUNITY

## 2023-10-30 RX ORDER — KETOROLAC TROMETHAMINE 10 MG/1
10 TABLET, FILM COATED ORAL EVERY 6 HOURS PRN
Qty: 20 TABLET | Refills: 0 | Status: SHIPPED | OUTPATIENT
Start: 2023-10-30 | End: 2023-11-04

## 2023-10-30 RX ADMIN — SODIUM CHLORIDE, SODIUM LACTATE, POTASSIUM CHLORIDE, AND CALCIUM CHLORIDE 1000 ML: .6; .31; .03; .02 INJECTION, SOLUTION INTRAVENOUS at 19:09

## 2023-10-30 RX ADMIN — IOHEXOL 80 ML: 350 INJECTION, SOLUTION INTRAVENOUS at 19:30

## 2023-10-30 RX ADMIN — KETOROLAC TROMETHAMINE 15 MG: 30 INJECTION, SOLUTION INTRAMUSCULAR; INTRAVENOUS at 19:09

## 2023-10-30 NOTE — ED PROVIDER NOTES
History  Chief Complaint   Patient presents with    Hernia     "My hernia hurts, I was supposed to have surgery but I was homeless". No meds taken. HPI  Patient 57year old male with pain in RLQ. Patient states that he has an right inguinal hernia. Was told that he needed surgery in September but he says he couldn't make it to his appointment due to being homeless. Pain started to worsen the past week and decided to come in for evaluation. Denies any skin change, or worsening swelling. Patient also had bowel movement that was normal. Denies any urinary symptoms. States that when he lays down he doesn't have any pain but whenever he walks he has severe pain. Denies any other complaints. Prior to Admission Medications   Prescriptions Last Dose Informant Patient Reported? Taking? QUEtiapine (SEROquel) 200 mg tablet Not Taking  No No   Sig: Take 1 tablet (200 mg total) by mouth daily at bedtime   Patient not taking: Reported on 10/30/2023   buprenorphine-naloxone (SUBOXONE) 8-2 mg per SL tablet   Yes Yes   Sig: Place 1 tablet under the tongue daily   mirtazapine (REMERON) 30 mg tablet Not Taking  No No   Sig: Take 1 tablet (30 mg total) by mouth daily at bedtime   Patient not taking: Reported on 10/30/2023   naloxone (NARCAN) 4 mg/0.1 mL nasal spray   No No   Sig: Administer 1 spray into a nostril. If no response after 2-3 minutes, give another dose in the other nostril using a new spray.    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[facility])     Asthma     Back pain     Bipolar 1 disorder ([facility])     Depression     Gastritis     GERD (gastroesophageal reflux disease)     Kidney stones     Renal cancer ([facility])     Substance abuse ([facility])        Past Surgical History:   Procedure Laterality Date    ABDOMINAL SURGERY      NEPHRECTOMY      NV CYSTO BLADDER W/URETERAL CATHETERIZATION Right 09/27/2016 Procedure: CYSTOSCOPY WITH RETROGRADE PYELOGRAM;  Surgeon: Francisco Sharma MD;  Location: [facility];  Service: Urology    IL LAPAROSCOPY RADICAL NEPHRECTOMY Right 2016    Procedure: HAND ASSISTED LAPAROSCOPIC NEPHRECTOMY, converted to open, lysis of adhesions,repair of  vena cava; Surgeon: Francisco Sharma MD;  Location: [facility];  Service: Urology    URETERAL STENT PLACEMENT Right 2016    Procedure: INSERTION STENT URETERAL;  Surgeon: Francisco Sharma MD;  Location: [facility];  Service:     VASCULAR SURGERY         Family History   Problem Relation Age of Onset    Stroke Mother     Heart disease Mother     HIV Father      I have reviewed and agree with the history as documented. E-Cigarette/Vaping    E-Cigarette Use Never User      E-Cigarette/Vaping Substances     Social History     Tobacco Use    Smoking status: Every Day     Packs/day: 0.50     Years: 11.00     Total pack years: 5.50     Types: Cigarettes     Last attempt to quit: 2023     Years since quittin.7    Smokeless tobacco: Never   Vaping Use    Vaping Use: Never used   Substance Use Topics    Alcohol use: No    Drug use: Not Currently     Types: Cocaine, Heroin     Comment: occassional       Review of Systems   Constitutional:  Negative for chills, diaphoresis, fever and unexpected weight change. HENT:  Negative for ear pain and sore throat. Eyes:  Negative for visual disturbance. Respiratory:  Negative for cough, chest tightness and shortness of breath. Cardiovascular:  Negative for chest pain and leg swelling. Gastrointestinal:  Negative for abdominal distention, abdominal pain, constipation, diarrhea, nausea and vomiting. Endocrine: Negative. Genitourinary:  Negative for difficulty urinating and dysuria. Right groin pain   Musculoskeletal: Negative. Skin: Negative. Allergic/Immunologic: Negative. Neurological: Negative. Hematological: Negative. Psychiatric/Behavioral: Negative. All other systems reviewed and are negative. Physical Exam  Physical Exam  Vitals and nursing note reviewed. Constitutional:       General: He is not in acute distress. Appearance: Normal appearance. He is not ill-appearing. HENT:      Head: Normocephalic and atraumatic. Right Ear: External ear normal.      Left Ear: External ear normal.      Nose: Nose normal.      Mouth/Throat:      Mouth: Mucous membranes are moist.      Pharynx: Oropharynx is clear. Eyes:      General: No scleral icterus. Right eye: No discharge. Left eye: No discharge. Extraocular Movements: Extraocular movements intact. Conjunctiva/sclera: Conjunctivae normal.      Pupils: Pupils are equal, round, and reactive to light. Cardiovascular:      Rate and Rhythm: Normal rate and regular rhythm. Pulses: Normal pulses. Heart sounds: Normal heart sounds. Pulmonary:      Effort: Pulmonary effort is normal.      Breath sounds: Normal breath sounds. Abdominal:      General: Abdomen is flat. Bowel sounds are normal. There is no distension. Palpations: Abdomen is soft. Tenderness: There is no abdominal tenderness. There is no guarding or rebound. Musculoskeletal:         General: Tenderness (right inguinal region. No swelling or skin change noted) present. Normal range of motion. Cervical back: Normal range of motion and neck supple. Skin:     General: Skin is warm and dry. Capillary Refill: Capillary refill takes less than 2 seconds. Neurological:      General: No focal deficit present. Mental Status: He is alert and oriented to person, place, and time. Mental status is at baseline. Psychiatric:         Mood and Affect: Mood normal.         Behavior: Behavior normal.         Thought Content:  Thought content normal.         Judgment: Judgment normal.         Vital Signs  ED Triage Vitals [10/30/23 1833]   Temperature Pulse Respirations Blood Pressure SpO2   98.3 °F (36.8 °C) 85 16 119/84 98 %      Temp Source Heart Rate Source Patient Position - Orthostatic VS BP Location FiO2 (%)   Oral Monitor Sitting Right arm --      Pain Score       8           Vitals:    10/30/23 1833   BP: 119/84   Pulse: 85   Patient Position - Orthostatic VS: Sitting         Visual Acuity      ED Medications  Medications   lactated ringers bolus 1,000 mL (1,000 mL Intravenous New Bag 10/30/23 1909)   ketorolac (TORADOL) injection 15 mg (15 mg Intravenous Given 10/30/23 1909)   iohexol (OMNIPAQUE) 350 MG/ML injection (MULTI-DOSE) 80 mL (80 mL Intravenous Given 10/30/23 1930)       Diagnostic Studies  Results Reviewed       Procedure Component Value Units Date/Time    Comprehensive metabolic panel [[id number]]  (Abnormal) Collected: 10/30/23 1858    Lab Status: Final result Specimen: Blood from Arm, Right Updated: 10/30/23 1922     Sodium 136 mmol/L      Potassium 4.0 mmol/L      Chloride 102 mmol/L      CO2 26 mmol/L      ANION GAP 8 mmol/L      BUN 14 mg/dL      Creatinine 1.03 mg/dL      Glucose 98 mg/dL      Calcium 9.5 mg/dL      AST 17 U/L      ALT 6 U/L      Alkaline Phosphatase 49 U/L      Total Protein 7.0 g/dL      Albumin 4.1 g/dL      Total Bilirubin 0.34 mg/dL      eGFR 86 ml/min/1.73sq m     Narrative:      [facility] guidelines for Chronic Kidney Disease (CKD):     Stage 1 with normal or high GFR (GFR > 90 mL/min/1.73 square meters)    Stage 2 Mild CKD (GFR = 60-89 mL/min/1.73 square meters)    Stage 3A Moderate CKD (GFR = 45-59 mL/min/1.73 square meters)    Stage 3B Moderate CKD (GFR = 30-44 mL/min/1.73 square meters)    Stage 4 Severe CKD (GFR = 15-29 mL/min/1.73 square meters)    Stage 5 End Stage CKD (GFR <15 mL/min/1.73 square meters)  Note: GFR calculation is accurate only with a steady state creatinine    Lipase [[id number]]  (Abnormal) Collected: 10/30/23 1858    Lab Status: Final result Specimen: Blood from Arm, Right Updated: 10/30/23 1922     Lipase 7 u/L     Lactic acid, plasma (w/reflex if result > 2.0) [[id number]]  (Normal) Collected: 10/30/23 1858    Lab Status: Final result Specimen: Blood from Arm, Right Updated: 10/30/23 1920     LACTIC ACID 0.9 mmol/L     Narrative:      Result may be elevated if tourniquet was used during collection. CBC and differential [[id number]] Collected: 10/30/23 1858    Lab Status: Final result Specimen: Blood from Arm, Right Updated: 10/30/23 1904     WBC 9.95 Thousand/uL      RBC 4.89 Million/uL      Hemoglobin 13.5 g/dL      Hematocrit 42.3 %      MCV 87 fL      MCH 27.6 pg      MCHC 31.9 g/dL      RDW 14.5 %      MPV 10.0 fL      Platelets 813 Thousands/uL      nRBC 0 /100 WBCs      Neutrophils Relative 70 %      Immat GRANS % 0 %      Lymphocytes Relative 22 %      Monocytes Relative 7 %      Eosinophils Relative 1 %      Basophils Relative 0 %      Neutrophils Absolute 6.98 Thousands/µL      Immature Grans Absolute 0.02 Thousand/uL      Lymphocytes Absolute 2.18 Thousands/µL      Monocytes Absolute 0.68 Thousand/µL      Eosinophils Absolute 0.05 Thousand/µL      Basophils Absolute 0.04 Thousands/µL                    CT abdomen pelvis with contrast   Final Result by Mary Saleh MD (10/30 2019)      Right inguinal hernia containing fat with mild induration suggesting possible incarceration. Correlation with physical examination is recommended. Gallstones without surrounding inflammation.                   Workstation performed: [id number]                    Procedures  Procedures         ED Course                                             Medical Decision Making  57year old male presenting with right inguinal pain   Concerning for possible incarcerated hernia   On exam no notable signs of hernia and no record of inguinal hernia   CT AP obtained to confirm hernia and assess for whether it is incarcerated or strangulated   Incarcerated hernia confirmed   Spoke with Dr. Aditi Santiago and instructed to reduce it and even if failed attempt to d/c patient with outpatient f/u   When informed of patient the need for reduction he refused stating that he would just like to be discharged with pain medication and that he would promptly follow up with surgery. Return precaution provided including skin change, worsening swelling, inability to have bowel movement    Problems Addressed:  Inguinal hernia: acute illness or injury    Amount and/or Complexity of Data Reviewed  Labs: ordered. Radiology: ordered. Risk  Prescription drug management. Disposition  Final diagnoses:   Inguinal hernia     Time reflects when diagnosis was documented in both MDM as applicable and the Disposition within this note       Time User Action Codes Description Comment    10/30/2023  8:54 PM Sina Young Add [K40.90] Inguinal hernia           ED Disposition       ED Disposition   Discharge    Condition   Stable    Date/Time   Mon Oct 30, 2023  8:54 PM    Comment   Jos Peñaloza discharge to home/self care. Follow-up Information       Follow up With Specialties Details Why Contact Info Additional Information    Jorgito Melendez MD General Surgery Schedule an appointment as soon as possible for a visit   [address] [facility] Emergency Department Emergency Medicine Go to  If symptoms worsen [address] [place] [phone]  [address] [facility] Emergency Department            Patient's Medications   Discharge Prescriptions    KETOROLAC (TORADOL) 10 MG TABLET    Take 1 tablet (10 mg total) by mouth every 6 (six) hours as needed for moderate pain for up to 5 days       Start Date: 10/30/2023End Date: 11/4/2023       Order Dose: 10 mg       Quantity: 20 tablet    Refills: 0       No discharge procedures on file.     PDMP Review         Value Time User    PDMP Reviewed  Yes 12/4/2022  6:56 AM Iveth You MD ED Provider  Electronically Signed by             Amada Pierce MD  10/30/23 1986

## 2023-12-05 ENCOUNTER — HOSPITAL ENCOUNTER (INPATIENT)
Facility: HOSPITAL | Age: 48
LOS: 5 days | Discharge: LEFT BEFORE DISCHARGE | DRG: 894 | End: 2023-12-10
Attending: EMERGENCY MEDICINE | Admitting: EMERGENCY MEDICINE
Payer: COMMERCIAL

## 2023-12-05 ENCOUNTER — APPOINTMENT (INPATIENT)
Dept: CT IMAGING | Facility: HOSPITAL | Age: 48
DRG: 894 | End: 2023-12-05
Payer: COMMERCIAL

## 2023-12-05 DIAGNOSIS — F13.939 WITHDRAWAL FROM SEDATIVE, HYPNOTIC, OR ANXIOLYTIC DRUG (HCC): ICD-10-CM

## 2023-12-05 DIAGNOSIS — K40.90 RIGHT INGUINAL HERNIA: ICD-10-CM

## 2023-12-05 DIAGNOSIS — R65.10 SIRS (SYSTEMIC INFLAMMATORY RESPONSE SYNDROME) (HCC): Primary | ICD-10-CM

## 2023-12-05 DIAGNOSIS — F11.93 OPIOID WITHDRAWAL (HCC): ICD-10-CM

## 2023-12-05 DIAGNOSIS — F31.9 BIPOLAR 1 DISORDER (HCC): ICD-10-CM

## 2023-12-05 DIAGNOSIS — J45.901 ASTHMA EXACERBATION: ICD-10-CM

## 2023-12-05 DIAGNOSIS — F11.10 HEROIN ABUSE (HCC): ICD-10-CM

## 2023-12-05 PROBLEM — D75.839 THROMBOCYTOSIS: Status: ACTIVE | Noted: 2023-12-05

## 2023-12-05 PROBLEM — F11.20 OPIOID USE DISORDER, SEVERE, DEPENDENCE (HCC): Status: ACTIVE | Noted: 2023-12-05

## 2023-12-05 PROBLEM — D72.829 LEUKOCYTOSIS: Status: ACTIVE | Noted: 2023-12-05

## 2023-12-05 PROBLEM — F19.10 POLYSUBSTANCE ABUSE (HCC): Status: ACTIVE | Noted: 2023-12-05

## 2023-12-05 PROBLEM — F17.200 TOBACCO USE DISORDER: Status: ACTIVE | Noted: 2023-12-05

## 2023-12-05 LAB
ALBUMIN SERPL BCP-MCNC: 4.2 G/DL (ref 3.5–5)
ALP SERPL-CCNC: 60 U/L (ref 34–104)
ALT SERPL W P-5'-P-CCNC: 8 U/L (ref 7–52)
AMPHETAMINES SERPL QL SCN: NEGATIVE
ANION GAP SERPL CALCULATED.3IONS-SCNC: 9 MMOL/L
AST SERPL W P-5'-P-CCNC: 13 U/L (ref 13–39)
BARBITURATES UR QL: NEGATIVE
BASOPHILS # BLD AUTO: 0.06 THOUSANDS/ÂΜL (ref 0–0.1)
BASOPHILS NFR BLD AUTO: 1 % (ref 0–1)
BENZODIAZ UR QL: NEGATIVE
BILIRUB SERPL-MCNC: 0.37 MG/DL (ref 0.2–1)
BUN SERPL-MCNC: 15 MG/DL (ref 5–25)
CALCIUM SERPL-MCNC: 10 MG/DL (ref 8.4–10.2)
CHLORIDE SERPL-SCNC: 99 MMOL/L (ref 96–108)
CO2 SERPL-SCNC: 30 MMOL/L (ref 21–32)
COCAINE UR QL: POSITIVE
CREAT SERPL-MCNC: 1.04 MG/DL (ref 0.6–1.3)
EOSINOPHIL # BLD AUTO: 0.84 THOUSAND/ÂΜL (ref 0–0.61)
EOSINOPHIL NFR BLD AUTO: 7 % (ref 0–6)
ERYTHROCYTE [DISTWIDTH] IN BLOOD BY AUTOMATED COUNT: 14.8 % (ref 11.6–15.1)
GFR SERPL CREATININE-BSD FRML MDRD: 85 ML/MIN/1.73SQ M
GLUCOSE SERPL-MCNC: 103 MG/DL (ref 65–140)
HCT VFR BLD AUTO: 46.1 % (ref 36.5–49.3)
HGB BLD-MCNC: 14.9 G/DL (ref 12–17)
IMM GRANULOCYTES # BLD AUTO: 0.04 THOUSAND/UL (ref 0–0.2)
IMM GRANULOCYTES NFR BLD AUTO: 0 % (ref 0–2)
LYMPHOCYTES # BLD AUTO: 2.43 THOUSANDS/ÂΜL (ref 0.6–4.47)
LYMPHOCYTES NFR BLD AUTO: 21 % (ref 14–44)
MCH RBC QN AUTO: 27.6 PG (ref 26.8–34.3)
MCHC RBC AUTO-ENTMCNC: 32.3 G/DL (ref 31.4–37.4)
MCV RBC AUTO: 85 FL (ref 82–98)
METHADONE UR QL: NEGATIVE
MONOCYTES # BLD AUTO: 1 THOUSAND/ÂΜL (ref 0.17–1.22)
MONOCYTES NFR BLD AUTO: 9 % (ref 4–12)
NEUTROPHILS # BLD AUTO: 7.18 THOUSANDS/ÂΜL (ref 1.85–7.62)
NEUTS SEG NFR BLD AUTO: 62 % (ref 43–75)
NRBC BLD AUTO-RTO: 0 /100 WBCS
OPIATES UR QL SCN: POSITIVE
OXYCODONE+OXYMORPHONE UR QL SCN: NEGATIVE
PCP UR QL: NEGATIVE
PLATELET # BLD AUTO: 417 THOUSANDS/UL (ref 149–390)
PMV BLD AUTO: 8.4 FL (ref 8.9–12.7)
POTASSIUM SERPL-SCNC: 4.1 MMOL/L (ref 3.5–5.3)
PROT SERPL-MCNC: 7.2 G/DL (ref 6.4–8.4)
RBC # BLD AUTO: 5.4 MILLION/UL (ref 3.88–5.62)
SODIUM SERPL-SCNC: 138 MMOL/L (ref 135–147)
THC UR QL: NEGATIVE
WBC # BLD AUTO: 11.55 THOUSAND/UL (ref 4.31–10.16)

## 2023-12-05 PROCEDURE — 80307 DRUG TEST PRSMV CHEM ANLYZR: CPT | Performed by: EMERGENCY MEDICINE

## 2023-12-05 PROCEDURE — 36415 COLL VENOUS BLD VENIPUNCTURE: CPT | Performed by: EMERGENCY MEDICINE

## 2023-12-05 PROCEDURE — 80053 COMPREHEN METABOLIC PANEL: CPT | Performed by: EMERGENCY MEDICINE

## 2023-12-05 PROCEDURE — 99291 CRITICAL CARE FIRST HOUR: CPT | Performed by: EMERGENCY MEDICINE

## 2023-12-05 PROCEDURE — 85025 COMPLETE CBC W/AUTO DIFF WBC: CPT | Performed by: EMERGENCY MEDICINE

## 2023-12-05 PROCEDURE — 99283 EMERGENCY DEPT VISIT LOW MDM: CPT

## 2023-12-05 PROCEDURE — 99223 1ST HOSP IP/OBS HIGH 75: CPT

## 2023-12-05 PROCEDURE — 96374 THER/PROPH/DIAG INJ IV PUSH: CPT

## 2023-12-05 RX ORDER — ACETAMINOPHEN 325 MG/1
650 TABLET ORAL EVERY 6 HOURS PRN
Status: DISCONTINUED | OUTPATIENT
Start: 2023-12-05 | End: 2023-12-10 | Stop reason: HOSPADM

## 2023-12-05 RX ORDER — CLONIDINE HYDROCHLORIDE 0.1 MG/1
0.1 TABLET ORAL EVERY 6 HOURS PRN
Status: DISCONTINUED | OUTPATIENT
Start: 2023-12-05 | End: 2023-12-09

## 2023-12-05 RX ORDER — KETOROLAC TROMETHAMINE 30 MG/ML
15 INJECTION, SOLUTION INTRAMUSCULAR; INTRAVENOUS ONCE
Status: DISCONTINUED | OUTPATIENT
Start: 2023-12-05 | End: 2023-12-05

## 2023-12-05 RX ORDER — SODIUM CHLORIDE 9 MG/ML
100 INJECTION, SOLUTION INTRAVENOUS CONTINUOUS
Status: DISCONTINUED | OUTPATIENT
Start: 2023-12-05 | End: 2023-12-09

## 2023-12-05 RX ORDER — ALBUTEROL SULFATE 90 UG/1
2 AEROSOL, METERED RESPIRATORY (INHALATION) EVERY 4 HOURS PRN
Status: DISCONTINUED | OUTPATIENT
Start: 2023-12-05 | End: 2023-12-10 | Stop reason: HOSPADM

## 2023-12-05 RX ORDER — ENOXAPARIN SODIUM 100 MG/ML
40 INJECTION SUBCUTANEOUS DAILY
Status: DISCONTINUED | OUTPATIENT
Start: 2023-12-06 | End: 2023-12-10 | Stop reason: HOSPADM

## 2023-12-05 RX ORDER — CLONIDINE HYDROCHLORIDE 0.1 MG/1
0.2 TABLET ORAL ONCE
Status: COMPLETED | OUTPATIENT
Start: 2023-12-05 | End: 2023-12-05

## 2023-12-05 RX ORDER — NICOTINE 21 MG/24HR
1 PATCH, TRANSDERMAL 24 HOURS TRANSDERMAL DAILY
Status: DISCONTINUED | OUTPATIENT
Start: 2023-12-06 | End: 2023-12-10 | Stop reason: HOSPADM

## 2023-12-05 RX ORDER — GABAPENTIN 300 MG/1
300 CAPSULE ORAL EVERY 8 HOURS PRN
Status: DISCONTINUED | OUTPATIENT
Start: 2023-12-05 | End: 2023-12-09

## 2023-12-05 RX ORDER — IPRATROPIUM BROMIDE AND ALBUTEROL SULFATE 2.5; .5 MG/3ML; MG/3ML
3 SOLUTION RESPIRATORY (INHALATION) ONCE
Status: COMPLETED | OUTPATIENT
Start: 2023-12-05 | End: 2023-12-05

## 2023-12-05 RX ORDER — BUPRENORPHINE 20 UG/H
20 PATCH TRANSDERMAL
Status: DISCONTINUED | OUTPATIENT
Start: 2023-12-05 | End: 2023-12-09

## 2023-12-05 RX ORDER — KETOROLAC TROMETHAMINE 30 MG/ML
30 INJECTION, SOLUTION INTRAMUSCULAR; INTRAVENOUS ONCE
Status: COMPLETED | OUTPATIENT
Start: 2023-12-05 | End: 2023-12-05

## 2023-12-05 RX ADMIN — BUPRENORPHINE 20 MCG: 20 PATCH, EXTENDED RELEASE TRANSDERMAL at 21:15

## 2023-12-05 RX ADMIN — IPRATROPIUM BROMIDE AND ALBUTEROL SULFATE 3 ML: 2.5; .5 SOLUTION RESPIRATORY (INHALATION) at 17:26

## 2023-12-05 RX ADMIN — CLONIDINE HYDROCHLORIDE 0.2 MG: 0.1 TABLET ORAL at 17:23

## 2023-12-05 RX ADMIN — KETOROLAC TROMETHAMINE 30 MG: 30 INJECTION, SOLUTION INTRAMUSCULAR; INTRAVENOUS at 17:21

## 2023-12-05 RX ADMIN — ACETAMINOPHEN 650 MG: 325 TABLET ORAL at 21:16

## 2023-12-05 RX ADMIN — SODIUM CHLORIDE 100 ML/HR: 0.9 INJECTION, SOLUTION INTRAVENOUS at 21:15

## 2023-12-05 NOTE — ED PROVIDER NOTES
History  Chief Complaint   Patient presents with    Detox Evaluation     Pt states "I need detox from heroin". Last use 3/4 hours ago     57-year-old male presents in evident distress, requesting opioid withdrawal management. Patient states that he smokes heroin daily, up to 3 bags. He last used 1 bag about 4 hours ago. He states he uses heroin to treat his pain from an inguinal hernia. He presents diaphoretic, uncomfortable with psychomotor agitation and requesting detox. He was last in detox a year ago. He denies concomitant alcohol or other drug use. He does smoke cigarettes despite being asthmatic. He complains of chest tightness and wheezing associated with his asthma. Denies f/c, lightheadedness/dizziness, chest pain other than tightness, abdominal pain, v/d or dysuria. 12 system ROS o/w negative. History provided by:  Patient and medical records  Detox Evaluation  Similar prior episodes: yes    Severity:  Moderate  Onset quality:  Sudden  Duration:  1 day  Timing:  Constant  Progression:  Worsening  Chronicity:  Recurrent  Suspected agents:  Heroin  Associated symptoms: agitation, nausea and shortness of breath    Associated symptoms: no abdominal pain, no bladder incontinence, no bowel incontinence, no confusion, no hallucinations, no headaches, no loss of consciousness, no palpitations, no seizures, no vomiting and no weakness    Asthma  Severity:  Moderate  Onset quality:  Unable to specify  Timing:  Constant  Progression:  Waxing and waning  Chronicity:  Chronic  Worsened by:  Cigarette smoking  Associated symptoms: cough, nausea, shortness of breath and wheezing    Associated symptoms: no abdominal pain, no chest pain, no congestion, no diarrhea, no ear pain, no fatigue, no fever, no headaches, no loss of consciousness, no myalgias, no rash, no rhinorrhea, no sore throat and no vomiting        Prior to Admission Medications   Prescriptions Last Dose Informant Patient Reported? Taking? QUEtiapine (SEROquel) 200 mg tablet   No No   Sig: Take 1 tablet (200 mg total) by mouth daily at bedtime   Patient not taking: Reported on 10/30/2023   buprenorphine-naloxone (SUBOXONE) 8-2 mg per SL tablet   Yes No   Sig: Place 1 tablet under the tongue daily   ketorolac (TORADOL) 10 mg tablet   No No   Sig: Take 1 tablet (10 mg total) by mouth every 6 (six) hours as needed for moderate pain for up to 5 days   mirtazapine (REMERON) 30 mg tablet   No No   Sig: Take 1 tablet (30 mg total) by mouth daily at bedtime   Patient not taking: Reported on 10/30/2023   naloxone (NARCAN) 4 mg/0.1 mL nasal spray   No No   Sig: Administer 1 spray into a nostril. If no response after 2-3 minutes, give another dose in the other nostril using a new spray. 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[facility])     Asthma     Back pain     Bipolar 1 disorder ([facility])     Depression     Gastritis     GERD (gastroesophageal reflux disease)     Kidney stones     Renal cancer ([facility])     Substance abuse ([facility])        Past Surgical History:   Procedure Laterality Date    ABDOMINAL SURGERY      NEPHRECTOMY      DC CYSTO BLADDER W/URETERAL CATHETERIZATION Right 09/27/2016    Procedure: CYSTOSCOPY WITH RETROGRADE PYELOGRAM;  Surgeon: Angelo Holland MD;  Location: [facility];  Service: Urology    DC LAPAROSCOPY RADICAL NEPHRECTOMY Right 11/23/2016    Procedure: HAND ASSISTED LAPAROSCOPIC NEPHRECTOMY, converted to open, lysis of adhesions,repair of  vena cava;   Surgeon: Angelo Holland MD;  Location: [facility];  Service: Urology    URETERAL STENT PLACEMENT Right 09/27/2016    Procedure: INSERTION STENT URETERAL;  Surgeon: Angelo Holland MD;  Location: [facility];  Service:     VASCULAR SURGERY         Family History   Problem Relation Age of Onset    Stroke Mother     Heart disease Mother     HIV Father      I have reviewed and agree with the history as documented. E-Cigarette/Vaping    E-Cigarette Use Never User      E-Cigarette/Vaping Substances     Social History     Tobacco Use    Smoking status: Every Day     Packs/day: 0.50     Years: 11.00     Total pack years: 5.50     Types: Cigarettes     Last attempt to quit: 2023     Years since quittin.8    Smokeless tobacco: Never   Vaping Use    Vaping Use: Never used   Substance Use Topics    Alcohol use: No    Drug use: Not Currently     Types: Cocaine, Heroin     Comment: occassional       Review of Systems   Constitutional:  Positive for diaphoresis. Negative for fatigue, fever and unexpected weight change. HENT:  Negative for congestion, ear pain, postnasal drip, rhinorrhea, sore throat and trouble swallowing. Eyes: Negative. Respiratory:  Positive for cough, chest tightness, shortness of breath and wheezing. Cardiovascular:  Negative for chest pain, palpitations and leg swelling. Gastrointestinal:  Positive for nausea. Negative for abdominal pain, bowel incontinence, diarrhea and vomiting. Genitourinary:  Negative for bladder incontinence and flank pain. Musculoskeletal:  Negative for myalgias, neck pain and neck stiffness. Skin:  Negative for pallor and rash. Neurological:  Negative for dizziness, seizures, loss of consciousness, syncope, weakness, light-headedness and headaches. Psychiatric/Behavioral:  Positive for agitation. Negative for confusion and hallucinations. All other systems reviewed and are negative. Physical Exam  Physical Exam  Vitals reviewed. Constitutional:       General: He is in acute distress. Appearance: He is well-developed. He is ill-appearing and diaphoretic. HENT:      Head: Normocephalic and atraumatic. Right Ear: External ear normal.      Left Ear: External ear normal.      Nose: Nose normal. No congestion or rhinorrhea.       Mouth/Throat:      Mouth: Mucous membranes are moist.      Pharynx: Oropharynx is clear. No oropharyngeal exudate. Eyes:      General: No scleral icterus. Conjunctiva/sclera: Conjunctivae normal.      Pupils: Pupils are equal, round, and reactive to light. Cardiovascular:      Rate and Rhythm: Normal rate and regular rhythm. Heart sounds: Normal heart sounds. No murmur heard. Pulmonary:      Effort: Tachypnea, accessory muscle usage and respiratory distress present. Breath sounds: Wheezing present. Abdominal:      General: Bowel sounds are normal.      Palpations: Abdomen is soft. Tenderness: There is no left CVA tenderness. Hernia: A hernia (soft, reducible RIH) is present. Musculoskeletal:         General: Normal range of motion. Cervical back: Normal range of motion and neck supple. Right lower leg: No edema. Left lower leg: No edema. Lymphadenopathy:      Cervical: No cervical adenopathy. Skin:     General: Skin is warm. Coloration: Skin is not pale. Neurological:      General: No focal deficit present. Mental Status: He is alert and oriented to person, place, and time. Motor: No abnormal muscle tone. Psychiatric:         Thought Content:  Thought content normal.      Comments: Dysphoric mood         Vital Signs  ED Triage Vitals   Temperature Pulse Respirations Blood Pressure SpO2   12/05/23 1641 12/05/23 1641 12/05/23 1641 12/05/23 1644 12/05/23 1641   98 °F (36.7 °C) 83 (!) 24 121/64 96 %      Temp Source Heart Rate Source Patient Position - Orthostatic VS BP Location FiO2 (%)   12/05/23 1641 12/05/23 1641 12/05/23 1644 12/05/23 1644 --   Oral Monitor Lying Right arm       Pain Score       12/05/23 1721       10 - Worst Possible Pain           Vitals:    12/05/23 1641 12/05/23 1644 12/05/23 1723   BP:  121/64 139/94   Pulse: 83     Patient Position - Orthostatic VS:  Lying          Visual Acuity      ED Medications  Medications   cloNIDine (CATAPRES) tablet 0.2 mg (0.2 mg Oral Given 12/5/23 1723)   ipratropium-albuterol (DUO-NEB) 0.5-2.5 mg/3 mL inhalation solution 3 mL (3 mL Nebulization Given 12/5/23 1726)   ketorolac (TORADOL) injection 30 mg (30 mg Intravenous Given 12/5/23 1721)       Diagnostic Studies  Results Reviewed       Procedure Component Value Units Date/Time    Comprehensive metabolic panel [[id number]] Collected: 12/05/23 1707    Lab Status: Final result Specimen: Blood from Arm, Left Updated: 12/05/23 1741     Sodium 138 mmol/L      Potassium 4.1 mmol/L      Chloride 99 mmol/L      CO2 30 mmol/L      ANION GAP 9 mmol/L      BUN 15 mg/dL      Creatinine 1.04 mg/dL      Glucose 103 mg/dL      Calcium 10.0 mg/dL      AST 13 U/L      ALT 8 U/L      Alkaline Phosphatase 60 U/L      Total Protein 7.2 g/dL      Albumin 4.2 g/dL      Total Bilirubin 0.37 mg/dL      eGFR 85 ml/min/1.73sq m     Narrative:      [facility] (CKD):     Stage 1 with normal or high GFR (GFR > 90 mL/min/1.73 square meters)    Stage 2 Mild CKD (GFR = 60-89 mL/min/1.73 square meters)    Stage 3A Moderate CKD (GFR = 45-59 mL/min/1.73 square meters)    Stage 3B Moderate CKD (GFR = 30-44 mL/min/1.73 square meters)    Stage 4 Severe CKD (GFR = 15-29 mL/min/1.73 square meters)    Stage 5 End Stage CKD (GFR <15 mL/min/1.73 square meters)  Note: GFR calculation is accurate only with a steady state creatinine    CBC and differential [[id number]]  (Abnormal) Collected: 12/05/23 1707    Lab Status: Final result Specimen: Blood from Arm, Left Updated: 12/05/23 1720     WBC 11.55 Thousand/uL      RBC 5.40 Million/uL      Hemoglobin 14.9 g/dL      Hematocrit 46.1 %      MCV 85 fL      MCH 27.6 pg      MCHC 32.3 g/dL      RDW 14.8 %      MPV 8.4 fL      Platelets 172 Thousands/uL      nRBC 0 /100 WBCs      Neutrophils Relative 62 %      Immat GRANS % 0 %      Lymphocytes Relative 21 %      Monocytes Relative 9 %      Eosinophils Relative 7 %      Basophils Relative 1 %      Neutrophils Absolute 7.18 Thousands/µL      Immature Grans Absolute 0.04 Thousand/uL      Lymphocytes Absolute 2.43 Thousands/µL      Monocytes Absolute 1.00 Thousand/µL      Eosinophils Absolute 0.84 Thousand/µL      Basophils Absolute 0.06 Thousands/µL     Rapid drug screen, urine [[id number]]     Lab Status: No result Specimen: Urine                    No orders to display              Procedures  CriticalCare Time    Date/Time: 12/5/2023 6:29 PM    Performed by: Jennifer Sun DO  Authorized by: Jennifer Sun DO    Critical care provider statement:     Critical care time (minutes):  30    Critical care time was exclusive of:  Separately billable procedures and treating other patients and teaching time    Critical care was necessary to treat or prevent imminent or life-threatening deterioration of the following conditions:  Toxidrome    Critical care was time spent personally by me on the following activities:  Obtaining history from patient or surrogate, development of treatment plan with patient or surrogate, discussions with consultants, evaluation of patient's response to treatment, examination of patient, ordering and performing treatments and interventions, ordering and review of laboratory studies, ordering and review of radiographic studies, re-evaluation of patient's condition and review of old charts    I assumed direction of critical care for this patient from another provider in my specialty: no             ED Course  ED Course as of 12/05/23 1830   Tue Dec 05, 2023   1743 Case discussed with Detox. Medical Decision Making  MDM/DDx: Chest tightness/wheezes - asthma flare, bronchitis/bronchospasm, less likely but at risk for pneumonia, pneumothorax, ACS/MI or PE.  MDM/DDx: Agitation/diaphoresis - opioid withdrawal syndrome. I independently reviewed and interpreted ordered labs from this encounter.     A/P: Will check pre-admission labs, UDS, treat symptoms with Clonidine, Toradol, duo-neb, consult Detox for admission. Amount and/or Complexity of Data Reviewed  Labs: ordered. Decision-making details documented in ED Course. Risk  Prescription drug management. Decision regarding hospitalization. Disposition  Final diagnoses:   Heroin abuse ([address])   Withdrawal from sedative, hypnotic, or anxiolytic drug ([address])   Asthma exacerbation     Time reflects when diagnosis was documented in both MDM as applicable and the Disposition within this note       Time User Action Codes Description Comment    12/5/2023  6:28 PM Felicity, [address] [F11.10] Heroin abuse ([address])     12/5/2023  6:28 PM Anita Manner Add [L18.245] Withdrawal from sedative, hypnotic, or anxiolytic drug ([address])     12/5/2023  6:28 PM Felicity, [address] [A51.674] Asthma exacerbation           ED Disposition       ED Disposition   Admit    Condition   Stable    Date/Time   Tue Dec 5, 2023  6:29 PM    Comment   Case was discussed with [facility] and the patient's admission status was agreed to be Admission Status: inpatient status to the service of Dr. Barrett Moore . Follow-up Information    None         Current Discharge Medication List        CONTINUE these medications which have NOT CHANGED    Details   buprenorphine-naloxone (SUBOXONE) 8-2 mg per SL tablet Place 1 tablet under the tongue daily      ketorolac (TORADOL) 10 mg tablet Take 1 tablet (10 mg total) by mouth every 6 (six) hours as needed for moderate pain for up to 5 days  Qty: 20 tablet, Refills: 0    Associated Diagnoses: Inguinal hernia      mirtazapine (REMERON) 30 mg tablet Take 1 tablet (30 mg total) by mouth daily at bedtime  Qty: 30 tablet, Refills: 1    Associated Diagnoses: MDD (major depressive disorder), recurrent episode, severe (HCC)      naloxone (NARCAN) 4 mg/0.1 mL nasal spray Administer 1 spray into a nostril. If no response after 2-3 minutes, give another dose in the other nostril using a new spray.   Qty: 2 each, Refills: 0 Associated Diagnoses: Overdose      QUEtiapine (SEROquel) 200 mg tablet Take 1 tablet (200 mg total) by mouth daily at bedtime  Qty: 30 tablet, Refills: 1    Associated Diagnoses: MDD (major depressive disorder), recurrent episode, severe ([address])             No discharge procedures on file.     PDMP Review         Value Time User    PDMP Reviewed  Yes 12/4/2022  6:56 AM Mary Flood MD            ED Provider  Electronically Signed by             Mckenna Soto DO  12/05/23 7873

## 2023-12-06 PROBLEM — J45.31 MILD PERSISTENT ASTHMA WITH EXACERBATION: Status: ACTIVE | Noted: 2023-12-06

## 2023-12-06 PROCEDURE — 94640 AIRWAY INHALATION TREATMENT: CPT

## 2023-12-06 PROCEDURE — 94760 N-INVAS EAR/PLS OXIMETRY 1: CPT

## 2023-12-06 PROCEDURE — 99233 SBSQ HOSP IP/OBS HIGH 50: CPT | Performed by: EMERGENCY MEDICINE

## 2023-12-06 PROCEDURE — 94664 DEMO&/EVAL PT USE INHALER: CPT

## 2023-12-06 PROCEDURE — 99222 1ST HOSP IP/OBS MODERATE 55: CPT | Performed by: PSYCHIATRY & NEUROLOGY

## 2023-12-06 RX ORDER — ALBUTEROL SULFATE 2.5 MG/3ML
2.5 SOLUTION RESPIRATORY (INHALATION) EVERY 4 HOURS PRN
Status: DISCONTINUED | OUTPATIENT
Start: 2023-12-06 | End: 2023-12-10 | Stop reason: HOSPADM

## 2023-12-06 RX ORDER — QUETIAPINE FUMARATE 50 MG/1
50 TABLET, FILM COATED ORAL
Status: DISCONTINUED | OUTPATIENT
Start: 2023-12-06 | End: 2023-12-07

## 2023-12-06 RX ORDER — ALBUTEROL SULFATE 2.5 MG/3ML
2.5 SOLUTION RESPIRATORY (INHALATION) EVERY 4 HOURS
Status: DISCONTINUED | OUTPATIENT
Start: 2023-12-06 | End: 2023-12-06

## 2023-12-06 RX ORDER — KETOROLAC TROMETHAMINE 30 MG/ML
15 INJECTION, SOLUTION INTRAMUSCULAR; INTRAVENOUS EVERY 6 HOURS PRN
Status: DISPENSED | OUTPATIENT
Start: 2023-12-06 | End: 2023-12-09

## 2023-12-06 RX ORDER — IPRATROPIUM BROMIDE AND ALBUTEROL SULFATE 2.5; .5 MG/3ML; MG/3ML
3 SOLUTION RESPIRATORY (INHALATION) ONCE
Status: DISCONTINUED | OUTPATIENT
Start: 2023-12-06 | End: 2023-12-06

## 2023-12-06 RX ORDER — ALBUTEROL SULFATE 2.5 MG/3ML
2.5 SOLUTION RESPIRATORY (INHALATION)
Status: DISCONTINUED | OUTPATIENT
Start: 2023-12-06 | End: 2023-12-08

## 2023-12-06 RX ADMIN — NICOTINE 1 PATCH: 21 PATCH, EXTENDED RELEASE TRANSDERMAL at 08:30

## 2023-12-06 RX ADMIN — CLONIDINE HYDROCHLORIDE 0.1 MG: 0.1 TABLET ORAL at 16:22

## 2023-12-06 RX ADMIN — KETOROLAC TROMETHAMINE 15 MG: 30 INJECTION, SOLUTION INTRAMUSCULAR; INTRAVENOUS at 15:05

## 2023-12-06 RX ADMIN — GABAPENTIN 300 MG: 300 CAPSULE ORAL at 11:31

## 2023-12-06 RX ADMIN — ALBUTEROL SULFATE 2 PUFF: 90 AEROSOL, METERED RESPIRATORY (INHALATION) at 03:23

## 2023-12-06 RX ADMIN — GABAPENTIN 300 MG: 300 CAPSULE ORAL at 04:15

## 2023-12-06 RX ADMIN — ALBUTEROL SULFATE 2 PUFF: 90 AEROSOL, METERED RESPIRATORY (INHALATION) at 11:31

## 2023-12-06 RX ADMIN — ACETAMINOPHEN 650 MG: 325 TABLET ORAL at 04:15

## 2023-12-06 RX ADMIN — ENOXAPARIN SODIUM 40 MG: 40 INJECTION SUBCUTANEOUS at 08:31

## 2023-12-06 RX ADMIN — ALBUTEROL SULFATE 2.5 MG: 2.5 SOLUTION RESPIRATORY (INHALATION) at 15:23

## 2023-12-06 RX ADMIN — CLONIDINE HYDROCHLORIDE 0.1 MG: 0.1 TABLET ORAL at 08:34

## 2023-12-06 RX ADMIN — ALBUTEROL SULFATE 2.5 MG: 2.5 SOLUTION RESPIRATORY (INHALATION) at 20:52

## 2023-12-06 NOTE — ASSESSMENT & PLAN NOTE
Lab Results   Component Value Date     (H) 12/05/2023      Increased PTL count on admission  Per chart review, pt appears to have chronic thrombocytosis, with baseline -500  No obvious evidence of active bleeding/bruising on limited physical exam  IVF hydration for possible dehydration component   Continue to monitor CBC

## 2023-12-06 NOTE — SOCIAL WORK
SUJEYK attempted to meet with Pt. Pt refused and reported "not right now". SUJEYK will attempt at a different time.

## 2023-12-06 NOTE — CONSULTS
Consultation - Watrous Laura 57 y.o. male MRN: [id number]  Unit/Bed#: 5T DETOX 507-01 Encounter: [id number]      Chief Complaint: " I came to the hospital to detox and get help with mental health."    History of Present Illness   Physician Requesting Consult: Iván Valencia DO  Reason for Consult / Principal Problem: Medication reconciliation    Shwetha Jackson is a 57 y.o. male with past psychiatric history of bipolar versus major depressive disorder and ADHD, admitted for detox from opiates. Prior records were reviewed. Interview was conducted with use of  on an iPad. Patient was quiet and minimally responsive throughout interview. At periods he became tearful. Jonathan Alston is stating that he came to the hospital to detox from heroin and get help with his mental health medications. He states that yesterday he last used heroin and made the decision that he wants to improve his health. He states that for the last couple months he has been using 4-5 bags of heroin every day. He is worried his continued heroin use will ruin his relationships and get him fired from work. He also states that he has a history of mental health problems but has not been taking his medications but is unsure why he stopped. He most recently was being prescribed Seroquel and Remeron. He is unsure when he last took his medications. He states that the Remeron did not help him but the Seroquel helped him. He also stated he suffers from ADHD but has not been taking medication for some time. He currently is endorsing depressive symptoms in the form of poor sleep, poor appetite, poor concentration, feelings of guilt, and decreased energy. He described his mood as "not very well."  He denied anhedonia and suicidal ideation.   He added he last felt suicidal ideation a year ago when he thought about jumping off a bridge but brought himself to the hospital and was admitted to the inpatient psychiatric unit. He denies homicidal ideation. He denies auditory visual hallucinations. He denies any history of trauma or abuse. When asked about ideas of reference he stated that his phone said "hello," to him 2 days prior. He stated this is never happened before. When asked to clarify more about this experience he turned to the other side of the bed and began moaning. He refused to elaborate further. Psychiatric Review Of Systems:  Problems with sleep: yes, decreased  Appetite changes: yes, decreased  Weight changes: no  Low energy/anergy: yes  Low interest/pleasure/anhedonia: No  Somatic symptoms: no  Anxiety/panic: no  Rebecca: no  Guilt/hopeless: Yes  Self injurious behavior/risky behavior: no    Historical Information   Prior psychiatric diagnoses: Bipolar Disorder vs Major Depressive Disorder  Inpatient hospitalizations: Yes. Most recently in  for SI w/ plan to jump off bridge. He endorsed other hospitalizations but can't recall how many.     Suicide attempts: No  Self-harm behaviors: patient denies  Outpatient treatment: None currently  Psychiatric medication trial: Patient endorsed trials of Lexapro, Prozac, Remeron, and Seroquel      Substance Abuse History:    Social History     Tobacco Use    Smoking status: Every Day     Packs/day: 0.50     Years: 11.00     Total pack years: 5.50     Types: Cigarettes     Last attempt to quit: 2023     Years since quittin.8     Passive exposure: Current    Smokeless tobacco: Never   Vaping Use    Vaping Use: Never used   Substance Use Topics    Alcohol use: No    Drug use: Yes     Types: Heroin     Comment: 4 bags a day of herion last use was 12/5 3pm      Patient reports currently using 4-5 bags of heroin    I have assessed this patient for substance use within the past 12 months    Family Psychiatric History:   Patient denies any known family history of psychiatric illness, suicide attempt, or substance abuse    Social History  Marital history: Single  Children: no  Living arrangement: Lives alone  Functioning Relationships: poor support system  Education: high school diploma/GED  Occupational History: works as a   Other Pertinent History: None    Traumatic History:   Abuse: none reported  Other Traumatic Events: none reported    Past Medical History:   Diagnosis Date    Addiction to drug ([facility])     Asthma     Back pain     Bipolar 1 disorder ([facility])     Depression     Gastritis     GERD (gastroesophageal reflux disease)     Kidney stones     Renal cancer ([facility])     Substance abuse ([facility])        Medical Review Of Systems:  Review of Systems - Negative except as noted in HPI    Meds/Allergies   all current active meds have been reviewed  No Known Allergies    Objective   Vital signs in last 24 hours:  Temp:  [97.4 °F (36.3 °C)-98.3 °F (36.8 °C)] 97.4 °F (36.3 °C)  HR:  [60-83] 62  Resp:  [16-24] 18  BP: (100-148)/(56-94) 131/82    Mental Status Exam:  Appearance:  drowsy, poor eye contact, appears stated age, appropriate grooming and hygiene, and thin. Overtly  male. Behavior:  calm and limited cooperativity   Motor: no abnormal movements and normal gait and balance   Speech:  delayed initiation and soft   Mood:  "Not very well"   Affect:  mood-congruent, constricted, and tearful at times   Thought Process:  organized, logical   Thought Content: no verbalized delusions or overt paranoia   Perceptual disturbances: no reported hallucinations and does not appear to be responding to internal stimuli at this time   Risk Potential: No active or passive suicidal or homicidal ideation was verbalized during interview   Cognition: oriented to self and situation, appears to be of average intelligence, and cognition not formally tested   Insight:  Fair   Judgment: [place]     Laboratory results:  I have personally reviewed all pertinent laboratory/tests results.         The following portions of the patient's history were reviewed and updated as appropriate: allergies, current medications, past family history, past medical history, past social history, past surgical history, and problem list.    Assessment/Plan   Maegan Carney is a 57 y.o. male with a past psychiatric history of bipolar disorder versus major depressive disorder and ADHD. He presents to the hospital for detox and psychiatric medication reconciliation. He states that Seroquel is the only medication that is helped him in the past.  At this time he does not meet admission criteria for an inpatient psychiatric unit. We will restart his Seroquel at this time. Diagnosis: Major depressive disorder vs bipolar disorder     Recommended Treatment:   The patient does not meet criteria for inpatient psychiatric hospitalization and will be discharged home at their request.  The patient has capacity to understand the risks and benefits of the different types of psychiatric treatment available (including inpatient, outpatient, and partial hospitalization) and has verbalized understanding of the same. Start Seroquel 50mg at night for management of mood symptoms  Patient should follow up with his PCP/OP Psychiatrist   Psychiatry will sign off. Please call or TigerText the on-call team with any questions or concerns. Diagnoses were discussed with the patient. Available treatment options were discussed with the patient. Prior records were reviewed in [address] [place] [address]. The assessment and plan was discussed with the primary team.     Risks, benefits and possible side effects of Medications:   Risks, benefits, and possible side effects of medications were explained to the patient, who verbalizes understanding.           Jose Roberto Lori, DO    This note was not shared with the patient due to reasonable likelihood of causing patient harm

## 2023-12-06 NOTE — ED NOTES
During belongings check PCA was informed by the pt that he "just now took a saboxone" the remainder of the pt prescribed med was confiscated and sent to Pharmacy.      Keiko Puente RN  12/05/23 2001

## 2023-12-06 NOTE — ASSESSMENT & PLAN NOTE
Lab Results   Component Value Date    WBC 11.55 (H) 12/05/2023        Mildly Elevated WBC on admission  Per chart review, pt appears to have chronic intermittent leukocytosis, with baseline WBC 10-11  Possibly increased at this time 2/2 leukemoid reaction from acute opioid withdrawal, possible dehydration, and/or ongoing inguinal hernia with possible incarceration  No evidence of systemic infection   Pt afebrile, VSS  IVF hydration for possible dehydration component  Continue monitoring CBC, VS

## 2023-12-06 NOTE — PLAN OF CARE
Problem: SUBSTANCE USE/ABUSE  Goal: By discharge, will develop insight into their chemical dependency and sustain motivation to continue in recovery  Description: INTERVENTIONS:  - Attends all daily group sessions and scheduled AA groups  - Actively practices coping skills through participation in the therapeutic community and adherence to program rules  - Reviews and completes assignments from individual treatment plan  - Assist patient development of understanding of their personal cycle of addiction and relapse triggers  Outcome: Progressing  Goal: By discharge, patient will have ongoing treatment plan addressing chemical dependency  Description: INTERVENTIONS:  - Assist patient with resources and/or appointments for ongoing recovery based living  Outcome: Progressing     Problem: PAIN - ADULT  Goal: Verbalizes/displays adequate comfort level or baseline comfort level  Description: Interventions:  - Encourage patient to monitor pain and request assistance  - Assess pain using appropriate pain scale  - Administer analgesics based on type and severity of pain and evaluate response  - Implement non-pharmacological measures as appropriate and evaluate response  - Consider cultural and social influences on pain and pain management  - Notify physician/advanced practitioner if interventions unsuccessful or patient reports new pain  Outcome: Progressing     Problem: DISCHARGE PLANNING  Goal: Discharge to home or other facility with appropriate resources  Description: INTERVENTIONS:  - Identify barriers to discharge w/patient and caregiver  - Arrange for needed discharge resources and transportation as appropriate  - Identify discharge learning needs (meds, wound care, etc.)  - Arrange for interpretive services to assist at discharge as needed  - Refer to Case Management Department for coordinating discharge planning if the patient needs post-hospital services based on physician/advanced practitioner order or complex needs related to functional status, cognitive ability, or social support system  Outcome: Progressing     Problem: Knowledge Deficit  Goal: Patient/family/caregiver demonstrates understanding of disease process, treatment plan, medications, and discharge instructions  Description: Complete learning assessment and assess knowledge base.   Interventions:  - Provide teaching at level of understanding  - Provide teaching via preferred learning methods  Outcome: Progressing

## 2023-12-06 NOTE — PLAN OF CARE
Problem: SUBSTANCE USE/ABUSE  Goal: By discharge, will develop insight into their chemical dependency and sustain motivation to continue in recovery  Description: INTERVENTIONS:  - Attends all daily group sessions and scheduled AA groups  - Actively practices coping skills through participation in the therapeutic community and adherence to program rules  - Reviews and completes assignments from individual treatment plan  - Assist patient development of understanding of their personal cycle of addiction and relapse triggers  Outcome: Progressing  Goal: By discharge, patient will have ongoing treatment plan addressing chemical dependency  Description: INTERVENTIONS:  - Assist patient with resources and/or appointments for ongoing recovery based living  Outcome: Progressing     Problem: PAIN - ADULT  Goal: Verbalizes/displays adequate comfort level or baseline comfort level  Description: Interventions:  - Encourage patient to monitor pain and request assistance  - Assess pain using appropriate pain scale  - Administer analgesics based on type and severity of pain and evaluate response  - Implement non-pharmacological measures as appropriate and evaluate response  - Consider cultural and social influences on pain and pain management  - Notify physician/advanced practitioner if interventions unsuccessful or patient reports new pain  Outcome: Progressing     Problem: DISCHARGE PLANNING  Goal: Discharge to home or other facility with appropriate resources  Description: INTERVENTIONS:  - Identify barriers to discharge w/patient and caregiver  - Arrange for needed discharge resources and transportation as appropriate  - Identify discharge learning needs (meds, wound care, etc.)  - Arrange for interpretive services to assist at discharge as needed  - Refer to Case Management Department for coordinating discharge planning if the patient needs post-hospital services based on physician/advanced practitioner order or complex needs related to functional status, cognitive ability, or social support system  Outcome: Progressing

## 2023-12-06 NOTE — ASSESSMENT & PLAN NOTE
Patient with a h/o bipolar disorder  Home medications include Seroquel 200 mg QHS and mirtazapine 30 mg QHS  Pt admits medication noncompliance but amount of time he was not taking is unclear  Per RN, patient reports wanting to restart specifically Seroquel for sleep  No current SI/HI/thoughts of self harm  Psychiatry consulted for medication reconciliation/appropriate titration, will appreciate recommendations  Recommend OP f/u with PCP/OP Psychiatry

## 2023-12-06 NOTE — ASSESSMENT & PLAN NOTE
Patient with a history of chronic opioid use  Last heroin use was 12/5 at 3 PM  However, pt reported in the ED that he "just took a Suboxone" around [address] [facility] MAT/opioid withdrawal protocol with plan for eventual microinduction with Suboxone  Currently experiencing opioid withdrawal s/sx of agitation and generalized myalgias/arthralgias, with limited participation in interview to ascertain other withdrawal symptoms  Initiate Butrans 20 mcg/hr patch   Continue monitoring opioid withdrawal, and will consider microinduction when >24 hours have passed since pt's last opioid use, depending on severity of pt's w/d symptoms  Symptomatic and supportive care  Continuous pulse oximetry monitoring

## 2023-12-06 NOTE — DISCHARGE INSTR - OTHER ORDERS
CRISIS INFORMATION  If you are experiencing a mental health emergency, you may call the [facility] 24 hours a day, 7 days per week at [phone]. In [facility], call [phone]. Apoorva Mcdermott is a confidential 24/7 telephone support service manned by trained mental health consumers. [facility] provides support, a listening ear and can provide information about available services. [facility] specializes in the concerns of mental health consumers, their families and friends. However, we are also here for anyone who has a mental health concern, is confused about or just doesn't know anything about mental health or where to get information. To reach Apoorva Mcdermott, call [phone]. HOW TO GET SUBSTANCE ABUSE HELP:  If you or someone you know has a drug or alcohol problem, there is help:  Autumn Frias,6Th Floor: [address]: [phone]  An assessment is the first step. In addition to those listed there are other programs available in the area but assessment is best to determine an appropriate level of care. If you DO NOT have Medical Assistance (MA) or [employer], an assessment can be scheduled at one of these providers:  8111 [facility]  315 [facility], [address] [place] [address]  [phone]   [facility]  1700 [facility],2 And 3 S Floors., [facility], [address] [place] [address]  81810 [facility].  DAPHNE, 65 West [facility]  [address]  1200 Mariour[address] ROE [facility], [facility]   Step by [address]  2450 [address] [facility].BHUPINDER[address]  62783 [facility]., BHUPINDER Doll, [address] [place] [address]  [phone]     If you [address], an assessment can be scheduled at one of these providers:  [place] on Alcohol & Drug Abuse  [facility]., [facility], 630 [facility]  [address], 350 [facility]  150 [facility] D&A Intake Unit  [address] 1113 [facility]., 1st Floor, Dee SERNA  [phone]  [address], Sharon Vergara, 2000 E [facility]  1700 [facility],2 And 3 S Floors., [facility], 350 [facility]  71915 [facility]. DAPHNE, [address] [facility] [facility] [address]  [phone]   NET ([address])  90 [facility] 1801 [facility], Dee SERNA  [address]  [address]   Step by 112 [address] [facility], 630 [facility]  [address] Blossom grayson [facility]., [facility], 630 [facility]  1002 [facility] [address] [facility], 350 [facility]  [phone]     If you 3700 [facility], an assessment can be scheduled at one of these providers. Please contact these Providers to determine if they are in your network plan:  [place] D&A Intake Unit  [address] 1113 [facility]., 1st Floor, Dee SERNA  [facility]  1700 [facility],2 And 3 S Floors., [facility], 350 [facility]  [phone]   223 [facility]. DAPHNE, [address] [facility] [facility] [address]  [phone]   NET ([address])  90 [facility] 1801 [facility], Dee SERNA  [address]  1409 [facility] [address] 851 [facility]., [facility], 2755 Colonaryan aPdgett   [address] [place]’s Emergency Shelter  [employer] provides safe housing, nutritionally balanced meals, clothing, access to medical care, and a wide variety of local social service and support organizations for men 25years old and older.  How to access our Emergency Shelter:  Come to the Emergency Shelter at [address] [place] [address], [facility], [address], during regular business hours (8:30 am - 5:00 pm) with a valid ID. During the COVID-19 pandemic our Emergency Shelter is open 24 hours a day. Call us at [phone] at any time and we will give you instructions on how to access our facility and to learn more about the services we provide. We offer our services to all men over 18 regardless of race, color, creed, political affiliation, Denominational affiliation, or sexual orientation. If you’re under the age of 25, a woman, or have dependent children, we recommend you call 80 from any phone to find shelter and other services. 211 is the [facility] direct phone line for "coordinated care" in the [facility]. They have expertise and access to a wide range of housing options. If you are a man over the age of 25, without dependent children living with you, they will very likely direct you back to us. But if you are in any other demographic or special needs group, they have numerous options available for you. Call 211. The Y is Here to Help  The [place] opens November 15th for the 0745-9890 Season. The [address] [place] [address] St provides shelter, food, and a safe place for those experiencing homelessness in our community. The [facility] is open to welcome guests from November 15th through April 15th. Our doors are open each night from 7:00 p.m. - 7:00 a.m. to provide this critical service for our neighbors. [address] guests will always find themselves greeted with kindness and a safe place to sleep. The [employer] provides a safe place to sleep and a dignified sense of community for neighbors in need. The Y also serves healthy, prepared suppers seven days a week to our Hongdianzhibo.  830 [facility], 630 [facility]     Entrance Through the [employer] on [employer]      Phone: [phone] 4800 Saray Coffey operates a winter shelter from December 1 to March 31 each year. We provide emergency shelter, basic human services and case management to [phone] unduplicated homeless men and women annually through this program.        1648 Deirdre Navarro, 2000 E [facility] (M) [phone]  (F) 1708 [facility]. ([facility]) operates an emergency shelter which is open every night, November 15th to April 30th. Guest Requirements  [facility] accepts adults, 18 years and older, who identify themselves as homeless. Adults must be able to navigate multiple flights of stairs and independently care for themselves due to the construction of the facility in the [address] [place] [address]. [facility] is not able to provide shelter to minors under 25years of age. Individuals presenting at longterm who are unable to meet the above criteria will be assisted by [facility] through contacting [address] [place] [address] for assistance. Shelter Guests are required to obtain a voucher from the [employer] located at [facility]. 10 [facility] Dee SERNA. Operations  Shelter doors open at 5 PM and will close after 8 PM. Lenny Graves will not be allowed entrance AFTER 8 PM unless they have made prior arrangements with the [employer]iki or have police escort. Guests are asked to not congregate outside the shelter before 4:45 PM. Guest admittance to the shelter is on the [facility] side of the building. Everyone entering the building is required to adhere to COVID-19 protocols and maintain social distancing. [facility] bed capacity is 70 beds per night. Beds may not be held or reserved in advance of a Guest’s stay at shelter. Beds are assigned on a first arrived, first assigned basis. Beds will be held for Guests who stay at the shelter the following night until 8 PM, at that time the bed may be assigned to other individuals seeking shelter.   Jacqualine Abo is provided every night from 5:30 PM to 7:30 PM.  Showers are available if volunteers are present. Partner service providers are accessible at the shelter during the weeknights. List of visiting partner service providers will be posted on the Service Provider Calendar in the shelter.   Guests are woken at 6 AM every day and are required to leave the premises by 6:45 AM.    Local DocRun Locations & Contact Information:  [address] [facility] [address]:  [facility], [facility] BENNY lund, Inc, Lorie Orosco, [address] [place] [address], [place], MonJefferson Abington Hospitallo denisha,  [place], and [address] [place] [address]  The [facility]  Address: [address], [facility], [facility]6 [facility] 75  Phone: [phone]  Hours of Operation: Fridays 10:00AM-1:00PM  [facility]  Address: 39512 [facility] 45 South, [facility], [address] [place] [address]  Phone: [phone]  Hours of Operation: Thursdays 5:30PM-6:30PM    5825 [employer] [facility]  Address: 5001 [facility], [facility], [address] [place] [address]  Phone: [phone]  Hours of Operation: Monday-Friday 9:30AM-12:00PM  [facility]  Address: 800 [facility], [address] [place] [address]  Phone: [phone]  Hours of Operation: By appointment -- Mondays, Tuesdays, Thursdays, & Fridays 8:30AM-4:00PM;  Wednesdays 8:30AM-12:00PM  [facility]IA [facility]  Address: 2020 [facility], [facility], [address] [place] [address]  Phone: [phone]  Hours of Operation: 1st & 3rd Saturdays 10:00AM-12:00PM  [facility]  Address: [address]00, [facility], [address] [place] [address]  Phone: [phone]  Hours of Operation: 2nd & 4th Thursdays 4:00PM-5:30PM (Only 4th Thursdays during the summer)  [facility]  Address: 600 [facility], 630 [facility]  Phone: [phone]  Hours of Operation: By appointment -- Wednesdays 6:00PM  2401 [facility]  Address: 1002 [facility], [address] [place] [address]  Phone: [phone]  Hours of Operation: Thursdays 11:00AM-2:00PM or By appointment  [facility]  Address: Dago Dinh [address]  Phone: [phone]  Hours of Operation: Saturdays 9:00AM-11:30AM  [facility] MENTAL HEALTH SERVICES  Address: [address]  Phone: [phone]  Hours of Operation: Fridays, 3rd & 4th Saturdays 9:00AM-11:00AM  [address] [place] [address]  Address: 118 [place] [address]  Phone: [phone]  Hours of Operation: Tuesdays 3:00PM-5:00PM or By appointment  [employer]  Address: [address] [place] [address] [address]  Phone: [phone]  Hours of Operation: By appointment  [facility]  Address: [address] [place] [address]  Phone: [phone]  Hours of Operation: 3rd Saturday, 8:00AM-11:00AM  [facility]  Address: 74-03 [facility], [address]  Phone: [phone]  Hours of Operation: 3rd Sunday 4:00PM-5:30PM    500 [facility])  Address: [address]  Phone: [phone]  Hours of Operation: 3rd Wednesday 9:00AM-4:00PM  [facility]  Address: [address]  Phone: [phone]  Hours of Operation: 1st & 3rd Saturdays 9:00AM-11:30AM  4867 [place]  Address: [address]  Phone: [phone]  Hours of Operation: By appointment  Noah Travis  Address: [address]  Phone: [phone]  Hours of Operation: 3rd Sunday 12:00PM-1:30PM  361 [facility]  Address: [address]  Phone: [phone]  Hours of Operation: 1st & 3rd Thursdays 6:30PM-7:30PM; By appointment -- Mondays  [facility]  Address: [address]  Phone: [phone]  Hours of Operation: By appointment  [facility]  Address: [address]  Phone: [phone]  Hours of Operation: 1st & 3rd Wednesdays 4:00PM-5:30PM  [place] [address]  Address: Con[facility]ado [facility], 350 [facility]  Phone: [phone]  Hours of Operation: 4th Wednesday 6:30PM-7:30PM  [address] Open [employer] Pantry  Address: [address], [facility], 350 [facility]  Phone: [phone]  Hours of Operation: 2nd Tuesday 10:00AM-12:00PM; 4th Thursday 4:00PM-6:00PM  [address] [place] [address])  Address: 452 [facility] [address], [facility], [address]  Phone: [phone]  Hours of Operation: By appointment -- Monday-Friday 9:00AM-5:00PM  [address] [facility] [address]  Address: [address], [facility], [address] [place] [address]  Phone: [phone]  Hours of Operation: 1st & 3rd Tuesdays 9:00AM-10:30AM; Thursdays 6:00PM-8:00PM    Love and The My 1%  Address: [address] [place] [address]  Phone: [phone]  Hours of Operation: Every other Saturday 10:00AM-12:00PM  [facility] [place] [facility]  Address: [facility], [address] [place] [address] 121  Phone: [phone]  Hours of Operation: 3rd Monday 6:00PM-7:30PM  [address]  Address: 2101 [facility], [facility], [address] [place] [address]  Phone: [phone]  Hours of Operation: 4th Sunday 9:00AM-11:00AM  First [address], Jude  Address: 222 S Suzanne [facility], [address] [place] [address]  Phone: [phone]  Hours of Operation: Saturdays 10:30AM-12:30PM  [facility]  Address: [address] [place] [address], [facility], [address] [place] [address]  Phone: [phone]  Hours of Operation: By appointment -- Tiffany Anaya 8:30AM-4:30PM  [facility] Pantry  Address: 201 [facility], [facility], [address] [place] [address]  Phone: [phone]  Hours of Operation: 1st & 3rd Wednesdays [address] [place] [address]  Address: Donaldmaximino Ketan lund, [address] [place] [address]  Phone: [phone]  Hours of Operation: Wednesdays & Fridays 3:00PM-4:00PM  Sin  [address]  Ashwini Trimble Pantry  Address: [address], [place], [place] [address]  Phone: [phone]  Hours of Operation: Every other Saturday 10:30AM-12:30PM  8451 [facility]  Address: [address], [place], [facility] Pradeep Karlose  Phone: [phone]  Hours of Operation: Tuesdays & Wednesdays 10:00AM-2:00PM; Closed last week of the month  [address], [address] at [employer]  Address: [address] [facility] [address], [facility]  Phone: [phone]  Hours of Operation: 1st Saturday 9:00AM-12:00PM; 3rd Thursday 4:00PM-7:00PM    [address] [place] [address]  Address: 223 [facility], 1015 [facility], 55 [facility] [address]  Phone: [phone]  Hours of Operation: 3rd Monday & 3rd Wednesday 4:00PM-6:00PM; 3rd Saturday 10:00AM-12:00PM  1937 [facility]  Address: Rosa Nice, [address] Cecelia Frias   Phone: [phone]  Hours of Operation: 1st & 3rd Mondays 9:00AM-11:30AM  3440 MAAME Smart [facility]  Address: [facility]1 [facility], [address] [place] [address]  Phone: [phone]  Hours of Operation: 2nd Saturday 9:00AM-11:00AM  Chavo Gamble  Address: [facility], [facility] [address]  Phone: [phone]  Hours of Operation: 1st, 2nd, & 3rd Thursdays 4:00PM-7:00PM; Last Saturday 9:30AM-12:00PM  [facility]  [facility]  Address: [place]  Phone: [phone]  Hours of Operation: Tuesdays 6:00PM-7:00PM; Saturdays 4:00PM-5:00PM; Sundays 12:30PM-1:30PM  [employer]  Address: 2303 [facility] [place]  Phone: [phone]  Hours of Operation: By appointment -- Mondays 6:00PM      [address]  Address: [address] Sena Padgett, [address]  Phone: [phone]  Hours of Operation: Monday-Friday 1:30PM-2:30PM  [facility])  Address: [address] [place] [address]  Phone: [phone]  Hours of Operation: Monday-Friday 9:00AM-5:00PM  [facility]  Address: 26 [facility]  Phone: [phone]  Hours of Operation: Tuesday-Thursday 12:00PM-1:00PM    [address]  Address: [facility]ammy[address]  Phone: [phone]  Hours of Operation: Sundays 8:00AM-9:00AM; Some holidays  3800 [facility], Nw:  [facility], [place], [place], and [address] [place] [address] Street  Address: [address] [place] [address]  Phone: [phone]  Hours of Operation: 2nd Tuesday 10:00AM-12:00PM  [address] [place] [address] Dr gonzalez [facility]  Address: 555 [facility], [address] [place] [address]  Phone: [phone]  Hours of Operation: Wednesdays 10:00AM-12:00PM  [facility] MENTAL HEALTH [place]  Address: [address] [place] [address], [address] [place] [address]  Phone: [phone]  Hours of Operation: 1st & 3rd Tuesdays 5:00PM-6:00PM  [facility]  Address: [address] [place], [place], [address]  Phone: [phone]  Hours of Operation: Tuesdays 6:00PM-7:00PM  [facility]  Address: 222 Roy[address] [facility], [address] [place] [address]  Phone: [phone]  Hours of Operation: 1st & 2nd Saturdays 12:00PM- 4:00PM  [employer]  Address: 4847 Bl[address] Cav[address] RdWest Park Hospital [facility], [address] [place] [address]  Phone: [phone]  Hours of Operation: Monday-Friday 10:30AM-11:30AM  1700 Medical [facility]  Address: 89[address] [facility], [address] [place] [address]  Phone: [phone]  Hours of Operation: 3rd & 4th Saturdays 9:00AM-11:00AM; [facility] residents only    [address]  Address: 30 [facility], [facility], [address] [place] [address]  Phone: [phone]  Hours of Operation: 2nd Thursday 10:00AM-12:00PM  [address] Pantry  Address: [address] [place] [address], [address] [place] [address]  Phone: [phone]  Hours of Operation: Monday & Tuesday of the first full week of the month 9:00AM-11:00AM  [facility]  Address: 160 [facility] Annie, [address] [place] [address]  Phone: [phone]  Hours of Operation: Mondays, Wednesdays, & Thursdays 9:30AM-11:30AM  [address] [place] [address]  Address: 2927 [facility] Hospitalterrie Valencia, [address] [place] [address]  Phone: [phone]  Hours of Operation: 2nd & 4th Saturdays 10:00AM-12:00PM  [address] [place] [address]  Address: [place] [facility] WestTungterrie ValenciaDee  Phone: [phone]  Hours of Operation: By appointment -- Tuesdays & Wednesdays 10:00AM-4:00PM, Thursdays 10:00AM-  12:00PM, & Sundays 4:00PM-7:00PM  [facility]  Address: [address] [place] [address] Tung ValenciaDee  Phone: [phone]  Hours of Operation: 3rd Saturday 10:00AM-12:00PM  CRIS FAITH [facility] Gabriel Whitney  Address: 1355 [facility] Hospitalterrie ValenciaDee  Phone: [phone]  Hours of Operation: 3rd Saturday 9:00AM-11:30AM or by appointment  [address] [place] [address]  Address: [address] [place] [address] Tung DowlingDee palumbo  Phone: [phone]  Hours of Operation: 2nd & 4thTuesdays 10:00AM-12:00PM  [facility]  Address: [address] [place], [place] [address]terrie ValenciaDee  Phone: [phone]  Hours of Operation: 1st, 2nd, & 4th Wednesdays 11:00AM-1:00PM; 3rd Wednesday 5:00PM-7:00PM  [facility] BEHAVIORAL HEALTH [facility]  Address: [facility] Hospital[facility] ValenciaDee  Phone: [phone]  Hours of Operation: Wednesdays 10:00AM-12:00PM; Last Wednesday 6:00PM-8:00PM  [address]  Address: 315 [facility], 2000 E [facility]  Phone: [phone]  Hours of Operation: Fridays 8:30AM-11:30AM & 1:30PM-3:30PM    [facility]  Address: 7710 [facility], 2000 E SCM-GL  Phone: [phone]  Hours of Operation: By appointment--Mondays-Fridays 9:00AM -4:30PM.  [facility]  Address: 350 [facility], 2000 E SCM-GL  Phone: [phone]  Hours of Operation: 1st & 3rd Tuesdays 6:00PM-7:30PM  [facility]  Address: 29 [facility], 2000 E SCM-GL  Phone: [phone]  Hours of Operation: By appointment -- Mondays-Fridays 9:00AM-5:00PM  Sol Moreira [facility])  Address: 900 [facility], 2000 E [facility]  Phone: [phone]  Hours of Operation: 3rd, 4th, & 5th Thursdays 5:00PM-7:00PM  [facility]  Address: 321 [facility], 2000 [facility]  Phone: [phone]  Hours of Operation: Mondays 10:00AM-12:30PM; Thursdays 10:00AM-12:30PM & 1:00PM-3:30 PM  [employer], [facility]  Address: [facility], 800 [facility], 2000 E [facility]  Phone: [phone]  Hours of Operation: Tuesdays 5:00PM-6:00PM  3100 [facility]  Address: 805 [facility], 2000 [facility]  Phone: [phone]  Hours of Operation: Thursdays 6:00PM-7:30PM; Closed 5th Thursday  [address] [place] [address]  Address: [address]  Phone: [phone]  Hours of Operation:  For last names beginning with A-M: 2nd Tuesday 8:00AM-10:00AM or 6:00PM-7:00PM;  For last names beginning with N-Z: 2nd Wednesday 8:00AM-10:00AM  35972 [facility]  Address: 361 [facility]  Phone: [phone]  Hours of Operation: Wednesdays 9:30AM-12:00PM; 1st, 2nd, & 3rd Thursdays 6:00PM-8:00PM; 2nd & 3rd Saturdays 9:30AM-12:00PM  [place] - [address]  [place] [facility]  Address: [address]  Phone: [phone]  Hours of Operation: 3rd Saturday 9:00AM-11:00AM    [facility] [place]  Address: [address] Frias, Rayodaniela, 951 N [facility]  Phone: [phone]  Hours of Operation: Tuesdays 10:00AM-2:00PM  [facility]  Address: [address], [place], [address]  Phone: [phone]  Hours of Operation: Tuesdays 10:00AM-12:00PM; Closed 5th Tuesday  [facility]  Address: [address] [facility] [address] [address] [address]  Phone: [phone]  Hours of Operation: Mondays 11:00AM-12:30PM & 7:00PM-8:30PM  [address]  Address: [address]  Phone: [phone]  Hours of Operation: Mondays 5:00PM-7:00PM  Dakota Cortes/Normachris Myriam  Address: [address]  Phone: [phone]  Hours of Operation: 2nd & 4th Saturdays 9:00AM-10:00AM  [address] [facility]  Address: [address] [facility]  Phone: [phone]  Hours of Operation: 2nd Saturday - Lunch (Call for times)  [facility]  [facility]  Phone: [phone]  Ours of Operation: Sundays 1:00PM-2:00PM  [address]  Address: [address] [place] [address] ALEX DensonJOCYRHONDA Danapatrice  Phone: [phone]  Hours of Operation: Saturdays 12:00PM-1:00PM  SUN Addison  Address: [address] [facility] D[address]  Phone: [phone]  Hours of Operation: Monday-Friday 8:00AM-4:00PM  [facility]  Address: [address] [place] [address] GEORGES DensonLOUISEDee SANDOVAL  Phone: [phone]  Hours of Operation: Monday-Friday 12:00PM-1:00PM    [facility]  Address: [address] [facility], [address] [facility]  Phone: [phone]  Hours of Operation: Call for times  [facility]  Address: [address] [facility], [address] [facility]  Phone: [phone]  Hours of Operation: Monday-Friday 12:00PM-1:00PM

## 2023-12-06 NOTE — QUICK NOTE
Approximately 0325: Pt rang call bell c/o SOB; provider evaluated pt at bedside, and when asked by pt what he needed, pt replied "air!" +Mild conversational dyspnea noted. VSS with SpO2 93-97% on room air. PRN albuterol inhaler administered by RN. DuoNeb treatment also ordered by this provider, which was communicated to the patient via [employer] . Also counseled patient on the importance of obtaining CT scan to further guide diagnosis and treatment. Pt states he does not want to go to CT at this time, as he is not feeling well but agrees to CT scan once he feels better after respiratory treatment interventions. VS monitored on continuous pulse oximetry, and after several minutes following albuterol inhaler, pt's SpO2 remains stable, so DuoNeb order was discontinued. Will continue to monitor. 0400: Pt reassessed at bedside. States he still does not feel good but his respiratory distress has objectively resolved. He now declines CT scan once again, is agreeable to opioid withdrawal medications. When asked to specify if he is having pain, anxiety, etc. to determine appropriate supportive care medication to administer, pt states he is having "all of it." Discussed with RN and will administer gabapentin at this time for withdrawal symptoms and continue to monitor. 0423: Notified by RN that patient is refusing AM labs and acknowledged. Will continue to monitor and treat opioid withdrawal and will also notify day shift provider of pt's refusal of lab work.

## 2023-12-06 NOTE — ASSESSMENT & PLAN NOTE
Stable. Reduced as inpatient by me for symptomatic comfort but recurred as typical for his history. No evidence of strangulation. .  Seen by surgery who agrees with elective repair, ice, NSAIDs.

## 2023-12-06 NOTE — ASSESSMENT & PLAN NOTE
Patient with a h/o bipolar disorder  Home medications include Seroquel 200 mg QHS and mirtazapine 30 mg QHS  Pt admits medication noncompliance but amount of time he was not taking is unclear  Per RN, patient reports wanting to restart specifically Seroquel for sleep  No current SI/HI/thoughts of self harm  Psychiatry consulted for medication reconciliation/appropriate titration, recommending increasing Seroquel to BID  Recommend OP f/u with PCP/OP Psychiatry

## 2023-12-06 NOTE — ASSESSMENT & PLAN NOTE
Lab Results   Component Value Date    WBC 15.68 (H) 12/08/2023      CT revealed possible pneumonia. Timing of steroids maybe related. .   Follow-up as outpatient.

## 2023-12-06 NOTE — UTILIZATION REVIEW
Initial Clinical Review    Pt presented to [facility] for its Level IV medically managed intensive inpatient detox unit. Admission: Date/Time/Statement:   Admission Orders (From admission, onward)       Ordered        12/05/23 2109  Inpatient Admission  Once            12/05/23 1837  INPATIENT ADMISSION  Once                          Orders Placed This Encounter   Procedures    INPATIENT ADMISSION     Standing Status:   Standing     Number of Occurrences:   1     Order Specific Question:   Level of Care     Answer:   Med Surg [16]     Order Specific Question:   Bed request comments     Answer:   Detox unit     Order Specific Question:   Estimated length of stay     Answer:   More than 2 Midnights     Order Specific Question:   Certification     Answer:   I certify that inpatient services are medically necessary for this patient for a duration of greater than two midnights. See H&P and MD Progress Notes for additional information about the patient's course of treatment. ED Arrival Information       Expected   -    Arrival   12/5/2023 16:29    Acuity   Urgent              Means of arrival   Walk-In    Escorted by   [facility]    Admission type   Emergency              Arrival complaint   detox/asthma             Chief Complaint   Patient presents with    Detox Evaluation     Pt states "I need detox from heroin". Last use 3/4 hours ago         Initial Presentation: 57 y.o. male who presented to medical detox. Inpatient admission for evaluation and treatment of acute opioid withdrawal. Presented w/ request for detox from opioids. Uses 3-4 bags of heroin/fentanyl via smoking daily, last use 12/5 @ 1500. On exam, restlessness, myalgias, inattentive, uncooperative, agitated. UDS positive for cocaine, opiates. COWS 12. Pt refusing CT evaluation and physical examination of known R inguinal hernia.  Plan: COWS monitoring w/ symptomatic supportive care, Butrans patch, IVF, micro induction of Suboxone when clinically indicated, telemetry, continuous pulse ox, Trend labs, replete electrolytes as needed. Continue PTA meds. Psychiatry consulted. Date: 12/06/23 Day 2: Pt continues w/ SOB on prn albuterol. On exam, wheezing, tender and soft inguinal reducible mass, diaphoretic, anxious. COWS 9. Plan: continue COWS monitoring w/ symptomatic supportive care, Butrans patch, micro induction of Suboxone, telemetry, continuous pulse ox, Trend labs, replete electrolytes as needed. Continue PTA meds. Psychiatry: Resume seroquel 50 mg qHS. Does not require inpatient psychiatric admission at this time. Date: 12/07/23    Day 3: COWS 20. Has surpassed a 2nd midnight with active treatments and services, which include IVF, micro-induction of Suboxone, continue current meds, Trend labs, replete electrolytes as needed; telemetry, continuous pulse ox, symptomatic supportive care.     ED Triage Vitals   Temperature Pulse Respirations Blood Pressure SpO2   12/05/23 1641 12/05/23 1641 12/05/23 1641 12/05/23 1644 12/05/23 1641   98 °F (36.7 °C) 83 (!) 24 121/64 96 %      Temp Source Heart Rate Source Patient Position - Orthostatic VS BP Location FiO2 (%)   12/05/23 1641 12/05/23 1641 12/05/23 1644 12/05/23 1644 --   Oral Monitor Lying Right arm       Pain Score       12/05/23 1721       10 - Worst Possible Pain          Wt Readings from Last 1 Encounters:   12/05/23 61.2 kg (135 lb)     Vital Signs:   Date/Time Temp Pulse Resp BP MAP (mmHg) SpO2 O2 Device   12/07/23 0727 98.4 °F (36.9 °C) 75 18 90/54 -- 97 % None (Room air)   12/07/23 0719 -- -- -- -- -- 96 % --   12/07/23 0255 -- 65 -- -- -- -- --   12/07/23 0254 97.9 °F (36.6 °C) 65 18 110/67 -- 100 % None (Room air)   12/06/23 2052 -- 74 18 -- -- 93 % None (Room air)   12/06/23 1905 98.1 °F (36.7 °C) 70 18 110/58 68 95 % None (Room air)   12/06/23 1622 -- 78 -- 113/58 -- -- --   12/06/23 1523 -- -- -- -- -- 97 % --   12/06/23 1504 97.6 °F (36.4 °C) 63 18 97/55 -- 97 % None (Room air)   12/06/23 1129 97.8 °F (36.6 °C) 62 18 117/72 -- 96 % None (Room air)   12/06/23 0834 97.4 °F (36.3 °C) Abnormal  62 18 131/82 -- 97 % None (Room air)   12/06/23 0328 97.7 °F (36.5 °C) 63 18 122/80 -- 97 % None (Room air)   12/05/23 2100 98.3 °F (36.8 °C) 60 16 100/56 -- 97 % None (Room air)   12/05/23 2049 -- 60 -- -- -- -- --   12/05/23 1840 -- 69 22 148/76 -- 93 % None (Room air)   12/05/23 1731 -- -- -- -- -- -- None (Room air)   12/05/23 1723 -- -- -- 139/94 -- -- --       Severity of Ethanol Withdrawal Scale (SEWS):    12/05/23 2049   ANXIETY: Do you feel that something bad is about to happen to you right now? 0  -LS   NAUSEA and DRY HEAVES or VOMITING? 0  -LS   SWEATING: (includes moist palms, sweating now)? Score 0 or 2 0  -LS   TREMOR: with arms extended eyes closed? 0  -LS   AGITATION: Fidgety, restless, pacing?  0  -LS   DISORIENTATION: 0  -LS   HALLUCINATIONS: 0  -LS   VITAL SIGNS: ANY (Pulse >873, Diastolic BP >66, Temp >66.9) 0  -LS   SEWS Total Score 0  -LS     Clinical Opiate Withdrawal Scale (COWS)   12/07/23 0255 12/06/23 0328 12/05/23 2049   Pulse 65 63  -DE 60  -LS   Resting Pulse Rate: Measured After Patient is Sitting or Lying for One Minute 0 0  -LS 0  -LS   GI Upset: Over Last Half Hour 1 0  -LS 0  -LS   Sweating: Over Past Half Hour Not Accounted for by Room Temperature of Patient Activity 1 1  -LS 1  -LS   Tremor: Observation of Outstretched Hands 2 0  -LS 0  -LS   Restlessness: Observation During Assessment 3 1  -LS 3  -LS   Yawning: Observation During Assessment 1 0  -LS 1  -LS   Pupil Size 2 2  -LS 1  -LS   Anxiety and Irritability 2 4  -LS 4  -LS   Bone or Joint Aches: If Patient was Having Pain Previously, Only the Additional Component Attributed to Opiate Withdrawal is Scored 1 1  -LS 2  -LS   Gooseflesh Skin 3 0  -LS 0  -LS   Runny Nose or Tearing: Not Accounted for by Cold Symptoms or Allergies 4 0  -LS 0  -LS   Clinical Opiate Withdrawal Scale Total Score 20 9  -LS 12  -LS         Pertinent Labs/Diagnostic Test Results:   Results from last 7 days   Lab Units 12/07/23  0504 12/05/23  1707   WBC Thousand/uL 11.02* 11.55*   HEMOGLOBIN g/dL 13.2 14.9   HEMATOCRIT % 41.5 46.1   PLATELETS Thousands/uL 379 417*   NEUTROS ABS Thousands/µL  --  7.18         Results from last 7 days   Lab Units 12/07/23  0504 12/05/23  1707   SODIUM mmol/L 141 138   POTASSIUM mmol/L 3.9 4.1   CHLORIDE mmol/L 106 99   CO2 mmol/L 25 30   ANION GAP mmol/L 10 9   BUN mg/dL 12 15   CREATININE mg/dL 0.95 1.04   EGFR ml/min/1.73sq m 95 85   CALCIUM mg/dL 9.3 10.0   MAGNESIUM mg/dL 2.1  --      Results from last 7 days   Lab Units 12/07/23  0504 12/05/23  1707   AST U/L 11* 13   ALT U/L 6* 8   ALK PHOS U/L 50 60   TOTAL PROTEIN g/dL 6.8 7.2   ALBUMIN g/dL 3.7 4.2   TOTAL BILIRUBIN mg/dL 0.23 0.37         Results from last 7 days   Lab Units 12/07/23  0504 12/05/23  1707   GLUCOSE RANDOM mg/dL 116 103     Results from last 7 days   Lab Units 12/05/23  1856   AMPH/METH  Negative   BARBITURATE UR  Negative   BENZODIAZEPINE UR  Negative   COCAINE UR  Positive*   METHADONE URINE  Negative   OPIATE UR  Positive*   PCP UR  Negative   THC UR  Negative       ED Treatment:   Medication Administration from 12/05/2023 1629 to 12/05/2023 2029         Date/Time Order Dose Route Action     12/05/2023 1723 EST cloNIDine (CATAPRES) tablet 0.2 mg 0.2 mg Oral Given     12/05/2023 1726 EST ipratropium-albuterol (DUO-NEB) 0.5-2.5 mg/3 mL inhalation solution 3 mL 3 mL Nebulization Given     12/05/2023 1721 EST ketorolac (TORADOL) injection 30 mg 30 mg Intravenous Given          Past Medical History:   Diagnosis Date    Addiction to drug ([facility])     Asthma     Back pain     Bipolar 1 disorder ([facility])     Depression     Gastritis     GERD (gastroesophageal reflux disease)     Kidney stones     Renal cancer ([address])     Substance abuse ([address])      Present on Admission:   Opioid withdrawal ([address])   Opioid use disorder, severe, dependence (HCC)   Right inguinal hernia   Leukocytosis   Thrombocytosis   Tobacco use disorder   Bipolar 1 disorder (HCC)   Mild persistent asthma with exacerbation      Admitting Diagnosis: Asthma exacerbation [J45.901]  Persons encountering health services in other specified circumstances [Z76.89]  Heroin abuse ([address] ) [F11.10]  Withdrawal from sedative, hypnotic, or anxiolytic drug ([address] ) [F13.939]  Age/Sex: 57 y.o. male  Admission Orders:  Regular Diet. SCDs. Fall & Seizure Precautions. COWS monitoring. Telemetry & Continuous Pulse Ox. Scheduled Medications:  albuterol, 2.5 mg, Nebulization, TID  buprenorphine, 0.5 mg, Sublingual, Q2H x4 doses  buprenorphine-naloxone, 2 mg, Sublingual, Q2H x4 doses  enoxaparin, 40 mg, Subcutaneous, Daily  nicotine, 1 patch, Transdermal, Daily  QUEtiapine, 50 mg, Oral, HS  transdermal buprenorphine, 20 mcg, Transdermal, Q7 Days    Continuous IV Infusions:  sodium chloride, 100 mL/hr, Intravenous, Continuous    PRN Meds:  acetaminophen, 650 mg, Oral, Q6H PRN; 12/5 x1, 12/6 x1  albuterol, 2 puff, Inhalation, Q4H PRN; 12/6 x2  albuterol, 2.5 mg, Nebulization, Q4H PRN  cloNIDine, 0.1 mg, Oral, Q6H PRN; 12/6 x2  gabapentin, 300 mg, Oral, Q8H PRN; 12/6 x2  ketorolac, 15 mg, Intravenous, Q6H PRN; 12/6 x1        IP CONSULT TO CASE MANAGEMENT  IP CONSULT TO PSYCHIATRY    Network Utilization Review Department  ATTENTION: Please call with any questions or concerns to [phone] and carefully listen to the prompts so that you are directed to the right person. All voicemails are confidential.   For Discharge needs, contact Care Management DC Support Team at [phone] opt. 2  Send all requests for admission clinical reviews, approved or denied determinations and any other requests to dedicated fax number below belonging to the campus where the patient is receiving treatment.  List of dedicated fax numbers for the Facilities:  FACILITY NAME UR FAX NUMBER   ADMISSION DENIALS (Administrative/Medical Necessity) [phone]   DISCHARGE SUPPORT TEAM (NETWORK) [id number] [facility] (Maternity/NICU/Pediatrics) [address] 1000 [facility] [phone] [address] [facility] [address] [address] [facility] [address] [phone]

## 2023-12-06 NOTE — ASSESSMENT & PLAN NOTE
Pt has a hx of R inguinal hernia, diagnosed during previous ED visit 10/30/23  CT abd/pelvis revealed "Right inguinal hernia containing fat with mild induration suggesting possible incarceration.  Correlation with physical examination is recommended."  Per ED provider note, General surgery physician recommended attempting to reduce the hernia and d/c with OP f/u; however, pt refused hernia reduction, requested only pain medications and stated he would follow up as outpatient   Today in the ED, attending physician reported pt has a soft, reducible R inguinal hernia; imaging not obtained while pt was in the ED  CT abd/pelvis ordered on admission to reassess inguinal hernia, as it was incarcerated on previous CT as above  Patient refused CT scan and physical examination to reassess hernia upon admission  Continue to monitor clinically as able at this time and encourage re-evaluation via ordered imaging and/or examination

## 2023-12-06 NOTE — ASSESSMENT & PLAN NOTE
Lab Results   Component Value Date     (H) 12/05/2023      Increased PTL count on admission  Per chart review, pt appears to have chronic thrombocytosis, with baseline -500  No obvious evidence of active bleeding/bruising on limited physical exam  IVF hydration was given with resolution

## 2023-12-06 NOTE — ASSESSMENT & PLAN NOTE
Resp panel negative. Changed nebs to duonebs  Continue steroids. Ambulate with P.ox today  Cont abx for possible pneumonia on imaging.

## 2023-12-06 NOTE — PROGRESS NOTES
PROGRESS NOTE  DEPARTMENT OF MEDICAL TOXICOLOGY  LEVEL 4 MEDICAL DETOX UNIT  Ronda Clark 57 y.o. male MRN: [id number]  Unit/Bed#: 5T DETOX 507-01 Encounter: [id number]      Reason for Admission/Principal Problem: Asthma exacerbation and opioid withdrawal  Rounding Provider: Jaclyn Aviles DO  Attending Provider: Jaclyn Aviles DO   12/5/2023  4:33 PM           Bipolar 1 disorder ([facility])  Assessment & Plan  Patient with a h/o bipolar disorder  Home medications include Seroquel 200 mg QHS and mirtazapine 30 mg QHS  Pt admits medication noncompliance but amount of time he was not taking is unclear  Per RN, patient reports wanting to restart specifically Seroquel for sleep  No current SI/HI/thoughts of self harm  Psychiatry consulted for medication reconciliation/appropriate titration, will appreciate recommendations  Recommend OP f/u with PCP/OP Psychiatry     Tobacco use disorder  Assessment & Plan  Pt reports smoking 0.5 ppd of cigarettes  Offer NRT   Encourage cessation     Thrombocytosis  Assessment & Plan  Lab Results   Component Value Date     (H) 12/05/2023      Increased PTL count on admission  Per chart review, pt appears to have chronic thrombocytosis, with baseline -500  No obvious evidence of active bleeding/bruising on limited physical exam  IVF hydration for possible dehydration component   Continue to monitor CBC    Leukocytosis  Assessment & Plan  Lab Results   Component Value Date    WBC 11.55 (H) 12/05/2023        Mildly Elevated WBC on admission  Per chart review, pt appears to have chronic intermittent leukocytosis, with baseline WBC 10-11  Possibly increased at this time 2/2 leukemoid reaction from acute opioid withdrawal, possible dehydration, and/or ongoing inguinal hernia with possible incarceration  No evidence of systemic infection   Pt afebrile, VSS  IVF hydration for possible dehydration component  Continue monitoring CBC, VS     Right inguinal hernia  Assessment & Plan  Hernia soft and reducible although mildly tender. No evidence of obstruction. No evidence of strangulation. Continue to monitor. CT ordered but not emergent. Opioid use disorder, severe, dependence ([address])  Assessment & Plan  Patient with a history of chronic heroin/fentanyl use  Uses 3-4 bags daily via smoking  No known h/o IVDU  History of prior Suboxone MAT  PDMP reviewed: last Suboxone prescription filled 10/4/23, (21 films/14 days)  Patient does have compliance issues and is specifically requesting Subutex, raising concern for diversion. Patient agrees to go on Sublocade  Management of opioid withdrawal under MAT protocol as above  Case management consulted for assistance with aftercare resources    Opioid withdrawal [facility])  Assessment & Plan  Patient with a history of chronic opioid use  Last heroin use was 12/5 at 3 PM  However, pt reported in the ED that he "just took a Suboxone" around [address] and this led to precipitated withdrawal   Initiate MAT/opioid withdrawal protocol with plan for eventual microinduction with Suboxone  Continue Butrans 20 mcg/hr patch   Continue monitoring opioid withdrawal, and will consider microinduction when >24 hours have passed since pt's last opioid use, depending on severity of pt's w/d symptoms, test dose later today if patient agreeable. Patient does have compliance issues with his Suboxone and prior prescriptions of Subutex. He takes them intermittently to relieve withdrawal and anxiety. He was counseled on compliance and long-term treatment and agrees to [employer]. Will then refer to share. Will have CRS meet with patient while in detox. Symptomatic and supportive care  Continuous pulse oximetry monitoring     * Mild persistent asthma with exacerbation  Assessment & Plan  Proved after albuterol treatment in the emergency department. His asthma is exacerbated by smoking opioids.   However, his wheezing and shortness of breath did return and require further treatment. At this point, we will start regular scheduled albuterol nebulizer every 4 hours, albuterol inhaler as needed every 4 hours, and prednisone 80 mg today and 40 mg for next 4 days. Continue telemetry and continuous pulse oximetry while monitoring clinically. VTE Pharmacologic Prophylaxis:   Pharmacologic: Enoxaparin (Lovenox)  Mechanical VTE Prophylaxis in Place: no    Code Status: Level 1 - Full Code    Patient Centered Rounds: I have performed bedside rounds with nursing staff today. Discussions with Specialists or Other Care Team Provider: CRS, nursing, psychiatry    Education and Discussions with Family / Patient: Patient    Time Spent for Care: 30 minutes. More than 50% of total time spent on counseling and coordination of care as described above. Current Length of Stay: 1 day(s)    Current Patient Status: Inpatient     Certification Statement: The patient will continue to require additional inpatient hospital stay due to persistent asthma exacerbation and continued stabilization of opioid withdrawal.  Discharge Plan: Pending case management evaluation and additional counseling      Subjective:   Patient continues to be short of breath while on as needed albuterol. We discussed adjustments as per above for treatment of asthma. He denies any abdominal pain or other GI symptoms but does state that his hernia is slightly tender when he touches it. He is opioid withdrawal is progressing and he agrees to MAT with Sublocade.     Objective:     Clinical Opiate Withdrawal Scale  Pulse: 62  Resting Pulse Rate: Measured After Patient is Sitting or Lying for One Minute: Pulse rate 80 or below  GI Upset: Over Last Half Hour: No GI symptoms  Sweating: Over Past Half Hour Not Accounted for by Room Temperature of Patient Activity: Subjective report of chills or flushing  Tremor: Observation of Outstretched Hands: No tremor  Restlessness: Observation During Assessment: Reports difficulty sitting still, but is able to do so  Yawning: Observation During Assessment: No yawning  Pupil Size: Pupils moderately dilated  Anxiety and Irritability: Patient so irritable or anxious that participation in the assessment is difficult  Bone or Joint Aches: If Patient was Having Pain Previously, Only the Additional Component Attributed to Opiate Withdrawal is Scored: Mild diffuse discomfort  Gooseflesh Skin: Skin is smooth  Runny Nose or Tearing: Not Accounted for by Cold Symptoms or Allergies: Not present  Clinical Opiate Withdrawal Scale Total Score: 9    SEWS Total Score: 0 (2023  8:49 PM)        Last 24 Hours Medication List:   Current Facility-Administered Medications   Medication Dose Route Frequency Provider Last Rate    acetaminophen  650 mg Oral Q6H PRN Jolene Defrancisco, PA-C      albuterol  2 puff Inhalation Q4H PRN Jolene Defrancisco, PA-C      albuterol  2.5 mg Nebulization Q4H Jimmie Barnes DO      cloNIDine  0.1 mg Oral Q6H PRN Jolene Defrancisco, PA-C      enoxaparin  40 mg Subcutaneous Daily Jolene Defrancisco, PA-C      gabapentin  300 mg Oral Q8H PRN Jolene Defrancisco, PA-C      ketorolac  15 mg Intravenous Q6H PRN Kristina Choi, PA-C      nicotine  1 patch Transdermal Daily Jolene Defrancisco, PA-C      sodium chloride  100 mL/hr Intravenous Continuous Jolene Defrancisco, PA-C 100 mL/hr (23)    transdermal buprenorphine  20 mcg Transdermal Q7 Days Jolene Defrancisco, PA-C           Vitals:   Temp (24hrs), Av.8 °F (36.6 °C), Min:97.4 °F (36.3 °C), Max:98.3 °F (36.8 °C)    Temp:  [97.4 °F (36.3 °C)-98.3 °F (36.8 °C)] 97.8 °F (36.6 °C)  HR:  [60-83] 62  Resp:  [16-24] 18  BP: (100-148)/(56-94) 117/72  SpO2:  [93 %-97 %] 96 %  Body mass index is 20.23 kg/m². Input and Output Summary (last 24 hours):No intake or output data in the 24 hours ending 23 1212    Physical Exam:   Physical Exam  Vitals and nursing note reviewed.    HENT: Head: Normocephalic. Mouth/Throat:      Mouth: Mucous membranes are moist.   Eyes:      Extraocular Movements: Extraocular movements intact. Conjunctiva/sclera: Conjunctivae normal.      Pupils: Pupils are equal, round, and reactive to light. Cardiovascular:      Rate and Rhythm: Normal rate and regular rhythm. Pulmonary:      Effort: Pulmonary effort is normal.      Breath sounds: Examination of the right-upper field reveals wheezing. Examination of the left-upper field reveals wheezing. Examination of the right-middle field reveals wheezing. Examination of the left-middle field reveals wheezing. Examination of the right-lower field reveals wheezing. Examination of the left-lower field reveals wheezing. Wheezing present. Abdominal:      General: Abdomen is flat. Palpations: Abdomen is soft. Comments: Mildly tender and soft inguinal, reducible mass. Musculoskeletal:         General: Normal range of motion. Cervical back: Normal range of motion. Skin:     General: Skin is warm. Comments: Diaphoretic   Neurological:      General: No focal deficit present. Mental Status: He is alert and oriented to person, place, and time. Psychiatric:         Attention and Perception: Attention normal.         Mood and Affect: Mood is anxious.          Additional Data:     Labs:   Results from last 7 days   Lab Units 12/05/23  1707   WBC Thousand/uL 11.55*   HEMOGLOBIN g/dL 14.9   HEMATOCRIT % 46.1   PLATELETS Thousands/uL 417*   NEUTROS PCT % 62   LYMPHS PCT % 21   MONOS PCT % 9   EOS PCT % 7*      Results from last 7 days   Lab Units 12/05/23  1707   SODIUM mmol/L 138   POTASSIUM mmol/L 4.1   CHLORIDE mmol/L 99   CO2 mmol/L 30   BUN mg/dL 15   CREATININE mg/dL 1.04   ANION GAP mmol/L 9   CALCIUM mg/dL 10.0   ALBUMIN g/dL 4.2   TOTAL BILIRUBIN mg/dL 0.37   ALK PHOS U/L 60   ALT U/L 8   AST U/L 13   GLUCOSE RANDOM mg/dL 103                              * I Have Reviewed All Lab Data Listed Above. * Additional Pertinent Lab Tests Reviewed: [address] Admission Reviewed      Imaging Studies: I have personally reviewed pertinent reports. Today, Patient Was Seen By: Joe Daly DO    ** Please Note: Dictation voice to text software may have been used in the creation of this document.  **

## 2023-12-06 NOTE — PROGRESS NOTES
12/06/23 1409   Referral Data   Referral Source Patient   Referral Name Patient initially presented to the [facility] ED requesting detoxification from opioids   Referral Reason Drug/Alcohol [address]   Readmission Root Cause   30 Day Readmission No   Patient Information   Mental Status Alert   Primary Caregiver Self   Support System Other (Comment)  (unknown, Pt refused to meet with CM.)   Scientologist/Cultural Requests None   Legal Information   Legal Issues unknown, Pt refused to meet with CM. Activities of Daily Living Prior to Admission   Functional Status Independent   Assistive Device No device needed   Living Arrangement Other (Comment)  (unknown, Pt refused to meet with CM.)   Ambulation Independent   Access to Firearms   Access to Firearms   (unknown, Pt refused to meet with CM.)   Income Information   Income Source SSI/SSD   Means of Transportation   Means of Transport to Appts:   (unknown, Pt refused to meet with CM.)        12/06/23 1413   Substance Abuse Addendum Details   History of Withdrawal Symptoms Other withdrawal symptoms (specify in comment)  (myalgias; restlessness)   Medical Complications Ringuinal hernia   Sober Supports Mother   Present Treatment unknown, Pt refused to meet with CM. Substance Abuse Treatment Hx Denies past history  (unknown, Pt refused to meet with CM.)   Stage of Change   Stage of Change Precontemplation     Additional Substance Use Detail    Questions Responses   Problems Due to Past Use of Alcohol? No   Problems Due to Past Use of Substances? Yes   Heroin Method Smoke   Heroin 1st Use unknown   Heroin Last Use & Amount 12/05/23- 3-4 bags   Heroin Longest Abstinence unknown   Cocaine frequency Past regular use   Comment:  Past regular use on 12/6/2023    Cocaine First Use unknown   Cocaine Longest Abstinence unknown     Pt's name, date of birth, home address and telephone number were verified.  Pt was informed of case management role and the purpose of the completion of intake with case management. Pt refused to meet with CM. Cm attempted multiple occasions and Pt continued to report "no", when Cm attempted to speak with Pt. Cm completed the chart review through chart review. SUBSTANCE ABUSE    Stressor/Trigger    Opioid withdrawal    UDS: (+) cocaine, (+) opiates   Audit: Not completed   PAWSS: Not completed   Nicotine: 0.5 PPD, Pt refused to meet with CM. Pt did not report any interest in any cessation resources or treatment. Ethanol: Not completed    Prior D&A treatment   Unknown, Pt refused to meet with CM. AA/NA Meetings   Unknown, Pt refused to meet with CM. Withdrawal  Symptoms   Myalgias; restlessness   History of OD/blackouts or Withdrawal Seizures   OD 2022. MENTAL HEALTH INFORMATION    Hx of Mental Health Dx   Bipolar 1   Outpatient Mental Health Tx   Unknown, Pt refused to meet with CM. Inpatient Hospitalizations (Mental Health)   [facility] 2022   Family History of Mental Health    Unknown, Pt refused to meet with CM. Trauma History    Unknown, Pt refused to meet with CM. Current MH Symptoms   Unknown, Pt refused to meet with CM. Access to Firearms    Unknown, Pt refused to meet with CM. PHYSICAL HEALTH INFORMATION    Physical Health Conditions (Chronic)   Right inguinal Hernia; Mild persistent asthma with exacerbation    PCP   [facility] [address], [address]  [place], [place], [address]  [phone]  CORIN not signed    [facility] [phone] CORIN not signed  [facility] BEHAVIORAL community health [facility]   [phone] AQJ not signed    Preferred Pharmacy   [facility], [address]   LEGAL ISSUES    Past or present legal issues   Unknown, Pt refused to meet with CM. Probation/[place]   Unknown, Pt refused to meet with CM. EMPLOYMENT/INCOME/NEEDS    Education   Unknown, Pt refused to meet with CM.     Employment SSI/ SSD      History   Unknown, Pt refused to meet with CM. Spiritual/Yarsani Beliefs   None   Transportation/Transport Home   Unknown, Pt refused to meet with CM. DEMOGRAPHICS    Discharge Address   [address], [place], [address] [address]   Living Arrangement   Unknown, Pt refused to meet with CM. Can pt return home   Unknown, Pt refused to meet with CM. External Supports     Unknown, Pt refused to meet with CM. Phone Number   [phone]   Email   [email or web address]   Marital Status/Children   Single     Substance First use Last Use Frequency Amount Used How long Longest period of sobriety and when Method of use   THC            Heroin  (Fentanyl) unknown 12/05 daily 3-4 bags unknown unknown Smoke   IV Use history   Cocaine   unknown Unknown  Unknown Unknown Unknown Unknown Unknown   ETOH            Meth            Benzos            Other:                  Pt presented in the pre-contemplation stage of change. Pt refused to meet with Cm. Pt presented agitated and irritable. Pt refused to sign any ROIs, IMM, and complete a relapse prevention plan. Pt will continue to be monitored by medical staff with the goal of being medically cleared for discharge. CM will attempt again tomorrow to meet with Pt. Pt's goals for detox are to successfully complete medical withdrawal and to develop a discharge plan that includes relapse prevention education.

## 2023-12-06 NOTE — ASSESSMENT & PLAN NOTE
Patient with a history of chronic heroin/fentanyl use  Uses 3-4 bags daily via smoking  No known h/o IVDU  History of prior Suboxone MAT  PDMP reviewed: last Suboxone prescription filled 10/4/23, (21 films/14 days)  Management of opioid withdrawal under MAT protocol as above  Case management consulted for assistance with aftercare resources

## 2023-12-06 NOTE — H&P
[facility]  H&P  Name: Yogesh Foy y.o. male I MRN: [id number]  Unit/Bed#: 5T DETOX 507-01 I Date of Admission: 12/5/2023   Date of Service: 12/5/2023 I Hospital Day: 0    HISTORY & PHYSICAL EXAM  DEPARTMENT OF MEDICAL TOXICOLOGY  LEVEL 4 MEDICAL DETOX UNIT  Yogesh Reese 57 y.o. male MRN: [id number]  Unit/Bed#: 5T DETOX 507-01 Encounter: [id number]      Reason for Admission/Principal Problem: Opioid withdrawal, opioid use disorder, R inguinal hernia  Admitting Provider: Margo Lozano PA-C  Attending Provider: Ashlee Wiley,    12/5/2023  4:33 PM      Opioid withdrawal [facility])  Assessment & Plan  Patient with a history of chronic opioid use  Last heroin use was 12/5 at 3 PM  However, pt reported in the ED that he "just took a Suboxone" around [id number] [facility] MAT/opioid withdrawal protocol with plan for eventual microinduction with Suboxone  Currently experiencing opioid withdrawal s/sx of agitation and generalized myalgias/arthralgias, with limited participation in interview to ascertain other withdrawal symptoms  Initiate Butrans 20 mcg/hr patch   Continue monitoring opioid withdrawal, and will consider microinduction when >24 hours have passed since pt's last opioid use, depending on severity of pt's w/d symptoms  Symptomatic and supportive care  Continuous pulse oximetry monitoring     Opioid use disorder, severe, dependence ([address])  Assessment & Plan  Patient with a history of chronic heroin/fentanyl use  Uses 3-4 bags daily via smoking  No known h/o IVDU  History of prior Suboxone MAT  PDMP reviewed: last Suboxone prescription filled 10/4/23, (21 films/14 days)  Management of opioid withdrawal under MAT protocol as above  Case management consulted for assistance with aftercare resources    Right inguinal hernia  Assessment & Plan  Pt has a hx of R inguinal hernia, diagnosed during previous ED visit 10/30/23  CT abd/pelvis revealed "Right inguinal hernia containing fat with mild induration suggesting possible incarceration.  Correlation with physical examination is recommended."  Per ED provider note, General surgery physician recommended attempting to reduce the hernia and d/c with OP f/u; however, pt refused hernia reduction, requested only pain medications and stated he would follow up as outpatient   Today in the ED, attending physician reported pt has a soft, reducible R inguinal hernia; imaging not obtained while pt was in the ED  CT abd/pelvis ordered on admission to reassess inguinal hernia, as it was incarcerated on previous CT as above  Patient refused CT scan and physical examination to reassess hernia upon admission  Continue to monitor clinically as able at this time and encourage re-evaluation via ordered imaging and/or examination    Bipolar 1 disorder [facility])  Assessment & Plan  Patient with a h/o bipolar disorder  Home medications include Seroquel 200 mg QHS and mirtazapine 30 mg QHS  Pt admits medication noncompliance but amount of time he was not taking is unclear  Per RN, patient reports wanting to restart specifically Seroquel for sleep  No current SI/HI/thoughts of self harm  Psychiatry consulted for medication reconciliation/appropriate titration, will appreciate recommendations  Recommend OP f/u with PCP/OP Psychiatry     Leukocytosis  Assessment & Plan  Lab Results   Component Value Date    WBC 11.55 (H) 12/05/2023        Mildly Elevated WBC on admission  Per chart review, pt appears to have chronic intermittent leukocytosis, with baseline WBC 10-11  Possibly increased at this time 2/2 leukemoid reaction from acute opioid withdrawal, possible dehydration, and/or ongoing inguinal hernia with possible incarceration  No evidence of systemic infection   Pt afebrile, VSS  IVF hydration for possible dehydration component  Continue monitoring CBC, VS     Thrombocytosis  Assessment & Plan  Lab Results   Component Value Date     (H) 12/05/2023      Increased PTL count on admission  Per chart review, pt appears to have chronic thrombocytosis, with baseline -500  No obvious evidence of active bleeding/bruising on limited physical exam  IVF hydration for possible dehydration component   Continue to monitor CBC    Tobacco use disorder  Assessment & Plan  Pt reports smoking 0.5 ppd of cigarettes  Offer NRT   Encourage cessation             Additional medical treatment(s) includes as above       VTE Prophylaxis: Enoxaparin (Lovenox)  / sequential compression device   Code Status: full code      Anticipated Length of Stay:  Patient will be admitted on an Inpatient basis with an anticipated length of stay of  >2  midnights. Justification for Hospital Stay: opioid withdrawal, opioid use disorder, further evaluation of R inguinal hernia    For any questions or concerns, please Tiger Text the advanced practitioner in the role of \[facility] [place] [facility]\""-DETOX-AP On Call      This patient qualifies for Level IV medically managed intensive inpatient services under the criteria set by the American Society of Addiction Medicine, including dimensions 1-3. The patient is in withdrawal (or is intoxicated with high risk of withdrawal), with severe and unstable medical and/or psychiatric (dual diagnosis) problems, requiring requires 24-hour medical and nursing care and the full resources of a licensed hospital.          [facility] patient to medical detox unit and continue supportive care and stabilization of acute opioid withdrawal per medical toxicology/detox medication assisted treatment pathway.      Complicated Opioid Withdrawal (ie associated with long acting agents, such as methadone or illicit fentanyl analogues):  >72 hours: Routine COWS/induction as per uncomplicated opoid withdrawal (below)  <72 hours:  Adjunctive medications (see below), including clonazepam 1-2mg Q6 hrs PRN anxiety (or diazepam 5 mg if cannot take PO)  >48 hours, test dose buprenorphine 0.5-1mg. If responds well, continue with 2mg Q2 hrs (total 8mg) holding for worsening withdrawal, then start maintenance dosing   If responds poorly, follow next step below   <48 hours and/or COWS < 6 or failed test dose, add Butrans transdermal patch 20-40mcg/hr, monitor for signs/symptoms of withdrawal   If within first 24-48 hrs, can administer buprenorphine 0.5-1mg Q6 hrs x 4, followed by 2mg Q2 hrs x 4 the next day  If surpassing 48 hrs, can administer buprenorphine 2mg Q2hrs if tolerated without transdermal patch or lower sublingual dose. When complete, remove transdermal patch and start maintenance dosing   For moderate-severe withdrawal (COWS >/= 8, may still consider routine induction with buprenorphine 8 mg if appropriate. For worsened or precipitated withdrawal, consider adjunctive benzodiazepines and other comfort meds. Dexmedetomidine may be considered for severe precipitated withdrawal.         Uncomplicated Opioid Withdrawal:  Monitor opioid severity via Clinical Opioid Withdrawal Scale (COWS) Q4 hours and administer buprenorphine/naloxone 8mg/2mg when COWS >8, or when greater than 24 hours have elapsed from most recent opioid use (excluding long-acting opioids, such as methadone). Continue to monitor opioid severity Q30-60 minutes after first dose and administer additional buprenorphine 2-4mg every 30-60 minutes until COWS < 8 for two consecutive hours.               Adjunctive medications administered as needed:  Clonidine 0.1 mg PO Q6 hours PRN anxiety or palpitations    Gabapentin 300mg PO Q8 hours PRN anxiety    Ibuprofen 600 mg PO Q6 hours PRN pain    Acetaminophen 1000mg PO Q8 hours PRN pain    Ondansetron 4 mg PO Q6 hours PRN N/V    Nicotine patch 7, 14, 21 mg  PRN nicotine withdrawal   Trazodone 50 mg PO QHS PRN sleep    Loperamide 4 mg PO PRN diarrhea up to 16 mg/day       The risks, benefits and mechanism of buprenorphine/naloxone were discussed and patient agreed to treatment. Case management consultation will take place to assist with coordination of subsequent treatment after discharge. Administer daily multivitamin. Evaluate and treat for coexisting substance use, such as nicotine. Discuss risk factors for infectious disease, such as history of intravenous drug abuse, and offer hepatitis and HIV screening if indicated. Hx and PE limited by: pt uncooperative, additional hx obtained from pt's RN and chart review    HPI: Reyna France is a 57y.o. year old male with a PMH of OUD, R inguinal hernia, and bipolar 1 disorder who presents with opioid withdrawal seeking detoxification. Patient initially presented to the [facility] ED requesting detoxification from opioids and was subsequently admitted to the [facility] medical detox unit for medically assisted opioid withdrawal and Suboxone induction. Upon my encounter, patient is uncooperative and refusing to answer questions, asking me to "come back later" and saying "I just need to sleep." Patient reports to RN that he is using 3-4 bags of heroin/fentanyl daily via smoking. Last reported heroin use was today at 3 PM; however, it was noted in the chart that patient reported "just now taking a Suboxone" around [address], prior to arriving on the unit and the remainder of his Suboxone supply was confiscated and sent to pharmacy. Of note, he denied currently taking Suboxone to RN on the detox unit when asked. He has no known history of IVDU. Reports current opioid withdrawal sx of generalized myalgias and restlessness. Unable to ascertain any further symptoms due to pt's unwillingness to participate in answering questions. He denied any other substance use to RN. Per encounter with RN earlier this evening, patient agreeable to initiation of Butrans 20 mcg/hr patch and supportive care at this time with plan for microinduction with Suboxone when at least 24 hours have passed since last opioid use.  His desired disposition is currently unclear. Opioids currently used: heroin and fentanyl  Route of use: smoking  Date/Time of Last Opioid Use: last heroin use today around 3 PM; may have taken a Suboxone (from home) in the ED around [address] per ED nursing note in pt's chart  Current Signs/Symptoms of Opioid Withdrawal: restlessness and myalgias/arthralgias    COWS score:   Clinical Opiate Withdrawal Scale  Pulse: 60  Resting Pulse Rate: Measured After Patient is Sitting or Lying for One Minute: Pulse rate 80 or below  GI Upset: Over Last Half Hour: No GI symptoms  Sweating: Over Past Half Hour Not Accounted for by Room Temperature of Patient Activity: Subjective report of chills or flushing  Tremor: Observation of Outstretched Hands: No tremor  Restlessness: Observation During Assessment: Frequent shifting or extraneous movements of legs/arms  Yawning: Observation During Assessment: Yawning once or twice during assessment  Pupil Size: Pupils possibly larger than normal for room light  Anxiety and Irritability: Patient so irritable or anxious that participation in the assessment is difficult  Bone or Joint Aches: If Patient was Having Pain Previously, Only the Additional Component Attributed to Opiate Withdrawal is Scored: Patient reports severe diffuse aching of joints/muscle  Gooseflesh Skin: Skin is smooth  Runny Nose or Tearing: Not Accounted for by Cold Symptoms or Allergies: Not present  Clinical Opiate Withdrawal Scale Total Score: 12          Methadone & Buprenorphine History  History of prior treatment for opioid dependence? yes  Currently on Methadone Maintenance? no  History of prior treatment with Suboxone? yes  Currently taking Suboxone? Unclear; pt denied taking Suboxone to RN but reportedly took a Suboxone in the ED and had multiple other films from home on his person which were confiscated and sent to pharmacy  History of using Suboxone without having a prescription? unknown  History of IVDA?  no  Co-existing substance use? No; pt denies any alcohol or other substance use to RN; UDS also positive for cocaine but pt denies cocaine use    Review of PDMP: yes; last Suboxone prescription filled 10/4/23, (21 films/14 days)    Social History     Substance and Sexual Activity   Alcohol Use No     Social History     Substance and Sexual Activity   Drug Use Yes    Types: Heroin    Comment: 4 bags a day of herion last use was 12/5 3pm     Social History     Tobacco Use   Smoking Status Every Day    Packs/day: 0.50    Years: 11.00    Total pack years: 5.50    Types: Cigarettes    Last attempt to quit: 2023    Years since quittin.8    Passive exposure: Current   Smokeless Tobacco Never       Review of Systems   Reason unable to perform ROS: pt uncooperative. Gastrointestinal:  Positive for nausea (pt c/o nausea 2/2 hunger and was given food by RN). Pt did not endorse    Musculoskeletal:  Positive for arthralgias (generalized), back pain (pt reported to RN that he had 8/10 back pain along with generalized myalgias) and myalgias (generalized). Neurological:  Positive for tremors (as reported by RN). Psychiatric/Behavioral:  Positive for agitation. Historical Information   Past Medical History:   Diagnosis Date    Addiction to drug [facility])     Asthma     Back pain     Bipolar 1 disorder ([facility])     Depression     Gastritis     GERD (gastroesophageal reflux disease)     Kidney stones     Renal cancer ([facility])     Substance abuse ([facility])      Past Surgical History:   Procedure Laterality Date    ABDOMINAL SURGERY      NEPHRECTOMY      SD CYSTO BLADDER W/URETERAL CATHETERIZATION Right 2016    Procedure: CYSTOSCOPY WITH RETROGRADE PYELOGRAM;  Surgeon: Hector Guzman MD;  Location: [facility];  Service: Urology    SD LAPAROSCOPY RADICAL NEPHRECTOMY Right 2016    Procedure: HAND ASSISTED LAPAROSCOPIC NEPHRECTOMY, converted to open, lysis of adhesions,repair of  vena cava;   Surgeon: Hector Guzman MD; Location: [facility];  Service: Urology    URETERAL STENT PLACEMENT Right 09/27/2016    Procedure: INSERTION STENT URETERAL;  Surgeon: Nguyen Louis MD;  Location: [facility];  Service:     VASCULAR SURGERY       Family History   Problem Relation Age of Onset    Stroke Mother     Heart disease Mother     HIV Father      Social History   Marital Status: Single   Occupation: primarily disability income  Patient Pre-hospital Living Situation: lives alone in an apartment   Patient Pre-hospital Level of Mobility: independent  Patient Pre-hospital Diet Restrictions: none    No Known Allergies    Prior to Admission medications    Medication Sig Start Date End Date Taking? Authorizing Provider   buprenorphine-naloxone (SUBOXONE) 8-2 mg per SL tablet Place 1 tablet under the tongue daily  Patient not taking: Reported on 12/5/2023    Historical Provider, MD   ketorolac (TORADOL) 10 mg tablet Take 1 tablet (10 mg total) by mouth every 6 (six) hours as needed for moderate pain for up to 5 days 10/30/23 11/4/23  Madhav Toledo MD   mirtazapine (REMERON) 30 mg tablet Take 1 tablet (30 mg total) by mouth daily at bedtime  Patient not taking: Reported on 10/30/2023 12/8/22   Radha Saavedra MD   naloxone [facility]) 4 mg/0.1 mL nasal spray Administer 1 spray into a nostril. If no response after 2-3 minutes, give another dose in the other nostril using a new spray.   Patient not taking: Reported on 2/6/2023 12/8/22   Roberta Cast PA-C   QUEtiapine (SEROquel) 200 mg tablet Take 1 tablet (200 mg total) by mouth daily at bedtime  Patient not taking: Reported on 10/30/2023 12/8/22   Radha Saavedra MD       Current Facility-Administered Medications   Medication Dose Route Frequency    acetaminophen (TYLENOL) tablet 650 mg  650 mg Oral Q6H PRN    albuterol (PROVENTIL HFA,VENTOLIN HFA) inhaler 2 puff  2 puff Inhalation Q4H PRN    cloNIDine (CATAPRES) tablet 0.1 mg  0.1 mg Oral Q6H PRN    [START ON 12/6/2023] enoxaparin (LOVENOX) subcutaneous injection 40 mg  40 mg Subcutaneous Daily    gabapentin (NEURONTIN) capsule 300 mg  300 mg Oral Q8H PRN    [START ON 12/6/2023] nicotine (NICODERM CQ) 21 mg/24 hr TD 24 hr patch 1 patch  1 patch Transdermal Daily    sodium chloride 0.9 % infusion  100 mL/hr Intravenous Continuous    transdermal buprenorphine (BUTRANS) 20 mcg/hr TD patch 20 mcg  20 mcg Transdermal Q7 Days       Continuous Infusions:sodium chloride, 100 mL/hr, Last Rate: 100 mL/hr (12/05/23 2115)             Objective     No intake or output data in the 24 hours ending 12/05/23 2346    Invasive Devices:   Peripheral IV 12/05/23 Left Antecubital (Active)   Site Assessment WDL; Clean; Intact;Dry 12/05/23 1708   Dressing Type Transparent 12/05/23 1708   Line Status Blood return noted; Flushed;Saline locked 12/05/23 1708   Dressing Status Clean;Dry; Intact 12/05/23 1708       Vitals   Vitals:    12/05/23 1723 12/05/23 1840 12/05/23 2049 12/05/23 2100   BP: 139/94 148/76  100/56   TempSrc:    Temporal   Pulse:  69 60 60   Resp:  22  16   Patient Position - Orthostatic VS:  Lying  Lying   Temp:    98.3 °F (36.8 °C)       Physical Exam  Vitals and nursing note reviewed. Constitutional:       General: He is in acute distress (agitated 2/2 opioid withdrawal). Appearance: He is not diaphoretic. Comments: Pt uncooperative during encounter, responds to voice but refuses to answer questions or allow provider to examine him, saying "I just need to sleep"   HENT:      Head: Normocephalic and atraumatic. Right Ear: External ear normal.      Left Ear: External ear normal.      Nose: Nose normal. No rhinorrhea. Mouth/Throat:      Mouth: Mucous membranes are moist.   Eyes:      Comments: Unable to assess as pt has eyes closed and is uncooperative   Cardiovascular:      Comments: HR 60s on continuous pulse ox monitor  Pulmonary:      Effort: Pulmonary effort is normal. No respiratory distress.    Abdominal:      Comments: PT REFUSED exam to reassess inguinal hernia   Genitourinary:     Comments: PT REFUSED exam   Musculoskeletal:      Comments: Moving all 4 extremities spontaneously   Neurological:      General: No focal deficit present. Mental Status: He is alert and oriented to person, place, and time. Motor: Motor function is intact. Psychiatric:         Attention and Perception: He is inattentive. Behavior: Behavior is uncooperative and agitated. Behavior is not aggressive. DATA    EKG, Pathology, and Other Studies: I have personally reviewed pertinent reports. EKG: pending    Lab Results: I have personally reviewed pertinent reports.         CBC ETOH     Lab Results   Component Value Date    WBC 11.55 (H) 12/05/2023    WBC 10.63 (H) 03/20/2015    RBC 5.40 12/05/2023    RBC 5.15 03/20/2015    HGB 14.9 12/05/2023    HGB 14.8 03/20/2015    HCT 46.1 12/05/2023    HCT 45.0 03/20/2015    MCV 85 12/05/2023    MCV 87 03/20/2015    MCH 27.6 12/05/2023    MCH 28.7 03/20/2015    MCHC 32.3 12/05/2023    MCHC 32.9 03/20/2015    RDW 14.8 12/05/2023    RDW 14.9 03/20/2015     (H) 12/05/2023     03/20/2015    MPV 8.4 (L) 12/05/2023    MPV 9.2 03/20/2015      Lab Results   Component Value Date    LACTICACID 0.9 10/30/2023      CMP UA         Component Value Date/Time     03/20/2015 0847    K 4.1 12/05/2023 1707    K 3.8 03/20/2015 0847    CL 99 12/05/2023 1707     03/20/2015 0847    CO2 30 12/05/2023 1707    CO2 22 11/23/2016 1506    BUN 15 12/05/2023 1707    BUN 17 03/20/2015 0847    CREATININE 1.04 12/05/2023 1707    CREATININE 1.04 03/20/2015 0847         Component Value Date/Time    CALCIUM 10.0 12/05/2023 1707    CALCIUM 9.2 03/20/2015 0847    ALKPHOS 60 12/05/2023 1707    ALKPHOS 88 03/20/2015 0847    AST 13 12/05/2023 1707    AST 29 03/20/2015 0847    ALT 8 12/05/2023 1707    ALT 39 03/20/2015 0847    BILITOT 0.21 03/20/2015 0847      Lab Results   Component Value Date    CLARITYU Cloudy (A) 06/06/2020 COLORU Yellow 06/06/2020    SPECGRAV 1.020 06/06/2020    PHUR 5.0 06/06/2020    PHUR 6.0 03/21/2017    GLUCOSEU Negative 06/06/2020    KETONESU 15 (1+) (A) 06/06/2020    BLOODU 250.0 (A) 06/06/2020    PROTEIN UA 30 (1+) (A) 06/06/2020    NITRITE Negative 06/06/2020    BILIRUBINUR Negative 06/06/2020    UROBILINOGEN Negative 06/06/2020    UROBILINOGEN 0.2 03/21/2017    LEUKOCYTESUR 500.0 (A) 06/06/2020    WBCUA Innumerable (A) 06/06/2020    RBCUA None Seen 06/06/2020    HYALINE None Seen 03/21/2017    BACTERIA Moderate (A) 06/06/2020    EPIS Occasional 06/06/2020        Liver Function Test: ASA     Lab Results   Component Value Date    TBILI 0.37 12/05/2023    ALKPHOS 60 12/05/2023    ALKPHOS 88 03/20/2015    AST 13 12/05/2023    AST 29 03/20/2015    ALT 8 12/05/2023    ALT 39 03/20/2015    TP 7.2 12/05/2023    ALB 4.2 12/05/2023    ALB 3.6 03/20/2015      No results found for: "SALICYLATE"   Troponin APAP     No results found for: "TROPONINI"   No results found for: "ACTMNPHEN"   VBG HCG     No results found for: "PHVEN", "HSL9KIG", "PO2VEN", "LSE7XKR", "Avila Skeans", "K7MTNRRWK", "W6TOVBO"   No results found for: "HCGQUANT"   ABG Urine Drug Screen     Lab Results   Component Value Date/Time    PHART 7.437 08/22/2022 12:40 AM    HGP4QCP 35.5 (L) 08/22/2022 12:40 AM    PO2ART 104.5 08/22/2022 12:40 AM    PSE0ELP 23.4 08/22/2022 12:40 AM    BEART -0.3 08/22/2022 12:40 AM    O8ATBQOYQ 19.0 08/22/2022 12:40 AM    O2HGB 96.1 08/22/2022 12:40 AM    JUSTINE Yes 08/22/2022 12:40 AM    VTAC AC 08/22/2022 12:40 AM    ACRATE 18 08/22/2022 12:40 AM    INSPIREDAIR 45 08/22/2022 12:40 AM    PEEP 8 08/22/2022 12:40 AM      Lab Results   Component Value Date    AMPMETHUR Negative 12/05/2023    BARBTUR Negative 12/05/2023    BDZUR Negative 12/05/2023    COCAINEUR Positive (A) 12/05/2023    METHADONEUR Negative 12/05/2023    OPIATEUR Positive (A) 12/05/2023    PCPUR Negative 12/05/2023    THCUR Negative 12/05/2023    OXYCODONEUR Negative 12/05/2023      Lactate INR     Lab Results   Component Value Date    LACTICACID 0.9 10/30/2023      Lab Results   Component Value Date    INR 1.04 08/22/2022      PTT Protime     Lab Results   Component Value Date/Time    PTT 35 10/04/2016 10:07 AM      Lab Results   Component Value Date/Time    PROTIME 13.4 08/22/2022 12:47 AM      Hepatitis HIV     No results found for: "HEPBSAG", "HEPCAB"   No results found for: "YTGDRXM3VYP2", "ZUF5Z63PE"         Imaging Studies: I have reviewed pertinent reports. Counseling / Coordination of Care  Total floor / unit time spent today 40 minutes. Greater than 50% of total time was spent with the patient and / or family counseling and / or coordination of care. ** Please Note: This note has been constructed using a voice recognition system.  **

## 2023-12-06 NOTE — ASSESSMENT & PLAN NOTE
Patient received Sublocade   Refer for follow-up in [facility]. Case management for coordination of care.

## 2023-12-06 NOTE — ASSESSMENT & PLAN NOTE
Maintenance buprenorphine started and tolerating it well.    Will plan a bridge Rx upon discharge and possible SHARE f/u

## 2023-12-07 ENCOUNTER — APPOINTMENT (INPATIENT)
Dept: CT IMAGING | Facility: HOSPITAL | Age: 48
DRG: 894 | End: 2023-12-07
Payer: COMMERCIAL

## 2023-12-07 ENCOUNTER — APPOINTMENT (INPATIENT)
Dept: RADIOLOGY | Facility: HOSPITAL | Age: 48
DRG: 894 | End: 2023-12-07
Payer: COMMERCIAL

## 2023-12-07 PROBLEM — D75.839 THROMBOCYTOSIS: Status: RESOLVED | Noted: 2023-12-05 | Resolved: 2023-12-07

## 2023-12-07 LAB
ALBUMIN SERPL BCP-MCNC: 3.7 G/DL (ref 3.5–5)
ALP SERPL-CCNC: 50 U/L (ref 34–104)
ALT SERPL W P-5'-P-CCNC: 6 U/L (ref 7–52)
ANION GAP SERPL CALCULATED.3IONS-SCNC: 10 MMOL/L
AST SERPL W P-5'-P-CCNC: 11 U/L (ref 13–39)
BILIRUB SERPL-MCNC: 0.23 MG/DL (ref 0.2–1)
BUN SERPL-MCNC: 12 MG/DL (ref 5–25)
CALCIUM SERPL-MCNC: 9.3 MG/DL (ref 8.4–10.2)
CHLORIDE SERPL-SCNC: 106 MMOL/L (ref 96–108)
CO2 SERPL-SCNC: 25 MMOL/L (ref 21–32)
CREAT SERPL-MCNC: 0.95 MG/DL (ref 0.6–1.3)
ERYTHROCYTE [DISTWIDTH] IN BLOOD BY AUTOMATED COUNT: 14.9 % (ref 11.6–15.1)
FLUAV RNA RESP QL NAA+PROBE: NEGATIVE
FLUBV RNA RESP QL NAA+PROBE: NEGATIVE
GFR SERPL CREATININE-BSD FRML MDRD: 95 ML/MIN/1.73SQ M
GLUCOSE SERPL-MCNC: 116 MG/DL (ref 65–140)
HCT VFR BLD AUTO: 41.5 % (ref 36.5–49.3)
HGB BLD-MCNC: 13.2 G/DL (ref 12–17)
MAGNESIUM SERPL-MCNC: 2.1 MG/DL (ref 1.9–2.7)
MCH RBC QN AUTO: 26.8 PG (ref 26.8–34.3)
MCHC RBC AUTO-ENTMCNC: 31.8 G/DL (ref 31.4–37.4)
MCV RBC AUTO: 84 FL (ref 82–98)
PLATELET # BLD AUTO: 379 THOUSANDS/UL (ref 149–390)
PMV BLD AUTO: 9.3 FL (ref 8.9–12.7)
POTASSIUM SERPL-SCNC: 3.9 MMOL/L (ref 3.5–5.3)
PROT SERPL-MCNC: 6.8 G/DL (ref 6.4–8.4)
RBC # BLD AUTO: 4.92 MILLION/UL (ref 3.88–5.62)
RSV RNA RESP QL NAA+PROBE: NEGATIVE
SARS-COV-2 RNA RESP QL NAA+PROBE: NEGATIVE
SODIUM SERPL-SCNC: 141 MMOL/L (ref 135–147)
WBC # BLD AUTO: 11.02 THOUSAND/UL (ref 4.31–10.16)

## 2023-12-07 PROCEDURE — 94640 AIRWAY INHALATION TREATMENT: CPT

## 2023-12-07 PROCEDURE — 87077 CULTURE AEROBIC IDENTIFY: CPT | Performed by: PHYSICIAN ASSISTANT

## 2023-12-07 PROCEDURE — 74177 CT ABD & PELVIS W/CONTRAST: CPT

## 2023-12-07 PROCEDURE — 99233 SBSQ HOSP IP/OBS HIGH 50: CPT | Performed by: PHYSICIAN ASSISTANT

## 2023-12-07 PROCEDURE — 71260 CT THORAX DX C+: CPT

## 2023-12-07 PROCEDURE — 83735 ASSAY OF MAGNESIUM: CPT | Performed by: PHYSICIAN ASSISTANT

## 2023-12-07 PROCEDURE — 71046 X-RAY EXAM CHEST 2 VIEWS: CPT

## 2023-12-07 PROCEDURE — 80053 COMPREHEN METABOLIC PANEL: CPT | Performed by: PHYSICIAN ASSISTANT

## 2023-12-07 PROCEDURE — 85027 COMPLETE CBC AUTOMATED: CPT | Performed by: PHYSICIAN ASSISTANT

## 2023-12-07 PROCEDURE — 87186 SC STD MICRODIL/AGAR DIL: CPT | Performed by: PHYSICIAN ASSISTANT

## 2023-12-07 PROCEDURE — 87040 BLOOD CULTURE FOR BACTERIA: CPT | Performed by: PHYSICIAN ASSISTANT

## 2023-12-07 PROCEDURE — 99232 SBSQ HOSP IP/OBS MODERATE 35: CPT | Performed by: PSYCHIATRY & NEUROLOGY

## 2023-12-07 PROCEDURE — 0241U HB NFCT DS VIR RESP RNA 4 TRGT: CPT | Performed by: PHYSICIAN ASSISTANT

## 2023-12-07 PROCEDURE — 87147 CULTURE TYPE IMMUNOLOGIC: CPT | Performed by: PHYSICIAN ASSISTANT

## 2023-12-07 PROCEDURE — 94760 N-INVAS EAR/PLS OXIMETRY 1: CPT

## 2023-12-07 PROCEDURE — 87154 CUL TYP ID BLD PTHGN 6+ TRGT: CPT | Performed by: PHYSICIAN ASSISTANT

## 2023-12-07 PROCEDURE — G1004 CDSM NDSC: HCPCS

## 2023-12-07 RX ORDER — QUETIAPINE FUMARATE 100 MG/1
100 TABLET, FILM COATED ORAL 2 TIMES DAILY
Status: DISCONTINUED | OUTPATIENT
Start: 2023-12-07 | End: 2023-12-07

## 2023-12-07 RX ORDER — QUETIAPINE FUMARATE 50 MG/1
50 TABLET, FILM COATED ORAL 2 TIMES DAILY
Status: DISCONTINUED | OUTPATIENT
Start: 2023-12-07 | End: 2023-12-10 | Stop reason: HOSPADM

## 2023-12-07 RX ORDER — PREDNISONE 20 MG/1
40 TABLET ORAL DAILY
Status: DISCONTINUED | OUTPATIENT
Start: 2023-12-08 | End: 2023-12-10 | Stop reason: HOSPADM

## 2023-12-07 RX ORDER — BUPRENORPHINE AND NALOXONE 2; .5 MG/1; MG/1
2 FILM, SOLUBLE BUCCAL; SUBLINGUAL
Status: COMPLETED | OUTPATIENT
Start: 2023-12-07 | End: 2023-12-07

## 2023-12-07 RX ORDER — METHYLPREDNISOLONE SODIUM SUCCINATE 125 MG/2ML
125 INJECTION, POWDER, LYOPHILIZED, FOR SOLUTION INTRAMUSCULAR; INTRAVENOUS ONCE
Status: COMPLETED | OUTPATIENT
Start: 2023-12-07 | End: 2023-12-07

## 2023-12-07 RX ORDER — BUPRENORPHINE AND NALOXONE 8; 2 MG/1; MG/1
8 FILM, SOLUBLE BUCCAL; SUBLINGUAL 2 TIMES DAILY
Status: DISCONTINUED | OUTPATIENT
Start: 2023-12-07 | End: 2023-12-09

## 2023-12-07 RX ORDER — BUPRENORPHINE AND NALOXONE 8; 2 MG/1; MG/1
8 FILM, SOLUBLE BUCCAL; SUBLINGUAL 2 TIMES DAILY
Status: DISCONTINUED | OUTPATIENT
Start: 2023-12-08 | End: 2023-12-07

## 2023-12-07 RX ORDER — LEVOFLOXACIN 5 MG/ML
750 INJECTION, SOLUTION INTRAVENOUS EVERY 24 HOURS
Status: DISCONTINUED | OUTPATIENT
Start: 2023-12-07 | End: 2023-12-10 | Stop reason: HOSPADM

## 2023-12-07 RX ORDER — BUPRENORPHINE 2 MG/1
0.5 TABLET SUBLINGUAL
Status: COMPLETED | OUTPATIENT
Start: 2023-12-07 | End: 2023-12-07

## 2023-12-07 RX ADMIN — BUPRENORPHINE AND NALOXONE 2 MG: 2; .5 FILM BUCCAL; SUBLINGUAL at 14:49

## 2023-12-07 RX ADMIN — GABAPENTIN 300 MG: 300 CAPSULE ORAL at 16:43

## 2023-12-07 RX ADMIN — KETOROLAC TROMETHAMINE 15 MG: 30 INJECTION, SOLUTION INTRAMUSCULAR; INTRAVENOUS at 18:19

## 2023-12-07 RX ADMIN — BUPRENORPHINE AND NALOXONE 2 MG: 2; .5 FILM BUCCAL; SUBLINGUAL at 16:43

## 2023-12-07 RX ADMIN — Medication 0.5 MG: at 08:44

## 2023-12-07 RX ADMIN — Medication 0.5 MG: at 07:23

## 2023-12-07 RX ADMIN — LEVOFLOXACIN 750 MG: 750 INJECTION, SOLUTION INTRAVENOUS at 16:42

## 2023-12-07 RX ADMIN — QUETIAPINE FUMARATE 50 MG: 50 TABLET ORAL at 20:04

## 2023-12-07 RX ADMIN — NICOTINE 1 PATCH: 21 PATCH, EXTENDED RELEASE TRANSDERMAL at 08:44

## 2023-12-07 RX ADMIN — BUPRENORPHINE AND NALOXONE 2 MG: 2; .5 FILM BUCCAL; SUBLINGUAL at 10:38

## 2023-12-07 RX ADMIN — BUPRENORPHINE AND NALOXONE 2 MG: 2; .5 FILM BUCCAL; SUBLINGUAL at 12:26

## 2023-12-07 RX ADMIN — GABAPENTIN 300 MG: 300 CAPSULE ORAL at 08:44

## 2023-12-07 RX ADMIN — SODIUM CHLORIDE 500 ML: 0.9 INJECTION, SOLUTION INTRAVENOUS at 08:47

## 2023-12-07 RX ADMIN — Medication 0.5 MG: at 04:47

## 2023-12-07 RX ADMIN — ALBUTEROL SULFATE 2.5 MG: 2.5 SOLUTION RESPIRATORY (INHALATION) at 20:15

## 2023-12-07 RX ADMIN — KETOROLAC TROMETHAMINE 15 MG: 30 INJECTION, SOLUTION INTRAMUSCULAR; INTRAVENOUS at 12:28

## 2023-12-07 RX ADMIN — ACETAMINOPHEN 650 MG: 325 TABLET ORAL at 18:20

## 2023-12-07 RX ADMIN — IOHEXOL 100 ML: 350 INJECTION, SOLUTION INTRAVENOUS at 13:59

## 2023-12-07 RX ADMIN — METHYLPREDNISOLONE SODIUM SUCCINATE 125 MG: 125 INJECTION, POWDER, FOR SOLUTION INTRAMUSCULAR; INTRAVENOUS at 08:43

## 2023-12-07 RX ADMIN — ALBUTEROL SULFATE 2.5 MG: 2.5 SOLUTION RESPIRATORY (INHALATION) at 07:19

## 2023-12-07 RX ADMIN — Medication 0.5 MG: at 03:11

## 2023-12-07 RX ADMIN — BUPRENORPHINE AND NALOXONE 8 MG: 8; 2 FILM BUCCAL; SUBLINGUAL at 20:04

## 2023-12-07 RX ADMIN — ALBUTEROL SULFATE 2.5 MG: 2.5 SOLUTION RESPIRATORY (INHALATION) at 13:24

## 2023-12-07 RX ADMIN — QUETIAPINE FUMARATE 50 MG: 50 TABLET ORAL at 02:49

## 2023-12-07 RX ADMIN — CLONIDINE HYDROCHLORIDE 0.1 MG: 0.1 TABLET ORAL at 14:49

## 2023-12-07 NOTE — PROGRESS NOTES
Progress Note - Edwin Sanchez 57 y.o. male MRN: [id number]  Unit/Bed#: 5T DETOX 507-01 Encounter: [id number]      Chayito Allen is a 57 y.o. male today is describing his mood as "better." He states his sleep continues to be poor but his appetite has moderately improved. He denied any side effects from restarting Seroquel and requested that the medication continued to be titrated up to his prior amount. He was notified that it would be increased gradually and he was accepting of this plan. He denied SI, HI, and AVH. Throughout the interview he was perseverative about obtaining stimulants for his reported ADHD. He continuously stated he needed stimulants "urgently." He was notified he would have to follow up with his PCP to be prescribed this medication. Behavior over the last 24 hours: improved  Sleep: insomnia  Appetite: improving  Medication side effects: none verbalized  ROS: Complete review of systems is negative except as noted above.     Mental Status Exam:  Appearance:  drowsy, good eye contact, appears stated age, appropriate grooming and hygiene, marginal grooming/hygiene, thin, and overtly  male, dressed in hospital attire   Behavior:  limited cooperativity and demanding   Motor: no abnormal movements and normal gait and balance   Speech:  spontaneous and coherent   Mood:  "Better"   Affect:  blunted and tearful at times   Thought Process:  perseverative   Thought Content: no verbalized delusions or overt paranoia   Perceptual disturbances: no reported hallucinations and does not appear to be responding to internal stimuli at this time   Risk Potential: No active or passive suicidal or homicidal ideation was verbalized during interview   Cognition: oriented to self and situation, appears to be of average intelligence, and cognition not formally tested   Insight:  Fair   Judgment: Fair     Risks, benefits and possible side effects of Medications:   Risks, benefits, and possible side effects of medications have been explained to the patient, who verbalizes understanding. Labs: I have personally reviewed all pertinent laboratory results. Assessment/Plan  Lindsay Fink is a 57 y.o. male with a past psychiatric history of bipolar disorder versus major depressive disorder and ADHD. He presents to the hospital for detox and psychiatric medication reconciliation. He states that Seroquel is the only medication that is helped him in the past.  At this time he does not meet admission criteria for an inpatient psychiatric unit. Plan to increase his Seroquel to 50mg BID. Diagnosis: MDD vs Bipolar disorder     Recommended Treatment:   The patient does not meet criteria for inpatient psychiatric hospitalization and will be discharged home at their request.  The patient has capacity to understand the risks and benefits of the different types of psychiatric treatment available (including inpatient, outpatient, and partial hospitalization) and has verbalized understanding of the same.   Continue home medications  Increase Seroquel from 50mg once daily to 50mg BID for mood symptoms  Discussed with primary team    [facility], DO    This note was not shared with the patient due to reasonable likelihood of causing patient harm

## 2023-12-07 NOTE — PROGRESS NOTES
PROGRESS NOTE  DEPARTMENT OF MEDICAL TOXICOLOGY  LEVEL 4 MEDICAL DETOX UNIT  Sgaar Melvin 57 y.o. male MRN: [id number]  Unit/Bed#:  DETOX 507-01 Encounter: [id number]      Reason for Admission/Principal Problem: Opioid withdrawal, opioid use disorder, right inguinal hernia  Rounding Provider: Mary De Luna PA-C  Attending Provider: Jelani Greene*   12/5/2023  4:33 PM           Mild persistent asthma with exacerbation  Assessment & Plan  - Pt refused CT from the time of admission, finally agreeable today due to worsening symptoms   - Patient complaining of worsening cough today. He is complaining of chills however has been afebrile, may also be due to withdrawal sx. BP was soft this morning, 500 cc fluid bolus given with improvement  - Moderate wheezing and rhonchi on exam today. He was given Solu-Medrol 125mg and nebs with some improvement   - CT chest with groundglass patches seen in lungs consistent with acute infectious process-pneumonia versus bronchitis/bronchiolitis. May be a component of both- IV Levaquin 750 mg daily and prednisone 40 mg ordered. - Blood cultures prior to antibiotics, COVID/flu/RSV ordered as well.   Labs ordered for the morning.  - Continue telemetry and continuous pulse oximetry while monitoring clinically    Bipolar 1 disorder ([address])  Assessment & Plan  Patient with a h/o bipolar disorder  Home medications include Seroquel 200 mg QHS and mirtazapine 30 mg QHS  Pt admits medication noncompliance but amount of time he was not taking is unclear  Per RN, patient reports wanting to restart specifically Seroquel for sleep  No current SI/HI/thoughts of self harm  Psychiatry consulted for medication reconciliation/appropriate titration, recommending increasing Seroquel to BID  Recommend OP f/u with PCP/OP Psychiatry     Tobacco use disorder  Assessment & Plan  Pt reports smoking 0.5 ppd of cigarettes  Offer NRT   Encourage cessation     Leukocytosis  Assessment & Plan  Lab Results   Component Value Date    WBC 11.55 (H) 12/05/2023        Mildly Elevated WBC on admission  Per chart review, pt appears to have chronic intermittent leukocytosis, with baseline WBC 10-11  Possibly increased at this time 2/2 leukemoid reaction from acute opioid withdrawal, possible dehydration, and/or ongoing inguinal hernia with possible incarceration  Stable. Continue monitoring CBC, VS     Right inguinal hernia  Assessment & Plan  - Hernia soft and reducible although mildly tender on exam. No evidence of obstruction or strangulation.   - Patient initially refused CT scan on arrival to unit  - Complaining of more pain today. Agreeable to CT scan- pending    Opioid use disorder, severe, dependence ([address])  Assessment & Plan  Patient with a history of chronic heroin/fentanyl use  Uses 3-4 bags daily via smoking  No known h/o IVDU  History of prior Suboxone MAT  PDMP reviewed: last Suboxone prescription filled 10/4/23, (21 films/14 days)  Patient does have compliance issues and is specifically requesting Subutex, raising concern for diversion. Patient agrees to go on Sublocade  Management of opioid withdrawal under MAT protocol as above  Case management consulted for assistance with aftercare resources    * Opioid withdrawal [facility])  Assessment & Plan  Patient with a history of chronic opioid use  Last heroin use was 12/5 at 3 PM  However, pt reported in the ED that he "just took a Suboxone" around [address] and this led to precipitated withdrawal   Initiate MAT/opioid withdrawal protocol with plan for eventual microinduction with Suboxone  Micro induction was started this morning. Patient is tolerating well. Will transition to 2 mg q2h x 4 doses and if continues to respond well, we will start maintenance therapy at 8 mg. Possibly dc tomorrow? Patient does have compliance issues with his Suboxone and prior prescriptions of Subutex. He takes them intermittently to relieve withdrawal and anxiety.   He was counseled on compliance and long-term treatment and agrees to [employer]. Will then refer to [facility]. Will have CRS meet with patient while in detox. Symptomatic and supportive care  Continuous pulse oximetry monitoring     Thrombocytosis-resolved as of 12/7/2023  Assessment & Plan  Lab Results   Component Value Date     (H) 12/05/2023      Increased PTL count on admission  Per chart review, pt appears to have chronic thrombocytosis, with baseline -500  No obvious evidence of active bleeding/bruising on limited physical exam  IVF hydration was given with resolution            VTE Pharmacologic Prophylaxis:   Pharmacologic: Enoxaparin (Lovenox)  Mechanical VTE Prophylaxis in Place: no    Code Status: Level 1 - Full Code    Patient Centered Rounds: I have performed bedside rounds with nursing staff today. Discussions with Specialists or Other Care Team Provider: Psych     Education and Discussions with Family / Patient: Dispo planning    Time Spent for Care: 45 minutes. More than 50% of total time spent on counseling and coordination of care as described above. Current Length of Stay: 2 day(s)    Current Patient Status: Inpatient     Certification Statement: The patient will continue to require additional inpatient hospital stay due to opioid withdrawal management and further work up of inguinal hernia and worsening cough  Discharge Plan: TBD, case management on board        Subjective:   Patient evaluated on rounds today. He states he has had the chills all night as well as body aches and worsening cough. He has been getting nebulizers with some improvement. Admits to mild shortness of breath but denies any chest pain. He is also complaining of worsening pain where his right inguinal hernia is located. He has been having bowel movements and passing gas. He is finally agreeable to CT of the chest/abdomen/pelvis. Vitals stable.     Objective:     Clinical Opiate Withdrawal Scale  Pulse: 78  Resting Pulse Rate: Measured After Patient is Sitting or Lying for One Minute: Pulse rate 80 or below  GI Upset: Over Last Half Hour: Stomach cramps  Sweating: Over Past Half Hour Not Accounted for by Room Temperature of Patient Activity: Subjective report of chills or flushing  Tremor: Observation of Outstretched Hands: Tremor can be felt, but not observed  Restlessness: Observation During Assessment: Reports difficulty sitting still, but is able to do so  Yawning: Observation During Assessment: Yawning once or twice during assessment  Pupil Size: Pupils moderately dilated  Anxiety and Irritability: Patient reports increasing irritability or anxiousness  Bone or Joint Aches: If Patient was Having Pain Previously, Only the Additional Component Attributed to Opiate Withdrawal is Scored: Mild diffuse discomfort  Gooseflesh Skin: Piloerection of skin can be felt or hairs standing up on arms  Runny Nose or Tearing: Not Accounted for by Cold Symptoms or Allergies: Nose constantly running or tears streaming down cheeks  Clinical Opiate Withdrawal Scale Total Score: 16    SEWS Total Score: 0 (12/5/2023  8:49 PM)        Last 24 Hours Medication List:   Current Facility-Administered Medications   Medication Dose Route Frequency Provider Last Rate    acetaminophen  650 mg Oral Q6H PRN Jolene Defrancisco, PA-C      albuterol  2 puff Inhalation Q4H PRN Jolene Defrancisco, PA-C      albuterol  2.5 mg Nebulization TID Candice Barnes, DO      albuterol  2.5 mg Nebulization Q4H PRN Candice Barnes, DO      buprenorphine-naloxone  8 mg Sublingual BID Leah Frankey Hurt, PA-C      cloNIDine  0.1 mg Oral Q6H PRN Jolene Defrancisco, PA-C      enoxaparin  40 mg Subcutaneous Daily Jolene Defrancisco, PA-C      gabapentin  300 mg Oral Q8H PRN Jolene Defrancisco, PA-C      ketorolac  15 mg Intravenous Q6H PRN Kristina Domínguez PA-C      levofloxacin  750 mg Intravenous Q24H [facility], PA-C 750 mg (23 1642)    nicotine  1 patch Transdermal Daily Jolene Verde PA-C      [START ON 2023] predniSONE  40 mg Oral Daily Nanci Heath PA-C      QUEtiapine  100 mg Oral BID Johnny Madrigal DO      sodium chloride  100 mL/hr Intravenous Continuous Jolene Verde PA-C 100 mL/hr (23)    transdermal buprenorphine  20 mcg Transdermal Q7 Days Jolene Verde PA-C           Vitals:   Temp (24hrs), Av °F (36.7 °C), Min:97.4 °F (36.3 °C), Max:98.4 °F (36.9 °C)    Temp:  [97.4 °F (36.3 °C)-98.4 °F (36.9 °C)] 97.4 °F (36.3 °C)  HR:  [65-78] 78  Resp:  [16-18] 16  BP: ()/(54-74) 131/64  SpO2:  [93 %-100 %] 100 %  Body mass index is 20.23 kg/m². Input and Output Summary (last 24 hours):No intake or output data in the 24 hours ending 23    Physical Exam:   Physical Exam    Additional Data:     Labs:   Results from last 7 days   Lab Units 23  0504 23  1707   WBC Thousand/uL 11.02* 11.55*   HEMOGLOBIN g/dL 13.2 14.9   HEMATOCRIT % 41.5 46.1   PLATELETS Thousands/uL 379 417*   NEUTROS PCT %  --  62   LYMPHS PCT %  --  21   MONOS PCT %  --  9   EOS PCT %  --  7*      Results from last 7 days   Lab Units 23  0504   SODIUM mmol/L 141   POTASSIUM mmol/L 3.9   CHLORIDE mmol/L 106   CO2 mmol/L 25   BUN mg/dL 12   CREATININE mg/dL 0.95   ANION GAP mmol/L 10   CALCIUM mg/dL 9.3   ALBUMIN g/dL 3.7   TOTAL BILIRUBIN mg/dL 0.23   ALK PHOS U/L 50   ALT U/L 6*   AST U/L 11*   GLUCOSE RANDOM mg/dL 116                              * I Have Reviewed All Lab Data Listed Above. * Additional Pertinent Lab Tests Reviewed: [facility] Admission Reviewed      Imaging Studies: I have personally reviewed pertinent reports.    and I have personally reviewed pertinent films in PACS      Recent Cultures (last 7 days):    Blood cultures ordered prior to starting abx today       Today, Patient Was Seen By: Frank Gallegos PA-C    ** Please Note: Dictation voice to text software may have been used in the creation of this document.  **

## 2023-12-07 NOTE — NURSING NOTE
Pt. refused routine needle stick for labs this morning but agreed for me to remove blood off of his line in left AC, no other issues.

## 2023-12-08 LAB
ALBUMIN SERPL BCP-MCNC: 3.5 G/DL (ref 3.5–5)
ALP SERPL-CCNC: 48 U/L (ref 34–104)
ALT SERPL W P-5'-P-CCNC: 13 U/L (ref 7–52)
ANION GAP SERPL CALCULATED.3IONS-SCNC: 9 MMOL/L
AST SERPL W P-5'-P-CCNC: 14 U/L (ref 13–39)
BASOPHILS # BLD AUTO: 0.03 THOUSANDS/ÂΜL (ref 0–0.1)
BASOPHILS NFR BLD AUTO: 0 % (ref 0–1)
BILIRUB SERPL-MCNC: 0.18 MG/DL (ref 0.2–1)
BUN SERPL-MCNC: 17 MG/DL (ref 5–25)
CALCIUM SERPL-MCNC: 9.3 MG/DL (ref 8.4–10.2)
CHLORIDE SERPL-SCNC: 107 MMOL/L (ref 96–108)
CO2 SERPL-SCNC: 24 MMOL/L (ref 21–32)
CREAT SERPL-MCNC: 1.03 MG/DL (ref 0.6–1.3)
EOSINOPHIL # BLD AUTO: 0.01 THOUSAND/ÂΜL (ref 0–0.61)
EOSINOPHIL NFR BLD AUTO: 0 % (ref 0–6)
ERYTHROCYTE [DISTWIDTH] IN BLOOD BY AUTOMATED COUNT: 15.2 % (ref 11.6–15.1)
GFR SERPL CREATININE-BSD FRML MDRD: 86 ML/MIN/1.73SQ M
GLUCOSE SERPL-MCNC: 96 MG/DL (ref 65–140)
HCT VFR BLD AUTO: 40.4 % (ref 36.5–49.3)
HGB BLD-MCNC: 12.8 G/DL (ref 12–17)
IMM GRANULOCYTES # BLD AUTO: 0.06 THOUSAND/UL (ref 0–0.2)
IMM GRANULOCYTES NFR BLD AUTO: 0 % (ref 0–2)
LYMPHOCYTES # BLD AUTO: 3.09 THOUSANDS/ÂΜL (ref 0.6–4.47)
LYMPHOCYTES NFR BLD AUTO: 20 % (ref 14–44)
MCH RBC QN AUTO: 27.3 PG (ref 26.8–34.3)
MCHC RBC AUTO-ENTMCNC: 31.7 G/DL (ref 31.4–37.4)
MCV RBC AUTO: 86 FL (ref 82–98)
MONOCYTES # BLD AUTO: 1.17 THOUSAND/ÂΜL (ref 0.17–1.22)
MONOCYTES NFR BLD AUTO: 8 % (ref 4–12)
NEUTROPHILS # BLD AUTO: 11.32 THOUSANDS/ÂΜL (ref 1.85–7.62)
NEUTS SEG NFR BLD AUTO: 72 % (ref 43–75)
NRBC BLD AUTO-RTO: 0 /100 WBCS
PLATELET # BLD AUTO: 393 THOUSANDS/UL (ref 149–390)
PMV BLD AUTO: 9.2 FL (ref 8.9–12.7)
POTASSIUM SERPL-SCNC: 4.4 MMOL/L (ref 3.5–5.3)
PROT SERPL-MCNC: 6.4 G/DL (ref 6.4–8.4)
RBC # BLD AUTO: 4.69 MILLION/UL (ref 3.88–5.62)
SODIUM SERPL-SCNC: 140 MMOL/L (ref 135–147)
WBC # BLD AUTO: 15.68 THOUSAND/UL (ref 4.31–10.16)

## 2023-12-08 PROCEDURE — 94640 AIRWAY INHALATION TREATMENT: CPT

## 2023-12-08 PROCEDURE — 87040 BLOOD CULTURE FOR BACTERIA: CPT | Performed by: EMERGENCY MEDICINE

## 2023-12-08 PROCEDURE — 80053 COMPREHEN METABOLIC PANEL: CPT | Performed by: PHYSICIAN ASSISTANT

## 2023-12-08 PROCEDURE — 94760 N-INVAS EAR/PLS OXIMETRY 1: CPT

## 2023-12-08 PROCEDURE — 99233 SBSQ HOSP IP/OBS HIGH 50: CPT | Performed by: EMERGENCY MEDICINE

## 2023-12-08 PROCEDURE — 85025 COMPLETE CBC W/AUTO DIFF WBC: CPT | Performed by: PHYSICIAN ASSISTANT

## 2023-12-08 RX ORDER — IPRATROPIUM BROMIDE AND ALBUTEROL SULFATE 2.5; .5 MG/3ML; MG/3ML
3 SOLUTION RESPIRATORY (INHALATION)
Status: DISCONTINUED | OUTPATIENT
Start: 2023-12-08 | End: 2023-12-10 | Stop reason: HOSPADM

## 2023-12-08 RX ADMIN — LEVOFLOXACIN 750 MG: 750 INJECTION, SOLUTION INTRAVENOUS at 16:05

## 2023-12-08 RX ADMIN — QUETIAPINE FUMARATE 50 MG: 50 TABLET ORAL at 08:47

## 2023-12-08 RX ADMIN — ALBUTEROL SULFATE 2 PUFF: 90 AEROSOL, METERED RESPIRATORY (INHALATION) at 09:20

## 2023-12-08 RX ADMIN — KETOROLAC TROMETHAMINE 15 MG: 30 INJECTION, SOLUTION INTRAMUSCULAR; INTRAVENOUS at 09:11

## 2023-12-08 RX ADMIN — ALBUTEROL SULFATE 2.5 MG: 2.5 SOLUTION RESPIRATORY (INHALATION) at 07:21

## 2023-12-08 RX ADMIN — NICOTINE 1 PATCH: 21 PATCH, EXTENDED RELEASE TRANSDERMAL at 08:47

## 2023-12-08 RX ADMIN — QUETIAPINE FUMARATE 50 MG: 50 TABLET ORAL at 18:14

## 2023-12-08 RX ADMIN — GABAPENTIN 300 MG: 300 CAPSULE ORAL at 22:13

## 2023-12-08 RX ADMIN — BUPRENORPHINE AND NALOXONE 8 MG: 8; 2 FILM BUCCAL; SUBLINGUAL at 20:31

## 2023-12-08 RX ADMIN — BUPRENORPHINE AND NALOXONE 8 MG: 8; 2 FILM BUCCAL; SUBLINGUAL at 08:47

## 2023-12-08 RX ADMIN — ACETAMINOPHEN 650 MG: 325 TABLET ORAL at 22:13

## 2023-12-08 RX ADMIN — KETOROLAC TROMETHAMINE 15 MG: 30 INJECTION, SOLUTION INTRAMUSCULAR; INTRAVENOUS at 19:58

## 2023-12-08 RX ADMIN — PREDNISONE 40 MG: 20 TABLET ORAL at 08:47

## 2023-12-08 RX ADMIN — IPRATROPIUM BROMIDE AND ALBUTEROL SULFATE 3 ML: 2.5; .5 SOLUTION RESPIRATORY (INHALATION) at 10:02

## 2023-12-08 RX ADMIN — IPRATROPIUM BROMIDE AND ALBUTEROL SULFATE 3 ML: 2.5; .5 SOLUTION RESPIRATORY (INHALATION) at 13:45

## 2023-12-08 NOTE — SOCIAL WORK
SUJEYK met with Pt and discussed after care options. Pt reported he wants to continue care on the OP setting. DEIDRA connected Pt with [facility]. Pt has an appointment with [facility] and is scheduled with Cailin TONEY CM, at 11 am on 12/14, and with Dr. Yahaira Alexander at 2:30 pm on 1/8. Pt is scheduled with [facility] for PCP appointment is scheduled for 12/14/2023 @1pm with . Pt connected Pt with [facility] for Op case management services. Pt has an appointment with the  for Monday 12/11/23 for 2 pm. Pt understands all appointments. Pt signed CORIN's for insurance, medicare, medicaid, [employer] foundation.   Pt signed IMM

## 2023-12-08 NOTE — PROGRESS NOTES
PROGRESS NOTE  DEPARTMENT OF MEDICAL TOXICOLOGY  LEVEL 4 MEDICAL DETOX UNIT  Amoret  57 y.o. male MRN: [id number]  Unit/Bed#: 5T DETOX 045-79 Encounter: [id number]      Reason for Admission/Principal Problem: Opioid withdrawal and respiratory distress  Rounding Provider: Johanne Hawkins DO  Attending Provider: Johanne Hawkins DO   12/5/2023  4:33 PM           Mild persistent asthma with exacerbation  Assessment & Plan  Resp panel negative. Changed nebs to duonebs  Continue steroids. Ambulate with P.ox today  Cont abx for possible pneumonia on imaging. Bipolar 1 disorder ([address] )  Assessment & Plan  Patient with a h/o bipolar disorder  Home medications include Seroquel 200 mg QHS and mirtazapine 30 mg QHS  Pt admits medication noncompliance but amount of time he was not taking is unclear  Per RN, patient reports wanting to restart specifically Seroquel for sleep  No current SI/HI/thoughts of self harm  Psychiatry consulted for medication reconciliation/appropriate titration, recommending increasing Seroquel to BID  Recommend OP f/u with PCP/OP Psychiatry     Tobacco use disorder  Assessment & Plan  Pt reports smoking 0.5 ppd of cigarettes  Offer NRT   Encourage cessation     Leukocytosis  Assessment & Plan  Lab Results   Component Value Date    WBC 15.68 (H) 12/08/2023      CT revealed possible pneumonia. Additionally, he was started on steroids, which will also contribute. No intervention or further work-up necessary. Right inguinal hernia  Assessment & Plan  Stable. CT without evidence of incarceration  Outpatient follow up appropriate. Opioid use disorder, severe, dependence ([address])  Assessment & Plan  Patient started on maintenance buprenorphine dosing for MOUD and tolerating it well  Pt interested in [facility]. Will plan for administration tomorrow prior to discharge if he's able to be discharged from a resp standpoint.        * Opioid withdrawal [facility])  Assessment & Plan  Maintenance buprenorphine started and tolerating it well. Will plan a bridge Rx upon discharge and possible [facility] f/u    Thrombocytosis-resolved as of 12/7/2023  Assessment & Plan  Lab Results   Component Value Date     (H) 12/05/2023      Increased PTL count on admission  Per chart review, pt appears to have chronic thrombocytosis, with baseline -500  No obvious evidence of active bleeding/bruising on limited physical exam  IVF hydration was given with resolution            VTE Pharmacologic Prophylaxis:   Pharmacologic: Enoxaparin (Lovenox)  Mechanical VTE Prophylaxis in Place: no    Code Status: Level 1 - Full Code    Patient Centered Rounds: I have performed bedside rounds with nursing staff today. Discussions with Specialists or Other Care Team Provider: case management and nursing    Education and Discussions with Family / Patient: yes    Time Spent for Care: 20 minutes. More than 50% of total time spent on counseling and coordination of care as described above. Current Length of Stay: 3 day(s)    Current Patient Status: Inpatient     Certification Statement: The patient will continue to require additional inpatient hospital stay due to ongoing asthma and pneumonia treatment. Discharge Plan: home with outpatient follow oup        Subjective:   Still c/o SOB and has wheezing.      Objective:     Clinical Opiate Withdrawal Scale  Pulse: 66  Resting Pulse Rate: Measured After Patient is Sitting or Lying for One Minute: Pulse rate 80 or below  GI Upset: Over Last Half Hour: Stomach cramps  Sweating: Over Past Half Hour Not Accounted for by Room Temperature of Patient Activity: Subjective report of chills or flushing  Tremor: Observation of Outstretched Hands: Tremor can be felt, but not observed  Restlessness: Observation During Assessment: Reports difficulty sitting still, but is able to do so  Yawning: Observation During Assessment: Yawning once or twice during assessment  Pupil Size: Pupils moderately dilated  Anxiety and Irritability: Patient reports increasing irritability or anxiousness  Bone or Joint Aches: If Patient was Having Pain Previously, Only the Additional Component Attributed to Opiate Withdrawal is Scored: Mild diffuse discomfort  Gooseflesh Skin: Piloerection of skin can be felt or hairs standing up on arms  Runny Nose or Tearing: Not Accounted for by Cold Symptoms or Allergies: Nose constantly running or tears streaming down cheeks  Clinical Opiate Withdrawal Scale Total Score: 16    SEWS Total Score: 0 (2023  8:49 PM)        Last 24 Hours Medication List:   Current Facility-Administered Medications   Medication Dose Route Frequency Provider Last Rate    acetaminophen  650 mg Oral Q6H PRN Jolene Stevencisco, PA-C      albuterol  2 puff Inhalation Q4H PRN Jolene Stevencisco, PA-C      albuterol  2.5 mg Nebulization Q4H PRN Dallas Barnes, DO      buprenorphine-naloxone  8 mg Sublingual BID Nanci Coates PA-C      cloNIDine  0.1 mg Oral Q6H PRN Jolene Ammon, PA-C      enoxaparin  40 mg Subcutaneous Daily Jolene Ammon, PA-THEODORE      gabapentin  300 mg Oral Q8H PRN Jolene Stevencisco, PA-C      ipratropium-albuterol  3 mL Nebulization Q6H Junior Lorenz DO      ketorolac  15 mg Intravenous Q6H PRN Kristina Flores PA-C      levofloxacin  750 mg Intravenous Q24H Julián Gordon PA-C 750 mg (23 1642)    nicotine  1 patch Transdermal Daily KISHOR Luna-C      predniSONE  40 mg Oral Daily KISHOR Sun-THEODORE      QUEtiapine  50 mg Oral BID Georgia C Holter, DO      sodium chloride  100 mL/hr Intravenous Continuous Jolene Stevencisco, PA-C 100 mL/hr (23)    transdermal buprenorphine  20 mcg Transdermal Q7 Days Jolene Verde PA-C           Vitals:   Temp (24hrs), Av.6 °F (36.4 °C), Min:97 °F (36.1 °C), Max:98 °F (36.7 °C)    Temp:  [97 °F (36.1 °C)-98 °F (36.7 °C)] 97.9 °F (36.6 °C)  HR:  Giuliana Cool 66  Resp:  [16-18] 18  BP: (100-131)/(60-71) 119/71  SpO2:  [93 %-100 %] 96 %  Body mass index is 20.23 kg/m². Input and Output Summary (last 24 hours):No intake or output data in the 24 hours ending 12/08/23 1228    Physical Exam:   Physical Exam  Constitutional:       General: He is not in acute distress. Appearance: Normal appearance. Cardiovascular:      Rate and Rhythm: Normal rate and regular rhythm. Pulmonary:      Comments: Diffuse insp and exp wheezes. Abdominal:      Palpations: Abdomen is soft. Musculoskeletal:      Right lower leg: No edema. Left lower leg: No edema. Neurological:      Mental Status: He is alert and oriented to person, place, and time. Psychiatric:         Mood and Affect: Mood normal.         Behavior: Behavior normal.         Additional Data:     Labs:   Results from last 7 days   Lab Units 12/08/23  0455   WBC Thousand/uL 15.68*   HEMOGLOBIN g/dL 12.8   HEMATOCRIT % 40.4   PLATELETS Thousands/uL 393*   NEUTROS PCT % 72   LYMPHS PCT % 20   MONOS PCT % 8   EOS PCT % 0      Results from last 7 days   Lab Units 12/08/23  0455   SODIUM mmol/L 140   POTASSIUM mmol/L 4.4   CHLORIDE mmol/L 107   CO2 mmol/L 24   BUN mg/dL 17   CREATININE mg/dL 1.03   ANION GAP mmol/L 9   CALCIUM mg/dL 9.3   ALBUMIN g/dL 3.5   TOTAL BILIRUBIN mg/dL 0.18*   ALK PHOS U/L 48   ALT U/L 13   AST U/L 14   GLUCOSE RANDOM mg/dL 96                              * I Have Reviewed All Lab Data Listed Above. * Additional Pertinent Lab Tests Reviewed: [address] [place] [address] Admission Reviewed      Imaging Studies: I have personally reviewed pertinent reports. Recent Cultures (last 7 days):  Results from last 7 days   Lab Units 12/07/23  1558   BLOOD CULTURE  Received in Microbiology Lab. Culture in Progress. Received in Microbiology Lab. Culture in Progress.          Today, Patient Was Seen By: Vito Powers DO    ** Please Note: Dictation voice to text software may have been used in the creation of this document.  ** Detail Level: Detailed Quality 110: Preventive Care And Screening: Influenza Immunization: Influenza Immunization Ordered or Recommended, but not Administered due to system reason Quality 130: Documentation Of Current Medications In The Medical Record: Current Medications Documented

## 2023-12-09 PROBLEM — F11.93 OPIOID WITHDRAWAL (HCC): Status: RESOLVED | Noted: 2023-12-05 | Resolved: 2023-12-09

## 2023-12-09 LAB
ANION GAP SERPL CALCULATED.3IONS-SCNC: 10 MMOL/L
BASOPHILS # BLD AUTO: 0.03 THOUSANDS/ÂΜL (ref 0–0.1)
BASOPHILS NFR BLD AUTO: 0 % (ref 0–1)
BUN SERPL-MCNC: 23 MG/DL (ref 5–25)
CALCIUM SERPL-MCNC: 9.1 MG/DL (ref 8.4–10.2)
CHLORIDE SERPL-SCNC: 105 MMOL/L (ref 96–108)
CO2 SERPL-SCNC: 25 MMOL/L (ref 21–32)
CREAT SERPL-MCNC: 1.08 MG/DL (ref 0.6–1.3)
EOSINOPHIL # BLD AUTO: 0.02 THOUSAND/ÂΜL (ref 0–0.61)
EOSINOPHIL NFR BLD AUTO: 0 % (ref 0–6)
ERYTHROCYTE [DISTWIDTH] IN BLOOD BY AUTOMATED COUNT: 15 % (ref 11.6–15.1)
GFR SERPL CREATININE-BSD FRML MDRD: 81 ML/MIN/1.73SQ M
GLUCOSE SERPL-MCNC: 88 MG/DL (ref 65–140)
HCT VFR BLD AUTO: 40.6 % (ref 36.5–49.3)
HGB BLD-MCNC: 12.8 G/DL (ref 12–17)
IMM GRANULOCYTES # BLD AUTO: 0.05 THOUSAND/UL (ref 0–0.2)
IMM GRANULOCYTES NFR BLD AUTO: 0 % (ref 0–2)
LYMPHOCYTES # BLD AUTO: 3.62 THOUSANDS/ÂΜL (ref 0.6–4.47)
LYMPHOCYTES NFR BLD AUTO: 26 % (ref 14–44)
MAGNESIUM SERPL-MCNC: 2 MG/DL (ref 1.9–2.7)
MCH RBC QN AUTO: 26.9 PG (ref 26.8–34.3)
MCHC RBC AUTO-ENTMCNC: 31.5 G/DL (ref 31.4–37.4)
MCV RBC AUTO: 85 FL (ref 82–98)
MONOCYTES # BLD AUTO: 1.12 THOUSAND/ÂΜL (ref 0.17–1.22)
MONOCYTES NFR BLD AUTO: 8 % (ref 4–12)
NEUTROPHILS # BLD AUTO: 9.15 THOUSANDS/ÂΜL (ref 1.85–7.62)
NEUTS SEG NFR BLD AUTO: 66 % (ref 43–75)
NRBC BLD AUTO-RTO: 0 /100 WBCS
PLATELET # BLD AUTO: 373 THOUSANDS/UL (ref 149–390)
PMV BLD AUTO: 9.2 FL (ref 8.9–12.7)
POTASSIUM SERPL-SCNC: 4.4 MMOL/L (ref 3.5–5.3)
RBC # BLD AUTO: 4.76 MILLION/UL (ref 3.88–5.62)
SODIUM SERPL-SCNC: 140 MMOL/L (ref 135–147)
WBC # BLD AUTO: 13.99 THOUSAND/UL (ref 4.31–10.16)

## 2023-12-09 PROCEDURE — NC001 PR NO CHARGE: Performed by: EMERGENCY MEDICINE

## 2023-12-09 PROCEDURE — 94640 AIRWAY INHALATION TREATMENT: CPT

## 2023-12-09 PROCEDURE — 99233 SBSQ HOSP IP/OBS HIGH 50: CPT | Performed by: EMERGENCY MEDICINE

## 2023-12-09 PROCEDURE — 83735 ASSAY OF MAGNESIUM: CPT

## 2023-12-09 PROCEDURE — 87040 BLOOD CULTURE FOR BACTERIA: CPT

## 2023-12-09 PROCEDURE — 85025 COMPLETE CBC W/AUTO DIFF WBC: CPT

## 2023-12-09 PROCEDURE — 94760 N-INVAS EAR/PLS OXIMETRY 1: CPT

## 2023-12-09 PROCEDURE — 80048 BASIC METABOLIC PNL TOTAL CA: CPT

## 2023-12-09 RX ORDER — KETOROLAC TROMETHAMINE 30 MG/ML
15 INJECTION, SOLUTION INTRAMUSCULAR; INTRAVENOUS EVERY 6 HOURS PRN
Status: DISCONTINUED | OUTPATIENT
Start: 2023-12-09 | End: 2023-12-10 | Stop reason: HOSPADM

## 2023-12-09 RX ORDER — IBUPROFEN 600 MG/1
600 TABLET ORAL EVERY 6 HOURS PRN
Status: DISCONTINUED | OUTPATIENT
Start: 2023-12-09 | End: 2023-12-10 | Stop reason: HOSPADM

## 2023-12-09 RX ADMIN — IPRATROPIUM BROMIDE AND ALBUTEROL SULFATE 3 ML: 2.5; .5 SOLUTION RESPIRATORY (INHALATION) at 14:28

## 2023-12-09 RX ADMIN — LEVOFLOXACIN 750 MG: 750 INJECTION, SOLUTION INTRAVENOUS at 16:00

## 2023-12-09 RX ADMIN — PREDNISONE 40 MG: 20 TABLET ORAL at 09:01

## 2023-12-09 RX ADMIN — IPRATROPIUM BROMIDE AND ALBUTEROL SULFATE 3 ML: 2.5; .5 SOLUTION RESPIRATORY (INHALATION) at 07:47

## 2023-12-09 RX ADMIN — BUPRENORPHINE AND NALOXONE 8 MG: 8; 2 FILM BUCCAL; SUBLINGUAL at 09:01

## 2023-12-09 RX ADMIN — QUETIAPINE FUMARATE 50 MG: 50 TABLET ORAL at 17:43

## 2023-12-09 RX ADMIN — QUETIAPINE FUMARATE 50 MG: 50 TABLET ORAL at 09:01

## 2023-12-09 RX ADMIN — IPRATROPIUM BROMIDE AND ALBUTEROL SULFATE 3 ML: 2.5; .5 SOLUTION RESPIRATORY (INHALATION) at 20:53

## 2023-12-09 RX ADMIN — IBUPROFEN 600 MG: 600 TABLET ORAL at 14:25

## 2023-12-09 RX ADMIN — ACETAMINOPHEN 650 MG: 325 TABLET ORAL at 13:25

## 2023-12-09 RX ADMIN — KETOROLAC TROMETHAMINE 15 MG: 30 INJECTION, SOLUTION INTRAMUSCULAR; INTRAVENOUS at 20:07

## 2023-12-09 RX ADMIN — BUPRENORPHINE 300 MG: 300 SOLUTION SUBCUTANEOUS at 17:43

## 2023-12-09 NOTE — PROGRESS NOTES
PROGRESS NOTE  DEPARTMENT OF MEDICAL TOXICOLOGY  LEVEL 4 MEDICAL DETOX UNIT  Maegan Carney 57 y.o. male MRN: [id number]  Unit/Bed#: 5T DETOX 112-29 Encounter: [id number]      Reason for Admission/Principal Problem: Opioid withdrawal  Rounding Provider: Andi Elliott DO  Attending Provider: Andi Elliott DO   12/5/2023  4:33 PM           Mild persistent asthma with exacerbation  Assessment & Plan  Resp panel negative. Changed nebs to duonebs  Continue steroids. Ambulate with P.ox today  Cont abx for possible pneumonia on imaging. Bipolar 1 disorder ([address] [facility])  Assessment & Plan  Patient with a h/o bipolar disorder  Home medications include Seroquel 200 mg QHS and mirtazapine 30 mg QHS  Pt admits medication noncompliance but amount of time he was not taking is unclear  Per RN, patient reports wanting to restart specifically Seroquel for sleep  No current SI/HI/thoughts of self harm  Psychiatry consulted for medication reconciliation/appropriate titration, recommending increasing Seroquel to BID  Recommend OP f/u with PCP/OP Psychiatry     Tobacco use disorder  Assessment & Plan  Pt reports smoking 0.5 ppd of cigarettes  Offer NRT   Encourage cessation     Leukocytosis  Assessment & Plan  Lab Results   Component Value Date    WBC 15.68 (H) 12/08/2023      CT revealed possible pneumonia. Timing of steroids likely unrelated. .   No intervention or further work-up necessary. Right inguinal hernia  Assessment & Plan  Stable. CT without evidence of incarceration  After ice application, patient was agreeable to attempt reduction. I reduce his hernia but he states that little area likely come back when he stands up. Outpatient follow up appropriate. Opioid use disorder, severe, dependence ([address])  Assessment & Plan  Patient started on maintenance buprenorphine dosing for MOUD and tolerating it well  Pt interested in [facility].  Will plan for administration tomorrow prior to discharge if he's able to be discharged from a resp standpoint. Thrombocytosis-resolved as of 12/7/2023  Assessment & Plan  Lab Results   Component Value Date     (H) 12/05/2023      Increased PTL count on admission  Per chart review, pt appears to have chronic thrombocytosis, with baseline -500  No obvious evidence of active bleeding/bruising on limited physical exam  IVF hydration was given with resolution    * Opioid withdrawal (HCC)-resolved as of 12/9/2023  Assessment & Plan  Maintenance buprenorphine started and tolerating it well. Will plan a bridge Rx upon discharge and possible SHARE f/u            VTE Pharmacologic Prophylaxis:   Pharmacologic: Enoxaparin (Lovenox)  Mechanical VTE Prophylaxis in Place: no    Code Status: Level 1 - Full Code    Patient Centered Rounds: I have performed bedside rounds with nursing staff today. Discussions with Specialists or Other Care Team Provider: Nursing    Education and Discussions with Family / Patient: Patient    Time Spent for Care: 20 minutes. More than 50% of total time spent on counseling and coordination of care as described above. Current Length of Stay: 4 day(s)    Current Patient Status: Inpatient     Certification Statement: The patient will continue to require additional inpatient hospital stay due to pending blood cultures  Discharge Plan: Discharge once blood cultures come back presumed negative      Subjective:   Patient feels much better from opioid withdrawal.  Abdominal discomfort improved after reduction of hernia. Eating well. No difficulty moving bowels.     Objective:     Clinical Opiate Withdrawal Scale  Pulse: 71  Resting Pulse Rate: Measured After Patient is Sitting or Lying for One Minute: Pulse rate 80 or below  GI Upset: Over Last Half Hour: Stomach cramps  Sweating: Over Past Half Hour Not Accounted for by Room Temperature of Patient Activity: Subjective report of chills or flushing  Tremor: Observation of Outstretched Hands: Tremor can be felt, but not observed  Restlessness: Observation During Assessment: Reports difficulty sitting still, but is able to do so  Yawning: Observation During Assessment: Yawning once or twice during assessment  Pupil Size: Pupils moderately dilated  Anxiety and Irritability: Patient reports increasing irritability or anxiousness  Bone or Joint Aches: If Patient was Having Pain Previously, Only the Additional Component Attributed to Opiate Withdrawal is Scored: Mild diffuse discomfort  Gooseflesh Skin: Piloerection of skin can be felt or hairs standing up on arms  Runny Nose or Tearing: Not Accounted for by Cold Symptoms or Allergies: Nose constantly running or tears streaming down cheeks  Clinical Opiate Withdrawal Scale Total Score: 16    SEWS Total Score: 0 (2023  8:49 PM)        Last 24 Hours Medication List:   Current Facility-Administered Medications   Medication Dose Route Frequency Provider Last Rate    acetaminophen  650 mg Oral Q6H PRN KISHOR Luna-THEODORE      albuterol  2 puff Inhalation Q4H PRN Jolene ROBI Verde      albuterol  2.5 mg Nebulization Q4H PRN Elmon Stalls Nappe, DO      Buprenorphine ER  300 mg Subcutaneous Once Elmon Stalls Nappe, DO      enoxaparin  40 mg Subcutaneous Daily Jolene Verde PA-C      ibuprofen  600 mg Oral Q6H PRN Kristina Snowden PA-C      ipratropium-albuterol  3 mL Nebulization Q6H Asya Bazzi,       levofloxacin  750 mg Intravenous Q24H Parish Casillas PA-C 750 mg (23 1600)    nicotine  1 patch Transdermal Daily Jolene Verde PA-C      predniSONE  40 mg Oral Daily Nanci Aviles PA-C      QUEtiapine  50 mg Oral BID Georgia C Holter, DO           Vitals:   Temp (24hrs), Av.7 °F (36.5 °C), Min:97.3 °F (36.3 °C), Max:98.1 °F (36.7 °C)    Temp:  [97.3 °F (36.3 °C)-98.1 °F (36.7 °C)] 97.6 °F (36.4 °C)  HR:  [58-78] 71  Resp:  [18] 18  BP: (104-123)/(55-71) 112/55  SpO2:  [97 %-99 %] 97 %  Body mass index is 20.23 kg/m². Input and Output Summary (last 24 hours):No intake or output data in the 24 hours ending 12/09/23 1731    Physical Exam:   Physical Exam  Vitals and nursing note reviewed. Constitutional:       Appearance: Normal appearance. HENT:      Head: Normocephalic. Nose: Nose normal.      Mouth/Throat:      Mouth: Mucous membranes are moist.   Eyes:      Extraocular Movements: Extraocular movements intact. Conjunctiva/sclera: Conjunctivae normal.      Pupils: Pupils are equal, round, and reactive to light. Cardiovascular:      Rate and Rhythm: Normal rate and regular rhythm. Pulmonary:      Effort: Pulmonary effort is normal.   Abdominal:      General: Abdomen is flat. Palpations: Abdomen is soft. There is mass. Tenderness: There is abdominal tenderness. Musculoskeletal:         General: Normal range of motion. Cervical back: Normal range of motion. Skin:     General: Skin is warm and dry. Neurological:      General: No focal deficit present. Mental Status: He is alert and oriented to person, place, and time. Psychiatric:         Mood and Affect: Mood normal.         Behavior: Behavior normal.         Additional Data:     Labs:   Results from last 7 days   Lab Units 12/09/23  0546   WBC Thousand/uL 13.99*   HEMOGLOBIN g/dL 12.8   HEMATOCRIT % 40.6   PLATELETS Thousands/uL 373   NEUTROS PCT % 66   LYMPHS PCT % 26   MONOS PCT % 8   EOS PCT % 0      Results from last 7 days   Lab Units 12/09/23  0546 12/08/23  0455   SODIUM mmol/L 140 140   POTASSIUM mmol/L 4.4 4.4   CHLORIDE mmol/L 105 107   CO2 mmol/L 25 24   BUN mg/dL 23 17   CREATININE mg/dL 1.08 1.03   ANION GAP mmol/L 10 9   CALCIUM mg/dL 9.1 9.3   ALBUMIN g/dL  --  3.5   TOTAL BILIRUBIN mg/dL  --  0.18*   ALK PHOS U/L  --  48   ALT U/L  --  13   AST U/L  --  14   GLUCOSE RANDOM mg/dL 88 96                               * I Have Reviewed All Lab Data Listed Above.   * Additional Pertinent Lab Tests Reviewed: [facility] Admission Reviewed      Imaging Studies: I have personally reviewed pertinent reports. Recent Cultures (last 7 days):  Results from last 7 days   Lab Units 12/09/23  0545 12/08/23  1346 12/07/23  1558   BLOOD CULTURE  Received in Microbiology Lab. Culture in Progress. Received in Microbiology Lab. Culture in Progress. Staphylococcus epidermidis*  Staphylococcus epidermidis*  Staphylococcus hominis*   GRAM STAIN RESULT   --   --  Gram positive cocci in clusters*  Gram positive cocci in clusters*         Today, Patient Was Seen By: Edna De León DO    ** Please Note: Dictation voice to text software may have been used in the creation of this document.  **

## 2023-12-09 NOTE — PROCEDURES
Procedures      Right inguinal hernia reduction    Patient verbally consented to reduction of right inguinal hernia. Indication, pain management. Patient placed in supine position with right lower extremity adducted. With gentle upward pressure on inguinal mass, hernia easily reduced. Patient tolerated procedure well and only required ice for analgesia. No complications.

## 2023-12-10 VITALS
OXYGEN SATURATION: 98 % | WEIGHT: 135 LBS | RESPIRATION RATE: 16 BRPM | SYSTOLIC BLOOD PRESSURE: 104 MMHG | DIASTOLIC BLOOD PRESSURE: 56 MMHG | HEIGHT: 69 IN | TEMPERATURE: 97.3 F | BODY MASS INDEX: 19.99 KG/M2 | HEART RATE: 70 BPM

## 2023-12-10 PROBLEM — J45.31 MILD PERSISTENT ASTHMA WITH EXACERBATION: Status: RESOLVED | Noted: 2023-12-06 | Resolved: 2023-12-10

## 2023-12-10 LAB
BACTERIA BLD CULT: ABNORMAL
GRAM STN SPEC: ABNORMAL
GRAM STN SPEC: ABNORMAL
MECA+MECC ISLT/SPM QL: DETECTED
S EPIDERMIDIS DNA BLD POS QL NAA+NON-PRB: DETECTED

## 2023-12-10 PROCEDURE — NC001 PR NO CHARGE: Performed by: EMERGENCY MEDICINE

## 2023-12-10 PROCEDURE — 94760 N-INVAS EAR/PLS OXIMETRY 1: CPT

## 2023-12-10 PROCEDURE — 99222 1ST HOSP IP/OBS MODERATE 55: CPT | Performed by: SPECIALIST

## 2023-12-10 PROCEDURE — 99239 HOSP IP/OBS DSCHRG MGMT >30: CPT | Performed by: EMERGENCY MEDICINE

## 2023-12-10 PROCEDURE — 94640 AIRWAY INHALATION TREATMENT: CPT

## 2023-12-10 RX ORDER — QUETIAPINE FUMARATE 50 MG/1
50 TABLET, FILM COATED ORAL 2 TIMES DAILY
Qty: 14 TABLET | Refills: 0 | Status: SHIPPED | OUTPATIENT
Start: 2023-12-10 | End: 2023-12-17

## 2023-12-10 RX ORDER — LEVOFLOXACIN 500 MG/1
500 TABLET, FILM COATED ORAL EVERY 24 HOURS
Qty: 3 TABLET | Refills: 0 | Status: SHIPPED | OUTPATIENT
Start: 2023-12-10 | End: 2023-12-13

## 2023-12-10 RX ADMIN — IPRATROPIUM BROMIDE AND ALBUTEROL SULFATE 3 ML: 2.5; .5 SOLUTION RESPIRATORY (INHALATION) at 07:45

## 2023-12-10 RX ADMIN — PREDNISONE 40 MG: 20 TABLET ORAL at 08:07

## 2023-12-10 RX ADMIN — QUETIAPINE FUMARATE 50 MG: 50 TABLET ORAL at 08:07

## 2023-12-10 NOTE — NURSING NOTE
Patient leaving AMA, for signed and risk explained. IV removed, belongings accounted for. AVS reviewed with patient, questions answered and understanding stated. Patient ambulated to lobby with PCA.

## 2023-12-10 NOTE — PROGRESS NOTES
PROGRESS NOTE  DEPARTMENT OF MEDICAL TOXICOLOGY  LEVEL 4 MEDICAL DETOX UNIT  Candance Kern 57 y.o. male MRN: [id number]  Unit/Bed#: 5T DETOX 343-39 Encounter: [id number]      Reason for Admission/Principal Problem: Opioid withdrawal  Rounding Provider: Corinna Amado DO  Attending Provider: Corinna Amado DO   12/5/2023  4:33 PM           Mild persistent asthma with exacerbation  Assessment & Plan  Resp panel negative. Changed nebs to duonebs  Continue steroids. Ambulate with P.ox today  Cont abx for possible pneumonia on imaging. Bipolar 1 disorder ([address])  Assessment & Plan  Patient with a h/o bipolar disorder  Home medications include Seroquel 200 mg QHS and mirtazapine 30 mg QHS  Pt admits medication noncompliance but amount of time he was not taking is unclear  Per RN, patient reports wanting to restart specifically Seroquel for sleep  No current SI/HI/thoughts of self harm  Psychiatry consulted for medication reconciliation/appropriate titration, recommending increasing Seroquel to BID  Recommend OP f/u with PCP/OP Psychiatry     Tobacco use disorder  Assessment & Plan  Pt reports smoking 0.5 ppd of cigarettes  Offer NRT   Encourage cessation     Leukocytosis  Assessment & Plan  Lab Results   Component Value Date    WBC 15.68 (H) 12/08/2023      CT revealed possible pneumonia. Timing of steroids maybe related. .   Follow-up as outpatient. Right inguinal hernia  Assessment & Plan  Stable. Reduced as inpatient by me for symptomatic comfort but recurred as typical for his history. No evidence of strangulation. .  Seen by surgery who agrees with elective repair, ice, NSAIDs. Opioid use disorder, severe, dependence ([address])  Assessment & Plan  Patient received Sublocade   Refer for follow-up in [place] office. Case management for coordination of care.     Thrombocytosis-resolved as of 12/7/2023  Assessment & Plan  Lab Results   Component Value Date     (H) 12/05/2023 Increased PTL count on admission  Per chart review, pt appears to have chronic thrombocytosis, with baseline -500  No obvious evidence of active bleeding/bruising on limited physical exam  IVF hydration was given with resolution    * Opioid withdrawal (HCC)-resolved as of 12/9/2023  Assessment & Plan  Maintenance buprenorphine started and tolerating it well. Will plan a bridge Rx upon discharge and possible SHARE f/u            VTE Pharmacologic Prophylaxis:   Pharmacologic: Enoxaparin (Lovenox)  Mechanical VTE Prophylaxis in Place: yes    Code Status: Level 1 - Full Code    Patient Centered Rounds: I have performed bedside rounds with nursing staff today. Discussions with Specialists or Other Care Team Provider: Surgery    Education and Discussions with Family / Patient: Patient    Time Spent for Care: 20 minutes. More than 50% of total time spent on counseling and coordination of care as described above. Current Length of Stay: 5 day(s)    Current Patient Status: Inpatient     Certification Statement: The patient will continue to require additional inpatient hospital stay due to awaiting blood culture results  Discharge Plan: Discharge once blood cultures result      Subjective:   Patient is feeling well and is eager for discharge. Awaiting blood cultures.     Objective:     COWS 0      Last 24 Hours Medication List:   Current Facility-Administered Medications   Medication Dose Route Frequency Provider Last Rate    acetaminophen  650 mg Oral Q6H PRN Jolene StevenciROBI herrera      albuterol  2 puff Inhalation Q4H PRN Jolene StevenciROBI herrera      albuterol  2.5 mg Nebulization Q4H PRN Candice Barnes DO      enoxaparin  40 mg Subcutaneous Daily Jolene Verde PA-C      ibuprofen  600 mg Oral Q6H PRN Kristina Domínguez PA-C      ipratropium-albuterol  3 mL Nebulization Q6H Victoriano Boo DO      ketorolac  15 mg Intravenous Q6H PRN Kristina Domínguez PA-C      levofloxacin 750 mg Intravenous Q24H April Antony Stopped (23 180)    nicotine  1 patch Transdermal Daily Jolene Verde PA-C      predniSONE  40 mg Oral Daily Nancidilcia Garvinkayla Morales PA-C      QUEtiapine  50 mg Oral BID Georgia C Holter, DO           Vitals:   Temp (24hrs), Av.8 °F (36.6 °C), Min:97.3 °F (36.3 °C), Max:98.3 °F (36.8 °C)    Temp:  [97.3 °F (36.3 °C)-98.3 °F (36.8 °C)] 97.3 °F (36.3 °C)  HR:  [58-73] 70  Resp:  [16-18] 16  BP: (104-134)/(55-68) 104/56  SpO2:  [96 %-99 %] 98 %  Body mass index is 20.23 kg/m². Input and Output Summary (last 24 hours): Intake/Output Summary (Last 24 hours) at 12/10/2023 1305  Last data filed at 2023  Gross per 24 hour   Intake 730 ml   Output --   Net 730 ml       Physical Exam:   Physical Exam  Nursing note reviewed. Constitutional:       Appearance: Normal appearance. HENT:      Head: Normocephalic and atraumatic. Nose: Nose normal.      Mouth/Throat:      Mouth: Mucous membranes are moist.   Eyes:      Extraocular Movements: Extraocular movements intact. Conjunctiva/sclera: Conjunctivae normal.      Pupils: Pupils are equal, round, and reactive to light. Cardiovascular:      Rate and Rhythm: Normal rate and regular rhythm. Pulmonary:      Effort: Pulmonary effort is normal.      Breath sounds: Normal breath sounds. Abdominal:      General: Abdomen is flat. Palpations: Abdomen is soft. Tenderness: There is no abdominal tenderness. Comments: R inguinal mass   Musculoskeletal:      Cervical back: Normal range of motion. Skin:     General: Skin is warm and dry. Neurological:      General: No focal deficit present. Mental Status: He is alert and oriented to person, place, and time. Comments: Ambulating well   Psychiatric:         Mood and Affect: Mood normal.         Behavior: Behavior normal.         Thought Content:  Thought content normal.         Additional Data:     Labs:   Results from last 7 days   Lab Units 12/09/23  0546   WBC Thousand/uL 13.99*   HEMOGLOBIN g/dL 12.8   HEMATOCRIT % 40.6   PLATELETS Thousands/uL 373   NEUTROS PCT % 66   LYMPHS PCT % 26   MONOS PCT % 8   EOS PCT % 0      Results from last 7 days   Lab Units 12/09/23  0546 12/08/23  0455   SODIUM mmol/L 140 140   POTASSIUM mmol/L 4.4 4.4   CHLORIDE mmol/L 105 107   CO2 mmol/L 25 24   BUN mg/dL 23 17   CREATININE mg/dL 1.08 1.03   ANION GAP mmol/L 10 9   CALCIUM mg/dL 9.1 9.3   ALBUMIN g/dL  --  3.5   TOTAL BILIRUBIN mg/dL  --  0.18*   ALK PHOS U/L  --  48   ALT U/L  --  13   AST U/L  --  14   GLUCOSE RANDOM mg/dL 88 96                              * I Have Reviewed All Lab Data Listed Above. * Additional Pertinent Lab Tests Reviewed: [address] [facility] Admission Reviewed      Imaging Studies: I have personally reviewed pertinent reports. Recent Cultures (last 7 days):  Results from last 7 days   Lab Units 12/09/23  0545 12/08/23  1346 12/07/23  1558   BLOOD CULTURE  Received in Microbiology Lab. Culture in Progress. No Growth at 24 hrs. Staphylococcus epidermidis*  Staphylococcus hominis*  Staphylococcus epidermidis*  Staphylococcus epidermidis*   GRAM STAIN RESULT   --   --  Gram positive cocci in clusters*  Gram positive cocci in clusters*         Today, Patient Was Seen By: Oksana Martins DO    ** Please Note: Dictation voice to text software may have been used in the creation of this document.  **

## 2023-12-10 NOTE — CASE MANAGEMENT
CM met with patient to discuss discharge plans. Patient informed CM that he plans on walking home once discharged. Patient is awaiting medical follow up prior to discharge, patient is aware.

## 2023-12-10 NOTE — DISCHARGE INSTR - APPOINTMENTS
You are being discharged to you confirmed address of [address] ,[address] [place] [address].

## 2023-12-10 NOTE — CONSULTS
Chief Complaint: Right inguinal hernia      History of Present Illness: Patient is a 17-year-old  male with a history of bipolar disorder, heroin abuse and history of asthma who was apparently admitted to the detox unit for detox. He said a long history of a right inguinal hernia that says causes him significant pain such that he abuses heroin to relieve the pain. (?). The hernia appears to be reducible. CT scan demonstrated a small right inguinal hernia consistent with his physical exam.  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 Past Medical History:   Past Medical History:   Diagnosis Date    Addiction to drug [facility])     Asthma     Back pain     Bipolar 1 disorder ([address] )     Depression     Gastritis     GERD (gastroesophageal reflux disease)     Kidney stones     Opioid withdrawal ([address] [facility]) 12/05/2023    Renal cancer ([address] [facility])     Substance abuse ([address] [facility])          Past Surgical History:    Past Surgical History:   Procedure Laterality Date    ABDOMINAL SURGERY      NEPHRECTOMY      CT CYSTO BLADDER W/URETERAL CATHETERIZATION Right 09/27/2016    Procedure: CYSTOSCOPY WITH RETROGRADE PYELOGRAM;  Surgeon: Otoniel Del Rosario MD;  Location: [facility];  Service: Urology    CT LAPAROSCOPY RADICAL NEPHRECTOMY Right 11/23/2016    Procedure: HAND ASSISTED LAPAROSCOPIC NEPHRECTOMY, converted to open, lysis of adhesions,repair of  vena cava;   Surgeon: Otoniel Del Rosario MD;  Location: [facility];  Service: Urology    URETERAL STENT PLACEMENT Right 09/27/2016    Procedure: INSERTION STENT URETERAL;  Surgeon: Otoniel Del Rosario MD;  Location: [facility];  Service:     VASCULAR SURGERY           Allergies:  No Known Allergies      Medications:    Current Facility-Administered Medications:     acetaminophen (TYLENOL) tablet 650 mg, 650 mg, Oral, Q6H PRN, Jolene Verde PA-C, 650 mg at 12/09/23 1325    albuterol (PROVENTIL HFA,VENTOLIN HFA) inhaler 2 puff, 2 puff, Inhalation, Q4H PRN, Alexandra Cure KISHOR Verde-C, 2 puff at 23 0920    albuterol inhalation solution 2.5 mg, 2.5 mg, Nebulization, Q4H PRN, Ludivina Barnes DO    enoxaparin (LOVENOX) subcutaneous injection 40 mg, 40 mg, Subcutaneous, Daily, KISHOR Luna-C, 40 mg at 23 0831    ibuprofen (MOTRIN) tablet 600 mg, 600 mg, Oral, Q6H PRN, KISHOR Joyce-THEODORE, 600 mg at 23 1425    ipratropium-albuterol (DUO-NEB) 0.5-2.5 mg/3 mL inhalation solution 3 mL, 3 mL, Nebulization, Q6H, Asya Bazzi DO, 3 mL at 12/10/23 0745    ketorolac (TORADOL) injection 15 mg, 15 mg, Intravenous, Q6H PRN, Holley Tim PA-C, 15 mg at 23    levofloxacin (LEVAQUIN) IVPB (premix in dextrose) 750 mg 150 mL, 750 mg, Intravenous, Q24H, [employer], PA-C, Stopped at 23 1806    nicotine (NICODERM CQ) 21 mg/24 hr TD 24 hr patch 1 patch, 1 patch, Transdermal, Daily, Jolene Verde PA-C, 1 patch at 23 0847    predniSONE tablet 40 mg, 40 mg, Oral, Daily, [employer], PA-C, 40 mg at 12/10/23 1969    QUEtiapine (SEROquel) tablet 50 mg, 50 mg, Oral, BID, Nate Prescottter, , 50 mg at 12/10/23 0807      Social History:  Social History     Social History     Substance and Sexual Activity   Alcohol Use Not Currently     Social History     Substance and Sexual Activity   Drug Use Yes    Types: Heroin    Comment: 4 bags a day of herion last use was 12/5 3pm     Social History     Tobacco Use   Smoking Status Every Day    Packs/day: 0.50    Years: 11.00    Total pack years: 5.50    Types: Cigarettes    Last attempt to quit: 2023    Years since quittin.8    Passive exposure: Current   Smokeless Tobacco Never         Family History:    Family History   Problem Relation Age of Onset    Stroke Mother     Heart disease Mother     HIV Father          Review of Systems:    Please refer to the formal H&P.     Vitals:  Vitals:    12/10/23 1111   BP: 104/56   Pulse: 70   Resp: 16   Temp: (!) 97.3 °F (36.3 °C)   SpO2: 98%       Physical Exam:  The patient is a young adult cachectic appearing  male who is awake and alert and in no obvious distress. He is 5 foot 8-1/2 inches and 135 pounds. Abdomen: There is a large right subcostal incision present. There is a transverse right lower quadrant incision. No obvious evidence of incisional hernia. The abdomen is soft nontender. There is no evidence of left inguinal hernia. There is a small right inguinal hernia that is reducible and is somewhat tender to palpation. Lab Results: I have personally reviewed pertinent reports. See below. Imaging: I have personally reviewed pertinent imaging studies primarily CT scan of the abdomen and pelvis. EKG, Pathology, and Other Studies: I have personally reviewed pertinent reports.      Admission on 12/05/2023   Component Date Value    WBC 12/05/2023 11.55 (H)     RBC 12/05/2023 5.40     Hemoglobin 12/05/2023 14.9     Hematocrit 12/05/2023 46.1     MCV 12/05/2023 85     MCH 12/05/2023 27.6     MCHC 12/05/2023 32.3     RDW 12/05/2023 14.8     MPV 12/05/2023 8.4 (L)     Platelets 78/60/2438 417 (H)     nRBC 12/05/2023 0     Neutrophils Relative 12/05/2023 62     Immat GRANS % 12/05/2023 0     Lymphocytes Relative 12/05/2023 21     Monocytes Relative 12/05/2023 9     Eosinophils Relative 12/05/2023 7 (H)     Basophils Relative 12/05/2023 1     Neutrophils Absolute 12/05/2023 7.18     Immature Grans Absolute 12/05/2023 0.04     Lymphocytes Absolute 12/05/2023 2.43     Monocytes Absolute 12/05/2023 1.00     Eosinophils Absolute 12/05/2023 0.84 (H)     Basophils Absolute 12/05/2023 0.06     Sodium 12/05/2023 138     Potassium 12/05/2023 4.1     Chloride 12/05/2023 99     CO2 12/05/2023 30     ANION GAP 12/05/2023 9     BUN 12/05/2023 15     Creatinine 12/05/2023 1.04     Glucose 12/05/2023 103     Calcium 12/05/2023 10.0     AST 12/05/2023 13     ALT 12/05/2023 8     Alkaline Phosphatase 12/05/2023 60 Total Protein 12/05/2023 7.2     Albumin 12/05/2023 4.2     Total Bilirubin 12/05/2023 0.37     eGFR 12/05/2023 85     Amph/Meth UR 12/05/2023 Negative     Barbiturate Ur 12/05/2023 Negative     Benzodiazepine Urine 12/05/2023 Negative     Cocaine Urine 12/05/2023 Positive (A)     Methadone Urine 12/05/2023 Negative     Opiate Urine 12/05/2023 Positive (A)     PCP Ur 12/05/2023 Negative     THC Urine 12/05/2023 Negative     Oxycodone Urine 12/05/2023 Negative     WBC 12/07/2023 11.02 (H)     RBC 12/07/2023 4.92     Hemoglobin 12/07/2023 13.2     Hematocrit 12/07/2023 41.5     MCV 12/07/2023 84     MCH 12/07/2023 26.8     MCHC 12/07/2023 31.8     RDW 12/07/2023 14.9     Platelets 81/42/3063 379     MPV 12/07/2023 9.3     Sodium 12/07/2023 141     Potassium 12/07/2023 3.9     Chloride 12/07/2023 106     CO2 12/07/2023 25     ANION GAP 12/07/2023 10     BUN 12/07/2023 12     Creatinine 12/07/2023 0.95     Glucose 12/07/2023 116     Calcium 12/07/2023 9.3     AST 12/07/2023 11 (L)     ALT 12/07/2023 6 (L)     Alkaline Phosphatase 12/07/2023 50     Total Protein 12/07/2023 6.8     Albumin 12/07/2023 3.7     Total Bilirubin 12/07/2023 0.23     eGFR 12/07/2023 95     Magnesium 12/07/2023 2.1     SARS-CoV-2 12/07/2023 Negative     INFLUENZA A PCR 12/07/2023 Negative     INFLUENZA B PCR 12/07/2023 Negative     RSV PCR 12/07/2023 Negative     Blood Culture 12/07/2023 Staphylococcus epidermidis (A)     Gram Stain Result 12/07/2023 Gram positive cocci in clusters (A)     Blood Culture 12/07/2023 Staphylococcus epidermidis (A)     Blood Culture 12/07/2023 Staphylococcus hominis (A)     Gram Stain Result 12/07/2023 Gram positive cocci in clusters (A)     WBC 12/08/2023 15.68 (H)     RBC 12/08/2023 4.69     Hemoglobin 12/08/2023 12.8     Hematocrit 12/08/2023 40.4     MCV 12/08/2023 86     MCH 12/08/2023 27.3     MCHC 12/08/2023 31.7     RDW 12/08/2023 15.2 (H)     MPV 12/08/2023 9.2     Platelets 78/27/1418 393 (H)     nRBC 12/08/2023 0     Neutrophils Relative 12/08/2023 72     Immat GRANS % 12/08/2023 0     Lymphocytes Relative 12/08/2023 20     Monocytes Relative 12/08/2023 8     Eosinophils Relative 12/08/2023 0     Basophils Relative 12/08/2023 0     Neutrophils Absolute 12/08/2023 11.32 (H)     Immature Grans Absolute 12/08/2023 0.06     Lymphocytes Absolute 12/08/2023 3.09     Monocytes Absolute 12/08/2023 1.17     Eosinophils Absolute 12/08/2023 0.01     Basophils Absolute 12/08/2023 0.03     Sodium 12/08/2023 140     Potassium 12/08/2023 4.4     Chloride 12/08/2023 107     CO2 12/08/2023 24     ANION GAP 12/08/2023 9     BUN 12/08/2023 17     Creatinine 12/08/2023 1.03     Glucose 12/08/2023 96     Calcium 12/08/2023 9.3     AST 12/08/2023 14     ALT 12/08/2023 13     Alkaline Phosphatase 12/08/2023 48     Total Protein 12/08/2023 6.4     Albumin 12/08/2023 3.5     Total Bilirubin 12/08/2023 0.18 (L)     eGFR 12/08/2023 86     Staphylococcus epidermid* 12/07/2023 Detected (A)     mecA/C (Methicillin-resi* 12/07/2023 Detected (A)     Blood Culture 12/08/2023 No Growth at 24 hrs.      WBC 12/09/2023 13.99 (H)     RBC 12/09/2023 4.76     Hemoglobin 12/09/2023 12.8     Hematocrit 12/09/2023 40.6     MCV 12/09/2023 85     MCH 12/09/2023 26.9     MCHC 12/09/2023 31.5     RDW 12/09/2023 15.0     MPV 12/09/2023 9.2     Platelets 57/15/0215 373     nRBC 12/09/2023 0     Neutrophils Relative 12/09/2023 66     Immat GRANS % 12/09/2023 0     Lymphocytes Relative 12/09/2023 26     Monocytes Relative 12/09/2023 8     Eosinophils Relative 12/09/2023 0     Basophils Relative 12/09/2023 0     Neutrophils Absolute 12/09/2023 9.15 (H)     Immature Grans Absolute 12/09/2023 0.05     Lymphocytes Absolute 12/09/2023 3.62     Monocytes Absolute 12/09/2023 1.12     Eosinophils Absolute 12/09/2023 0.02     Basophils Absolute 12/09/2023 0.03     Sodium 12/09/2023 140     Potassium 12/09/2023 4.4     Chloride 12/09/2023 105     CO2 12/09/2023 25 ANION GAP 12/09/2023 10     BUN 12/09/2023 23     Creatinine 12/09/2023 1.08     Glucose 12/09/2023 88     Calcium 12/09/2023 9.1     eGFR 12/09/2023 81     Magnesium 12/09/2023 2.0     Blood Culture 12/09/2023 Received in Microbiology Lab. Culture in Progress. Impression:  Right inguinal hernia, reducible. Apparently long history of right inguinal hernia causing him intermittent discomfort. He obviously needs this repaired sometime electively. Positive blood culture? Contamination? Being repeated at this time. Plan:  Discussed etiology of hernias with the patient. No urgent need for surgery at this time as there is no incarceration and it is easily reducible. .  Symptomatic treatment i.e. NSAIDs, ice etc.  Will follow-up with him on an outpatient basis. Discussed with Dr. Ruslan Martinez.   Thank you for this interesting consult

## 2023-12-10 NOTE — CASE MANAGEMENT
CM discussed with patient discharge plans. At this time patient is not being discharged and has been advised that he needs to stay a bit longer pending lab results confirming that patient is cleared. Patient appeared to comprehend and stated that he needed to leave today or this evening to ensure that his wife has money to purchase food. Patient stated that his wife is currently incarcerated and is depending on the money that patient will deposit. CM explained to patient that he needs to stay to manage medical condition and should he leave his health could be in jeopardy. Patient stated that he will think about it and decide if he should leave against medical advice.

## 2023-12-10 NOTE — CASE MANAGEMENT
Patient informed CM that he would like to discharge, AMA. CM informed patient that his results from the lab would be in the system in 2 hours, if he could wait for them. Patient stated that he would prefer to leave and return to the ED if needed in the evening.

## 2023-12-10 NOTE — PLAN OF CARE
Problem: SUBSTANCE USE/ABUSE  Goal: By discharge, will develop insight into their chemical dependency and sustain motivation to continue in recovery  Description: INTERVENTIONS:  - Attends all daily group sessions and scheduled AA groups  - Actively practices coping skills through participation in the therapeutic community and adherence to program rules  - Reviews and completes assignments from individual treatment plan  - Assist patient development of understanding of their personal cycle of addiction and relapse triggers  Outcome: Progressing  Goal: By discharge, patient will have ongoing treatment plan addressing chemical dependency  Description: INTERVENTIONS:  - Assist patient with resources and/or appointments for ongoing recovery based living  Outcome: Progressing     Problem: DISCHARGE PLANNING  Goal: Discharge to home or other facility with appropriate resources  Description: INTERVENTIONS:  - Identify barriers to discharge w/patient and caregiver  - Arrange for needed discharge resources and transportation as appropriate  - Identify discharge learning needs (meds, wound care, etc.)  - Arrange for interpretive services to assist at discharge as needed  - Refer to Case Management Department for coordinating discharge planning if the patient needs post-hospital services based on physician/advanced practitioner order or complex needs related to functional status, cognitive ability, or social support system  Outcome: Progressing       Problem: INFECTION - ADULT  Goal: Absence or prevention of progression during hospitalization  Description: INTERVENTIONS:  - Assess and monitor for signs and symptoms of infection  - Monitor lab/diagnostic results  - Monitor all insertion sites, i.e. indwelling lines, tubes, and drains  - [place] appropriate cooling/warming therapies per order  - Administer medications as ordered  - Instruct and encourage patient and family to use good hand hygiene technique  - Identify and instruct in appropriate isolation precautions for identified infection/condition  Outcome: Progressing  Goal: Absence of fever/infection during neutropenic period  Description: INTERVENTIONS:  - Monitor WBC  Outcome: Progressing

## 2023-12-10 NOTE — DISCHARGE SUMMARY
MEDICAL DETOX UNIT, LEVEL 4  Department of Medical Toxicology  Reason for Admission/Principal Problem: Opioid withdrawal, asthma exacerbation  Admitting provider: DO Shiela Sanchez DO   12/5/2023  4:33 PM       Discharging Physician / Practitioner: Shiela Mayorga DO  PCP: John Medrano MD  Admission Date:   Admission Orders (From admission, onward)       Ordered        12/05/23 2109  Inpatient Admission  Once            12/05/23 1837  INPATIENT ADMISSION  Once                          Discharge Date: 12/10/23    Medical Problems       Resolved Problems  Date Reviewed: 12/5/2023            Resolved    * (Principal) Opioid withdrawal ([address]) 12/9/2023     Resolved by  Shiela Mayorga DO    Thrombocytosis 12/7/2023     Resolved by  Maylin Gutierrez PA-C    Mild persistent asthma with exacerbation 12/10/2023     Resolved by  Shiela Mayorga DO          Bipolar 1 disorder [facility])  Assessment & Plan  Patient with a h/o bipolar disorder  Home medications include Seroquel 200 mg QHS and mirtazapine 30 mg QHS  Pt admits medication noncompliance but amount of time he was not taking is unclear  Per RN, patient reports wanting to restart specifically Seroquel for sleep  No current SI/HI/thoughts of self harm  Psychiatry consulted for medication reconciliation/appropriate titration, recommending increasing Seroquel to BID  Recommend OP f/u with PCP/OP Psychiatry     Tobacco use disorder  Assessment & Plan  Pt reports smoking 0.5 ppd of cigarettes  Offer NRT   Encourage cessation     Leukocytosis  Assessment & Plan  Lab Results   Component Value Date    WBC 15.68 (H) 12/08/2023      CT revealed possible pneumonia. Timing of steroids maybe related. .   Follow-up as outpatient. Right inguinal hernia  Assessment & Plan  Stable. Reduced as inpatient by me for symptomatic comfort but recurred as typical for his history. No evidence of strangulation. .  Seen by surgery who agrees with elective repair, ice, NSAIDs. Opioid use disorder, severe, dependence ([address])  Assessment & Plan  Patient received Sublocade   Refer for follow-up in [facility]. Case management for coordination of care. Mild persistent asthma with exacerbation-resolved as of 12/10/2023  Assessment & Plan  Resp panel negative. Changed nebs to duonebs  Continue steroids. Ambulate with P.ox today  Cont abx for possible pneumonia on imaging. Thrombocytosis-resolved as of 12/7/2023  Assessment & Plan  Lab Results   Component Value Date     (H) 12/05/2023      Increased PTL count on admission  Per chart review, pt appears to have chronic thrombocytosis, with baseline -500  No obvious evidence of active bleeding/bruising on limited physical exam  IVF hydration was given with resolution    * Opioid withdrawal (HCC)-resolved as of 12/9/2023  Assessment & Plan  Maintenance buprenorphine started and tolerating it well. Will plan a bridge Rx upon discharge and possible SHARE f/u        Consultations During Hospital Stay:  Surgery, psychiatry    Procedures Performed:   Hernia reduction    Significant Findings / Test Results:   Leukocytosis, positive blood cultures    Incidental Findings:   N/A    Test Results Pending at Discharge (will require follow up):   Repeat blood cultures     Outpatient Tests Requested:  N/A    Complications: N/A    Reason for Admission: Asthma exacerbation and opioid withdrawal    Hospital Course:     Derek Colin is a 57 y.o. male patient who originally presented to the hospital on 12/5/2023 due to asthma exacerbation. The patient presented to the hospital with wheezing associated with asthma exacerbation that was likely secondary to smoking fentanyl. X-ray imaging revealed possible pneumonia. CT imaging of his chest revealed possible pneumonia and blood cultures along with IV levofloxacin were ordered.   The patient also reported an ongoing chronic right inguinal hernia that he knows he needs to have repaired. He expresses discomfort with this hernia but does not experience any bowel obstructive symptoms. For pain reduction, I reduced his hernia in the hospital.  However, it does spontaneously recur. He was seen by surgery who agreed no emergent intervention is needed and he can follow-up to schedule surgery. Additionally, psychiatry evaluated him for his bipolar disorder and restarted his quetiapine. Regarding the opioid withdrawal, he was started on micro induction pathway and once he tolerated a full dose of buprenorphine, received the Sublocade injection. Patient initial blood cultures were positive but appears to be contaminant. Repeat set of blood cultures was indicated, and patient ultimately signed out [facility] because he did not want to be in the hospital any longer. One of his repeat blood cultures was negative at 24 hours and the second 1 was still pending. P.o. Levaquin was prescribed. Patient was assisted by case management and downloading the [employer] desmond to monitor for his culture results and any stated he would return. He does not have any signs of sepsis. I believe that the infiltrates on his lungs may represent more of a chemical or aspiration pneumonitis associated with smoking fentanyl and out of hospital CNS depression. Patient understood the risks of sepsis, worsening infection, death. The patient, initially admitted to the hospital as inpatient, was discharged earlier than expected given the following: He signed out [facility]. Please see above list of diagnoses and related plan for additional information. Condition at Discharge: good     Discharge Day Visit / Exam:     Subjective: Patient states he feels well and is eager to go home. He has been ambulating around the detox unit expressing boredom. He is eating well. He is moving his bowels and has normal flatus.   Vitals: Blood Pressure: 104/56 (12/10/23 1111)  Pulse: 70 (12/10/23 1111)  Temperature: (!) 97.3 °F (36.3 °C) (12/10/23 1111)  Temp Source: Temporal (12/10/23 1111)  Respirations: 16 (12/10/23 1111)  Height: 5' 8.5" (174 cm) (12/05/23 2100)  Weight - Scale: 61.2 kg (135 lb) (12/05/23 2100)  SpO2: 98 % (12/10/23 1111)  Exam:   Physical Exam  Constitutional:       Appearance: Normal appearance. He is not ill-appearing. HENT:      Head: Normocephalic and atraumatic. Nose: Nose normal.      Mouth/Throat:      Mouth: Mucous membranes are moist.      Pharynx: Oropharynx is clear. Eyes:      Extraocular Movements: Extraocular movements intact. Conjunctiva/sclera: Conjunctivae normal.      Pupils: Pupils are equal, round, and reactive to light. Cardiovascular:      Rate and Rhythm: Normal rate and regular rhythm. Pulmonary:      Effort: Pulmonary effort is normal.      Breath sounds: Normal breath sounds. Abdominal:      General: Abdomen is flat. Palpations: Abdomen is soft. Tenderness: There is no abdominal tenderness. Musculoskeletal:         General: Normal range of motion. Cervical back: Normal range of motion. Skin:     General: Skin is warm and dry. Neurological:      General: No focal deficit present. Mental Status: He is alert and oriented to person, place, and time. Psychiatric:         Attention and Perception: Attention normal.         Mood and Affect: Mood normal.         Speech: Speech normal.         Behavior: Behavior normal.         Cognition and Memory: Cognition normal.         Judgment: Judgment is impulsive. Discharge instructions/Information to patient and family:   See after visit summary for information provided to patient and family. Provisions for Follow-Up Care:  See after visit summary for information related to follow-up care and any pertinent home health orders.       Disposition:     Home        Planned Readmission: na     Discharge Statement:  I spent 65 minutes discharging the patient. This time was spent on the day of discharge. I had direct contact with the patient on the day of discharge. Greater than 50% of the total time was spent examining patient, answering all patient questions, arranging and discussing plan of care with patient as well as directly providing post-discharge instructions. Additional time then spent on discharge activities. Discharge Medications:  See after visit summary for reconciled discharge medications provided to patient and family.       ** Please Note: This note has been constructed using a voice recognition system **

## 2023-12-11 ENCOUNTER — RA CDI HCC (OUTPATIENT)
Dept: OTHER | Facility: HOSPITAL | Age: 48
End: 2023-12-11

## 2023-12-11 NOTE — UTILIZATION REVIEW
NOTIFICATION OF ADMISSION DISCHARGE   This is a Notification of Discharge from [address] [place] [address]. Please be advised that this patient has been discharge from our facility. Below you will find the admission and discharge date and time including the patient’s disposition. UTILIZATION REVIEW CONTACT:  Patel Bosch MA  Utilization   Network Utilization Review Department  Phone: [phone] x carefully listen to the prompts. All voicemails are confidential.  Email: [email or web address]     ADMISSION INFORMATION  PRESENTATION DATE: 12/5/2023  4:33 PM  OBERVATION ADMISSION DATE:   INPATIENT ADMISSION DATE: 12/5/23  6:29 PM   DISCHARGE DATE: 12/10/2023  3:02 PM   DISPOSITION:Left against medical advice or discontinued care    Network Utilization Review Department  ATTENTION: Please call with any questions or concerns to [phone] and carefully listen to the prompts so that you are directed to the right person. All voicemails are confidential.   For Discharge needs, contact Care Management DC Support Team at [phone] opt. 2  Send all requests for admission clinical reviews, approved or denied determinations and any other requests to dedicated fax number below belonging to the campus where the patient is receiving treatment.  List of dedicated fax numbers for the Facilities:  [place] DENIALS (Administrative/Medical Necessity) [phone]   DISCHARGE SUPPORT TEAM (Network) [phone]   [facility] (Maternity/NICU/Pediatrics) [phone]   [address] [facility] [address] [place] [address] [address] [facility]  [facility] [phone]

## 2023-12-11 NOTE — PROGRESS NOTES
[facility] coding opportunities       Chart reviewed, no opportunity found: CHART REVIEWED, NO OPPORTUNITY FOUND        Patients Insurance     Medicare Insurance: [employer] [facility] Medicare Complete

## 2023-12-14 LAB
BACTERIA BLD CULT: NORMAL
BACTERIA BLD CULT: NORMAL

## 2024-01-08 ENCOUNTER — APPOINTMENT (EMERGENCY)
Dept: CT IMAGING | Facility: HOSPITAL | Age: 49
DRG: 871 | End: 2024-01-08
Payer: COMMERCIAL

## 2024-01-08 ENCOUNTER — HOSPITAL ENCOUNTER (INPATIENT)
Facility: HOSPITAL | Age: 49
LOS: 1 days | Discharge: HOME | DRG: 871 | End: 2024-01-10
Attending: EMERGENCY MEDICINE | Admitting: INTERNAL MEDICINE
Payer: COMMERCIAL

## 2024-01-08 DIAGNOSIS — F11.20 OPIOID USE DISORDER, SEVERE, DEPENDENCE (HCC): ICD-10-CM

## 2024-01-08 DIAGNOSIS — J18.9 BILATERAL PNEUMONIA: Primary | ICD-10-CM

## 2024-01-08 DIAGNOSIS — R26.2 AMBULATORY DYSFUNCTION: ICD-10-CM

## 2024-01-08 DIAGNOSIS — J18.9 PNEUMONIA DUE TO INFECTIOUS ORGANISM, UNSPECIFIED LATERALITY, UNSPECIFIED PART OF LUNG: ICD-10-CM

## 2024-01-08 PROBLEM — T50.901A OVERDOSE: Status: RESOLVED | Noted: 2022-08-22 | Resolved: 2024-01-08

## 2024-01-08 PROBLEM — A41.9 SEPSIS (HCC): Status: ACTIVE | Noted: 2024-01-08

## 2024-01-08 PROBLEM — M77.10 LATERAL EPICONDYLITIS: Status: ACTIVE | Noted: 2017-10-20

## 2024-01-08 PROBLEM — J96.91 RESPIRATORY FAILURE WITH HYPOXIA (HCC): Status: RESOLVED | Noted: 2022-08-22 | Resolved: 2024-01-08

## 2024-01-08 LAB
2HR DELTA HS TROPONIN: -1 NG/L
ALBUMIN SERPL BCP-MCNC: 3.4 G/DL (ref 3.5–5)
ALP SERPL-CCNC: 44 U/L (ref 34–104)
ALT SERPL W P-5'-P-CCNC: 15 U/L (ref 7–52)
ANION GAP SERPL CALCULATED.3IONS-SCNC: 7 MMOL/L
APTT PPP: 32 SECONDS (ref 23–37)
AST SERPL W P-5'-P-CCNC: 13 U/L (ref 13–39)
ATRIAL RATE: 82 BPM
BACTERIA UR QL AUTO: NORMAL /HPF
BASOPHILS # BLD AUTO: 0.03 THOUSANDS/ÂΜL (ref 0–0.1)
BASOPHILS NFR BLD AUTO: 0 % (ref 0–1)
BILIRUB SERPL-MCNC: 0.3 MG/DL (ref 0.2–1)
BILIRUB UR QL STRIP: NEGATIVE
BUN SERPL-MCNC: 26 MG/DL (ref 5–25)
CALCIUM ALBUM COR SERPL-MCNC: 9.3 MG/DL (ref 8.3–10.1)
CALCIUM SERPL-MCNC: 8.8 MG/DL (ref 8.4–10.2)
CARDIAC TROPONIN I PNL SERPL HS: 2 NG/L
CARDIAC TROPONIN I PNL SERPL HS: 3 NG/L
CHLORIDE SERPL-SCNC: 103 MMOL/L (ref 96–108)
CLARITY UR: CLEAR
CO2 SERPL-SCNC: 29 MMOL/L (ref 21–32)
COLOR UR: YELLOW
CREAT SERPL-MCNC: 1.52 MG/DL (ref 0.6–1.3)
EOSINOPHIL # BLD AUTO: 0.39 THOUSAND/ÂΜL (ref 0–0.61)
EOSINOPHIL NFR BLD AUTO: 3 % (ref 0–6)
ERYTHROCYTE [DISTWIDTH] IN BLOOD BY AUTOMATED COUNT: 15.8 % (ref 11.6–15.1)
FLUAV RNA RESP QL NAA+PROBE: NEGATIVE
FLUBV RNA RESP QL NAA+PROBE: NEGATIVE
GFR SERPL CREATININE-BSD FRML MDRD: 54 ML/MIN/1.73SQ M
GLUCOSE SERPL-MCNC: 108 MG/DL (ref 65–140)
GLUCOSE UR STRIP-MCNC: NEGATIVE MG/DL
HCT VFR BLD AUTO: 40.8 % (ref 36.5–49.3)
HGB BLD-MCNC: 13.3 G/DL (ref 12–17)
HGB UR QL STRIP.AUTO: NEGATIVE
IMM GRANULOCYTES # BLD AUTO: 0.08 THOUSAND/UL (ref 0–0.2)
IMM GRANULOCYTES NFR BLD AUTO: 1 % (ref 0–2)
INR PPP: 0.94 (ref 0.84–1.19)
KETONES UR STRIP-MCNC: NEGATIVE MG/DL
LACTATE SERPL-SCNC: 0.7 MMOL/L (ref 0.5–2)
LEUKOCYTE ESTERASE UR QL STRIP: NEGATIVE
LYMPHOCYTES # BLD AUTO: 3.3 THOUSANDS/ÂΜL (ref 0.6–4.47)
LYMPHOCYTES NFR BLD AUTO: 25 % (ref 14–44)
MCH RBC QN AUTO: 27.7 PG (ref 26.8–34.3)
MCHC RBC AUTO-ENTMCNC: 32.6 G/DL (ref 31.4–37.4)
MCV RBC AUTO: 85 FL (ref 82–98)
MONOCYTES # BLD AUTO: 0.84 THOUSAND/ÂΜL (ref 0.17–1.22)
MONOCYTES NFR BLD AUTO: 6 % (ref 4–12)
NEUTROPHILS # BLD AUTO: 8.5 THOUSANDS/ÂΜL (ref 1.85–7.62)
NEUTS SEG NFR BLD AUTO: 65 % (ref 43–75)
NITRITE UR QL STRIP: NEGATIVE
NON-SQ EPI CELLS URNS QL MICRO: NORMAL /HPF
NRBC BLD AUTO-RTO: 0 /100 WBCS
P AXIS: 50 DEGREES
PH UR STRIP.AUTO: 6 [PH]
PLATELET # BLD AUTO: 354 THOUSANDS/UL (ref 149–390)
PMV BLD AUTO: 8.2 FL (ref 8.9–12.7)
POTASSIUM SERPL-SCNC: 4.1 MMOL/L (ref 3.5–5.3)
PR INTERVAL: 132 MS
PROT SERPL-MCNC: 6.1 G/DL (ref 6.4–8.4)
PROT UR STRIP-MCNC: NEGATIVE MG/DL
PROTHROMBIN TIME: 12.9 SECONDS (ref 11.6–14.5)
QRS AXIS: 30 DEGREES
QRSD INTERVAL: 86 MS
QT INTERVAL: 378 MS
QTC INTERVAL: 441 MS
RBC # BLD AUTO: 4.81 MILLION/UL (ref 3.88–5.62)
RBC #/AREA URNS AUTO: NORMAL /HPF
RSV RNA RESP QL NAA+PROBE: NEGATIVE
SARS-COV-2 RNA RESP QL NAA+PROBE: NEGATIVE
SODIUM SERPL-SCNC: 139 MMOL/L (ref 135–147)
SP GR UR STRIP.AUTO: 1.01 (ref 1–1.04)
T WAVE AXIS: 53 DEGREES
UROBILINOGEN UA: NEGATIVE MG/DL
VENTRICULAR RATE: 82 BPM
WBC # BLD AUTO: 13.14 THOUSAND/UL (ref 4.31–10.16)
WBC #/AREA URNS AUTO: NORMAL /HPF

## 2024-01-08 PROCEDURE — 87449 NOS EACH ORGANISM AG IA: CPT | Performed by: INTERNAL MEDICINE

## 2024-01-08 PROCEDURE — 71275 CT ANGIOGRAPHY CHEST: CPT

## 2024-01-08 PROCEDURE — 99223 1ST HOSP IP/OBS HIGH 75: CPT | Performed by: INTERNAL MEDICINE

## 2024-01-08 PROCEDURE — 99285 EMERGENCY DEPT VISIT HI MDM: CPT

## 2024-01-08 PROCEDURE — 93005 ELECTROCARDIOGRAM TRACING: CPT

## 2024-01-08 PROCEDURE — 85610 PROTHROMBIN TIME: CPT | Performed by: PHYSICIAN ASSISTANT

## 2024-01-08 PROCEDURE — 83605 ASSAY OF LACTIC ACID: CPT | Performed by: PHYSICIAN ASSISTANT

## 2024-01-08 PROCEDURE — G1004 CDSM NDSC: HCPCS

## 2024-01-08 PROCEDURE — 84484 ASSAY OF TROPONIN QUANT: CPT | Performed by: PHYSICIAN ASSISTANT

## 2024-01-08 PROCEDURE — 36415 COLL VENOUS BLD VENIPUNCTURE: CPT | Performed by: PHYSICIAN ASSISTANT

## 2024-01-08 PROCEDURE — 99285 EMERGENCY DEPT VISIT HI MDM: CPT | Performed by: PHYSICIAN ASSISTANT

## 2024-01-08 PROCEDURE — 87040 BLOOD CULTURE FOR BACTERIA: CPT | Performed by: PHYSICIAN ASSISTANT

## 2024-01-08 PROCEDURE — 80053 COMPREHEN METABOLIC PANEL: CPT | Performed by: PHYSICIAN ASSISTANT

## 2024-01-08 PROCEDURE — 85730 THROMBOPLASTIN TIME PARTIAL: CPT | Performed by: PHYSICIAN ASSISTANT

## 2024-01-08 PROCEDURE — 0241U HB NFCT DS VIR RESP RNA 4 TRGT: CPT | Performed by: PHYSICIAN ASSISTANT

## 2024-01-08 PROCEDURE — 81001 URINALYSIS AUTO W/SCOPE: CPT | Performed by: INTERNAL MEDICINE

## 2024-01-08 PROCEDURE — 96360 HYDRATION IV INFUSION INIT: CPT

## 2024-01-08 PROCEDURE — 96361 HYDRATE IV INFUSION ADD-ON: CPT

## 2024-01-08 PROCEDURE — 85025 COMPLETE CBC W/AUTO DIFF WBC: CPT | Performed by: PHYSICIAN ASSISTANT

## 2024-01-08 RX ORDER — ACETAMINOPHEN 325 MG/1
650 TABLET ORAL EVERY 6 HOURS PRN
Status: DISCONTINUED | OUTPATIENT
Start: 2024-01-08 | End: 2024-01-08

## 2024-01-08 RX ORDER — HEPARIN SODIUM 5000 [USP'U]/ML
5000 INJECTION, SOLUTION INTRAVENOUS; SUBCUTANEOUS EVERY 8 HOURS SCHEDULED
Status: DISCONTINUED | OUTPATIENT
Start: 2024-01-08 | End: 2024-01-10 | Stop reason: HOSPADM

## 2024-01-08 RX ORDER — ONDANSETRON 2 MG/ML
4 INJECTION INTRAMUSCULAR; INTRAVENOUS EVERY 6 HOURS PRN
Status: DISCONTINUED | OUTPATIENT
Start: 2024-01-08 | End: 2024-01-08

## 2024-01-08 RX ORDER — QUETIAPINE FUMARATE 50 MG/1
50 TABLET, FILM COATED ORAL 2 TIMES DAILY
Status: DISCONTINUED | OUTPATIENT
Start: 2024-01-08 | End: 2024-01-10 | Stop reason: HOSPADM

## 2024-01-08 RX ORDER — ENOXAPARIN SODIUM 100 MG/ML
40 INJECTION SUBCUTANEOUS DAILY
Status: DISCONTINUED | OUTPATIENT
Start: 2024-01-08 | End: 2024-01-08

## 2024-01-08 RX ORDER — ALBUTEROL SULFATE 90 UG/1
2 AEROSOL, METERED RESPIRATORY (INHALATION) ONCE
Status: COMPLETED | OUTPATIENT
Start: 2024-01-08 | End: 2024-01-08

## 2024-01-08 RX ORDER — SODIUM CHLORIDE 9 MG/ML
150 INJECTION, SOLUTION INTRAVENOUS CONTINUOUS
Status: DISCONTINUED | OUTPATIENT
Start: 2024-01-08 | End: 2024-01-08

## 2024-01-08 RX ORDER — SODIUM CHLORIDE 9 MG/ML
125 INJECTION, SOLUTION INTRAVENOUS CONTINUOUS
Status: DISCONTINUED | OUTPATIENT
Start: 2024-01-08 | End: 2024-01-09

## 2024-01-08 RX ORDER — VANCOMYCIN HYDROCHLORIDE 1 G/200ML
15 INJECTION, SOLUTION INTRAVENOUS EVERY 12 HOURS
Status: DISCONTINUED | OUTPATIENT
Start: 2024-01-08 | End: 2024-01-08 | Stop reason: SDUPTHER

## 2024-01-08 RX ORDER — CEFEPIME HYDROCHLORIDE 2 G/50ML
2000 INJECTION, SOLUTION INTRAVENOUS EVERY 12 HOURS
Status: DISCONTINUED | OUTPATIENT
Start: 2024-01-08 | End: 2024-01-09

## 2024-01-08 RX ORDER — ONDANSETRON 2 MG/ML
4 INJECTION INTRAMUSCULAR; INTRAVENOUS EVERY 6 HOURS PRN
Status: DISCONTINUED | OUTPATIENT
Start: 2024-01-08 | End: 2024-01-10 | Stop reason: HOSPADM

## 2024-01-08 RX ORDER — VANCOMYCIN HYDROCHLORIDE 500 MG/100ML
500 INJECTION, SOLUTION INTRAVENOUS EVERY 12 HOURS
Status: DISCONTINUED | OUTPATIENT
Start: 2024-01-08 | End: 2024-01-08 | Stop reason: SDUPTHER

## 2024-01-08 RX ORDER — CEFTRIAXONE 1 G/50ML
1000 INJECTION, SOLUTION INTRAVENOUS ONCE
Status: COMPLETED | OUTPATIENT
Start: 2024-01-08 | End: 2024-01-08

## 2024-01-08 RX ORDER — OXYCODONE HYDROCHLORIDE 5 MG/1
5 TABLET ORAL EVERY 6 HOURS PRN
Status: DISCONTINUED | OUTPATIENT
Start: 2024-01-08 | End: 2024-01-09

## 2024-01-08 RX ORDER — ACETAMINOPHEN 325 MG/1
650 TABLET ORAL EVERY 4 HOURS PRN
Status: DISCONTINUED | OUTPATIENT
Start: 2024-01-08 | End: 2024-01-09

## 2024-01-08 RX ORDER — NICOTINE 21 MG/24HR
1 PATCH, TRANSDERMAL 24 HOURS TRANSDERMAL DAILY
Status: DISCONTINUED | OUTPATIENT
Start: 2024-01-08 | End: 2024-01-10 | Stop reason: HOSPADM

## 2024-01-08 RX ORDER — NICOTINE 21 MG/24HR
1 PATCH, TRANSDERMAL 24 HOURS TRANSDERMAL DAILY
Status: DISCONTINUED | OUTPATIENT
Start: 2024-01-08 | End: 2024-01-08 | Stop reason: SDUPTHER

## 2024-01-08 RX ORDER — OXYCODONE HYDROCHLORIDE 10 MG/1
10 TABLET ORAL EVERY 6 HOURS PRN
Status: DISCONTINUED | OUTPATIENT
Start: 2024-01-08 | End: 2024-01-09

## 2024-01-08 RX ADMIN — ALBUTEROL SULFATE 2 PUFF: 90 AEROSOL, METERED RESPIRATORY (INHALATION) at 04:06

## 2024-01-08 RX ADMIN — SODIUM CHLORIDE 1000 ML: 0.9 INJECTION, SOLUTION INTRAVENOUS at 04:19

## 2024-01-08 RX ADMIN — SODIUM CHLORIDE 125 ML/HR: 0.9 INJECTION, SOLUTION INTRAVENOUS at 11:16

## 2024-01-08 RX ADMIN — QUETIAPINE FUMARATE 50 MG: 50 TABLET ORAL at 11:18

## 2024-01-08 RX ADMIN — HEPARIN SODIUM 5000 UNITS: 5000 INJECTION INTRAVENOUS; SUBCUTANEOUS at 13:54

## 2024-01-08 RX ADMIN — SODIUM CHLORIDE 125 ML/HR: 0.9 INJECTION, SOLUTION INTRAVENOUS at 21:38

## 2024-01-08 RX ADMIN — CEFEPIME HYDROCHLORIDE 2000 MG: 2 INJECTION, SOLUTION INTRAVENOUS at 21:37

## 2024-01-08 RX ADMIN — NICOTINE 1 PATCH: 21 PATCH, EXTENDED RELEASE TRANSDERMAL at 11:17

## 2024-01-08 RX ADMIN — ACETAMINOPHEN 650 MG: 325 TABLET ORAL at 11:18

## 2024-01-08 RX ADMIN — IOHEXOL 85 ML: 350 INJECTION, SOLUTION INTRAVENOUS at 04:48

## 2024-01-08 RX ADMIN — VANCOMYCIN HYDROCHLORIDE 1750 MG: 1 INJECTION, POWDER, LYOPHILIZED, FOR SOLUTION INTRAVENOUS at 11:16

## 2024-01-08 RX ADMIN — VANCOMYCIN HYDROCHLORIDE 500 MG: 1 INJECTION, POWDER, LYOPHILIZED, FOR SOLUTION INTRAVENOUS at 23:54

## 2024-01-08 RX ADMIN — QUETIAPINE FUMARATE 50 MG: 50 TABLET ORAL at 17:10

## 2024-01-08 RX ADMIN — CEFTRIAXONE 1000 MG: 1 INJECTION, SOLUTION INTRAVENOUS at 06:45

## 2024-01-08 RX ADMIN — OXYCODONE HYDROCHLORIDE 10 MG: 10 TABLET ORAL at 21:37

## 2024-01-08 RX ADMIN — CEFEPIME HYDROCHLORIDE 2000 MG: 2 INJECTION, SOLUTION INTRAVENOUS at 13:51

## 2024-01-08 RX ADMIN — HEPARIN SODIUM 5000 UNITS: 5000 INJECTION INTRAVENOUS; SUBCUTANEOUS at 21:38

## 2024-01-08 NOTE — ED PROVIDER NOTES
"History  Chief Complaint   Patient presents with    Shortness of Breath     Pt c/o sob x1 week, recent admission for pneumonia.  Also c/o pain to \"hernia\" in RLQ abd and pain to right foot     48-year-old male with history of substance abuse renal carcinoma resulting in nephrectomy and bipolar disorder presents complaining of shortness of breath and leg swelling.  Patient was recently admitted last week for pneumonia and was in the hospital for several days.  Patient was discharged and stated he was feeling better however shortness of breath has returned.  Patient now states that it hurts to take a deep breath from the front of his lungs to his back.  Patient also states he noted some right foot swelling and right posterior calf pain earlier today.  Denies history of similar.  Denies fevers.  Admits to an occasional dry cough.  Denies sick contacts. Denies any other complaints       History provided by:  Patient   used: No        Prior to Admission Medications   Prescriptions Last Dose Informant Patient Reported? Taking?   QUEtiapine (SEROquel) 200 mg tablet   No No   Sig: Take 1 tablet (200 mg total) by mouth daily at bedtime   Patient not taking: Reported on 10/30/2023   QUEtiapine (SEROquel) 50 mg tablet   No No   Sig: Take 1 tablet (50 mg total) by mouth 2 (two) times a day for 7 days   buprenorphine-naloxone (SUBOXONE) 8-2 mg per SL tablet   Yes No   Sig: Place 1 tablet under the tongue daily   Patient not taking: Reported on 12/5/2023   ketorolac (TORADOL) 10 mg tablet   No No   Sig: Take 1 tablet (10 mg total) by mouth every 6 (six) hours as needed for moderate pain for up to 5 days   mirtazapine (REMERON) 30 mg tablet   No No   Sig: Take 1 tablet (30 mg total) by mouth daily at bedtime   Patient not taking: Reported on 10/30/2023   naloxone (NARCAN) 4 mg/0.1 mL nasal spray   No No   Sig: Administer 1 spray into a nostril. If no response after 2-3 minutes, give another dose in the other " nostril using a new spray.   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(HCC)     Asthma     Back pain     Bipolar 1 disorder (HCC)     Depression     Gastritis     GERD (gastroesophageal reflux disease)     Kidney stones     Opioid withdrawal (HCC) 12/05/2023    Renal cancer (HCC)     Substance abuse (HCC)        Past Surgical History:   Procedure Laterality Date    ABDOMINAL SURGERY      NEPHRECTOMY      NV CYSTO BLADDER W/URETERAL CATHETERIZATION Right 09/27/2016    Procedure: CYSTOSCOPY WITH RETROGRADE PYELOGRAM;  Surgeon: Petey Hernandez MD;  [facility];  Service: Urology    NV LAPAROSCOPY RADICAL NEPHRECTOMY Right 11/23/2016    Procedure: HAND ASSISTED LAPAROSCOPIC NEPHRECTOMY, converted to open, lysis of adhesions,repair of  vena cava;  Surgeon: Petey Hernandez MD;  [facility];  Service: Urology    URETERAL STENT PLACEMENT Right 09/27/2016    Procedure: INSERTION STENT URETERAL;  Surgeon: Petey Hernandez MD;  [facility];  Service:     VASCULAR SURGERY         Family History   Problem Relation Age of Onset    Stroke Mother     Heart disease Mother     HIV Father      I have reviewed and agree with the history as documented.    E-Cigarette/Vaping    E-Cigarette Use Never User      E-Cigarette/Vaping Substances    Nicotine No     THC No     CBD No     Flavoring No     Other No     Unknown No      Social History     Tobacco Use    Smoking status: Every Day     Current packs/day: 1.00     Average packs/day: 0.5 packs/day for 11.0 years (5.5 ttl pk-yrs)     Types: Cigarettes     Start date: 1/26/2012     Last attempt to quit: 1/26/2023     Passive exposure: Current    Smokeless tobacco: Never   Vaping Use    Vaping status: Never Used   Substance Use Topics    Alcohol use: Not Currently    Drug use: Not Currently     Types: Heroin       Review of Systems   Constitutional:  Negative.  Negative for chills and fatigue.   HENT:  Negative for ear pain and sore throat.    Eyes:  Negative for photophobia and redness.   Respiratory:  Positive for cough. Negative for apnea and shortness of breath.    Cardiovascular:  Positive for chest pain and leg swelling.   Gastrointestinal:  Negative for abdominal pain, nausea and vomiting.   Genitourinary:  Negative for dysuria.   Musculoskeletal:  Negative for arthralgias, neck pain and neck stiffness.   Skin:  Negative for rash.   Neurological:  Negative for dizziness, tremors, syncope and weakness.   Psychiatric/Behavioral:  Negative for suicidal ideas.        Physical Exam  Physical Exam  Constitutional:       General: He is not in acute distress.     Appearance: He is well-developed. He is not diaphoretic.   Eyes:      Pupils: Pupils are equal, round, and reactive to light.   Cardiovascular:      Rate and Rhythm: Normal rate and regular rhythm.   Pulmonary:      Effort: Pulmonary effort is normal. No respiratory distress.      Breath sounds: Normal breath sounds.   Abdominal:      General: Bowel sounds are normal. There is no distension.      Palpations: Abdomen is soft.   Musculoskeletal:         General: Normal range of motion.      Cervical back: Normal range of motion and neck supple.      Comments: Right lower extremity with mild edema confined to the pedal area extending into the posterior calf.  Tenderness appreciated along the posterior calf without palpable cord.  Positive Homans.  Dorsalis pedis pulse 2+   Skin:     General: Skin is warm and dry.   Neurological:      Mental Status: He is alert and oriented to person, place, and time.         Vital Signs  ED Triage Vitals [01/08/24 0322]   Temperature Pulse Respirations Blood Pressure SpO2   (!) 97.2 °F (36.2 °C) 99 20 123/78 97 %      Temp Source Heart Rate Source Patient Position - Orthostatic VS BP Location FiO2 (%)   Tympanic Monitor Sitting Left arm --      Pain Score       --            Vitals:    01/08/24 0322   BP: 123/78   Pulse: 99   Patient Position - Orthostatic VS: Sitting         Visual Acuity      ED Medications  Medications   cefTRIAXone (ROCEPHIN) IVPB (premix in dextrose) 1,000 mg 50 mL (has no administration in time range)   albuterol (PROVENTIL HFA,VENTOLIN HFA) inhaler 2 puff (2 puffs Inhalation Given 1/8/24 0406)   sodium chloride 0.9 % bolus 1,000 mL (1,000 mL Intravenous New Bag 1/8/24 0419)   iohexol (OMNIPAQUE) 350 MG/ML injection (MULTI-DOSE) 85 mL (85 mL Intravenous Given 1/8/24 0448)       Diagnostic Studies  Results Reviewed       Procedure Component Value Units Date/Time    FLU/RSV/COVID - if FLU/RSV clinically relevant [[id number]]     Lab Status: No result Specimen: Nares from Nose     Lactic acid, plasma (w/reflex if result > 2.0) [[id number]]  (Normal) Collected: 01/08/24 0418    Lab Status: Final result Specimen: Blood from Arm, Left Updated: 01/08/24 0446     LACTIC ACID 0.7 mmol/L     Narrative:      Result may be elevated if tourniquet was used during collection.    HS Troponin 0hr (reflex protocol) [[id number]]  (Normal) Collected: 01/08/24 0358    Lab Status: Final result Specimen: Blood from Arm, Left Updated: 01/08/24 0427     hs TnI 0hr 3 ng/L     HS Troponin I 2hr [[id number]]     Lab Status: No result Specimen: Blood     HS Troponin I 4hr [[id number]]     Lab Status: No result Specimen: Blood     Blood culture #2 [[id number]] Collected: 01/08/24 0418    Lab Status: In process Specimen: Blood from Arm, Left Updated: 01/08/24 0426    Blood culture #1 [[id number]] Collected: 01/08/24 0418    Lab Status: In process Specimen: Blood from Hand, Right Updated: 01/08/24 0426    Comprehensive metabolic panel [[id number]]  (Abnormal) Collected: 01/08/24 0358    Lab Status: Final result Specimen: Blood from Arm, Left Updated: 01/08/24 0422     Sodium 139 mmol/L      Potassium 4.1 mmol/L      Chloride 103 mmol/L      CO2 29 mmol/L      ANION GAP 7 mmol/L      BUN 26 mg/dL       Creatinine 1.52 mg/dL      Glucose 108 mg/dL      Calcium 8.8 mg/dL      Corrected Calcium 9.3 mg/dL      AST 13 U/L      ALT 15 U/L      Alkaline Phosphatase 44 U/L      Total Protein 6.1 g/dL      Albumin 3.4 g/dL      Total Bilirubin 0.30 mg/dL      eGFR 54 ml/min/1.73sq m     Narrative:      National Kidney Disease Foundation guidelines for Chronic Kidney Disease (CKD):     Stage 1 with normal or high GFR (GFR > 90 mL/min/1.73 square meters)    Stage 2 Mild CKD (GFR = 60-89 mL/min/1.73 square meters)    Stage 3A Moderate CKD (GFR = 45-59 mL/min/1.73 square meters)    Stage 3B Moderate CKD (GFR = 30-44 mL/min/1.73 square meters)    Stage 4 Severe CKD (GFR = 15-29 mL/min/1.73 square meters)    Stage 5 End Stage CKD (GFR <15 mL/min/1.73 square meters)  Note: GFR calculation is accurate only with a steady state creatinine    Protime-INR [[id number]]  (Normal) Collected: 01/08/24 0358    Lab Status: Final result Specimen: Blood from Arm, Left Updated: 01/08/24 0416     Protime 12.9 seconds      INR 0.94    APTT [[id number]]  (Normal) Collected: 01/08/24 0358    Lab Status: Final result Specimen: Blood from Arm, Left Updated: 01/08/24 0416     PTT 32 seconds     CBC and differential [[id number]]  (Abnormal) Collected: 01/08/24 0358    Lab Status: Final result Specimen: Blood from Arm, Left Updated: 01/08/24 0404     WBC 13.14 Thousand/uL      RBC 4.81 Million/uL      Hemoglobin 13.3 g/dL      Hematocrit 40.8 %      MCV 85 fL      MCH 27.7 pg      MCHC 32.6 g/dL      RDW 15.8 %      MPV 8.2 fL      Platelets 354 Thousands/uL      nRBC 0 /100 WBCs      Neutrophils Relative 65 %      Immat GRANS % 1 %      Lymphocytes Relative 25 %      Monocytes Relative 6 %      Eosinophils Relative 3 %      Basophils Relative 0 %      Neutrophils Absolute 8.50 Thousands/µL      Immature Grans Absolute 0.08 Thousand/uL      Lymphocytes Absolute 3.30 Thousands/µL      Monocytes Absolute 0.84 Thousand/µL      Eosinophils Absolute 0.39  Thousand/µL      Basophils Absolute 0.03 Thousands/µL                    CTA ED chest PE Study   Final Result by Lyndsay Coffey MD (01/08 0544)      Small focus of infectious/inflammatory groundglass opacity in both lower lobes      No pulmonary embolus.      Mild coronary artery calcification indicating atherosclerotic heart disease.            Workstation performed: [id number]                    Procedures  ECG 12 Lead Documentation Only    Date/Time: 1/8/2024 4:18 AM    Performed by: Tootie Leblanc PA-C  Authorized by: Tootie Leblanc PA-C    Indications / Diagnosis:  SOB  ECG reviewed by me, the ED Provider: yes    Patient location:  ED  Interpretation:     Interpretation: normal    Rate:     ECG rate:  82    ECG rate assessment: normal    Rhythm:     Rhythm: sinus rhythm    Ectopy:     Ectopy: none    QRS:     QRS axis:  Normal    QRS intervals:  Normal  Conduction:     Conduction: normal    ST segments:     ST segments:  Normal  T waves:     T waves: peaked    Q waves:     Q waves:  V2, V3, V4, V5 and V6           ED Course                       PERC Rule for PE      Flowsheet Row Most Recent Value   PERC Rule for PE    Age >=50 0 Filed at: 01/08/2024 0341   HR >=100 0 Filed at: 01/08/2024 0341   O2 Sat on room air < 95% 0 Filed at: 01/08/2024 0341   History of PE or DVT 0 Filed at: 01/08/2024 0341   Recent trauma or surgery 0 Filed at: 01/08/2024 0341   Hemoptysis 0 Filed at: 01/08/2024 0341   Exogenous estrogen 0 Filed at: 01/08/2024 0341   Unilateral leg swelling 1 Filed at: 01/08/2024 0341   PERC Rule for PE Results 1 Filed at: 01/08/2024 0341                              Medical Decision Making  Patient presenting complaining of shortness of breath after recent admission for pneumonia.  Patient states that he was discharged and that his symptoms acutely got worse.  Patient is currently homeless and unable to complete albuterol treatments.  Patient arrived afebrile however was mildly  tachycardic and tachypneic.  Patient also complained of right lower extremity pain and swelling which was thought to be unrelated however given the setting of chest discomfort while breathing as well as extremity swelling PE study was ordered.  CTA PE negative for acute coagulopathy however significant bilateral pneumonia found.  Patient was started on antibiotics.  Hemodynamically stable and satting well on room air.  Admitted to the floor.  Patient was offered admission to detox however declined at this time however detox will consult during patient's admission.  Admitted to the floor in stable condition.    Amount and/or Complexity of Data Reviewed  Labs: ordered.  Radiology: ordered.    Risk  Prescription drug management.  Decision regarding hospitalization.             Disposition  Final diagnoses:   Bilateral pneumonia     Time reflects when diagnosis was documented in both MDM as applicable and the Disposition within this note       Time User Action Codes Description Comment    1/8/2024  6:41 AM Tootie Leblanc Add [J18.9] Bilateral pneumonia           ED Disposition       ED Disposition   Admit    Condition   Stable    Date/Time   Mon Jan 8, 2024 0641    Comment   Case was discussed with SLIM PA and the patient's admission status was agreed to be Admission Status: observation status to the service of Dr. Garcia .               Follow-up Information    None         Patient's Medications   Discharge Prescriptions    No medications on file       No discharge procedures on file.    PDMP Review         Value Time User    PDMP Reviewed  Yes 1/8/2024  6:17 AM Kristina Drummond PA-C            ED Provider  Electronically Signed by             Tootie Leblanc PA-C  01/08/24 0646

## 2024-01-08 NOTE — H&P
[facility]  H&P  Name: Juan Diego Horowitz 48 y.o. male I MRN: [id number]  Unit/Bed#: ED 03 I Date of Admission: 1/8/2024   Date of Service: 1/8/2024 I Hospital Day: 0      Assessment/Plan   Sepsis (HCC)  Assessment & Plan  Present on admission as evidenced by tachycardia and tachypnea  Likely secondary to bacterial pneumonia  Initiate vancomycin and cefepime ; de-escalate antibiotics as appropriate  Pharmacy consult for vancomycin trough management  Trend fever curve and WBC count and procalcitonin  Follow-up blood cultures    Pneumonia  Assessment & Plan  Lower lobe consolidations noted on CT chest  Complicated by sepsis  Vancomycin and cefepime as above  Obtain sputum culture and Gram stain    Acute kidney injury (HCC)  Assessment & Plan  Present on admission as evidenced by creatinine of 1.52 up from baseline of 1.00  Likely prerenal azotemia in the setting of infection and decreased oral intake  IV fluid hydration with normal saline solution at 125 cc/h  Trend BMP  Avoid nephrotoxic agents and hypotension  Strict intake and output  Daily standing weights    Bipolar 1 disorder (HCC)  Assessment & Plan  Continue home Seroquel         VTE Prophylaxis: Heparin  Code Status: Level 1 full code    Anticipated Length of Stay:  Patient will be admitted on an Observation basis with an anticipated length of stay of less than 2 midnights.   Justification for Hospital Stay: Sepsis    Total Time for Visit, including Counseling / Coordination of Care: I have spent a total time of 45 minutes on 01/08/24 in caring for this patient including Diagnostic results, Prognosis, Risks and benefits of tx options, Instructions for management, Patient and family education, Importance of tx compliance, Risk factor reductions, Impressions, Counseling / Coordination of care, Documenting in the medical record, Reviewing / ordering tests, medicine, procedures  , Obtaining or reviewing history  , and Communicating  with other healthcare professionals .    Chief Complaint:     History of Present Illness:    Juan Diego Horowitz is a 48 y.o. male with a past medical history significant for substance abuse, bipolar disorder who presents with shortness of breath.  The patient was recently admitted last week for pneumonia and was in the hospital for several days.  The patient states that it now hurts him to take a deep breath.  He has no other complaints at this time.  In the ED, he was found to be meeting SIRS criteria in the setting of tachycardia and tachypnea.  Likely source being pneumonia.    Review of Systems:    Review of Systems   Constitutional:  Negative for chills and fever.   HENT:  Negative for ear pain and sore throat.    Eyes:  Negative for pain and visual disturbance.   Respiratory:  Positive for shortness of breath. Negative for cough.    Cardiovascular:  Negative for chest pain and palpitations.   Gastrointestinal:  Negative for abdominal pain and vomiting.   Genitourinary:  Negative for dysuria and hematuria.   Musculoskeletal:  Negative for arthralgias and back pain.   Skin:  Negative for color change and rash.   Neurological:  Negative for seizures and syncope.   All other systems reviewed and are negative.      Past Medical and Surgical History:     Past Medical History:   Diagnosis Date    Addiction to drug (HCC)     Asthma     Back pain     Bipolar 1 disorder (HCC)     Depression     Gastritis     GERD (gastroesophageal reflux disease)     Kidney stones     Opioid withdrawal (HCC) 12/05/2023    Renal cancer (HCC)     Substance abuse (HCC)        Past Surgical History:   Procedure Laterality Date    ABDOMINAL SURGERY      NEPHRECTOMY      ID CYSTO BLADDER W/URETERAL CATHETERIZATION Right 09/27/2016    Procedure: CYSTOSCOPY WITH RETROGRADE PYELOGRAM;  Surgeon: Petey Hernandez MD;  [facility];  Service: Urology    ID LAPAROSCOPY RADICAL NEPHRECTOMY Right 11/23/2016    Procedure: HAND ASSISTED  LAPAROSCOPIC NEPHRECTOMY, converted to open, lysis of adhesions,repair of  vena cava;  Surgeon: Petey Hernandez MD;  [facility];  Service: Urology    URETERAL STENT PLACEMENT Right 09/27/2016    Procedure: INSERTION STENT URETERAL;  Surgeon: Petey Hernandez MD;  [facility];  Service:     VASCULAR SURGERY         Meds/Allergies:    Prior to Admission medications    Medication Sig Start Date End Date Taking? Authorizing Provider   buprenorphine-naloxone (SUBOXONE) 8-2 mg per SL tablet Place 1 tablet under the tongue daily  Patient not taking: Reported on 12/5/2023    Historical Provider, MD   ketorolac (TORADOL) 10 mg tablet Take 1 tablet (10 mg total) by mouth every 6 (six) hours as needed for moderate pain for up to 5 days 10/30/23 11/4/23  Gabriel Collins MD   mirtazapine (REMERON) 30 mg tablet Take 1 tablet (30 mg total) by mouth daily at bedtime  Patient not taking: Reported on 10/30/2023 12/8/22   Devaughn Bush MD   naloxone (NARCAN) 4 mg/0.1 mL nasal spray Administer 1 spray into a nostril. If no response after 2-3 minutes, give another dose in the other nostril using a new spray.  Patient not taking: Reported on 2/6/2023 12/8/22   Roberta Cast PA-C   QUEtiapine (SEROquel) 200 mg tablet Take 1 tablet (200 mg total) by mouth daily at bedtime  Patient not taking: Reported on 10/30/2023 12/8/22   Devaughn Bush MD   QUEtiapine (SEROquel) 50 mg tablet Take 1 tablet (50 mg total) by mouth 2 (two) times a day for 7 days 12/10/23 12/17/23  Ke Barnes DO       Allergies: No Known Allergies    Social History:     Marital Status: Single   Substance Use History:   Social History     Substance and Sexual Activity   Alcohol Use Not Currently     Social History     Tobacco Use   Smoking Status Every Day    Current packs/day: 1.00    Average packs/day: 0.5 packs/day for 11.0 years (5.5 ttl pk-yrs)    Types: Cigarettes    Start date: 1/26/2012    Last attempt to quit: 1/26/2023    Passive exposure:  Current   Smokeless Tobacco Never     Social History     Substance and Sexual Activity   Drug Use Not Currently    Types: Heroin       Family History:    Pertinent family history reviewed    Physical Exam:     Vitals:   Blood Pressure: 109/61 (01/08/24 0825)  Pulse: 80 (01/08/24 0825)  Temperature: (!) 97.2 °F (36.2 °C) (01/08/24 0322)  Temp Source: Tympanic (01/08/24 0322)  Respirations: 18 (01/08/24 0825)  Weight - Scale: 66.7 kg (147 lb) (01/08/24 0322)  SpO2: 95 % (01/08/24 0825)    Physical Exam  Vitals and nursing note reviewed.   Constitutional:       General: He is not in acute distress.     Appearance: He is well-developed.   HENT:      Head: Normocephalic and atraumatic.   Eyes:      Conjunctiva/sclera: Conjunctivae normal.   Cardiovascular:      Rate and Rhythm: Normal rate and regular rhythm.      Heart sounds: No murmur heard.  Pulmonary:      Effort: Pulmonary effort is normal. No respiratory distress.      Breath sounds: Normal breath sounds.   Abdominal:      Palpations: Abdomen is soft.      Tenderness: There is no abdominal tenderness.   Musculoskeletal:         General: No swelling.      Cervical back: Neck supple.   Skin:     General: Skin is warm and dry.      Capillary Refill: Capillary refill takes less than 2 seconds.   Neurological:      Mental Status: He is alert.   Psychiatric:         Mood and Affect: Mood normal.          Additional Data:     Lab Results: I have reviewed pertinent results     Results from last 7 days   Lab Units 01/08/24  0358   WBC Thousand/uL 13.14*   HEMOGLOBIN g/dL 13.3   HEMATOCRIT % 40.8   PLATELETS Thousands/uL 354   NEUTROS PCT % 65   LYMPHS PCT % 25   MONOS PCT % 6   EOS PCT % 3     Results from last 7 days   Lab Units 01/08/24  0358   SODIUM mmol/L 139   POTASSIUM mmol/L 4.1   CHLORIDE mmol/L 103   CO2 mmol/L 29   BUN mg/dL 26*   CREATININE mg/dL 1.52*   ANION GAP mmol/L 7   CALCIUM mg/dL 8.8   ALBUMIN g/dL 3.4*   TOTAL BILIRUBIN mg/dL 0.30   ALK PHOS U/L 44    ALT U/L 15   AST U/L 13   GLUCOSE RANDOM mg/dL 108     Results from last 7 days   Lab Units 01/08/24  0358   INR  0.94             Results from last 7 days   Lab Units 01/08/24  0418   LACTIC ACID mmol/L 0.7       Imaging: I have reviewed pertinent imaging     CTA ED chest PE Study   Final Result by Lyndsay Coffey MD (01/08 0544)      Small focus of infectious/inflammatory groundglass opacity in both lower lobes      No pulmonary embolus.      Mild coronary artery calcification indicating atherosclerotic heart disease.            Workstation performed: [id number]             EKG, Pathology, and Other Studies Reviewed on Admission:   EKG: NSR    Allscripts / Epic Records Reviewed    ** Please Note: This note has been constructed using a voice recognition system. **

## 2024-01-08 NOTE — PROGRESS NOTES
Juan Diego Horowitz is a 48 y.o. male who is currently ordered Vancomycin IV with management by the Pharmacy Consult service.  Relevant clinical data and objective / subjective history reviewed.  Vancomycin Assessment:  Indication and Goal AUC/Trough: Pneumonia (goal -600, trough >10), -600, trough >10  Clinical Status:  new  Micro:   pending  Renal Function:  SCr: 1.52 mg/dL  CrCl: 53.4 mL/min  Renal replacement:   Days of Therapy: 1  Current Dose: 500 mg q12h  Vancomycin Plan:  New Dosing:    Estimated AUC: 413 mcg*hr/mL  Estimated Trough: 14 mcg/mL  Next Level: 01/09/24 at 06:00  Renal Function Monitoring: Daily BMP and UOP  Pharmacy will continue to follow closely for s/sx of nephrotoxicity, infusion reactions and appropriateness of therapy.  BMP and CBC will be ordered per protocol. We will continue to follow the patient’s culture results and clinical progress daily.    Javi Espinoza, Pharmacist

## 2024-01-08 NOTE — PLAN OF CARE

## 2024-01-08 NOTE — ASSESSMENT & PLAN NOTE
Present on admission as evidenced by tachycardia and tachypnea  Likely secondary to bacterial pneumonia  Initiate vancomycin and cefepime ; de-escalate antibiotics as appropriate  Pharmacy consult for vancomycin trough management  Trend fever curve and WBC count and procalcitonin  Follow-up blood cultures

## 2024-01-08 NOTE — ASSESSMENT & PLAN NOTE
Present on admission as evidenced by creatinine of 1.52 up from baseline of 1.00  Likely prerenal azotemia in the setting of infection and decreased oral intake  IV fluid hydration with normal saline solution at 125 cc/h  Trend BMP  Avoid nephrotoxic agents and hypotension  Strict intake and output  Daily standing weights

## 2024-01-08 NOTE — ASSESSMENT & PLAN NOTE
Lower lobe consolidations noted on CT chest  Complicated by sepsis  Vancomycin and cefepime as above  Obtain sputum culture and Gram stain

## 2024-01-09 ENCOUNTER — APPOINTMENT (OUTPATIENT)
Dept: RADIOLOGY | Facility: HOSPITAL | Age: 49
DRG: 871 | End: 2024-01-09
Payer: COMMERCIAL

## 2024-01-09 PROBLEM — R26.2 AMBULATORY DYSFUNCTION: Status: ACTIVE | Noted: 2024-01-09

## 2024-01-09 LAB
ANION GAP SERPL CALCULATED.3IONS-SCNC: 5 MMOL/L
BASOPHILS # BLD AUTO: 0.03 THOUSANDS/ÂΜL (ref 0–0.1)
BASOPHILS NFR BLD AUTO: 0 % (ref 0–1)
BUN SERPL-MCNC: 15 MG/DL (ref 5–25)
CALCIUM SERPL-MCNC: 8.2 MG/DL (ref 8.4–10.2)
CHLORIDE SERPL-SCNC: 107 MMOL/L (ref 96–108)
CO2 SERPL-SCNC: 26 MMOL/L (ref 21–32)
CREAT SERPL-MCNC: 1.04 MG/DL (ref 0.6–1.3)
EOSINOPHIL # BLD AUTO: 0.43 THOUSAND/ÂΜL (ref 0–0.61)
EOSINOPHIL NFR BLD AUTO: 5 % (ref 0–6)
ERYTHROCYTE [DISTWIDTH] IN BLOOD BY AUTOMATED COUNT: 15.9 % (ref 11.6–15.1)
GFR SERPL CREATININE-BSD FRML MDRD: 85 ML/MIN/1.73SQ M
GLUCOSE SERPL-MCNC: 84 MG/DL (ref 65–140)
HCT VFR BLD AUTO: 36.7 % (ref 36.5–49.3)
HGB BLD-MCNC: 12.1 G/DL (ref 12–17)
IMM GRANULOCYTES # BLD AUTO: 0.04 THOUSAND/UL (ref 0–0.2)
IMM GRANULOCYTES NFR BLD AUTO: 0 % (ref 0–2)
L PNEUMO1 AG UR QL IA.RAPID: NEGATIVE
LYMPHOCYTES # BLD AUTO: 2.7 THOUSANDS/ÂΜL (ref 0.6–4.47)
LYMPHOCYTES NFR BLD AUTO: 30 % (ref 14–44)
MCH RBC QN AUTO: 27.9 PG (ref 26.8–34.3)
MCHC RBC AUTO-ENTMCNC: 33 G/DL (ref 31.4–37.4)
MCV RBC AUTO: 85 FL (ref 82–98)
MONOCYTES # BLD AUTO: 0.9 THOUSAND/ÂΜL (ref 0.17–1.22)
MONOCYTES NFR BLD AUTO: 10 % (ref 4–12)
NEUTROPHILS # BLD AUTO: 5.04 THOUSANDS/ÂΜL (ref 1.85–7.62)
NEUTS SEG NFR BLD AUTO: 55 % (ref 43–75)
NRBC BLD AUTO-RTO: 0 /100 WBCS
PLATELET # BLD AUTO: 305 THOUSANDS/UL (ref 149–390)
PMV BLD AUTO: 8.5 FL (ref 8.9–12.7)
POTASSIUM SERPL-SCNC: 4.5 MMOL/L (ref 3.5–5.3)
PROCALCITONIN SERPL-MCNC: 0.12 NG/ML
RBC # BLD AUTO: 4.34 MILLION/UL (ref 3.88–5.62)
S PNEUM AG UR QL: NEGATIVE
SODIUM SERPL-SCNC: 138 MMOL/L (ref 135–147)
VANCOMYCIN SERPL-MCNC: 12.6 UG/ML (ref 10–20)
WBC # BLD AUTO: 9.14 THOUSAND/UL (ref 4.31–10.16)

## 2024-01-09 PROCEDURE — 85025 COMPLETE CBC W/AUTO DIFF WBC: CPT | Performed by: INTERNAL MEDICINE

## 2024-01-09 PROCEDURE — 99232 SBSQ HOSP IP/OBS MODERATE 35: CPT | Performed by: INTERNAL MEDICINE

## 2024-01-09 PROCEDURE — 80202 ASSAY OF VANCOMYCIN: CPT | Performed by: INTERNAL MEDICINE

## 2024-01-09 PROCEDURE — 84145 PROCALCITONIN (PCT): CPT | Performed by: INTERNAL MEDICINE

## 2024-01-09 PROCEDURE — 73630 X-RAY EXAM OF FOOT: CPT

## 2024-01-09 PROCEDURE — 80048 BASIC METABOLIC PNL TOTAL CA: CPT | Performed by: INTERNAL MEDICINE

## 2024-01-09 RX ORDER — KETOROLAC TROMETHAMINE 30 MG/ML
30 INJECTION, SOLUTION INTRAMUSCULAR; INTRAVENOUS EVERY 6 HOURS PRN
Status: DISCONTINUED | OUTPATIENT
Start: 2024-01-09 | End: 2024-01-10 | Stop reason: HOSPADM

## 2024-01-09 RX ORDER — VANCOMYCIN HYDROCHLORIDE 750 MG/150ML
12.5 INJECTION, SOLUTION INTRAVENOUS EVERY 12 HOURS
Status: DISCONTINUED | OUTPATIENT
Start: 2024-01-09 | End: 2024-01-09

## 2024-01-09 RX ORDER — AMOXICILLIN AND CLAVULANATE POTASSIUM 875; 125 MG/1; MG/1
1 TABLET, FILM COATED ORAL EVERY 12 HOURS SCHEDULED
Status: DISCONTINUED | OUTPATIENT
Start: 2024-01-09 | End: 2024-01-10 | Stop reason: HOSPADM

## 2024-01-09 RX ORDER — ACETAMINOPHEN 325 MG/1
975 TABLET ORAL EVERY 4 HOURS PRN
Status: DISCONTINUED | OUTPATIENT
Start: 2024-01-09 | End: 2024-01-10 | Stop reason: HOSPADM

## 2024-01-09 RX ADMIN — HEPARIN SODIUM 5000 UNITS: 5000 INJECTION INTRAVENOUS; SUBCUTANEOUS at 05:20

## 2024-01-09 RX ADMIN — HEPARIN SODIUM 5000 UNITS: 5000 INJECTION INTRAVENOUS; SUBCUTANEOUS at 21:00

## 2024-01-09 RX ADMIN — KETOROLAC TROMETHAMINE 30 MG: 30 INJECTION, SOLUTION INTRAMUSCULAR; INTRAVENOUS at 20:51

## 2024-01-09 RX ADMIN — HEPARIN SODIUM 5000 UNITS: 5000 INJECTION INTRAVENOUS; SUBCUTANEOUS at 14:29

## 2024-01-09 RX ADMIN — QUETIAPINE FUMARATE 50 MG: 50 TABLET ORAL at 17:49

## 2024-01-09 RX ADMIN — AMOXICILLIN AND CLAVULANATE POTASSIUM 1 TABLET: 875; 125 TABLET, FILM COATED ORAL at 21:00

## 2024-01-09 RX ADMIN — QUETIAPINE FUMARATE 50 MG: 50 TABLET ORAL at 09:19

## 2024-01-09 RX ADMIN — SODIUM CHLORIDE 125 ML/HR: 0.9 INJECTION, SOLUTION INTRAVENOUS at 05:42

## 2024-01-09 RX ADMIN — OXYCODONE HYDROCHLORIDE 10 MG: 10 TABLET ORAL at 03:54

## 2024-01-09 RX ADMIN — KETOROLAC TROMETHAMINE 30 MG: 30 INJECTION, SOLUTION INTRAMUSCULAR; INTRAVENOUS at 12:01

## 2024-01-09 RX ADMIN — AMOXICILLIN AND CLAVULANATE POTASSIUM 1 TABLET: 875; 125 TABLET, FILM COATED ORAL at 12:00

## 2024-01-09 RX ADMIN — VANCOMYCIN HYDROCHLORIDE 750 MG: 750 INJECTION, SOLUTION INTRAVENOUS at 09:18

## 2024-01-09 NOTE — PROGRESS NOTES
[facility]  Progress Note  Name: Juan Diego Horowitz I  MRN: [id number]  Unit/Bed#: 7T [facility] 710-01 I Date of Admission: 1/8/2024   Date of Service: 1/9/2024 I Hospital Day: 0    Assessment/Plan   Sepsis (HCC)  Assessment & Plan  Present on admission as evidenced by tachycardia and tachypnea  Likely secondary to bacterial pneumonia  Sepsis parameters improving  De-escalate antibiotics to Augmentin  Trend fever curve and WBC count and procalcitonin  Follow-up blood cultures    Pneumonia  Assessment & Plan  Lower lobe consolidations noted on CT chest  Complicated by sepsis  Augmentin as above  Obtain sputum culture and Gram stain    Acute kidney injury (HCC)  Assessment & Plan  Resolved  Present on admission as evidenced by creatinine of 1.52 up from baseline of 1.00  Likely prerenal azotemia in the setting of infection and decreased oral intake  Discontinue IV fluids  Trend BMP  Avoid nephrotoxic agents and hypotension  Strict intake and output  Daily standing weights    Ambulatory dysfunction  Assessment & Plan  Patient states he is unable to bear weight on his right foot secondary to pain and swelling  Will obtain x-ray of right foot  PT/OT evaluation and treatment  Case management consultation for safe disposition planning    Bipolar 1 disorder (HCC)  Assessment & Plan  Continue home Seroquel               VTE Pharmacologic Prophylaxis: VTE Score: 5 Moderate Risk (Score 3-4) - Pharmacological DVT Prophylaxis Ordered: heparin.    Mobility:   Basic Mobility Inpatient Raw Score: 24  JH-HLM Goal: 8: Walk 250 feet or more  JH-HLM Achieved: 8: Walk 250 feet ot more  HLM Goal achieved. Continue to encourage appropriate mobility.    Patient Centered Rounds: I performed bedside rounds with nursing staff today.   Discussions with Specialists or Other Care Team Provider: Nursing    Education and Discussions with Family / Patient: Patient declined call to .     Total Time Spent on  Date of Encounter in care of patient: 35 mins. This time was spent on one or more of the following: performing physical exam; counseling and coordination of care; obtaining or reviewing history; documenting in the medical record; reviewing/ordering tests, medications or procedures; communicating with other healthcare professionals and discussing with patient's family/caregivers.    Current Length of Stay: 0 day(s)  Current Patient Status: Observation   Certification Statement: The patient will continue to require additional inpatient hospital stay due to ambulatory dysfunction  Discharge Plan: Anticipate discharge tomorrow to discharge location to be determined pending rehab evaluations.    Code Status: Level 1 - Full Code    Subjective:   Patient seen and examined at bedside. No acute events overnight. Denies chest pain, SOB, diaphoresis, nausea/vomiting/diarrhea, fevers/chills.     Objective:     Vitals:   Temp (24hrs), Av.6 °F (36.4 °C), Min:97.1 °F (36.2 °C), Max:98.4 °F (36.9 °C)    Temp:  [97.1 °F (36.2 °C)-98.4 °F (36.9 °C)] 98.4 °F (36.9 °C)  HR:  [69-89] 69  Resp:  [16-19] 17  BP: ()/(62-79) 96/62  SpO2:  [96 %-99 %] 96 %  Body mass index is 22.2 kg/m².     Input and Output Summary (last 24 hours):     Intake/Output Summary (Last 24 hours) at 2024 1028  Last data filed at 2024 0847  Gross per 24 hour   Intake 5960.41 ml   Output 2150 ml   Net 3810.41 ml       Physical Exam:   Physical Exam  Vitals and nursing note reviewed.   Constitutional:       General: He is not in acute distress.     Appearance: He is well-developed.   HENT:      Head: Normocephalic and atraumatic.   Eyes:      Conjunctiva/sclera: Conjunctivae normal.   Cardiovascular:      Rate and Rhythm: Normal rate and regular rhythm.      Heart sounds: No murmur heard.  Pulmonary:      Effort: Pulmonary effort is normal. No respiratory distress.      Breath sounds: Normal breath sounds.   Abdominal:      Palpations: Abdomen is  soft.      Tenderness: There is no abdominal tenderness.   Musculoskeletal:         General: No swelling.      Cervical back: Neck supple.   Skin:     General: Skin is warm and dry.      Capillary Refill: Capillary refill takes less than 2 seconds.   Neurological:      Mental Status: He is alert.   Psychiatric:         Mood and Affect: Mood normal.          Additional Data:     Labs:  Results from last 7 days   Lab Units 01/09/24  0631   WBC Thousand/uL 9.14   HEMOGLOBIN g/dL 12.1   HEMATOCRIT % 36.7   PLATELETS Thousands/uL 305   NEUTROS PCT % 55   LYMPHS PCT % 30   MONOS PCT % 10   EOS PCT % 5     Results from last 7 days   Lab Units 01/09/24  0631 01/08/24  0358   SODIUM mmol/L 138 139   POTASSIUM mmol/L 4.5 4.1   CHLORIDE mmol/L 107 103   CO2 mmol/L 26 29   BUN mg/dL 15 26*   CREATININE mg/dL 1.04 1.52*   ANION GAP mmol/L 5 7   CALCIUM mg/dL 8.2* 8.8   ALBUMIN g/dL  --  3.4*   TOTAL BILIRUBIN mg/dL  --  0.30   ALK PHOS U/L  --  44   ALT U/L  --  15   AST U/L  --  13   GLUCOSE RANDOM mg/dL 84 108     Results from last 7 days   Lab Units 01/08/24  0358   INR  0.94             Results from last 7 days   Lab Units 01/09/24  0631 01/08/24  0418   LACTIC ACID mmol/L  --  0.7   PROCALCITONIN ng/ml 0.12  --        Lines/Drains:  Invasive Devices       Peripheral Intravenous Line  Duration             Peripheral IV 01/08/24 Left Antecubital 1 day                          Imaging: Reviewed radiology reports from this admission including: chest CT scan    Recent Cultures (last 7 days):   Results from last 7 days   Lab Units 01/08/24  2307 01/08/24  0418   BLOOD CULTURE   --  Received in Microbiology Lab. Culture in Progress.  Received in Microbiology Lab. Culture in Progress.   LEGIONELLA URINARY ANTIGEN  Negative  --        Last 24 Hours Medication List:   Current Facility-Administered Medications   Medication Dose Route Frequency Provider Last Rate    acetaminophen  650 mg Oral Q4H PRN Skyler Garcia DO       amoxicillin-clavulanate  1 tablet Oral Q12H IVANA Skyler Garcia, DO      heparin (porcine)  5,000 Units Subcutaneous Q8H IVANA Skyler Garcia,       nicotine  1 patch Transdermal Daily Skyler Garcia,       ondansetron  4 mg Intravenous Q6H PRN Skyler Garcia,       oxyCODONE  10 mg Oral Q6H PRN Skyler Garcia,       oxyCODONE  5 mg Oral Q6H PRN Skyler Garcia, DO      QUEtiapine  50 mg Oral BID Skyler Garcia DO          Today, Patient Was Seen By: Skyler Garcia DO    **Please Note: This note may have been constructed using a voice recognition system.**

## 2024-01-09 NOTE — PROGRESS NOTES
Juan Diego Horowitz is a 48 y.o. male who is currently ordered Vancomycin IV with management by the Pharmacy Consult service.  Relevant clinical data and objective / subjective history reviewed.  Vancomycin Assessment:  1. Indication and Goal AUC/Trough: Pneumonia (goal -600, trough >10)  2. Clinical Status: stable  3. Micro:          4. Renal Function:  SCr: 1.04 mg/dL  CrCl: 80.4 mL/min  5. Days of Therapy: 2  6. Current Dose: 500 mg IV every 12 hours  Vancomycin Plan:  1. New Dosin mg IV every 12 hours  2. Estimated AUC: 423 mcg*hr/mL  3. Estimated Trough: 13.6 mcg/mL  4. Next Level: 2024 with morning labs  5. Renal Function Monitoring: Daily BMP and UOP  Pharmacy will continue to follow closely for s/sx of nephrotoxicity, infusion reactions and appropriateness of therapy.  BMP and CBC will be ordered per protocol. We will continue to follow the patient’s culture results and clinical progress daily.    Joi Fang, Pharmacist

## 2024-01-09 NOTE — ASSESSMENT & PLAN NOTE
Lower lobe consolidations noted on CT chest  Complicated by sepsis  Augmentin as above  Obtain sputum culture and Gram stain

## 2024-01-09 NOTE — PLAN OF CARE
Problem: INFECTION - ADULT  Goal: Absence or prevention of progression during hospitalization  Description: INTERVENTIONS:  - Assess and monitor for signs and symptoms of infection  - Monitor lab/diagnostic results  - Monitor all insertion sites, i.e. indwelling lines, tubes, and drains  - Monitor endotracheal if appropriate and nasal secretions for changes in amount and color  - [place] appropriate cooling/warming therapies per order  - Administer medications as ordered  - Instruct and encourage patient and family to use good hand hygiene technique  - Identify and instruct in appropriate isolation precautions for identified infection/condition  Outcome: Progressing  Goal: Absence of fever/infection during neutropenic period  Description: INTERVENTIONS:  - Monitor WBC    Outcome: Progressing

## 2024-01-09 NOTE — UTILIZATION REVIEW
"Initial Clinical Review    Pt initially admitted as Observation on 1/8. Changed to Inpatient on 1/9. Pt requiring continued stay d/t ongoing workup and treatment for sepsis and PNA necessitating IV ABX.    Admission: Date/Time/Statement:   Admission Orders (From admission, onward)       Ordered        01/09/24 1048  Inpatient Admission  Once            01/08/24 0642  Place in Observation  Once                          Orders Placed This Encounter   Procedures    Inpatient Admission     Standing Status:   Standing     Number of Occurrences:   1     Order Specific Question:   Level of Care     Answer:   Med Surg [16]     Order Specific Question:   Estimated length of stay     Answer:   More than 2 Midnights     Order Specific Question:   Certification     Answer:   I certify that inpatient services are medically necessary for this patient for a duration of greater than two midnights. See H&P and MD Progress Notes for additional information about the patient's course of treatment.     ED Arrival Information       Expected   -    Arrival   1/8/2024 03:06    Acuity   Urgent              Means of arrival   Walk-In    Escorted by   Self    Service   Hospitalist    Admission type   Emergency              Arrival complaint   Leg pain; shortness of breath; abdominal pain             Chief Complaint   Patient presents with    Shortness of Breath     Pt c/o sob x1 week, recent admission for pneumonia.  Also c/o pain to \"hernia\" in RLQ abd and pain to right foot       Initial Presentation: 48 y.o. male who presented self from home to [facility] ED. Admitted in observation status for evaluation and treatment of b/l pneumonia. PMHx: substance use, asthma, psychiatric disorder, GERD, renal cancer. Presented w/ SOB, notes hurting when taking deep breaths. On exam, RLE w/ edema into posterior calf w/ tenderness, positive Homans; tachycardic, tachypnea. WBC 13.14. Plan: follow blood cultures, IV ABX, check sputum culture " and gram stain, IVF, I&O, DW, continue PTA meds, Trend labs, replete electrolytes as needed.       01/09/24 Changed to Inpatient Status: Exam unremarkable. Plan: IV ABX, follow blood cultures, sputum cultures, DW, I&O, discontinue IVF, PT/OT evals. Trend labs, replete electrolytes as needed.      Date: 01/10/24    Day 3: Has surpassed a 2nd midnight with active treatments and services, which include ABX, DW, I&O, PT/OT evals.    ED Triage Vitals   Temperature Pulse Respirations Blood Pressure SpO2   01/08/24 0322 01/08/24 0322 01/08/24 0322 01/08/24 0322 01/08/24 0322   (!) 97.2 °F (36.2 °C) 99 20 123/78 97 %      Temp Source Heart Rate Source Patient Position - Orthostatic VS BP Location FiO2 (%)   01/08/24 0322 01/08/24 0322 01/08/24 0322 01/08/24 0322 --   Tympanic Monitor Sitting Left arm       Pain Score       01/08/24 0825       4          Wt Readings from Last 1 Encounters:   01/10/24 62.2 kg (137 lb 2 oz)     Additional Vital Signs:   Date/Time Temp Pulse Resp BP MAP (mmHg) SpO2 O2 Device   01/10/24 0719 97 °F (36.1 °C) Abnormal  61 18 96/66 -- 96 % None (Room air)   01/09/24 2323 97.5 °F (36.4 °C) 66 18 106/77 82 95 % None (Room air)   01/09/24 1452 98.9 °F (37.2 °C) 67 18 120/68 84 96 % None (Room air)     Date/Time Temp Pulse Resp BP MAP (mmHg) SpO2 O2 Device   01/09/24 0716 98.4 °F (36.9 °C) 69 17 96/62 -- 96 % None (Room air)   01/08/24 2225 97.1 °F (36.2 °C) Abnormal  87 16 100/75 80 99 % None (Room air)   01/08/24 1559 97.6 °F (36.4 °C) 70 18 100/70 76 98 % None (Room air)   01/08/24 1123 97.4 °F (36.3 °C) Abnormal  89 19 103/79 76 97 % None (Room air)   01/08/24 0955 -- 76 16 117/67 83 96 % None (Room air)   01/08/24 0825 -- 80 18 109/61 77 95 % None (Room air)     Pertinent Labs/Diagnostic Test Results:   1/8 - EKG  Normal sinus rhythm     XR foot 3+ vw right   Final Result by Fabrizio Knox MD (01/09 7907)      No acute osseous abnormality.      Workstation performed: [id number]         CTA ED chest  PE Study   Final Result by Lyndsay Coffey MD (01/08 0544)      Small focus of infectious/inflammatory groundglass opacity in both lower lobes      No pulmonary embolus.      Mild coronary artery calcification indicating atherosclerotic heart disease.            Workstation performed: [id number]           Results from last 7 days   Lab Units 01/08/24  0651   SARS-COV-2  Negative     Results from last 7 days   Lab Units 01/10/24  0435 01/09/24  0631 01/08/24  0358   WBC Thousand/uL 8.28 9.14 13.14*   HEMOGLOBIN g/dL 13.1 12.1 13.3   HEMATOCRIT % 39.7 36.7 40.8   PLATELETS Thousands/uL 317 305 354   NEUTROS ABS Thousands/µL 4.00 5.04 8.50*         Results from last 7 days   Lab Units 01/10/24  0435 01/09/24  0631 01/08/24  0358   SODIUM mmol/L 136 138 139   POTASSIUM mmol/L 4.4 4.5 4.1   CHLORIDE mmol/L 103 107 103   CO2 mmol/L 27 26 29   ANION GAP mmol/L 6 5 7   BUN mg/dL 18 15 26*   CREATININE mg/dL 1.07 1.04 1.52*   EGFR ml/min/1.73sq m 82 85 54   CALCIUM mg/dL 8.8 8.2* 8.8   MAGNESIUM mg/dL 2.1  --   --    PHOSPHORUS mg/dL 3.5  --   --      Results from last 7 days   Lab Units 01/08/24  0358   AST U/L 13   ALT U/L 15   ALK PHOS U/L 44   TOTAL PROTEIN g/dL 6.1*   ALBUMIN g/dL 3.4*   TOTAL BILIRUBIN mg/dL 0.30         Results from last 7 days   Lab Units 01/10/24  0435 01/09/24  0631 01/08/24  0358   GLUCOSE RANDOM mg/dL 89 84 108      Results from last 7 days   Lab Units 01/08/24 0651 01/08/24  0358   HS TNI 0HR ng/L  --  3   HS TNI 2HR ng/L 2  --    HSTNI D2 ng/L -1  --          Results from last 7 days   Lab Units 01/08/24  0358   PROTIME seconds 12.9   INR  0.94   PTT seconds 32         Results from last 7 days   Lab Units 01/10/24  0435 01/09/24  0631   PROCALCITONIN ng/ml 0.11 0.12     Results from last 7 days   Lab Units 01/08/24  0418   LACTIC ACID mmol/L 0.7          Results from last 7 days   Lab Units 01/08/24  2307   CLARITY UA  Clear   COLOR UA  Yellow   SPEC GRAV UA  1.015   PH UA  6.0   GLUCOSE  UA mg/dl Negative   KETONES UA mg/dl Negative   BLOOD UA  Negative   PROTEIN UA mg/dl Negative   NITRITE UA  Negative   BILIRUBIN UA  Negative   UROBILINOGEN UA mg/dL Negative   LEUKOCYTES UA  Negative   WBC UA /hpf None Seen   RBC UA /hpf None Seen   BACTERIA UA /hpf None Seen   EPITHELIAL CELLS WET PREP /hpf None Seen     Results from last 7 days   Lab Units 01/08/24  2307 01/08/24  0651   STREP PNEUMONIAE ANTIGEN, URINE  Negative  --    LEGIONELLA URINARY ANTIGEN  Negative  --    INFLUENZA A PCR   --  Negative   INFLUENZA B PCR   --  Negative   RSV PCR   --  Negative      Results from last 7 days   Lab Units 01/08/24  0418   BLOOD CULTURE  No Growth at 24 hrs.  No Growth at 24 hrs.             Results from last 7 days   Lab Units 01/09/24  0631   VANCOMYCIN RM ug/mL 12.6       ED Treatment:   Medication Administration from 01/08/2024 0306 to 01/08/2024 1106         Date/Time Order Dose Route Action     01/08/2024 0406 EST albuterol (PROVENTIL HFA,VENTOLIN HFA) inhaler 2 puff 2 puff Inhalation Given     01/08/2024 0419 EST sodium chloride 0.9 % bolus 1,000 mL 1,000 mL Intravenous New Bag     01/08/2024 0448 EST iohexol (OMNIPAQUE) 350 MG/ML injection (MULTI-DOSE) 85 mL 85 mL Intravenous Given     01/08/2024 0645 EST cefTRIAXone (ROCEPHIN) IVPB (premix in dextrose) 1,000 mg 50 mL 1,000 mg Intravenous New Bag          Past Medical History:   Diagnosis Date    Addiction to drug ([facility])     Asthma     Back pain     Bipolar 1 disorder ([facility])     Depression     Gastritis     GERD (gastroesophageal reflux disease)     Kidney stones     Opioid withdrawal ([facility]ly [facility]) 12/05/2023    Renal cancer (Formerly [facility])     Substance abuse (Formerly [facility])      Present on Admission:   Acute kidney injury (HCC)   Sepsis ([facility])   Pneumonia   Bipolar 1 disorder ([facility])   Ambulatory dysfunction      Admitting Diagnosis: Shortness of breath [R06.02]  Bilateral pneumonia [J18.9]  Opioid use disorder, severe, dependence (Formerly [facility]) [F11.20]  Age/Sex: 48 y.o. male  Admission  Orders:  Regular Diet.   Aspiration precautions.  Up & OOB as tolerated.  DW. I&O. SCDs.    Scheduled Medications:  amoxicillin-clavulanate, 1 tablet, Oral, Q12H IVANA  heparin (porcine), 5,000 Units, Subcutaneous, Q8H IVANA  nicotine, 1 patch, Transdermal, Daily  QUEtiapine, 50 mg, Oral, BID    Continuous IV Infusions:   sodium chloride 0.9 %, 125 mL/hr, Intravenous, Continuous; Start: 01/08/24 1015 End: 01/09/24 1027     PRN Meds:  acetaminophen, 975 mg, Oral, Q4H PRN; 1/8 x1  ketorolac, 30 mg, Intravenous, Q6H PRN; 1/9 x2  ondansetron, 4 mg, Intravenous, Q6H PRN  oxyCODONE, 10 mg, Oral; 1/8 x1, 1/9 x1    cefepime (MAXIPIME) IVPB (premix in dextrose) 2,000 mg 50 mL  Dose: 2,000 mg  Freq: Every 12 hours Route: IV  Last Dose: Stopped (01/09/24 1428)  Start: 01/08/24 1000 End: 01/09/24 1026   vancomycin (VANCOCIN) 1,750 mg in sodium chloride 0.9 % 500 mL IVPB  Dose: 25 mg/kg  Weight Dosing Info: 66.7 kg  Freq: Once Route: IV  Last Dose: 1,750 mg (01/08/24 1116)  Start: 01/08/24 1100 End: 01/08/24 1316   vancomycin (VANCOCIN) 500 mg in sodium chloride 0.9 % 100 mL IVPB  Dose: 500 mg  Freq: Every 12 hours Route: IV  Last Dose: 500 mg (01/08/24 2354)  Start: 01/08/24 2300 End: 01/09/24 0750   vancomycin (VANCOCIN) IVPB (premix in dextrose) 750 mg 150 mL  Dose: 12.5 mg/kg  Weight Dosing Info: 64.3 kg  Freq: Every 12 hours Route: IV  Last Dose: 750 mg (01/09/24 0918)  Start: 01/09/24 0900 End: 01/09/24 1026        IP CONSULT TO CASE MANAGEMENT  IP CONSULT TO CASE MANAGEMENT    Network Utilization Review Department  ATTENTION: Please call with any questions or concerns to [phone] and carefully listen to the prompts so that you are directed to the right person. All voicemails are confidential.   For Discharge needs, contact Care Management DC Support Team at [phone] opt. 2  Send all requests for admission clinical reviews, approved or denied determinations and any other requests to dedicated fax number below belonging  to the campus where the patient is receiving treatment. List of dedicated fax numbers for the Facilities:  FACILITY NAME UR FAX NUMBER   ADMISSION DENIALS (Administrative/Medical Necessity) [phone]   DISCHARGE SUPPORT TEAM (NETWORK) [phone]   PARENT CHILD HEALTH (Maternity/NICU/Pediatrics) [phone]   [facility] [phone]   [facility] [phone]   [facility] [phone]   [facility] [phone]   [facility] [phone]   [facility] [phone]   [facility] [phone]   [facility] [phone]   [facility] [phone]   [facility] [phone]   [facility] [phone]

## 2024-01-09 NOTE — ASSESSMENT & PLAN NOTE
Resolved  Present on admission as evidenced by creatinine of 1.52 up from baseline of 1.00  Likely prerenal azotemia in the setting of infection and decreased oral intake  Discontinue IV fluids  Trend BMP  Avoid nephrotoxic agents and hypotension  Strict intake and output  Daily standing weights

## 2024-01-09 NOTE — ASSESSMENT & PLAN NOTE
Patient states he is unable to bear weight on his right foot secondary to pain and swelling  Will obtain x-ray of right foot  PT/OT evaluation and treatment  Case management consultation for safe disposition planning

## 2024-01-09 NOTE — ASSESSMENT & PLAN NOTE
Present on admission as evidenced by tachycardia and tachypnea  Likely secondary to bacterial pneumonia  Sepsis parameters improving  De-escalate antibiotics to Augmentin  Trend fever curve and WBC count and procalcitonin  Follow-up blood cultures

## 2024-01-09 NOTE — PROGRESS NOTES
Vancomycin IV Pharmacy-to-Dose Consultation     Vancomycin has been discontinued.  Pharmacy will sign off.  Please contact or re-consult with questions.    Nancy Koenig, Pharmacist

## 2024-01-10 VITALS
HEART RATE: 61 BPM | OXYGEN SATURATION: 96 % | DIASTOLIC BLOOD PRESSURE: 66 MMHG | SYSTOLIC BLOOD PRESSURE: 96 MMHG | TEMPERATURE: 97 F | HEIGHT: 67 IN | BODY MASS INDEX: 21.52 KG/M2 | WEIGHT: 137.13 LBS | RESPIRATION RATE: 18 BRPM

## 2024-01-10 LAB
ANION GAP SERPL CALCULATED.3IONS-SCNC: 6 MMOL/L
BASOPHILS # BLD AUTO: 0.02 THOUSANDS/ÂΜL (ref 0–0.1)
BASOPHILS NFR BLD AUTO: 0 % (ref 0–1)
BUN SERPL-MCNC: 18 MG/DL (ref 5–25)
CALCIUM SERPL-MCNC: 8.8 MG/DL (ref 8.4–10.2)
CHLORIDE SERPL-SCNC: 103 MMOL/L (ref 96–108)
CO2 SERPL-SCNC: 27 MMOL/L (ref 21–32)
CREAT SERPL-MCNC: 1.07 MG/DL (ref 0.6–1.3)
EOSINOPHIL # BLD AUTO: 0.39 THOUSAND/ÂΜL (ref 0–0.61)
EOSINOPHIL NFR BLD AUTO: 5 % (ref 0–6)
ERYTHROCYTE [DISTWIDTH] IN BLOOD BY AUTOMATED COUNT: 15.8 % (ref 11.6–15.1)
GFR SERPL CREATININE-BSD FRML MDRD: 82 ML/MIN/1.73SQ M
GLUCOSE SERPL-MCNC: 89 MG/DL (ref 65–140)
HCT VFR BLD AUTO: 39.7 % (ref 36.5–49.3)
HGB BLD-MCNC: 13.1 G/DL (ref 12–17)
IMM GRANULOCYTES # BLD AUTO: 0.04 THOUSAND/UL (ref 0–0.2)
IMM GRANULOCYTES NFR BLD AUTO: 1 % (ref 0–2)
LYMPHOCYTES # BLD AUTO: 2.9 THOUSANDS/ÂΜL (ref 0.6–4.47)
LYMPHOCYTES NFR BLD AUTO: 35 % (ref 14–44)
MAGNESIUM SERPL-MCNC: 2.1 MG/DL (ref 1.9–2.7)
MCH RBC QN AUTO: 27.9 PG (ref 26.8–34.3)
MCHC RBC AUTO-ENTMCNC: 33 G/DL (ref 31.4–37.4)
MCV RBC AUTO: 85 FL (ref 82–98)
MONOCYTES # BLD AUTO: 0.93 THOUSAND/ÂΜL (ref 0.17–1.22)
MONOCYTES NFR BLD AUTO: 11 % (ref 4–12)
NEUTROPHILS # BLD AUTO: 4 THOUSANDS/ÂΜL (ref 1.85–7.62)
NEUTS SEG NFR BLD AUTO: 48 % (ref 43–75)
NRBC BLD AUTO-RTO: 0 /100 WBCS
PHOSPHATE SERPL-MCNC: 3.5 MG/DL (ref 2.7–4.5)
PLATELET # BLD AUTO: 317 THOUSANDS/UL (ref 149–390)
PMV BLD AUTO: 8.2 FL (ref 8.9–12.7)
POTASSIUM SERPL-SCNC: 4.4 MMOL/L (ref 3.5–5.3)
PROCALCITONIN SERPL-MCNC: 0.11 NG/ML
RBC # BLD AUTO: 4.69 MILLION/UL (ref 3.88–5.62)
SODIUM SERPL-SCNC: 136 MMOL/L (ref 135–147)
WBC # BLD AUTO: 8.28 THOUSAND/UL (ref 4.31–10.16)

## 2024-01-10 PROCEDURE — 99239 HOSP IP/OBS DSCHRG MGMT >30: CPT | Performed by: INTERNAL MEDICINE

## 2024-01-10 PROCEDURE — 85025 COMPLETE CBC W/AUTO DIFF WBC: CPT | Performed by: INTERNAL MEDICINE

## 2024-01-10 PROCEDURE — 80048 BASIC METABOLIC PNL TOTAL CA: CPT | Performed by: INTERNAL MEDICINE

## 2024-01-10 PROCEDURE — 84145 PROCALCITONIN (PCT): CPT | Performed by: INTERNAL MEDICINE

## 2024-01-10 PROCEDURE — 84100 ASSAY OF PHOSPHORUS: CPT | Performed by: INTERNAL MEDICINE

## 2024-01-10 PROCEDURE — 83735 ASSAY OF MAGNESIUM: CPT | Performed by: INTERNAL MEDICINE

## 2024-01-10 RX ORDER — LIDOCAINE 50 MG/G
1 PATCH TOPICAL DAILY
Qty: 15 PATCH | Refills: 0 | Status: SHIPPED | OUTPATIENT
Start: 2024-01-10

## 2024-01-10 RX ORDER — AMOXICILLIN AND CLAVULANATE POTASSIUM 875; 125 MG/1; MG/1
1 TABLET, FILM COATED ORAL EVERY 12 HOURS SCHEDULED
Qty: 10 TABLET | Refills: 0 | Status: SHIPPED | OUTPATIENT
Start: 2024-01-10 | End: 2024-01-15

## 2024-01-10 RX ADMIN — NICOTINE 1 PATCH: 21 PATCH, EXTENDED RELEASE TRANSDERMAL at 08:48

## 2024-01-10 RX ADMIN — QUETIAPINE FUMARATE 50 MG: 50 TABLET ORAL at 08:48

## 2024-01-10 RX ADMIN — HEPARIN SODIUM 5000 UNITS: 5000 INJECTION INTRAVENOUS; SUBCUTANEOUS at 05:22

## 2024-01-10 RX ADMIN — AMOXICILLIN AND CLAVULANATE POTASSIUM 1 TABLET: 875; 125 TABLET, FILM COATED ORAL at 08:48

## 2024-01-10 NOTE — PLAN OF CARE
Problem: Potential for Falls  Goal: Patient will remain free of falls  Description: INTERVENTIONS:  - Educate patient/family on patient safety including physical limitations  - Instruct patient to call for assistance with activity   - Consult OT/PT to assist with strengthening/mobility   - Keep Call bell within reach  - Keep bed low and locked with side rails adjusted as appropriate  - Keep care items and personal belongings within reach  - Initiate and maintain comfort rounds  - Make Fall Risk Sign visible to staff    Problem: INFECTION - ADULT  Goal: Absence or prevention of progression during hospitalization  Description: INTERVENTIONS:  - Assess and monitor for signs and symptoms of infection  - Monitor lab/diagnostic results  - Monitor all insertion sites, i.e. indwelling lines, tubes, and drains  - Monitor endotracheal if appropriate and nasal secretions for changes in amount and color  - [place] appropriate cooling/warming therapies per order  - Administer medications as ordered  - Instruct and encourage patient and family to use good hand hygiene technique  - Identify and instruct in appropriate isolation precautions for identified infection/condition  Outcome: Progressing     Problem: DISCHARGE PLANNING  Goal: Discharge to home or other facility with appropriate resources  Description: INTERVENTIONS:  - Identify barriers to discharge w/patient and caregiver  - Arrange for needed discharge resources and transportation as appropriate  - Identify discharge learning needs (meds, wound care, etc.)  - Arrange for interpretive services to assist at discharge as needed  - Refer to Case Management Department for coordinating discharge planning if the patient needs post-hospital services based on physician/advanced practitioner order or complex needs related to functional status, cognitive ability, or social support system  Outcome: Progressing   - Apply yellow socks and bracelet for high fall risk patients  -  Consider moving patient to room near nurses station  Outcome: Progressing

## 2024-01-10 NOTE — PLAN OF CARE

## 2024-01-10 NOTE — DISCHARGE SUMMARY
[facility]  Discharge- Juan Diego Horowitz 1975, 48 y.o. male MRN: [id number]  Unit/Bed#: 7T [facility] 710-01 Encounter: [id number]  Primary Care Provider: Dago Burris MD   Date and time admitted to hospital: 1/8/2024  3:21 AM    * Sepsis (HCC)  Assessment & Plan  Present on admission as evidenced by tachycardia and tachypnea  Likely secondary to bacterial pneumonia  Sepsis parameters improving  De-escalate antibiotics to Augmentin  Trend fever curve and WBC count and procalcitonin  Follow-up blood cultures    Pneumonia  Assessment & Plan  Lower lobe consolidations noted on CT chest  Complicated by sepsis  Augmentin as above  Obtain sputum culture and Gram stain    Acute kidney injury (HCC)  Assessment & Plan  Resolved  Present on admission as evidenced by creatinine of 1.52 up from baseline of 1.00  Likely prerenal azotemia in the setting of infection and decreased oral intake  Discontinue IV fluids  Trend BMP  Avoid nephrotoxic agents and hypotension  Strict intake and output  Daily standing weights    Ambulatory dysfunction  Assessment & Plan  Patient states he is unable to bear weight on his right foot secondary to pain and swelling  Will obtain x-ray of right foot  PT/OT evaluation and treatment  Case management consultation for safe disposition planning    Bipolar 1 disorder (HCC)  Assessment & Plan  Continue home Seroquel              Medical Problems       Resolved Problems  Date Reviewed: 1/10/2024   None         Admission Date:   Admission Orders (From admission, onward)       Ordered        01/09/24 1048  Inpatient Admission  Once            01/08/24 0642  Place in Observation  Once                            Admitting Diagnosis: Shortness of breath [R06.02]  Bilateral pneumonia [J18.9]  Opioid use disorder, severe, dependence (HCC) [F11.20]    HPI: Juan Diego Horowitz is a 48 y.o. male with a past medical history significant for substance abuse, bipolar disorder  who presents with shortness of breath.  The patient was recently admitted last week for pneumonia and was in the hospital for several days.  The patient states that it now hurts him to take a deep breath.  He has no other complaints at this time.  In the ED, he was found to be meeting SIRS criteria in the setting of tachycardia and tachypnea.  Likely source being pneumonia.     Procedures Performed:   Orders Placed This Encounter   Procedures    ED ECG Documentation Only       Summary of Hospital Course: The patient remained hemodynamically stable and saturating well on room air throughout his hospital course.  Sepsis criteria resolved.  The patient states he currently resides at a shelter and will return.  He was strongly encouraged to resume all preadmission medications at preadmission dosages.  He was also encouraged to follow-up with his PCP within 7-14 days.    Significant Findings, Care, Treatment and Services Provided: Not applicable    Complications: Not applicable    Condition at Discharge: stable       Discharge instructions/Information to patient and family:   See after visit summary for information provided to patient and family.      Provisions for Follow-Up Care:  See after visit summary for information related to follow-up care and any pertinent home health orders.      PCP: Dago Burris MD    Disposition: Home    Planned Readmission: No    Discharge Statement   I spent 45 minutes discharging the patient. This time was spent on the day of discharge. I had direct contact with the patient on the day of discharge. Additional documentation is required if more than 30 minutes were spent on discharge.     Discharge Medications:  See after visit summary for reconciled discharge medications provided to patient and family.

## 2024-01-12 NOTE — UTILIZATION REVIEW
NOTIFICATION OF ADMISSION DISCHARGE   This is a Notification of Discharge from [facility]. Please be advised that this patient has been discharge from our facility. Below you will find the admission and discharge date and time including the patient’s disposition.   UTILIZATION REVIEW CONTACT:  Alyson Javier MA  Utilization   Network Utilization Review Department  Phone: [phone] x carefully listen to the prompts. All voicemails are confidential.  Email: [email or web address]     ADMISSION INFORMATION  PRESENTATION DATE: 1/8/2024  3:21 AM  OBERVATION ADMISSION DATE:  INPATIENT ADMISSION DATE: 1/9/24 10:48 AM   DISCHARGE DATE: 1/10/2024 11:50 AM   DISPOSITION:Home/Self Care    Network Utilization Review Department  ATTENTION: Please call with any questions or concerns to [phone] and carefully listen to the prompts so that you are directed to the right person. All voicemails are confidential.   For Discharge needs, contact Care Management DC Support Team at [phone] opt. 2  Send all requests for admission clinical reviews, approved or denied determinations and any other requests to dedicated fax number below belonging to the campus where the patient is receiving treatment. List of dedicated fax numbers for the Facilities:  FACILITY NAME UR FAX NUMBER   ADMISSION DENIALS (Administrative/Medical Necessity) [phone]   DISCHARGE SUPPORT TEAM ([facility]) [phone]   PARENT CHILD HEALTH (Maternity/NICU/Pediatrics) [phone]   [facility] [phone]   [facility] [phone]   [facility] [phone]   [facility] [phone]   Psychiatric hospital [phone]   [facility] [phone]   [facility] [phone]   [facility] [phone]    [facility] [phone]   [facility] [phone]   [facility] [phone]

## 2024-01-13 LAB
BACTERIA BLD CULT: NORMAL
BACTERIA BLD CULT: NORMAL

## 2024-02-21 PROBLEM — J18.9 PNEUMONIA: Status: RESOLVED | Noted: 2024-01-08 | Resolved: 2024-02-21

## 2024-02-21 PROBLEM — A41.9 SEPSIS (HCC): Status: RESOLVED | Noted: 2024-01-08 | Resolved: 2024-02-21

## 2025-01-21 ENCOUNTER — HOSPITAL ENCOUNTER (EMERGENCY)
Facility: HOSPITAL | Age: 50
End: 2025-01-21
Attending: INTERNAL MEDICINE | Admitting: EMERGENCY MEDICINE
Payer: MEDICARE

## 2025-01-21 VITALS
DIASTOLIC BLOOD PRESSURE: 60 MMHG | RESPIRATION RATE: 18 BRPM | WEIGHT: 180.6 LBS | SYSTOLIC BLOOD PRESSURE: 108 MMHG | OXYGEN SATURATION: 95 % | TEMPERATURE: 98.4 F | HEART RATE: 62 BPM | BODY MASS INDEX: 28.29 KG/M2

## 2025-01-21 DIAGNOSIS — Z00.8 ENCOUNTER FOR PSYCHOLOGICAL EVALUATION: Primary | ICD-10-CM

## 2025-01-21 LAB
AMPHETAMINES SERPL QL SCN: NEGATIVE
ATRIAL RATE: 61 BPM
ATRIAL RATE: 63 BPM
BARBITURATES UR QL: NEGATIVE
BENZODIAZ UR QL: NEGATIVE
COCAINE UR QL: POSITIVE
ETHANOL EXG-MCNC: 0 MG/DL
FENTANYL UR QL SCN: POSITIVE
HYDROCODONE UR QL SCN: NEGATIVE
METHADONE UR QL: NEGATIVE
OPIATES UR QL SCN: POSITIVE
OXYCODONE+OXYMORPHONE UR QL SCN: NEGATIVE
P AXIS: 29 DEGREES
P AXIS: 39 DEGREES
PCP UR QL: NEGATIVE
PR INTERVAL: 142 MS
PR INTERVAL: 152 MS
QRS AXIS: 11 DEGREES
QRS AXIS: 15 DEGREES
QRSD INTERVAL: 82 MS
QRSD INTERVAL: 90 MS
QT INTERVAL: 424 MS
QT INTERVAL: 432 MS
QTC INTERVAL: 434 MS
QTC INTERVAL: 436 MS
T WAVE AXIS: 32 DEGREES
T WAVE AXIS: 40 DEGREES
THC UR QL: NEGATIVE
VENTRICULAR RATE: 61 BPM
VENTRICULAR RATE: 63 BPM

## 2025-01-21 PROCEDURE — 93010 ELECTROCARDIOGRAM REPORT: CPT

## 2025-01-21 PROCEDURE — 99284 EMERGENCY DEPT VISIT MOD MDM: CPT

## 2025-01-21 PROCEDURE — 82075 ASSAY OF BREATH ETHANOL: CPT

## 2025-01-21 PROCEDURE — 99285 EMERGENCY DEPT VISIT HI MDM: CPT | Performed by: INTERNAL MEDICINE

## 2025-01-21 PROCEDURE — 93005 ELECTROCARDIOGRAM TRACING: CPT

## 2025-01-21 PROCEDURE — 80307 DRUG TEST PRSMV CHEM ANLYZR: CPT

## 2025-01-21 RX ORDER — BUPRENORPHINE AND NALOXONE 8; 2 MG/1; MG/1
8 FILM, SOLUBLE BUCCAL; SUBLINGUAL DAILY
Status: DISCONTINUED | OUTPATIENT
Start: 2025-01-21 | End: 2025-01-22 | Stop reason: HOSPADM

## 2025-01-21 RX ORDER — HALOPERIDOL 5 MG/1
2.5 TABLET ORAL
Status: CANCELLED | OUTPATIENT
Start: 2025-01-21

## 2025-01-21 RX ORDER — OLANZAPINE 5 MG/1
5 TABLET ORAL
Status: DISCONTINUED | OUTPATIENT
Start: 2025-01-22 | End: 2025-01-22 | Stop reason: HOSPADM

## 2025-01-21 RX ORDER — BUPRENORPHINE 8 MG/1
16 TABLET SUBLINGUAL DAILY
Status: ON HOLD | COMMUNITY
Start: 2025-01-17 | End: 2025-01-31

## 2025-01-21 RX ORDER — PROPRANOLOL HCL 20 MG
10 TABLET ORAL EVERY 8 HOURS PRN
Status: CANCELLED | OUTPATIENT
Start: 2025-01-21

## 2025-01-21 RX ORDER — HALOPERIDOL 1 MG/1
1 TABLET ORAL EVERY 6 HOURS PRN
Status: CANCELLED | OUTPATIENT
Start: 2025-01-21

## 2025-01-21 RX ORDER — LORAZEPAM 2 MG/ML
1 INJECTION INTRAMUSCULAR
Status: CANCELLED | OUTPATIENT
Start: 2025-01-21

## 2025-01-21 RX ORDER — BISACODYL 10 MG
10 SUPPOSITORY, RECTAL RECTAL DAILY PRN
Status: CANCELLED | OUTPATIENT
Start: 2025-01-21

## 2025-01-21 RX ORDER — HALOPERIDOL 5 MG/1
5 TABLET ORAL
Status: CANCELLED | OUTPATIENT
Start: 2025-01-21

## 2025-01-21 RX ORDER — IBUPROFEN 600 MG/1
600 TABLET, FILM COATED ORAL EVERY 6 HOURS PRN
Status: CANCELLED | OUTPATIENT
Start: 2025-01-21

## 2025-01-21 RX ORDER — HYDROXYZINE HYDROCHLORIDE 50 MG/1
50 TABLET, FILM COATED ORAL
Status: CANCELLED | OUTPATIENT
Start: 2025-01-21

## 2025-01-21 RX ORDER — HYDROXYZINE HYDROCHLORIDE 50 MG/1
100 TABLET, FILM COATED ORAL
Status: CANCELLED | OUTPATIENT
Start: 2025-01-21

## 2025-01-21 RX ORDER — MIRTAZAPINE 15 MG/1
30 TABLET, FILM COATED ORAL
Status: CANCELLED | OUTPATIENT
Start: 2025-01-21

## 2025-01-21 RX ORDER — IBUPROFEN 400 MG/1
400 TABLET, FILM COATED ORAL EVERY 4 HOURS PRN
Status: CANCELLED | OUTPATIENT
Start: 2025-01-21

## 2025-01-21 RX ORDER — MIRTAZAPINE 30 MG/1
30 TABLET, FILM COATED ORAL
Status: ON HOLD | COMMUNITY
Start: 2025-01-17

## 2025-01-21 RX ORDER — HALOPERIDOL 5 MG/ML
5 INJECTION INTRAMUSCULAR
Status: CANCELLED | OUTPATIENT
Start: 2025-01-21

## 2025-01-21 RX ORDER — BENZTROPINE MESYLATE 1 MG/ML
0.5 INJECTION, SOLUTION INTRAMUSCULAR; INTRAVENOUS
Status: CANCELLED | OUTPATIENT
Start: 2025-01-21

## 2025-01-21 RX ORDER — OLANZAPINE 5 MG/1
5 TABLET ORAL ONCE
Status: COMPLETED | OUTPATIENT
Start: 2025-01-21 | End: 2025-01-21

## 2025-01-21 RX ORDER — BENZTROPINE MESYLATE 1 MG/ML
1 INJECTION, SOLUTION INTRAMUSCULAR; INTRAVENOUS
Status: CANCELLED | OUTPATIENT
Start: 2025-01-21

## 2025-01-21 RX ORDER — TRAZODONE HYDROCHLORIDE 50 MG/1
50 TABLET, FILM COATED ORAL
Status: CANCELLED | OUTPATIENT
Start: 2025-01-21

## 2025-01-21 RX ORDER — DIPHENHYDRAMINE HYDROCHLORIDE 50 MG/ML
50 INJECTION INTRAMUSCULAR; INTRAVENOUS EVERY 6 HOURS PRN
Status: CANCELLED | OUTPATIENT
Start: 2025-01-21

## 2025-01-21 RX ORDER — BENZTROPINE MESYLATE 1 MG/1
1 TABLET ORAL
Status: CANCELLED | OUTPATIENT
Start: 2025-01-21

## 2025-01-21 RX ORDER — LORAZEPAM 2 MG/ML
2 INJECTION INTRAMUSCULAR
Status: CANCELLED | OUTPATIENT
Start: 2025-01-21

## 2025-01-21 RX ORDER — POLYETHYLENE GLYCOL 3350 17 G/17G
17 POWDER, FOR SOLUTION ORAL DAILY PRN
Status: CANCELLED | OUTPATIENT
Start: 2025-01-21

## 2025-01-21 RX ORDER — OLANZAPINE 5 MG/1
5 TABLET ORAL
Status: ON HOLD | COMMUNITY
Start: 2025-01-17 | End: 2025-02-16

## 2025-01-21 RX ORDER — IBUPROFEN 400 MG/1
800 TABLET, FILM COATED ORAL EVERY 8 HOURS PRN
Status: CANCELLED | OUTPATIENT
Start: 2025-01-21

## 2025-01-21 RX ORDER — HALOPERIDOL 5 MG/ML
2.5 INJECTION INTRAMUSCULAR
Status: CANCELLED | OUTPATIENT
Start: 2025-01-21

## 2025-01-21 RX ORDER — MAGNESIUM HYDROXIDE/ALUMINUM HYDROXICE/SIMETHICONE 120; 1200; 1200 MG/30ML; MG/30ML; MG/30ML
30 SUSPENSION ORAL EVERY 4 HOURS PRN
Status: CANCELLED | OUTPATIENT
Start: 2025-01-21

## 2025-01-21 RX ORDER — AMOXICILLIN 250 MG
1 CAPSULE ORAL DAILY PRN
Status: CANCELLED | OUTPATIENT
Start: 2025-01-21

## 2025-01-21 RX ORDER — OLANZAPINE 5 MG/1
5 TABLET ORAL
Status: CANCELLED | OUTPATIENT
Start: 2025-01-22 | End: 2025-02-16

## 2025-01-21 RX ORDER — HYDROXYZINE HYDROCHLORIDE 25 MG/1
25 TABLET, FILM COATED ORAL
Status: CANCELLED | OUTPATIENT
Start: 2025-01-21

## 2025-01-21 RX ORDER — LORAZEPAM 2 MG/ML
2 INJECTION INTRAMUSCULAR EVERY 6 HOURS PRN
Status: CANCELLED | OUTPATIENT
Start: 2025-01-21

## 2025-01-21 RX ADMIN — BUPRENORPHINE AND NALOXONE 8 MG: 8; 2 FILM, SOLUBLE BUCCAL; SUBLINGUAL at 12:15

## 2025-01-21 RX ADMIN — OLANZAPINE 5 MG: 5 TABLET, FILM COATED ORAL at 12:14

## 2025-01-21 NOTE — ED NOTES
Call placed to [facility] , [phone]. When pharmacy tries to apply medical assistance pharmacy benefit, it indicates there is a primary insurance, suggesting that the Medicare benefit may be managed.    Call placed to CHAZ Salazar , [phone]. She stated that the Medicare website indicates the patient has a traditional Medicare policy. The previous managed commercial plan was from an old 2019 worker's comp issue.    Call placed to [facility]. Pharmacy assistant stated they cannot fill the medication until the patient has had the old insurance removed from the site.

## 2025-01-21 NOTE — ED PROVIDER NOTES
Time reflects when diagnosis was documented in both MDM as applicable and the Disposition within this note       Time User Action Codes Description Comment    1/21/2025 12:34 PM Phil Garcia Add [Z00.8] Encounter for psychological evaluation           ED Disposition       ED Disposition   Transfer to Behavioral Health    Condition   --    Date/Time   Tue Jan 21, 2025 12:34 PM    Comment   Juan Diego Horowitz is medically clear for psychiatric eval             Assessment & Plan       Medical Decision Making  Patient is a 49-year-old male who comes in for depression and suicidal ideations.  Is no acute distress this time.  Patient is currently pending transport to psychiatric facility.  Signed out to colleague pending disposition.    Amount and/or Complexity of Data Reviewed  Labs: ordered. Decision-making details documented in ED Course.    Risk  Prescription drug management.  Decision regarding hospitalization.        ED Course as of 01/21/25 2043 Tue Jan 21, 2025   1518 COCAINE URINE(!): Positive   1518 OPIATE URINE(!): Positive   1518 FENTANYL URINE(!): Positive       Medications   buprenorphine-naloxone (Suboxone) film 8 mg (8 mg Sublingual Given 1/21/25 1215)   OLANZapine (ZyPREXA) tablet 5 mg (has no administration in time range)   OLANZapine (ZyPREXA) tablet 5 mg (5 mg Oral Given 1/21/25 1214)       ED Risk Strat Scores                                              History of Present Illness       Chief Complaint   Patient presents with    Psychiatric Evaluation     Has prescriptions [facility] [address] but cannot  issues with his insurance.       Past Medical History:   Diagnosis Date    Addiction to drug (HCC)     Asthma     Back pain     Bipolar 1 disorder (HCC)     Chronic kidney disease     Depression     Gastritis     GERD (gastroesophageal reflux disease)     Hallucination     Kidney stones     Opioid withdrawal (HCC) 12/05/2023    Psychiatric illness     Renal cancer (HCC)     Substance  "abuse (HCC)     Suicide attempt (HCC)       Past Surgical History:   Procedure Laterality Date    ABDOMINAL SURGERY      NEPHRECTOMY      RI CYSTOURETHROSCOPY W/URETERAL CATHETERIZATION Right 09/27/2016    Procedure: CYSTOSCOPY WITH RETROGRADE PYELOGRAM;  Surgeon: Petey Hernandez MD;  [facility];  Service: Urology    RI LAPAROSCOPY RADICAL NEPHRECTOMY Right 11/23/2016    Procedure: HAND ASSISTED LAPAROSCOPIC NEPHRECTOMY, converted to open, lysis of adhesions,repair of  vena cava;  Surgeon: Petey Hernandez MD;  [facility];  Service: Urology    URETERAL STENT PLACEMENT Right 09/27/2016    Procedure: INSERTION STENT URETERAL;  Surgeon: Petey Hernandez MD;  [facility];  Service:     VASCULAR SURGERY        Family History   Problem Relation Age of Onset    Stroke Mother     Heart disease Mother     HIV Father       Social History     Tobacco Use    Smoking status: Every Day     Current packs/day: 1.00     Average packs/day: 0.5 packs/day for 12.1 years (6.6 ttl pk-yrs)     Types: Cigarettes     Start date: 1/26/2012     Last attempt to quit: 1/26/2023     Passive exposure: Current    Smokeless tobacco: Never   Vaping Use    Vaping status: Never Used   Substance Use Topics    Alcohol use: Not Currently    Drug use: Yes     Types: Heroin, Fentanyl, \"Crack\" cocaine     Comment: last use 1/20/2025      E-Cigarette/Vaping    E-Cigarette Use Never User       E-Cigarette/Vaping Substances    Nicotine No     THC No     CBD No     Flavoring No     Other No     Unknown No       I have reviewed and agree with the history as documented.     Patient is a 49-year-old male coming in for psychiatric evaluation.  Reports that he does hear voices, telling him that he is no good.  Is depressed, with thoughts about harming himself.  Patient was recently started on Suboxone, as well as Zyprexa.  Unable to pick it up due to insurance issues with the pharmacy.  At this time, but was recommended to come to the " emergency department for admission for hallucinations.  Patient would like to sign a 201      Psychiatric Evaluation  Presenting symptoms: hallucinations    Associated symptoms: no abdominal pain, no fatigue and no headaches        Review of Systems   Constitutional:  Negative for fatigue and fever.   Gastrointestinal:  Negative for abdominal pain, nausea and vomiting.   Neurological:  Negative for headaches.   Psychiatric/Behavioral:  Positive for hallucinations.            Objective       ED Triage Vitals [01/21/25 1058]   Temperature Pulse Blood Pressure Respirations SpO2 Patient Position - Orthostatic VS   98.4 °F (36.9 °C) 75 118/68 16 96 % Lying      Temp Source Heart Rate Source BP Location FiO2 (%) Pain Score    Oral Monitor Left arm -- --      Vitals      Date and Time Temp Pulse SpO2 Resp BP Pain Score FACES Pain Rating User   01/21/25 1058 98.4 °F (36.9 °C) 75 96 % 16 118/68 -- -- JW            Physical Exam  Vitals reviewed.   Constitutional:       Appearance: Normal appearance. He is normal weight.   HENT:      Head: Normocephalic and atraumatic.      Right Ear: External ear normal.      Left Ear: External ear normal.      Nose: Nose normal.   Eyes:      Conjunctiva/sclera: Conjunctivae normal.   Cardiovascular:      Rate and Rhythm: Normal rate.   Pulmonary:      Effort: Pulmonary effort is normal.   Musculoskeletal:         General: Normal range of motion.      Cervical back: Normal range of motion.   Skin:     General: Skin is warm and dry.   Neurological:      Mental Status: He is alert.   Psychiatric:         Attention and Perception: Attention normal. He perceives auditory hallucinations.         Mood and Affect: Mood normal.         Speech: Speech normal.         Behavior: Behavior normal. Behavior is cooperative.         Thought Content: Thought content normal.         Results Reviewed       Procedure Component Value Units Date/Time    Rapid drug screen, urine [[id number]]  (Abnormal) Collected:  01/21/25 1341    Lab Status: Final result Specimen: Urine, Clean Catch Updated: 01/21/25 1516     Amph/Meth UR Negative     Barbiturate Ur Negative     Benzodiazepine Urine Negative     Cocaine Urine Positive     Methadone Urine Negative     Opiate Urine Positive     PCP Ur Negative     THC Urine Negative     Oxycodone Urine Negative     Fentanyl Urine Positive     HYDROCODONE URINE Negative    Narrative:      Presumptive report. If requested, specimen will be sent to reference lab for confirmation.  FOR MEDICAL PURPOSES ONLY.   IF CONFIRMATION NEEDED PLEASE CONTACT THE LAB WITHIN 5 DAYS.    Drug Screen Cutoff Levels:  AMPHETAMINE/METHAMPHETAMINES  1000 ng/mL  BARBITURATES     200 ng/mL  BENZODIAZEPINES     200 ng/mL  COCAINE      300 ng/mL  METHADONE      300 ng/mL  OPIATES      300 ng/mL  PHENCYCLIDINE     25 ng/mL  THC       50 ng/mL  OXYCODONE      100 ng/mL  FENTANYL      5 ng/mL  HYDROCODONE     300 ng/mL    POCT alcohol breath test [[id number]]  (Normal) Resulted: 01/21/25 1157    Lab Status: Final result Updated: 01/21/25 1157     EXTBreath Alcohol 0.000            No orders to display       Procedures    ED Medication and Procedure Management   Prior to Admission Medications   Prescriptions Last Dose Informant Patient Reported? Taking?   OLANZapine (ZyPREXA) 5 mg tablet Past Week  Yes Yes   Sig: Take 5 mg by mouth daily at bedtime   buprenorphine (SUBUTEX) 8 mg Past Week  Yes Yes   Sig: Place 16 mg under the tongue daily   lidocaine (Lidoderm) 5 % Unknown  No No   Sig: Apply 1 patch topically over 12 hours daily Remove & Discard patch within 12 hours or as directed by MD   Patient not taking: Reported on 1/21/2025   mirtazapine (REMERON) 30 mg tablet Past Week  Yes Yes   Sig: Take 30 mg by mouth daily at bedtime   nicotine polacrilex (NICORETTE) 4 mg gum Past Week Outside Facility (Specify) Yes Yes   Sig: Chew 4 mg as needed for smoking cessation      Facility-Administered Medications: None     Patient's  Medications   Discharge Prescriptions    No medications on file     No discharge procedures on file.  ED SEPSIS DOCUMENTATION   Time reflects when diagnosis was documented in both MDM as applicable and the Disposition within this note       Time User Action Codes Description Comment    1/21/2025 12:34 PM Phil Garcia Add [Z00.8] Encounter for psychological evaluation                  Phil Garcia PA-C  01/21/25 2043

## 2025-01-21 NOTE — ED NOTES
The patient is a 50 y/o Polish-speaking M interviewed using [employer] ([id number]). Patient has a history of depression and opioid use. He has had issues with unstable housing since about 2020. Patient presented as depressed with suicidal ideation with no plan. He stated he has not been in treatment consistently since his previous provider, [facility], closed and his insurance changed to Medicare. Patient most recently completed [facility] [place] on 1/14. He did follow up with [facility] on 1/17. He stated that he then relapsed on heroin. He reported his last use was 1/20. UDS was positive for cocaine, opioids and Fentanyl. Patient is tired, but attentive and fully oriented. He presents as tearful and depressed. Patient stated he hears the voice of his mother berating him and telling him she is disappointed in him. Patient stated he would like to work, but he has no energy and no motivation to do so. He related that he went to get his medication filled today and learned that there is an issue with his insurance (see previous ED Crisis note). He stated that when faced with such issues, he feels defeated and gives up. He stated that [facility] is referring him to [facility], but he is still unclear on how he would get his medication. He does take Subutex 8mg, 2 pills (16 mg) daily, and, per EMR, [facility] continued this. Patient reported a history of 3 suicide attempts, one at age 20, one by hanging during an incarceration, and one about 2 years ago when he intended to jump from the [facility] [place], but was stopped by a passerby. Patient requires more immediate treatment than can be afforded by outpatient. An acute partial hospital setting is unrealistic due to homelessness. Therefore an inpatient admission is recommended. Patient is willing to sign a voluntary agreement for an inpatient admission.

## 2025-01-22 ENCOUNTER — HOSPITAL ENCOUNTER (INPATIENT)
Facility: HOSPITAL | Age: 50
LOS: 6 days | Discharge: HOME | DRG: 885 | End: 2025-01-28
Attending: STUDENT IN AN ORGANIZED HEALTH CARE EDUCATION/TRAINING PROGRAM | Admitting: PSYCHIATRY & NEUROLOGY
Payer: MEDICARE

## 2025-01-22 DIAGNOSIS — F11.20 OPIOID USE DISORDER, SEVERE, DEPENDENCE (HCC): ICD-10-CM

## 2025-01-22 DIAGNOSIS — F31.9 BIPOLAR 1 DISORDER (HCC): Primary | Chronic | ICD-10-CM

## 2025-01-22 DIAGNOSIS — Z00.8 ENCOUNTER FOR PSYCHOLOGICAL EVALUATION: ICD-10-CM

## 2025-01-22 PROBLEM — F19.10 POLYSUBSTANCE ABUSE (HCC): Status: ACTIVE | Noted: 2025-01-22

## 2025-01-22 LAB
25(OH)D3 SERPL-MCNC: 14.5 NG/ML (ref 30–100)
ALBUMIN SERPL BCG-MCNC: 3.6 G/DL (ref 3.5–5)
ALP SERPL-CCNC: 60 U/L (ref 34–104)
ALT SERPL W P-5'-P-CCNC: 8 U/L (ref 7–52)
ANION GAP SERPL CALCULATED.3IONS-SCNC: 5 MMOL/L (ref 4–13)
AST SERPL W P-5'-P-CCNC: 12 U/L (ref 13–39)
BASOPHILS # BLD AUTO: 0.05 THOUSANDS/ΜL (ref 0–0.1)
BASOPHILS NFR BLD AUTO: 1 % (ref 0–1)
BILIRUB SERPL-MCNC: 0.28 MG/DL (ref 0.2–1)
BUN SERPL-MCNC: 13 MG/DL (ref 5–25)
CALCIUM SERPL-MCNC: 8.7 MG/DL (ref 8.4–10.2)
CHLORIDE SERPL-SCNC: 111 MMOL/L (ref 96–108)
CHOLEST SERPL-MCNC: 196 MG/DL (ref ?–200)
CO2 SERPL-SCNC: 27 MMOL/L (ref 21–32)
CREAT SERPL-MCNC: 1.02 MG/DL (ref 0.6–1.3)
EOSINOPHIL # BLD AUTO: 0.58 THOUSAND/ΜL (ref 0–0.61)
EOSINOPHIL NFR BLD AUTO: 8 % (ref 0–6)
ERYTHROCYTE [DISTWIDTH] IN BLOOD BY AUTOMATED COUNT: 15.1 % (ref 11.6–15.1)
FOLATE SERPL-MCNC: 9.7 NG/ML
GFR SERPL CREATININE-BSD FRML MDRD: 87 ML/MIN/1.73SQ M
GLUCOSE P FAST SERPL-MCNC: 88 MG/DL (ref 65–99)
GLUCOSE SERPL-MCNC: 88 MG/DL (ref 65–140)
HCT VFR BLD AUTO: 36.4 % (ref 36.5–49.3)
HDLC SERPL-MCNC: 41 MG/DL
HGB BLD-MCNC: 11.3 G/DL (ref 12–17)
IMM GRANULOCYTES # BLD AUTO: 0.03 THOUSAND/UL (ref 0–0.2)
IMM GRANULOCYTES NFR BLD AUTO: 0 % (ref 0–2)
LDLC SERPL CALC-MCNC: 130 MG/DL (ref 0–100)
LYMPHOCYTES # BLD AUTO: 2.81 THOUSANDS/ΜL (ref 0.6–4.47)
LYMPHOCYTES NFR BLD AUTO: 38 % (ref 14–44)
MCH RBC QN AUTO: 28.1 PG (ref 26.8–34.3)
MCHC RBC AUTO-ENTMCNC: 31 G/DL (ref 31.4–37.4)
MCV RBC AUTO: 91 FL (ref 82–98)
MONOCYTES # BLD AUTO: 0.6 THOUSAND/ΜL (ref 0.17–1.22)
MONOCYTES NFR BLD AUTO: 8 % (ref 4–12)
NEUTROPHILS # BLD AUTO: 3.28 THOUSANDS/ΜL (ref 1.85–7.62)
NEUTS SEG NFR BLD AUTO: 45 % (ref 43–75)
NONHDLC SERPL-MCNC: 155 MG/DL
NRBC BLD AUTO-RTO: 0 /100 WBCS
PLATELET # BLD AUTO: 307 THOUSANDS/UL (ref 149–390)
PMV BLD AUTO: 9.2 FL (ref 8.9–12.7)
POTASSIUM SERPL-SCNC: 4.3 MMOL/L (ref 3.5–5.3)
PROT SERPL-MCNC: 6.1 G/DL (ref 6.4–8.4)
RBC # BLD AUTO: 4.02 MILLION/UL (ref 3.88–5.62)
SODIUM SERPL-SCNC: 143 MMOL/L (ref 135–147)
TRIGL SERPL-MCNC: 125 MG/DL (ref ?–150)
TSH SERPL DL<=0.05 MIU/L-ACNC: 1.15 UIU/ML (ref 0.45–4.5)
VIT B12 SERPL-MCNC: 106 PG/ML (ref 180–914)
WBC # BLD AUTO: 7.35 THOUSAND/UL (ref 4.31–10.16)

## 2025-01-22 PROCEDURE — 99223 1ST HOSP IP/OBS HIGH 75: CPT | Performed by: STUDENT IN AN ORGANIZED HEALTH CARE EDUCATION/TRAINING PROGRAM

## 2025-01-22 PROCEDURE — 99222 1ST HOSP IP/OBS MODERATE 55: CPT | Performed by: PHYSICIAN ASSISTANT

## 2025-01-22 PROCEDURE — 85025 COMPLETE CBC W/AUTO DIFF WBC: CPT | Performed by: PSYCHIATRY & NEUROLOGY

## 2025-01-22 PROCEDURE — 86780 TREPONEMA PALLIDUM: CPT | Performed by: PSYCHIATRY & NEUROLOGY

## 2025-01-22 PROCEDURE — 82306 VITAMIN D 25 HYDROXY: CPT | Performed by: PSYCHIATRY & NEUROLOGY

## 2025-01-22 PROCEDURE — 93005 ELECTROCARDIOGRAM TRACING: CPT

## 2025-01-22 PROCEDURE — 80061 LIPID PANEL: CPT | Performed by: PSYCHIATRY & NEUROLOGY

## 2025-01-22 PROCEDURE — 82746 ASSAY OF FOLIC ACID SERUM: CPT | Performed by: PSYCHIATRY & NEUROLOGY

## 2025-01-22 PROCEDURE — 82607 VITAMIN B-12: CPT | Performed by: PSYCHIATRY & NEUROLOGY

## 2025-01-22 PROCEDURE — 84443 ASSAY THYROID STIM HORMONE: CPT | Performed by: PSYCHIATRY & NEUROLOGY

## 2025-01-22 PROCEDURE — 80053 COMPREHEN METABOLIC PANEL: CPT | Performed by: PSYCHIATRY & NEUROLOGY

## 2025-01-22 RX ORDER — IBUPROFEN 800 MG/1
800 TABLET, FILM COATED ORAL EVERY 8 HOURS PRN
Status: DISCONTINUED | OUTPATIENT
Start: 2025-01-22 | End: 2025-01-28 | Stop reason: HOSPADM

## 2025-01-22 RX ORDER — HALOPERIDOL 5 MG/1
5 TABLET ORAL
Status: DISCONTINUED | OUTPATIENT
Start: 2025-01-22 | End: 2025-01-28 | Stop reason: HOSPADM

## 2025-01-22 RX ORDER — TRAZODONE HYDROCHLORIDE 50 MG/1
50 TABLET, FILM COATED ORAL
Status: DISCONTINUED | OUTPATIENT
Start: 2025-01-22 | End: 2025-01-28 | Stop reason: HOSPADM

## 2025-01-22 RX ORDER — BENZTROPINE MESYLATE 1 MG/ML
1 INJECTION, SOLUTION INTRAMUSCULAR; INTRAVENOUS
Status: DISCONTINUED | OUTPATIENT
Start: 2025-01-22 | End: 2025-01-28 | Stop reason: HOSPADM

## 2025-01-22 RX ORDER — OLANZAPINE 5 MG/1
5 TABLET ORAL
Status: DISCONTINUED | OUTPATIENT
Start: 2025-01-22 | End: 2025-01-22

## 2025-01-22 RX ORDER — HYDROXYZINE HYDROCHLORIDE 50 MG/1
100 TABLET, FILM COATED ORAL
Status: DISCONTINUED | OUTPATIENT
Start: 2025-01-22 | End: 2025-01-28 | Stop reason: HOSPADM

## 2025-01-22 RX ORDER — IBUPROFEN 400 MG/1
400 TABLET, FILM COATED ORAL EVERY 4 HOURS PRN
Status: DISCONTINUED | OUTPATIENT
Start: 2025-01-22 | End: 2025-01-28 | Stop reason: HOSPADM

## 2025-01-22 RX ORDER — IBUPROFEN 600 MG/1
600 TABLET, FILM COATED ORAL EVERY 6 HOURS PRN
Status: DISCONTINUED | OUTPATIENT
Start: 2025-01-22 | End: 2025-01-28 | Stop reason: HOSPADM

## 2025-01-22 RX ORDER — ARIPIPRAZOLE 5 MG/1
5 TABLET ORAL DAILY
Status: DISCONTINUED | OUTPATIENT
Start: 2025-01-22 | End: 2025-01-28 | Stop reason: HOSPADM

## 2025-01-22 RX ORDER — BUPRENORPHINE 8 MG/1
16 TABLET SUBLINGUAL DAILY
Status: DISCONTINUED | OUTPATIENT
Start: 2025-01-22 | End: 2025-01-28 | Stop reason: HOSPADM

## 2025-01-22 RX ORDER — HYDROXYZINE HYDROCHLORIDE 25 MG/1
25 TABLET, FILM COATED ORAL
Status: DISCONTINUED | OUTPATIENT
Start: 2025-01-22 | End: 2025-01-28 | Stop reason: HOSPADM

## 2025-01-22 RX ORDER — LORAZEPAM 2 MG/ML
2 INJECTION INTRAMUSCULAR
Status: DISCONTINUED | OUTPATIENT
Start: 2025-01-22 | End: 2025-01-28 | Stop reason: HOSPADM

## 2025-01-22 RX ORDER — POLYETHYLENE GLYCOL 3350 17 G/17G
17 POWDER, FOR SOLUTION ORAL DAILY PRN
Status: DISCONTINUED | OUTPATIENT
Start: 2025-01-22 | End: 2025-01-28 | Stop reason: HOSPADM

## 2025-01-22 RX ORDER — LORAZEPAM 2 MG/ML
2 INJECTION INTRAMUSCULAR EVERY 6 HOURS PRN
Status: DISCONTINUED | OUTPATIENT
Start: 2025-01-22 | End: 2025-01-28 | Stop reason: HOSPADM

## 2025-01-22 RX ORDER — HALOPERIDOL 5 MG/ML
2.5 INJECTION INTRAMUSCULAR
Status: DISCONTINUED | OUTPATIENT
Start: 2025-01-22 | End: 2025-01-28 | Stop reason: HOSPADM

## 2025-01-22 RX ORDER — HYDROXYZINE HYDROCHLORIDE 50 MG/1
50 TABLET, FILM COATED ORAL
Status: DISCONTINUED | OUTPATIENT
Start: 2025-01-22 | End: 2025-01-28 | Stop reason: HOSPADM

## 2025-01-22 RX ORDER — LORAZEPAM 2 MG/ML
1 INJECTION INTRAMUSCULAR
Status: DISCONTINUED | OUTPATIENT
Start: 2025-01-22 | End: 2025-01-28 | Stop reason: HOSPADM

## 2025-01-22 RX ORDER — AMOXICILLIN 250 MG
1 CAPSULE ORAL DAILY PRN
Status: DISCONTINUED | OUTPATIENT
Start: 2025-01-22 | End: 2025-01-28 | Stop reason: HOSPADM

## 2025-01-22 RX ORDER — BENZTROPINE MESYLATE 1 MG/1
1 TABLET ORAL
Status: DISCONTINUED | OUTPATIENT
Start: 2025-01-22 | End: 2025-01-28 | Stop reason: HOSPADM

## 2025-01-22 RX ORDER — HALOPERIDOL 5 MG/ML
5 INJECTION INTRAMUSCULAR
Status: DISCONTINUED | OUTPATIENT
Start: 2025-01-22 | End: 2025-01-28 | Stop reason: HOSPADM

## 2025-01-22 RX ORDER — MIRTAZAPINE 15 MG/1
30 TABLET, FILM COATED ORAL
Status: DISCONTINUED | OUTPATIENT
Start: 2025-01-22 | End: 2025-01-28 | Stop reason: HOSPADM

## 2025-01-22 RX ORDER — BENZTROPINE MESYLATE 1 MG/ML
0.5 INJECTION, SOLUTION INTRAMUSCULAR; INTRAVENOUS
Status: DISCONTINUED | OUTPATIENT
Start: 2025-01-22 | End: 2025-01-28 | Stop reason: HOSPADM

## 2025-01-22 RX ORDER — GABAPENTIN 100 MG/1
100 CAPSULE ORAL 3 TIMES DAILY
Status: DISCONTINUED | OUTPATIENT
Start: 2025-01-22 | End: 2025-01-23

## 2025-01-22 RX ORDER — MAGNESIUM HYDROXIDE/ALUMINUM HYDROXICE/SIMETHICONE 120; 1200; 1200 MG/30ML; MG/30ML; MG/30ML
30 SUSPENSION ORAL EVERY 4 HOURS PRN
Status: DISCONTINUED | OUTPATIENT
Start: 2025-01-22 | End: 2025-01-28 | Stop reason: HOSPADM

## 2025-01-22 RX ORDER — HALOPERIDOL 5 MG/1
2.5 TABLET ORAL
Status: DISCONTINUED | OUTPATIENT
Start: 2025-01-22 | End: 2025-01-28 | Stop reason: HOSPADM

## 2025-01-22 RX ORDER — CLONIDINE HYDROCHLORIDE 0.1 MG/1
0.1 TABLET ORAL
Status: DISCONTINUED | OUTPATIENT
Start: 2025-01-22 | End: 2025-01-28 | Stop reason: HOSPADM

## 2025-01-22 RX ORDER — PROPRANOLOL HYDROCHLORIDE 10 MG/1
10 TABLET ORAL EVERY 8 HOURS PRN
Status: DISCONTINUED | OUTPATIENT
Start: 2025-01-22 | End: 2025-01-28 | Stop reason: HOSPADM

## 2025-01-22 RX ORDER — HALOPERIDOL 1 MG/1
1 TABLET ORAL EVERY 6 HOURS PRN
Status: DISCONTINUED | OUTPATIENT
Start: 2025-01-22 | End: 2025-01-28 | Stop reason: HOSPADM

## 2025-01-22 RX ORDER — DIPHENHYDRAMINE HYDROCHLORIDE 50 MG/ML
50 INJECTION INTRAMUSCULAR; INTRAVENOUS EVERY 6 HOURS PRN
Status: DISCONTINUED | OUTPATIENT
Start: 2025-01-22 | End: 2025-01-28 | Stop reason: HOSPADM

## 2025-01-22 RX ORDER — BISACODYL 10 MG
10 SUPPOSITORY, RECTAL RECTAL DAILY PRN
Status: DISCONTINUED | OUTPATIENT
Start: 2025-01-22 | End: 2025-01-28 | Stop reason: HOSPADM

## 2025-01-22 RX ADMIN — MIRTAZAPINE 30 MG: 15 TABLET, FILM COATED ORAL at 21:48

## 2025-01-22 RX ADMIN — ARIPIPRAZOLE 5 MG: 5 TABLET ORAL at 16:03

## 2025-01-22 RX ADMIN — CLONIDINE HYDROCHLORIDE 0.1 MG: 0.1 TABLET ORAL at 21:48

## 2025-01-22 RX ADMIN — BUPRENORPHINE 16 MG: 8 TABLET SUBLINGUAL at 10:32

## 2025-01-22 RX ADMIN — GABAPENTIN 100 MG: 100 CAPSULE ORAL at 21:47

## 2025-01-22 RX ADMIN — GABAPENTIN 100 MG: 100 CAPSULE ORAL at 16:03

## 2025-01-22 RX ADMIN — Medication 3 MG: at 21:48

## 2025-01-22 NOTE — ED NOTES
Patient is accepted at [facility]  Patient is accepted by Dr. LISA Solorio per [facility] (Intake)     Transportation is arranged with [facility].     Transportation is scheduled for 1/21/2025 @2330 via [facility]  Patient may go to the floor at 2330           Nurse report is to be called to [phone] prior to patient transfer.      Dominic Gabriel  Crisis Intervention Specialist II

## 2025-01-22 NOTE — PLAN OF CARE
Problem: Ineffective Coping  Goal: Cooperates with admission process  Description: Interventions:   - Complete admission process  Outcome: Completed     Patient cooperative with admission process

## 2025-01-22 NOTE — ASSESSMENT & PLAN NOTE
Start Abilify 5 mg daily  Clonidine 0.1 mg at bedtime  Gabapentin 100 mg 3 times daily  Melatonin 3 mg at bedtime  Remeron to 30 mg at bedtime

## 2025-01-22 NOTE — CONSULTS
Consultation - Hospitalist   Name: Juan Diego Horowitz 49 y.o. male I MRN: [id number]  Unit/Bed#: -01 I Date of Admission: 1/22/2025   Date of Service: 1/22/2025 I Hospital Day: 0   Inpatient consult for Medical Clearance for  patient  Consult performed by: Dereje Valente PA-C  Consult ordered by: April Solorio MD        Physician Requesting Evaluation: Juanita Martinez*   Reason for Evaluation / Principal Problem: medical clearance     Assessment & Plan  Bipolar 1 disorder (HCC)  Under 201 status for increased depression and SI  Management per psychiatry team  Asthma  No acute exacerbation  Monitor respiratory status  Tobacco use disorder  NRT offered  Cessation encouraged  Polysubstance abuse (HCC)  UDS positive for cocaine, fentanyl, opioids  Encourage cessation  Resume Subutex - Verified PDMP  Outpatient rehab  Medical clearance for psychiatric admission  Patient is medically stable to continue inpatient psychiatric treatment.  Contact [facility] with any questions or concerns.      Counseling / Coordination of Care Time: 30 minutes.  Greater than 50% of total time spent on patient counseling and coordination of care.      History of Present Illness:    Juan Diego Horowitz is a 49 y.o. male who is originally admitted to the Psychiatry service due to depression. We are consulted for medical clearance. Patient should continue all prior to admission medications as prescribed by primary care provider/outpatient specialists.   Available admission lab work and vitals are acceptable.  Patient feels a baseline physical health.  Patient appears medically stable for inpatient psychiatric treatment at this time. Please contact [facility] with any medical questions or concerns if issues should arise.     Review of Systems:    ROS unable to be preformed due to psychiatric disorder.    Past Medical and Surgical History:     Past Medical History:   Diagnosis Date    Addiction to drug (HCC)     Asthma     Back pain   "   Bipolar 1 disorder (HCC)     Chronic kidney disease     Depression     Gastritis     GERD (gastroesophageal reflux disease)     Hallucination     Kidney stones     Opioid withdrawal (HCC) 12/05/2023    Psychiatric illness     Renal cancer (HCC)     Substance abuse (HCC)     Suicide attempt (HCC)        Past Surgical History:   Procedure Laterality Date    ABDOMINAL SURGERY      NEPHRECTOMY      CT CYSTOURETHROSCOPY W/URETERAL CATHETERIZATION Right 09/27/2016    Procedure: CYSTOSCOPY WITH RETROGRADE PYELOGRAM;  Surgeon: Petey Hernandez MD;  [facility];  Service: Urology    CT LAPAROSCOPY RADICAL NEPHRECTOMY Right 11/23/2016    Procedure: HAND ASSISTED LAPAROSCOPIC NEPHRECTOMY, converted to open, lysis of adhesions,repair of  vena cava;  Surgeon: Petey Hernandez MD;  [facility];  Service: Urology    URETERAL STENT PLACEMENT Right 09/27/2016    Procedure: INSERTION STENT URETERAL;  Surgeon: Petey Hernandez MD;  [facility];  Service:     VASCULAR SURGERY         Meds/Allergies:    all medications and allergies reviewed    Allergies: No Known Allergies    Social History:     Marital Status: Single    Substance Use History:   Social History     Substance and Sexual Activity   Alcohol Use Not Currently     Social History     Tobacco Use   Smoking Status Every Day    Current packs/day: 1.00    Average packs/day: 0.5 packs/day for 12.1 years (6.6 ttl pk-yrs)    Types: Cigarettes    Start date: 1/26/2012    Last attempt to quit: 1/26/2023    Passive exposure: Current   Smokeless Tobacco Never     Social History     Substance and Sexual Activity   Drug Use Yes    Types: Heroin, Fentanyl, \"Crack\" cocaine    Comment: last use 1/20/2025       Family History:    non-contributory    Physical Exam:     Vitals:   Blood Pressure: 113/74 (01/22/25 0739)  Pulse: 69 (01/22/25 0739)  Temperature: (!) 96.1 °F (35.6 °C) (01/22/25 0739)  Temp Source: Tympanic (01/22/25 0739)  Respirations: 17 (01/22/25 " "0739)  Height: 5' 7\" (170.2 cm) (01/22/25 0010)  Weight - Scale: 79.5 kg (175 lb 3.2 oz) (01/22/25 0010)  SpO2: 97 % (01/22/25 0739)    Physical Exam  Constitutional:       General: He is not in acute distress.     Appearance: Normal appearance.   HENT:      Head: Normocephalic.      Nose: Nose normal.      Mouth/Throat:      Mouth: Mucous membranes are moist.      Pharynx: Oropharynx is clear.   Eyes:      General: No scleral icterus.     Extraocular Movements: Extraocular movements intact.      Conjunctiva/sclera: Conjunctivae normal.      Pupils: Pupils are equal, round, and reactive to light.   Cardiovascular:      Rate and Rhythm: Normal rate.      Heart sounds: No murmur heard.     No friction rub. No gallop.   Pulmonary:      Effort: Pulmonary effort is normal. No respiratory distress.      Breath sounds: Normal breath sounds. No stridor. No wheezing, rhonchi or rales.   Abdominal:      General: Bowel sounds are normal. There is no distension.      Palpations: Abdomen is soft.      Tenderness: There is no abdominal tenderness. There is no guarding.   Musculoskeletal:         General: No deformity. Normal range of motion.      Cervical back: Neck supple.      Right lower leg: No edema.      Left lower leg: No edema.   Skin:     General: Skin is warm and dry.      Coloration: Skin is not jaundiced.   Neurological:      General: No focal deficit present.      Mental Status: He is alert and oriented to person, place, and time. Mental status is at baseline.      Cranial Nerves: Cranial nerves 2-12 are intact. No cranial nerve deficit.           Additional Data:     Lab Results: I have personally reviewed pertinent reports.                      Lab Results   Component Value Date/Time    HGBA1C 5.9 (H) 01/01/2024 07:22 AM               Imaging: I have personally reviewed pertinent reports.      No orders to display       EKG, Pathology, and Other Studies Reviewed on Admission:   Prior pertinent studies and records " reviewed in Epic/Care Everywhere.    ** Please Note: This note has been constructed using a voice recognition system. **

## 2025-01-22 NOTE — TREATMENT PLAN
TREATMENT PLAN REVIEW - Behavioral Health Juan Diego MARGIAlthea 49 y.o. 1975 male MRN: [id number]    [facility] QU IP BEHAVIORAL HLTH Room / Bed: [facility] 208/[facility] 208-01 Encounter: [id number]          Admit Date/Time:  1/22/2025 12:06 AM    Treatment Team:   MD Blanca Grayson [facility]  Ileana Tang, EFREN Waterman, TIFFANY Bashir RN    Diagnosis: Principal Problem:    Bipolar 1 disorder (HCC)  Active Problems:    Asthma    Opioid use disorder, severe, dependence (HCC)    Tobacco use disorder    Polysubstance abuse ([facility])    Medical clearance for psychiatric admission      Patient Strengths/Assets: ability for insight, cooperative, general fund of knowledge, motivation for treatment/growth, patient is on a voluntary commitment    Patient Barriers/Limitations: financial instability, homeless, limited support system, poor self-care, substance abuse    Short Term Goals: decrease in depressive symptoms, decrease in anxiety symptoms, decrease in suicidal thoughts, improvement in insight, sleep improvement, improvement in appetite, mood stabilization    Long Term Goals: improvement in depression, improvement in anxiety, resolution of depressive symptoms, resolution of manic symptoms, stabilization of mood, improved insight, acceptance of need for psychiatric medications, acceptance of need for psychiatric treatment, adequate self care, adequate sleep    Progress Towards Goals: starting psychiatric medications as prescribed, improving, attends groups, mood is stabilizing, less depressed, no longer suicidal    Recommended Treatment: medication management, patient medication education, group therapy, milieu therapy, continued Behavioral Health psychiatric evaluation/assessment process    Treatment Frequency: daily medication monitoring, group and milieu therapy  daily, monitoring through interdisciplinary rounds, monitoring through weekly patient care conferences    Expected Discharge Date:  7-9 days    Discharge Plan: referral for outpatient drug and alcohol counseling, referral for outpatient medication management with a psychiatrist, referral for outpatient psychotherapy    Treatment Plan Created/Updated By: Juanita Gonzales MD

## 2025-01-22 NOTE — H&P
"Psychiatric Evaluation - Behavioral Health   Name: Juan Diego Horowitz 49 y.o. male I MRN: [id number]  Unit/Bed#: -01 I Date of Admission: 1/22/2025   Date of Service: 1/22/2025 I Hospital Day: 0     Assessment & Plan  Bipolar 1 disorder (HCC)  Start Abilify 5 mg daily  Clonidine 0.1 mg at bedtime  Gabapentin 100 mg 3 times daily  Melatonin 3 mg at bedtime  Remeron to 30 mg at bedtime  Asthma    Opioid use disorder, severe, dependence (HCC)  Continue Subutex 16 mg daily  Tobacco use disorder    Polysubstance abuse (HCC)    Medical clearance for psychiatric admission       Admit to [facility] on 201 status for safety and treatment  No 1:1 continuous observation needed at this time, as patient feels safe on the unit.  Check admission labs.  Get collaterals.  Collaborate with family for baseline assessment and disposition planning.  Case discussed with treatment team.  Treatment options and alternatives were reviewed with the patient. Risks, benefits, and possible side effects of medications were explained to the patient. Patient verbalizes understanding and concurs with the above plan.    Chief Complaint: \"I am depressed and suicidal\"    History of Present Illness     Per ED provider on 1/22:\"Patient is a 49-year-old male coming in for psychiatric evaluation. Reports that he does hear voices, telling him that he is no good. Is depressed, with thoughts about harming himself. Patient was recently started on Suboxone, as well as Zyprexa. Unable to pick it up due to insurance issues with the pharmacy. At this time, but was recommended to come to the emergency department for admission for hallucinations. Patient would like to sign a 201 \"    Per Crisis worker on 1/21:\"The patient is a 50 y/o Mohawk-speaking M interviewed using uiu  [employer] ([id number]). Patient has a history of depression and opioid use. He has had issues with unstable housing since about 2020. Patient presented as depressed " "with suicidal ideation with no plan. He stated he has not been in treatment consistently since his previous provider, [facility], closed and his insurance changed to Medicare. Patient most recently completed [facility] [place] on 1/14. He did follow up with [facility] on 1/17. He stated that he then relapsed on heroin. He reported his last use was 1/20. UDS was positive for cocaine, opioids and Fentanyl. Patient is tired, but attentive and fully oriented. He presents as tearful and depressed. Patient stated he hears the voice of his mother berating him and telling him she is disappointed in him. Patient stated he would like to work, but he has no energy and no motivation to do so. He related that he went to get his medication filled today and learned that there is an issue with his insurance (see previous ED Crisis note). He stated that when faced with such issues, he feels defeated and gives up. He stated that [facility] is referring him to [facility], but he is still unclear on how he would get his medication. He does take Subutex 8mg, 2 pills (16 mg) daily, and, per EMR, [facility] continued this. Patient reported a history of 3 suicide attempts, one at age 20, one by hanging during an incarceration, and one about 2 years ago when he intended to jump from the [facility] [place], but was stopped by a passerby. Patient requires more immediate treatment than can be afforded by outpatient. An acute partial hospital setting is unrealistic due to homelessness. Therefore an inpatient admission is recommended. Patient is willing to sign a voluntary agreement for an inpatient admission. \"    This is 49-year-old male with history of bipolar disorder and polysubstance abuse admitted to inpatient unit on voluntary status for worsening of mood and suicidal ideations in the context of psychosocial stressors and substance abuse.  Patient endorses depressed mood, anhedonia lack of motivation, " tiredness, hopelessness, poor sleep, poor concentration and passive death wishes.  Denies any intent or plan.  Endorses irritability, racing thoughts and mood swings.  Anxiety is fluctuating. denies any symptoms of psychosis.   Psychiatric Review Of Systems:  Medication side effects: none  Sleep: Poor  Appetite: no change  Hygiene: able to tend to instrumental and basic ADLs  Anxiety and panic attacks: denies  Psychotic Symptoms: denies  Depression Symptoms: Yes  Manic Symptoms: denies   PTSD Symptoms: denies  Obsession/compulsions: Denies  Eating disorders: Denies  Suicidal Thoughts: denies  Homicidal Thoughts: denies    Medical Review Of Systems:   Complete ROS is negative, except as noted above.    Scheduled medications:  Current Facility-Administered Medications   Medication Dose Route Frequency Provider Last Rate    aluminum-magnesium hydroxide-simethicone  30 mL Oral Q4H PRN April Solorio MD      ARIPiprazole  5 mg Oral Daily Shaheedgaia Isaura Gonzales MD      haloperidol lactate  2.5 mg Intramuscular Q4H PRN Max 4/day April Solorio MD      And    LORazepam  1 mg Intramuscular Q4H PRN Max 4/day April Solorio MD      And    benztropine  0.5 mg Intramuscular Q4H PRN Max 4/day April Solorio MD      haloperidol lactate  5 mg Intramuscular Q4H PRN Max 4/day April Solorio MD      And    LORazepam  2 mg Intramuscular Q4H PRN Max 4/day April Solorio MD      And    benztropine  1 mg Intramuscular Q4H PRN Max 4/day April Solorio MD      benztropine  1 mg Intramuscular Q4H PRN Max 6/day April Solorio MD      benztropine  1 mg Oral Q4H PRN Max 6/day April Solorio MD      bisacodyl  10 mg Rectal Daily PRN April Solorio MD      buprenorphine  16 mg Sublingual Daily Dereje Valente PA-C      hydrOXYzine HCL  50 mg Oral Q6H PRN Max 4/day April Solorio MD      Or    diphenhydrAMINE  50 mg Intramuscular Q6H PRN April Solorio MD      gabapentin  100 mg Oral TID [facility]  Isaura Gonzales MD      haloperidol  1 mg Oral Q6H PRN April Solorio MD      haloperidol  2.5 mg Oral Q4H PRN Max 4/day April Solorio MD      haloperidol  5 mg Oral Q4H PRN Max 4/day April Solorio MD      hydrOXYzine HCL  100 mg Oral Q6H PRN Max 4/day April Solorio MD      Or    LORazepam  2 mg Intramuscular Q6H PRN April Solorio MD      hydrOXYzine HCL  25 mg Oral Q6H PRN Max 4/day April Solorio MD      ibuprofen  400 mg Oral Q4H PRN April Solorio MD      ibuprofen  600 mg Oral Q6H PRN April Solorio MD      ibuprofen  800 mg Oral Q8H PRN April Solorio MD      melatonin  3 mg Oral HS April Solorio MD      mirtazapine  30 mg Oral HS April Solorio MD      nicotine polacrilex  4 mg Oral Q2H PRN April Solorio MD      polyethylene glycol  17 g Oral Daily PRN April Solorio MD      propranolol  10 mg Oral Q8H PRN April Solorio MD      senna-docusate sodium  1 tablet Oral Daily PRN April Solorio MD      traZODone  50 mg Oral HS PRN April Solorio MD          PRN:    aluminum-magnesium hydroxide-simethicone    haloperidol lactate **AND** LORazepam **AND** benztropine    haloperidol lactate **AND** LORazepam **AND** benztropine    benztropine    benztropine    bisacodyl    hydrOXYzine HCL **OR** diphenhydrAMINE    haloperidol    haloperidol    haloperidol    hydrOXYzine HCL **OR** LORazepam    hydrOXYzine HCL    ibuprofen    ibuprofen    ibuprofen    nicotine polacrilex    polyethylene glycol    propranolol    senna-docusate sodium    traZODone    Diet:       Diet Orders   (From admission, onward)                 Start     Ordered    01/22/25 0029  Diet Regular; Regular House  Diet effective now        References:    Adult Nutrition Support Algorithm    RD Therapeutic Diet Order Protocol   Question Answer Comment   Diet Type Regular    Regular Regular House    RD to adjust diet per protocol? Yes        01/22/25 0028                     - Observation:  "routine            - VS: as per unit protocol  - Legal status: 201  - Psychoeducation (benefits and potential risks) discussed, importance of compliance with the psychiatric treatment reiterated, and the patient verbalized understanding of the matter  - Encourage group attendance and milieu therapy   - The pt was educated and agreed to verbalize any suicidal thoughts, frustrations or concerns to the nursing staff, immediately.   - Dispo: To be determined            Historical Information     Psychiatric History:   Psychiatry diagnosis: Bipolar disorder  Inpatient Hx: Yes  Suicidal Hx: Yes  Self harming behavior Hx: Denies  Violent behavior Hx: Denies  Access to firearms: Denies  Outpatient Hx: Yes  Medications/Trials: Seroquel-drowsiness Zyprexa Remeron gabapentin clonazepam.       Substance Abuse History:    Long history of methamphetamines, opiates and cocaine use.  UDS positive for cocaine, fentanyl and opiates.  He is currently on Suboxone      Social History     Substance and Sexual Activity   Alcohol Use Not Currently     Social History     Substance and Sexual Activity   Drug Use Yes    Types: Heroin, Fentanyl, \"Crack\" cocaine    Comment: last use 1/20/2025       Family Psychiatric History:   Family History   Problem Relation Age of Onset    Stroke Mother     Heart disease Mother     HIV Father        Social History:  Social History     Socioeconomic History    Marital status: Single     Spouse name: Not on file    Number of children: Not on file    Years of education: Not on file    Highest education level: Not on file   Occupational History    Not on file   Tobacco Use    Smoking status: Every Day     Current packs/day: 1.00     Average packs/day: 0.5 packs/day for 12.1 years (6.6 ttl pk-yrs)     Types: Cigarettes     Start date: 1/26/2012     Last attempt to quit: 1/26/2023     Passive exposure: Current    Smokeless tobacco: Never   Vaping Use    Vaping status: Never Used   Substance and Sexual Activity    " "Alcohol use: Not Currently    Drug use: Yes     Types: Heroin, Fentanyl, \"Crack\" cocaine     Comment: last use 2025    Sexual activity: Yes     Partners: Female   Other Topics Concern    Not on file   Social History Narrative    Not on file     Social Drivers of Health     Financial Resource Strain: High Risk (2025)    Received from [facility]    Overall Financial Resource Strain (CARDIA)     Difficulty of Paying Living Expenses: Very hard   Food Insecurity: Food Insecurity Present (2025)    Nursing - Inadequate Food Risk Classification     Worried About Running Out of Food in the Last Year: Not on file     Ran Out of Food in the Last Year: Not on file     Ran Out of Food in the Last Year: Often true   Transportation Needs: Unmet Transportation Needs (2025)    Nursing - Transportation Risk Classification     Lack of Transportation: Not on file     Lack of Transportation: Yes   Physical Activity: Not on file   Stress: Not on file   Social Connections: Feeling Socially Isolated (2025)    Received from [facility]    OASIS : Social Isolation     How often do you feel lonely or isolated from those around you?: Always   Intimate Partner Violence: Unknown (2025)    Nursing IPS     Feels Physically and Emotionally Safe: Not on file     Physically Hurt by Someone: Not on file     Humiliated or Emotionally Abused by Someone: Not on file     Physically Hurt by Someone: No     Hurt or Threatened by Someone: No   Housing Stability: At Risk (2025)    Nursing: Inadequate Housing Risk Classification     Has Housing: Not on file     Worried About Losing Housing: Not on file     Unable to Get Utilities: Not on file     Unable to Pay for Housing in the Last Year: Yes     Has Housin       Traumatic History:   Abuse:Denies  Other Traumatic Events: None    Past Medical History:   Diagnosis Date    Addiction to drug (HCC)     Asthma     Back pain     Bipolar 1 " disorder (HCC)     Chronic kidney disease     Depression     Gastritis     GERD (gastroesophageal reflux disease)     Hallucination     Kidney stones     Opioid withdrawal (HCC) 12/05/2023    Psychiatric illness     Renal cancer (HCC)     Substance abuse (HCC)     Suicide attempt (HCC)        Medical Review Of Systems:  Pertinent items are noted in HPI; all others negative    Meds/Allergies   all current active meds have been reviewed  No Known Allergies    Objective      Mental Status Evaluation:  Appearance and attitude: appeared as stated age, cooperative and attentive, disheveled, dressed in hospital attire  Eye contact: fair  Motor Function: within normal limits, intact gait, No PMA/PMR  Gait/station: normal gait/station  Speech: talking in soft tone, with normal latency and amount  Language: No overt abnormality  Mood/affect: depressed, anxious / Affect was euthymic, reactive, in full range, normal intensity and mood congruent  Thought Processes: circumstantial  Thought content: some paranoia  Associations: intact associations  Perceptual disturbances: denies Auditory/Visual/Tactile Hallucinations  Orientation: oriented to time, person, place and to the situational context  Cognitive Function: intact  Memory: recent and remote memory grossly intact  Intellect: average  Fund of knowledge: aware of current events, aware of past history, and vocabulary average  Impulse control: fair  Insight/judgment: fair/fair      Lab results: I have personally reviewed all pertinent laboratory/tests results  Most Recent Labs:   Lab Results   Component Value Date    WBC 7.35 01/22/2025    RBC 4.02 01/22/2025    HGB 11.3 (L) 01/22/2025    HCT 36.4 (L) 01/22/2025     01/22/2025    RDW 15.1 01/22/2025    NEUTROABS 3.28 01/22/2025    TOTANEUTABS 5.79 11/22/2016    SODIUM 143 01/22/2025    K 4.3 01/22/2025     (H) 01/22/2025    CO2 27 01/22/2025    BUN 13 01/22/2025    CREATININE 1.02 01/22/2025    GLUC 88 01/22/2025     CALCIUM 8.7 01/22/2025    AST 12 (L) 01/22/2025    ALT 8 01/22/2025    ALKPHOS 60 01/22/2025    TP 6.1 (L) 01/22/2025    ALB 3.6 01/22/2025    TBILI 0.28 01/22/2025    CHOLESTEROL 196 01/22/2025    HDL 41 01/22/2025    TRIG 125 01/22/2025    LDLCALC 130 (H) 01/22/2025    NONHDLC 155 01/22/2025    MTO6HVKRXXFU 1.154 01/22/2025    FREET4 0.97 12/05/2022    HGBA1C 5.9 (H) 01/01/2024     01/01/2024       Imaging Studies: No results found.    EKG, Pathology, and Other Studies: Reviewed.    Advance Directive and Living Will: <no information>    Patient Strengths/Assets: ability for insight, cooperative, good physical health, patient is on a voluntary commitment    Patient Barriers/Limitations: difficulty adapting, financial instability, homeless, poor self-care, substance abuse    Suicide/Homicide Risk Assessment:    Risk of Harm to Self:   Nursing Suicide Risk Assessment Last 24 hours: C-SSRS Risk (Since Last Contact)  Calculated C-SSRS Risk Score (Since Last Contact): No Risk Indicated    Risk of Harm to Others:  Nursing Homicide Risk Assessment: Violence Risk to Others: Denies within past 6 months    The following interventions are recommended: Behavioral Health checks for safety monitoring, continued hospitalization on locked unit    Counseling / Coordination of Care:    Patient's presentation on admission and proposed treatment plan discussed with treatment team.  Diagnosis, medication changes and treatment plan reviewed with patient.  Events leading to admission reviewed with patient.  Discussed with patient need for drug and alcohol rehabilitation treatment upon discharge  Outpatient follow up discussed with patient.  Supportive therapy provided to patient.    Inpatient Psychiatric Certification:    Estimated length of stay: 7 midnights          This note was completed in part utilizing Dragon dictation Software. Grammatical, translation, syntax errors, random word insertions, spelling mistakes, and  incomplete sentences may be an occasional consequence of this system secondary to software limitations with voice recognition, ambient noise, and hardware issues. If you have any questions or concerns about the content, text, or information contained within the body of this dictation, please contact the provider for clarification.

## 2025-01-22 NOTE — CMS CERTIFICATION NOTE
Recertification: Based upon physical, mental and social evaluations, I certify that inpatient psychiatric services continue to be medically necessary for this patient for a duration of 7 midnights for the treatment of  Bipolar 1 disorder (HCC) Available alternative community resources still do not meet the patient's mental health care needs. I further attest that an established written individualized plan of care has been updated and is outlined in the patient's medical records.

## 2025-01-22 NOTE — NURSING NOTE
"Admitted to unit 01/22/2025 @ Approximately 0006:    Phone/Audio  ([id number]) utilized for communication purposes, as patient reported that he is primarily Bengali speaking and speaks little/basic english    Patient reported chief complaint: I need medications for my conditions: Depression, bipolar, ADHD. On assessment: good eye-contact noted, affect appropriate for circumstances, behavior cooperative. Patient reported having a past psychiatric history of being admitted to a behavioral/mental-health unit at [facility]. Body search completed. Patient changed into unit approved gown and non-skid socks - no contraband found. Small bruise, callus noted to sole of feet and patient reported that it is related to walking. Vital signs monitored and recorded. Patient signed admission paperwork. Denied having auditory or visual hallucinations at this time. Denied having suicidal or homicidal ideations at this time, however reported recently having passive death wishes, thoughts of wanting to die without a plan. Patient also reported having lifetime history of suicidal attempts (does not remember date and/or time): patient tried to poison self, patient tried to hang self. Last bowel movement: yesterday, per patient. No known allergies. Patient stated that he is homeless. Reports substance use: Crack, fentanyl, tobacco (all last used \"yesterday\"). COWS = 0. Denies alcohol use.      Dr Solorio paged via Epic secure-chat:   Patient scored moderate risk on lifetime CSSRs based on assessment questions answered at time of admission. Scored low risk on CSSRS Shift assessment ('Since Last Contact'). Patient verbalized safety and reports feeling safe at this time with no SI plan. PTA medications were reviewed: Patient stated that he currently takes \"Remeron\", however does not remember other medications that he takes/should take.  "

## 2025-01-22 NOTE — NURSING NOTE
Patient resting in bed. OOB for breakfast. Discussed unit rules and expectations. Patient requested to meet with provider to discuss ADHD and medications. Denies SI/HI and hallucinations. Fluctuating anxiety and depression d/t housing concerns. Currently homeless. Recently completed program at [facility] after he was released from incarceration in December. Patient aware UA ordered, specimen cup at bedside. Declined  but made aware he has the option if he chooses, verbalized understanding.

## 2025-01-22 NOTE — ASSESSMENT & PLAN NOTE
UDS positive for cocaine, fentanyl, opioids  Encourage cessation  Resume Subutex - Verified PDMP  Outpatient rehab

## 2025-01-22 NOTE — EMTALA/ACUTE CARE TRANSFER
[facility] EMERGENCY DEPARTMENT  [address] [place] [address]  [phone]  Dept: [phone]      EMTALA TRANSFER CONSENT    NAME Juan Diego ZIEGLER 1975                              MRN [id number]    I have been informed of my rights regarding examination, treatment, and transfer   by Dr. Fuad Dewey, *    Benefits: Specialized equipment and/or services available at the receiving facility (Include comment)________________________ (Inpatient psychiatry)    Risks: Potential for delay in receiving treatment      Transfer Request   I acknowledge that my medical condition has been evaluated and explained to me by the emergency department physician or other qualified medical person and/or my attending physician who has recommended and offered to me further medical examination and treatment. I understand the Hospital's obligation with respect to the treatment and stabilization of my emergency medical condition. I nevertheless request to be transferred. I release the Hospital, the doctor, and any other persons caring for me from all responsibility or liability for any injury or ill effects that may result from my transfer and agree to accept all responsibility for the consequences of my choice to transfer, rather than receive stabilizing treatment at the Hospital. I understand that because the transfer is my request, my insurance may not provide reimbursement for the services.  The Hospital will assist and direct me and my family in how to make arrangements for transfer, but the hospital is not liable for any fees charged by the transport service.  In spite of this understanding, I refuse to consent to further medical examination and treatment which has been offered to me, and request transfer to Accepting Facility Name, City & State : [facility]. I authorize the performance of emergency medical procedures and  treatments upon me in both transit and upon arrival at the receiving facility.  Additionally, I authorize the release of any and all medical records to the receiving facility and request they be transported with me, if possible.    I authorize the performance of emergency medical procedures and treatments upon me in both transit and upon arrival at the receiving facility.  Additionally, I authorize the release of any and all medical records to the receiving facility and request they be transported with me, if possible.  I understand that the safest mode of transportation during a medical emergency is an ambulance and that the Hospital advocates the use of this mode of transport. Risks of traveling to the receiving facility by car, including absence of medical control, life sustaining equipment, such as oxygen, and medical personnel has been explained to me and I fully understand them.    (ROYAL CORRECT BOX BELOW)  [  ]  I consent to the stated transfer and to be transported by ambulance/helicopter.  [  ]  I consent to the stated transfer, but refuse transportation by ambulance and accept full responsibility for my transportation by car.  I understand the risks of non-ambulance transfers and I exonerate the Hospital and its staff from any deterioration in my condition that results from this refusal.    X___________________________________________    DATE  25  TIME________  Signature of patient or legally responsible individual signing on patient behalf           RELATIONSHIP TO PATIENT_________________________          Provider Certification    NAME Juan Diego KISERAlthea                                        [facility] 1975                              MRN [id number]    A medical screening exam was performed on the above named patient.  Based on the examination:    Condition Necessitating Transfer The encounter diagnosis was Encounter for psychological evaluation.    Patient Condition: The patient has been  stabilized such that within reasonable medical probability, no material deterioration of the patient condition or the condition of the unborn child(levi) is likely to result from the transfer    Reason for Transfer: No bed available at level of patient's needs    Transfer Requirements: Facility [facility]   Space available and qualified personnel available for treatment as acknowledged by [facility] (FLOYD)  Agreed to accept transfer and to provide appropriate medical treatment as acknowledged by       DR. LISA REED  Appropriate medical records of the examination and treatment of the patient are provided at the time of transfer   STAFF INITIAL WHEN COMPLETED _______  Transfer will be performed by qualified personnel from [facility]  and appropriate transfer equipment as required, including the use of necessary and appropriate life support measures.    Provider Certification: I have examined the patient and explained the following risks and benefits of being transferred/refusing transfer to the patient/family:  Consent was not obtained as patient is committed to psychiatric facility and transfer is mandated      Based on these reasonable risks and benefits to the patient and/or the unborn child(levi), and based upon the information available at the time of the patient’s examination, I certify that the medical benefits reasonably to be expected from the provision of appropriate medical treatments at another medical facility outweigh the increasing risks, if any, to the individual’s medical condition, and in the case of labor to the unborn child, from effecting the transfer.    X____________________________________________ DATE 01/21/25        TIME_______      ORIGINAL - SEND TO MEDICAL RECORDS   COPY - SEND WITH PATIENT DURING TRANSFER

## 2025-01-22 NOTE — ED CARE HANDOFF
Emergency Department Sign Out Note        Sign out and transfer of care from Phil Garcia PA-C. See Separate Emergency Department note.     The patient, Juan Diego Horowitz, was evaluated by the previous provider due to worsening depression and SI without a plan.  He also reported auditory hallucinations.  Patient was prescribed Zyprexa during his most recent inpatient  stay, but was unable to  his medications due to an insurance issue.      Workup Completed:  Results Reviewed       Procedure Component Value Units Date/Time    Rapid drug screen, urine [[id number]]  (Abnormal) Collected: 01/21/25 1341    Lab Status: Final result Specimen: Urine, Clean Catch Updated: 01/21/25 1516     Amph/Meth UR Negative     Barbiturate Ur Negative     Benzodiazepine Urine Negative     Cocaine Urine Positive     Methadone Urine Negative     Opiate Urine Positive     PCP Ur Negative     THC Urine Negative     Oxycodone Urine Negative     Fentanyl Urine Positive     HYDROCODONE URINE Negative    Narrative:      Presumptive report. If requested, specimen will be sent to reference lab for confirmation.  FOR MEDICAL PURPOSES ONLY.   IF CONFIRMATION NEEDED PLEASE CONTACT THE LAB WITHIN 5 DAYS.    Drug Screen Cutoff Levels:  AMPHETAMINE/METHAMPHETAMINES  1000 ng/mL  BARBITURATES     200 ng/mL  BENZODIAZEPINES     200 ng/mL  COCAINE      300 ng/mL  METHADONE      300 ng/mL  OPIATES      300 ng/mL  PHENCYCLIDINE     25 ng/mL  THC       50 ng/mL  OXYCODONE      100 ng/mL  FENTANYL      5 ng/mL  HYDROCODONE     300 ng/mL    POCT alcohol breath test [[id number]]  (Normal) Resulted: 01/21/25 1157    Lab Status: Final result Updated: 01/21/25 1157     EXTBreath Alcohol 0.000              ED Course / Workup Pending (followup):  ED Course as of 01/21/25 2310 Tue Jan 21, 2025 2036 Sign out taken from Phil Garcia PA-C.  History of bipolar disorder and has been without medications for several days.  201 signed for depression, SI  with a plan, and medication management.  Also reports auditory hallucinations telling him to hurt himself.  Accepted at [facility].   9749 Transport to [facility] is at 2330.         Procedures  EKG shows a normal sinus rhythm with sinus arrhythmia and no acute ischemic changes      Medical Decision Making  Patient with a history of bipolar disorder presented due to depression, SI without a plan, and auditory hallucinations.  He met with the ED crisis worker and a 201 for voluntary inpatient treatment was signed.  No acute events while in the ED.  Patient transported to [facility].    Amount and/or Complexity of Data Reviewed  Labs: ordered.    Risk  Prescription drug management.  Decision regarding hospitalization.        Disposition  Final diagnoses:   Encounter for psychological evaluation     Time reflects when diagnosis was documented in both MDM as applicable and the Disposition within this note       Time User Action Codes Description Comment    1/21/2025 12:34 PM Phil Garcia Add [Z00.8] Encounter for psychological evaluation           ED Disposition       ED Disposition   Transfer to Behavioral Health Condition   --    Date/Time   Tue Jan 21, 2025 12:34 PM    Comment   Juan Diego Horowitz is medically clear for psychiatric eval             MD Documentation      Flowsheet Row Most Recent Value   Patient Condition The patient has been stabilized such that within reasonable medical probability, no material deterioration of the patient condition or the condition of the unborn child(levi) is likely to result from the transfer   Reason for Transfer No bed available at level of patient's needs   Benefits of Transfer Specialized equipment and/or services available at the receiving facility (Include comment)________________________  [Inpatient psychiatry]   Risks of Transfer Potential for delay in receiving treatment   Accepting Physician DR. LISA REED   Accepting Facility Name, City [facility],  [facility]    (Name & Tel number) [facility] (CRIS[facility])   Transported by (Company and Unit #) [facility]   Sending MD DR. RADU ANDERSON   Provider Certification Consent was not obtained as patient is committed to psychiatric facility and transfer is mandated          RN Documentation      Flowsheet Row Most Recent Value   Accepting Facility Name, City & State   [facility]    (Name & Tel number) [facility] ([facility])   Medications Reviewed with Next Provider of Service Yes   Transport Mode Ambulance   Transported by (Company and Unit #) [facility]   Level of Care Basic life support   Copies of Medical Records Sent Transfer form   Patient Belongings Disposition Sent with family          Follow-up Information    None       Patient's Medications   Discharge Prescriptions    No medications on file     No discharge procedures on file.       ED Provider  Electronically Signed by     Ct Horner PA-C  01/21/25 1487

## 2025-01-22 NOTE — NURSING NOTE
1 cellphone  1 watch  1 necklace  1 razor  1   2 hats  1 bag of toilettries  1 winter coat  1 hoodie  1 pair gloves  1 pair socks  2 sweatpants  3 shirts  1 pair sneakers

## 2025-01-22 NOTE — ASSESSMENT & PLAN NOTE
Patient is medically stable to continue inpatient psychiatric treatment.  Contact [facility] with any questions or concerns.

## 2025-01-23 LAB
ATRIAL RATE: 65 BPM
P AXIS: 62 DEGREES
PR INTERVAL: 162 MS
QRS AXIS: 43 DEGREES
QRSD INTERVAL: 90 MS
QT INTERVAL: 412 MS
QTC INTERVAL: 428 MS
T WAVE AXIS: 48 DEGREES
TREPONEMA PALLIDUM IGG+IGM AB [PRESENCE] IN SERUM OR PLASMA BY IMMUNOASSAY: NORMAL
VENTRICULAR RATE: 65 BPM

## 2025-01-23 PROCEDURE — 99232 SBSQ HOSP IP/OBS MODERATE 35: CPT | Performed by: STUDENT IN AN ORGANIZED HEALTH CARE EDUCATION/TRAINING PROGRAM

## 2025-01-23 PROCEDURE — 81001 URINALYSIS AUTO W/SCOPE: CPT | Performed by: PSYCHIATRY & NEUROLOGY

## 2025-01-23 PROCEDURE — 93010 ELECTROCARDIOGRAM REPORT: CPT | Performed by: INTERNAL MEDICINE

## 2025-01-23 RX ORDER — GABAPENTIN 300 MG/1
300 CAPSULE ORAL 3 TIMES DAILY
Status: DISCONTINUED | OUTPATIENT
Start: 2025-01-23 | End: 2025-01-28 | Stop reason: HOSPADM

## 2025-01-23 RX ADMIN — CLONIDINE HYDROCHLORIDE 0.1 MG: 0.1 TABLET ORAL at 21:25

## 2025-01-23 RX ADMIN — Medication 3 MG: at 21:25

## 2025-01-23 RX ADMIN — GABAPENTIN 300 MG: 300 CAPSULE ORAL at 16:52

## 2025-01-23 RX ADMIN — BUPRENORPHINE 16 MG: 8 TABLET SUBLINGUAL at 08:51

## 2025-01-23 RX ADMIN — ARIPIPRAZOLE 5 MG: 5 TABLET ORAL at 08:51

## 2025-01-23 RX ADMIN — GABAPENTIN 100 MG: 100 CAPSULE ORAL at 08:51

## 2025-01-23 RX ADMIN — MIRTAZAPINE 30 MG: 15 TABLET, FILM COATED ORAL at 21:25

## 2025-01-23 RX ADMIN — GABAPENTIN 300 MG: 300 CAPSULE ORAL at 21:25

## 2025-01-23 NOTE — CASE MANAGEMENT
CM called [facility] at [phone] and left her a voicemail message requesting a call back in reference to pt.

## 2025-01-23 NOTE — NURSING NOTE
Patient in bed asleep for most of the evening. Out of bed when called for HS snack. Good appetite for dinner. Denies SI/HI/AVH. Currently denies anxiety and depression. Denies any questions or concerns at this time.

## 2025-01-23 NOTE — PROGRESS NOTES
Progress Note - Behavioral Health   Name: Juan Diego Horowitz 49 y.o. male I MRN: [id number]  Unit/Bed#: -01 I Date of Admission: 1/22/2025   Date of Service: 1/23/2025 I Hospital Day: 1     Assessment & Plan  Bipolar 1 disorder (HCC)  Start Abilify 5 mg daily  Clonidine 0.1 mg at bedtime  Increase gabapentin 300 mg 3 times daily  Melatonin 3 mg at bedtime  Remeron to 30 mg at bedtime  Asthma    Opioid use disorder, severe, dependence (HCC)  Continue Subutex 16 mg daily  Tobacco use disorder    Polysubstance abuse (HCC)    Medical clearance for psychiatric admission      Recommended Treatment: Continue with pharmacotherapy, group therapy, milieu therapy and occupational therapy.   Risks, benefits and possible side effects of Medications:   Risks, benefits, alternatives, and possible side effects of patient's psychiatric medications were discussed with patient.    Progress Toward Goals: Progressing. Patient is not at goal. They are not yet ready for discharge. The patient's condition currently requires active psychopharmacological medication management, interdisciplinary coordination with case management, and the utilization of adjunctive milieu and group therapy to augment psychopharmacological efficacy. The patient's risk of morbidity, and progression or decompensation of psychiatric disease, is higher without this current treatment.    Subjective: Patient was seen, chart was reviewed, and case was discussed with the team.  Patient appears withdrawn, guarded, and depressed.  Patient continues to endorse depressed mood poor concentration, tiredness and lack of motivation.  Sleep is disturbed.  Anxiety is fluctuating. denies any hallucinations denies any hallucinations.  Denies any thoughts to hurt himself or others.    Patient is compliant with medications. Patient denied adverse effects to their current psychiatric medication regimen. Discussed the importance of continuing to take medications as prescribed,  as well as the importance of continuing to attend groups on the unit.    Behavior over the last 24 hours:  unchanged  Sleep: insomnia  Appetite: normal  Medication side effects: No    Medical ROS: Pertinent items are noted in HPI.all other systems are negative      Scheduled medications:  Current Facility-Administered Medications   Medication Dose Route Frequency Provider Last Rate    aluminum-magnesium hydroxide-simethicone  30 mL Oral Q4H PRN April Solorio MD      ARIPiprazole  5 mg Oral Daily Juanita Gonzales MD      haloperidol lactate  2.5 mg Intramuscular Q4H PRN Max 4/day April Solorio MD      And    LORazepam  1 mg Intramuscular Q4H PRN Max 4/day April Solorio MD      And    benztropine  0.5 mg Intramuscular Q4H PRN Max 4/day April Solorio MD      haloperidol lactate  5 mg Intramuscular Q4H PRN Max 4/day April Solorio MD      And    LORazepam  2 mg Intramuscular Q4H PRN Max 4/day April Solorio MD      And    benztropine  1 mg Intramuscular Q4H PRN Max 4/day April Solorio MD      benztropine  1 mg Intramuscular Q4H PRN Max 6/day April Solorio MD      benztropine  1 mg Oral Q4H PRN Max 6/day April Solorio MD      bisacodyl  10 mg Rectal Daily PRN April Solorio MD      buprenorphine  16 mg Sublingual Daily Dereje Valente PA-C      cloNIDine  0.1 mg Oral HS Juanita Gonzales MD      hydrOXYzine HCL  50 mg Oral Q6H PRN Max 4/day April Solorio MD      Or    diphenhydrAMINE  50 mg Intramuscular Q6H PRN April Solorio MD      gabapentin  100 mg Oral TID Juanita Gonzales MD      haloperidol  1 mg Oral Q6H PRN April Solorio MD      haloperidol  2.5 mg Oral Q4H PRN Max 4/day April Solorio MD      haloperidol  5 mg Oral Q4H PRN Max 4/day April Solorio MD      hydrOXYzine HCL  100 mg Oral Q6H PRN Max 4/day April Solorio MD      Or    LORazepam  2 mg Intramuscular Q6H PRN April Solorio MD      hydrOXYzine HCL   25 mg Oral Q6H PRN Max 4/day April Solorio MD      ibuprofen  400 mg Oral Q4H PRN April Solorio MD      ibuprofen  600 mg Oral Q6H PRN April Solorio MD      ibuprofen  800 mg Oral Q8H PRN April Solorio MD      melatonin  3 mg Oral HS April Solorio MD      mirtazapine  30 mg Oral HS April Solorio MD      nicotine polacrilex  4 mg Oral Q2H PRN April Solorio MD      polyethylene glycol  17 g Oral Daily PRN April Solorio MD      propranolol  10 mg Oral Q8H PRN April Solorio MD      senna-docusate sodium  1 tablet Oral Daily PRN April Solorio MD      traZODone  50 mg Oral HS PRN April Solorio MD        PRN:    aluminum-magnesium hydroxide-simethicone    haloperidol lactate **AND** LORazepam **AND** benztropine    haloperidol lactate **AND** LORazepam **AND** benztropine    benztropine    benztropine    bisacodyl    hydrOXYzine HCL **OR** diphenhydrAMINE    haloperidol    haloperidol    haloperidol    hydrOXYzine HCL **OR** LORazepam    hydrOXYzine HCL    ibuprofen    ibuprofen    ibuprofen    nicotine polacrilex    polyethylene glycol    propranolol    senna-docusate sodium    traZODone    - Observation: routine            - VS: as per unit protocol  - Legal status: 201  - Diet: Regular diet  - Encourage group attendance and milieu therapy  - Dispo: To be determined        Objective    Current Mental Status Evaluation:  Appearance and attitude: appeared as stated age, dressed in hospital attire, with good hygiene  Eye contact: fair  Motor Function: within normal limits, intact gait, No PMA/PMR  Gait/station: normal gait/station  Speech: Clear tomorrowtalking in soft tone, with normal latency and amount  Language: No overt abnormality  Mood/affect: depressed, anxious / Affect was constricted but reactive, mood congruent  Thought Processes: sequential and goal-directed, logical, coherent, organized  Thought content: denies suicidal ideation or homicidal ideation; no delusions or  first rank symptoms  Associations: intact associations  Perceptual disturbances: denies Auditory/Visual/Tactile Hallucinations  Orientation: oriented to time, person, place and to the situational context  Cognitive Function: intact  Memory: recent and remote memory grossly intact  Intellect: average  Fund of knowledge: aware of current events, aware of past history, and vocabulary average  Impulse control: fair  Insight/judgment: fair/fair      Vital signs in last 24 hours:    Temp:  [98.2 °F (36.8 °C)] 98.2 °F (36.8 °C)  HR:  [65] 65  BP: (103)/(62) 103/62  Resp:  [18] 18  SpO2:  [97 %] 97 %  O2 Device: None (Room air)    Lab Results: I have reviewed the following results:   Most Recent Labs:   Lab Results   Component Value Date    WBC 7.35 01/22/2025    RBC 4.02 01/22/2025    HGB 11.3 (L) 01/22/2025    HCT 36.4 (L) 01/22/2025     01/22/2025    RDW 15.1 01/22/2025    NEUTROABS 3.28 01/22/2025    SODIUM 143 01/22/2025    K 4.3 01/22/2025     (H) 01/22/2025    CO2 27 01/22/2025    BUN 13 01/22/2025    CREATININE 1.02 01/22/2025    GLUC 88 01/22/2025    GLUF 88 01/22/2025    CALCIUM 8.7 01/22/2025    AST 12 (L) 01/22/2025    ALT 8 01/22/2025    ALKPHOS 60 01/22/2025    TP 6.1 (L) 01/22/2025    ALB 3.6 01/22/2025    TBILI 0.28 01/22/2025    CHOLESTEROL 196 01/22/2025    HDL 41 01/22/2025    TRIG 125 01/22/2025    LDLCALC 130 (H) 01/22/2025    NONHDLC 155 01/22/2025    QFR1KBYEGLLW 1.154 01/22/2025    FREET4 0.97 12/05/2022    HGBA1C 5.9 (H) 01/01/2024     01/01/2024       Counseling / Coordination of Care  Patient's progress discussed with staff in treatment team meeting.  Medications, treatment progress and treatment plan reviewed with patient.  Medication education provided to patient.  Supportive therapy provided to patient.  Cognitive techniques utilized during the session.  Reassurance and supportive therapy provided.  Encouraged participation in milieu and group therapy on the  unit.  Crisis/safety plan discussed with patient.        This note was completed in part utilizing Dragon dictation Software. Grammatical, translation, syntax errors, random word insertions, spelling mistakes, and incomplete sentences may be an occasional consequence of this system secondary to software limitations with voice recognition, ambient noise, and hardware issues. If you have any questions or concerns about the content, text, or information contained within the body of this dictation, please contact the provider for clarification.

## 2025-01-23 NOTE — NURSING NOTE
Awake and social with another female peer. Received cell phone to obtain phone number for PO. Depressed and anxious re: living situation. Denies SI/HI. Monitored while shaving.Urine specimen cup at bedside, patient aware. No questions or concerns.

## 2025-01-23 NOTE — ASSESSMENT & PLAN NOTE
Start Abilify 5 mg daily  Clonidine 0.1 mg at bedtime  Increase gabapentin 300 mg 3 times daily  Melatonin 3 mg at bedtime  Remeron to 30 mg at bedtime

## 2025-01-23 NOTE — CASE MANAGEMENT
Cm had patient sign a CORIN for [facility]. Patient stated that his probation is out of [facility]. CM will call and provide an update on patient.

## 2025-01-23 NOTE — PLAN OF CARE
Problem: Ineffective Coping  Goal: Identifies ineffective coping skills  1/23/2025 0220 by Katelyn Bowling RN  Outcome: Progressing  1/23/2025 0219 by Katelyn Bowling RN  Outcome: Progressing  Goal: Identifies healthy coping skills  1/23/2025 0220 by Katelyn Bowling RN  Outcome: Progressing  1/23/2025 0219 by Katelyn Bowling RN  Outcome: Progressing  Goal: Demonstrates healthy coping skills  1/23/2025 0220 by Katelyn Bowling RN  Outcome: Not Progressing  1/23/2025 0219 by Katelyn Bowling RN  Outcome: Progressing  Goal: Participates in unit activities  Description: Interventions:  - Provide therapeutic environment   - Provide required programming   - Redirect inappropriate behaviors   1/23/2025 0220 by Katelyn Bowling RN  Outcome: Progressing  1/23/2025 0219 by Katelyn Bowling RN  Outcome: Progressing  Goal: Patient/Family participate in treatment and DC plans  Description: Interventions:  - Provide therapeutic environment  1/23/2025 0220 by Katelyn Bowling RN  Outcome: Progressing  1/23/2025 0219 by Katelyn Bowling RN  Outcome: Progressing  Goal: Patient/Family verbalizes awareness of resources  1/23/2025 0220 by Katelyn Bowling RN  Outcome: Progressing  1/23/2025 0219 by Katelyn Bowling RN  Outcome: Progressing     Problem: Depression  Goal: Treatment Goal: Demonstrate behavioral control of depressive symptoms, verbalize feelings of improved mood/affect, and adopt new coping skills prior to discharge  Outcome: Progressing  Goal: Verbalize thoughts and feelings  Description: Interventions:  - Assess and re-assess patient's level of risk   - Engage patient in 1:1 interactions, daily, for a minimum of 15 minutes   - Encourage patient to express feelings, fears, frustrations, hopes   Outcome: Progressing  Goal: Refrain from harming self  Description: Interventions:  - Monitor patient closely, per order   - Supervise medication ingestion, monitor effects and side effects    Outcome: Progressing  Goal: Refrain from isolation  Description: Interventions:  - Develop a trusting relationship   - Encourage socialization   Outcome: Progressing  Goal: Refrain from self-neglect  Outcome: Progressing  Goal: Complete daily ADLs, including personal hygiene independently, as able  Description: Interventions:  - Observe, teach, and assist patient with ADLS  -  Monitor and promote a balance of rest/activity, with adequate nutrition and elimination   Outcome: Progressing     Problem: Anxiety  Goal: Anxiety is at manageable level  Description: Interventions:  - Assess and monitor patient's anxiety level.   - Monitor for signs and symptoms (heart palpitations, chest pain, shortness of breath, headaches, nausea, feeling jumpy, restlessness, irritable, apprehensive).   - Collaborate with interdisciplinary team and initiate plan and interventions as ordered.  - [place] patient to unit/surroundings  - Explain treatment plan  - Encourage participation in care  - Encourage verbalization of concerns/fears  - Identify coping mechanisms  - Assist in developing anxiety-reducing skills  - Administer/offer alternative therapies  - Limit or eliminate stimulants  Outcome: Progressing     Problem: DISCHARGE PLANNING  Goal: Discharge to home or other facility with appropriate resources  Description: INTERVENTIONS:  - Identify barriers to discharge w/patient and caregiver  - Arrange for needed discharge resources and transportation as appropriate  - Identify discharge learning needs (meds, wound care, etc.)  - Arrange for interpretive services to assist at discharge as needed  - Refer to Case Management Department for coordinating discharge planning if the patient needs post-hospital services based on physician/advanced practitioner order or complex needs related to functional status, cognitive ability, or social support system  Outcome: Progressing

## 2025-01-24 LAB
BACTERIA UR QL AUTO: ABNORMAL /HPF
BILIRUB UR QL STRIP: NEGATIVE
CLARITY UR: CLEAR
COLOR UR: YELLOW
GLUCOSE UR STRIP-MCNC: NEGATIVE MG/DL
HGB UR QL STRIP.AUTO: NEGATIVE
KETONES UR STRIP-MCNC: NEGATIVE MG/DL
LEUKOCYTE ESTERASE UR QL STRIP: NEGATIVE
NITRITE UR QL STRIP: NEGATIVE
NON-SQ EPI CELLS URNS QL MICRO: ABNORMAL /HPF
PH UR STRIP.AUTO: 7.5 [PH]
PROT UR STRIP-MCNC: ABNORMAL MG/DL
RBC #/AREA URNS AUTO: ABNORMAL /HPF
SP GR UR STRIP.AUTO: 1.02 (ref 1–1.03)
UROBILINOGEN UR STRIP-ACNC: <2 MG/DL
WBC #/AREA URNS AUTO: ABNORMAL /HPF

## 2025-01-24 PROCEDURE — 99232 SBSQ HOSP IP/OBS MODERATE 35: CPT | Performed by: STUDENT IN AN ORGANIZED HEALTH CARE EDUCATION/TRAINING PROGRAM

## 2025-01-24 RX ADMIN — GABAPENTIN 300 MG: 300 CAPSULE ORAL at 09:00

## 2025-01-24 RX ADMIN — MIRTAZAPINE 30 MG: 15 TABLET, FILM COATED ORAL at 21:26

## 2025-01-24 RX ADMIN — Medication 3 MG: at 21:26

## 2025-01-24 RX ADMIN — CLONIDINE HYDROCHLORIDE 0.1 MG: 0.1 TABLET ORAL at 21:26

## 2025-01-24 RX ADMIN — BUPRENORPHINE 16 MG: 8 TABLET SUBLINGUAL at 09:00

## 2025-01-24 RX ADMIN — GABAPENTIN 300 MG: 300 CAPSULE ORAL at 15:31

## 2025-01-24 RX ADMIN — GABAPENTIN 300 MG: 300 CAPSULE ORAL at 21:26

## 2025-01-24 RX ADMIN — ARIPIPRAZOLE 5 MG: 5 TABLET ORAL at 09:00

## 2025-01-24 NOTE — PROGRESS NOTES
Progress Note - Behavioral Health   Name: Juan Diego Horowitz 49 y.o. male I MRN: [id number]  Unit/Bed#: -01 I Date of Admission: 1/22/2025   Date of Service: 1/24/2025 I Hospital Day: 2     Assessment & Plan  Bipolar 1 disorder (HCC)  Abilify 5 mg daily, may go up on the dose.  Clonidine 0.1 mg at bedtime  Increase gabapentin 300 mg 3 times daily  Melatonin 3 mg at bedtime  Remeron to 30 mg at bedtime  Asthma    Opioid use disorder, severe, dependence (HCC)  Continue Subutex 16 mg daily  Tobacco use disorder    Polysubstance abuse (HCC)    Medical clearance for psychiatric admission      Recommended Treatment: Continue with pharmacotherapy, group therapy, milieu therapy and occupational therapy.   Risks, benefits and possible side effects of Medications:   Risks, benefits, alternatives, and possible side effects of patient's psychiatric medications were discussed with patient.    Progress Toward Goals: Progressing. Patient is not at goal. They are not yet ready for discharge. The patient's condition currently requires active psychopharmacological medication management, interdisciplinary coordination with case management, and the utilization of adjunctive milieu and group therapy to augment psychopharmacological efficacy. The patient's risk of morbidity, and progression or decompensation of psychiatric disease, is higher without this current treatment.    Subjective: Patient was seen, chart was reviewed, and case was discussed with the teamPatient appears withdrawn, guarded, superficial and depressed.  He continues to endorse depressed mood, lack of motivation, tiredness and hopelessness anxiety is fluctuating.  Denies any thoughts to hurt himself or others.    Patient is compliant with medications. Patient denied adverse effects to their current psychiatric medication regimen. Discussed the importance of continuing to take medications as prescribed, as well as the importance of continuing to attend groups on  the unit.    Behavior over the last 24 hours:  unchanged  Sleep: normal  Appetite: normal  Medication side effects: No    Medical ROS: Pertinent items are noted in HPI.all other systems are negative      Scheduled medications:  Current Facility-Administered Medications   Medication Dose Route Frequency Provider Last Rate    aluminum-magnesium hydroxide-simethicone  30 mL Oral Q4H PRN April Solorio MD      ARIPiprazole  5 mg Oral Daily Juanita Gonzales MD      haloperidol lactate  2.5 mg Intramuscular Q4H PRN Max 4/day April Solorio MD      And    LORazepam  1 mg Intramuscular Q4H PRN Max 4/day April Solorio MD      And    benztropine  0.5 mg Intramuscular Q4H PRN Max 4/day April Solorio MD      haloperidol lactate  5 mg Intramuscular Q4H PRN Max 4/day April Solorio MD      And    LORazepam  2 mg Intramuscular Q4H PRN Max 4/day April Solorio MD      And    benztropine  1 mg Intramuscular Q4H PRN Max 4/day April Solorio MD      benztropine  1 mg Intramuscular Q4H PRN Max 6/day April Solorio MD      benztropine  1 mg Oral Q4H PRN Max 6/day April Solorio MD      bisacodyl  10 mg Rectal Daily PRN April Solorio MD      buprenorphine  16 mg Sublingual Daily Dereje Valente PA-C      cloNIDine  0.1 mg Oral HS Juanita Gonzales MD      hydrOXYzine HCL  50 mg Oral Q6H PRN Max 4/day April Solorio MD      Or    diphenhydrAMINE  50 mg Intramuscular Q6H PRN April Solorio MD      gabapentin  300 mg Oral TID Juanita Gonzales MD      haloperidol  1 mg Oral Q6H PRN April Solorio MD      haloperidol  2.5 mg Oral Q4H PRN Max 4/day April Solorio MD      haloperidol  5 mg Oral Q4H PRN Max 4/day April Solorio MD      hydrOXYzine HCL  100 mg Oral Q6H PRN Max 4/day April Solorio MD      Or    LORazepam  2 mg Intramuscular Q6H PRN April Solorio MD      hydrOXYzine HCL  25 mg Oral Q6H PRN Max 4/day April Solorio MD       ibuprofen  400 mg Oral Q4H PRN April Solorio MD      ibuprofen  600 mg Oral Q6H PRN April Solorio MD      ibuprofen  800 mg Oral Q8H PRN April Solorio MD      melatonin  3 mg Oral HS April Solorio MD      mirtazapine  30 mg Oral HS April Solorio MD      nicotine polacrilex  4 mg Oral Q2H PRN April Solorio MD      polyethylene glycol  17 g Oral Daily PRN April Solorio MD      propranolol  10 mg Oral Q8H PRN April Solorio MD      senna-docusate sodium  1 tablet Oral Daily PRN April Solorio MD      traZODone  50 mg Oral HS PRN April Solorio MD        PRN:    aluminum-magnesium hydroxide-simethicone    haloperidol lactate **AND** LORazepam **AND** benztropine    haloperidol lactate **AND** LORazepam **AND** benztropine    benztropine    benztropine    bisacodyl    hydrOXYzine HCL **OR** diphenhydrAMINE    haloperidol    haloperidol    haloperidol    hydrOXYzine HCL **OR** LORazepam    hydrOXYzine HCL    ibuprofen    ibuprofen    ibuprofen    nicotine polacrilex    polyethylene glycol    propranolol    senna-docusate sodium    traZODone    - Observation: routine            - VS: as per unit protocol  - Legal status: 201  - Diet: Regular diet  - Encourage group attendance and milieu therapy  - Dispo: To be determined        Objective    Current Mental Status Evaluation:  Appearance and attitude: appeared as stated age, dressed in hospital attire, with good hygiene  Eye contact: fair  Motor Function: within normal limits, intact gait, No PMA/PMR  Gait/station: normal gait/station  Speech: normal for rate, rhythm, volume, latency, amount  Language: No overt abnormality  Mood/affect: depressed, dysphoric, anxious / Affect was constricted but reactive, mood congruent  Thought Processes: sequential and goal-directed, logical, coherent, organized  Thought content: denies suicidal ideation or homicidal ideation; no delusions or first rank symptoms  Associations: intact  associations  Perceptual disturbances: denies Auditory/Visual/Tactile Hallucinations  Orientation: oriented to time, person, place and to the situational context  Cognitive Function: intact  Memory: recent and remote memory grossly intact  Intellect: average  Fund of knowledge: aware of current events, aware of past history, and vocabulary average  Impulse control: fair  Insight/judgment: fair/fair      Vital signs in last 24 hours:    Temp:  [97.6 °F (36.4 °C)-97.7 °F (36.5 °C)] 97.7 °F (36.5 °C)  HR:  [56-67] 56  BP: (103-115)/(64-67) 103/65  Resp:  [16-17] 16  SpO2:  [95 %-96 %] 95 %  O2 Device: None (Room air)    Lab Results: I have reviewed the following results:   Most Recent Labs:   Lab Results   Component Value Date    WBC 7.35 01/22/2025    RBC 4.02 01/22/2025    HGB 11.3 (L) 01/22/2025    HCT 36.4 (L) 01/22/2025     01/22/2025    RDW 15.1 01/22/2025    NEUTROABS 3.28 01/22/2025    SODIUM 143 01/22/2025    K 4.3 01/22/2025     (H) 01/22/2025    CO2 27 01/22/2025    BUN 13 01/22/2025    CREATININE 1.02 01/22/2025    GLUC 88 01/22/2025    GLUF 88 01/22/2025    CALCIUM 8.7 01/22/2025    AST 12 (L) 01/22/2025    ALT 8 01/22/2025    ALKPHOS 60 01/22/2025    TP 6.1 (L) 01/22/2025    ALB 3.6 01/22/2025    TBILI 0.28 01/22/2025    CHOLESTEROL 196 01/22/2025    HDL 41 01/22/2025    TRIG 125 01/22/2025    LDLCALC 130 (H) 01/22/2025    NONHDLC 155 01/22/2025    BOU5IWRKQTZQ 1.154 01/22/2025    FREET4 0.97 12/05/2022    HGBA1C 5.9 (H) 01/01/2024     01/01/2024       Counseling / Coordination of Care  Patient's progress discussed with staff in treatment team meeting.  Medications, treatment progress and treatment plan reviewed with patient.  Medication education provided to patient.  Supportive therapy provided to patient.  Cognitive techniques utilized during the session.  Reassurance and supportive therapy provided.  Encouraged participation in milieu and group therapy on the unit.  Crisis/safety  plan discussed with patient.        This note was completed in part utilizing Dragon dictation Software. Grammatical, translation, syntax errors, random word insertions, spelling mistakes, and incomplete sentences may be an occasional consequence of this system secondary to software limitations with voice recognition, ambient noise, and hardware issues. If you have any questions or concerns about the content, text, or information contained within the body of this dictation, please contact the provider for clarification.

## 2025-01-24 NOTE — CASE MANAGEMENT
CM called [facility] to follow up on patient application for housing, Phone: [phone].    CM left a voicemail message requesting a call back in reference to patient's housing application status.

## 2025-01-24 NOTE — NURSING NOTE
"Approached pt in hallway. Pt walking halls and social with female peer. Pt primarily [place] citizen speaking, declined  at this time. Pt verbalizes that he feels safe here but is depressed. \"I'm sad inside.\" Pt denies SI at this time. Denies HI/AVH. Pt is hopeful to get the help he needs. No unmet needs at this time. Urine specimen obtained. Will come to nurse's station with any concerns.  "

## 2025-01-24 NOTE — CASE MANAGEMENT
EZIO received a call from [facility] Probation, Forensic CM Lena Zafar, who is also assigned to work with patient.    Lena reported that she will be looking for a place for patient to discharge to. In the meantime patient must report to Probation Office when he does discharge. Lena can be reached at [phone].

## 2025-01-24 NOTE — CASE MANAGEMENT
Intake    Admit Date/Time: 2025 at 0006   : 1975 (49 yrs old)  Juan Diego Carr  Discharge Date: 25    Treatment Plan:   Social Drivers:   Reviewed   Reviewed    Confirmed Address:    [facility]: [place]      Email: [email or web address]     [address]    [place] [id number] (Mailing Address Only)    Resides in the home with:     Patient is homeless at this time     Will Return Home at Discharge:     Homeless   (was asked to leave the [facility] 1 week prior to coming to the hospital)     Confirmed Phone Number:     Patient could not recall the number      Marital Status:   Children:     Single   2 kids     Family History:              Parents:                             Father -    Mother - in [place] no known mental health issues     Commitment Status:   201      Status Changes:         No changes    Admitted from:     [facility]    Presenting problem:      Patient reported feeling overwhelmed after coming out of nursing home and losing his place to live. He stayed at the [facility] and was kicked out of there recently. He reported an increase in depressive symptoms.         As per ED Note:      Patient is a 49-year-old male who comes in for depression and suicidal ideations. Is no acute distress this time. Patient is currently pending transport to psychiatric facility. Signed out to colleague pending disposition.    Past Inpatient Tx:     At least twice in the past     Past Suicide Attempts:     2 attempts while in nursing home by hanging     Current outpatient:  To be referred     Psychiatrist:     To be referred     Therapist:   To be referred     ACT/ICM/CPS/WRT/SC:     [place] [facility]     Med Hx/Concern:     Asthma     Medications:     Abilify 5 mg, Subutex 8 mg, Catapres 0.1 mg, Neurontin 300 mg, Melatonin 3 mg, Remeron 30 mg     Pharmacy:     [facility]     Spirituality/Zoroastrian:     No     Work/Income:         Preferred time for appts:  SSI/SSD - $  1200/ month    Food Amma- receives monthly unsure of the amount          Open     Legal:       Probation/Chical Ofc:  Probation - [facility]   [facility] [phone]    Access to Firearms:     No     Transportation:     Walks or takes bus     Strength:   Support System:  Cooperative    No support system     Goal:  To get medications     Referrals Needed:        [facility]OP   medication management    Housing   Therapy     Transport at Discharge:     To be arranged by the hospital     IMM:  1/24/25    ROIs obtained:        OP   [facility] Probation (ADULT)    INSURANCE:          EMERGENCY CONTACT:   No emergency contact        MEDICARE - MEDICARE A AND B   [facility] [employer]    Audit: 0  PAWSS:  0  BAT:  0.00  UDS: Pos. (Fentynal, Opiate, Cocaine)    Substance Abuse Hx:   Freq.  Amount  Last Use  Notes    Cocaine  1x use   Unknown     Pt reported using substance to manage    Fentanyl   1x use     Unknown         Opiate     Unknown

## 2025-01-24 NOTE — CASE MANAGEMENT
CM called the [facility] at [phone] to follow up on patient's medication. CM left a voicemail message.   Patient stated that he goes here for medication management and was seen once so far. Patient confirmed that he went to the [address] [place] [address] location in [place].

## 2025-01-24 NOTE — PLAN OF CARE
Problem: DISCHARGE PLANNING  Goal: Discharge to home or other facility with appropriate resources  Description: INTERVENTIONS:  - Identify barriers to discharge w/patient and caregiver  - Arrange for needed discharge resources and transportation as appropriate  - Identify discharge learning needs (meds, wound care, etc.)  - Arrange for interpretive services to assist at discharge as needed  - Refer to Case Management Department for coordinating discharge planning if the patient needs post-hospital services based on physician/advanced practitioner order or complex needs related to functional status, cognitive ability, or social support system  Outcome: Progressing   - CM met with patient and went over ROIs, Intake, and Tx Plan.     -Pt is a 201

## 2025-01-24 NOTE — NURSING NOTE
Pt is awake and OOB for breakfast and medications. Pt reports feeling depressed and sad. Denies SI, HI, AVH. Writer educated pt on medications. Pt declined to attend groups, reports not knowing what is being said, as [place]ian is pt's primary language. Encouraged pt to alert staff if any changes in symptoms.

## 2025-01-24 NOTE — NURSING NOTE
"Pt pleasant during assessment. Pt reports, \"I'm depressed but I am good.\" Pt denies SI/HI/AVH. Pt intermittently napping this evening. Social with select peers. OOB for meals and medications. Denies unmet needs at this time.   "

## 2025-01-24 NOTE — PLAN OF CARE
Problem: Ineffective Coping  Goal: Identifies ineffective coping skills  Outcome: Progressing  Goal: Identifies healthy coping skills  Outcome: Progressing  Goal: Demonstrates healthy coping skills  Outcome: Progressing  Goal: Participates in unit activities  Description: Interventions:  - Provide therapeutic environment   - Provide required programming   - Redirect inappropriate behaviors   Outcome: Progressing  Goal: Patient/Family participate in treatment and DC plans  Description: Interventions:  - Provide therapeutic environment  Outcome: Progressing  Goal: Patient/Family verbalizes awareness of resources  Outcome: Progressing     Problem: Depression  Goal: Treatment Goal: Demonstrate behavioral control of depressive symptoms, verbalize feelings of improved mood/affect, and adopt new coping skills prior to discharge  Outcome: Progressing  Goal: Verbalize thoughts and feelings  Description: Interventions:  - Assess and re-assess patient's level of risk   - Engage patient in 1:1 interactions, daily, for a minimum of 15 minutes   - Encourage patient to express feelings, fears, frustrations, hopes   Outcome: Progressing  Goal: Refrain from harming self  Description: Interventions:  - Monitor patient closely, per order   - Supervise medication ingestion, monitor effects and side effects   Outcome: Progressing  Goal: Refrain from isolation  Description: Interventions:  - Develop a trusting relationship   - Encourage socialization   Outcome: Progressing  Goal: Refrain from self-neglect  Outcome: Progressing  Goal: Attend and participate in unit activities, including therapeutic, recreational, and educational groups  Description: Interventions:  - Provide therapeutic and educational activities daily, encourage attendance and participation, and document same in the medical record   Outcome: Progressing  Goal: Complete daily ADLs, including personal hygiene independently, as able  Description: Interventions:  -  Observe, teach, and assist patient with ADLS  -  Monitor and promote a balance of rest/activity, with adequate nutrition and elimination   Outcome: Progressing     Problem: Anxiety  Goal: Anxiety is at manageable level  Description: Interventions:  - Assess and monitor patient's anxiety level.   - Monitor for signs and symptoms (heart palpitations, chest pain, shortness of breath, headaches, nausea, feeling jumpy, restlessness, irritable, apprehensive).   - Collaborate with interdisciplinary team and initiate plan and interventions as ordered.  - [place] patient to unit/surroundings  - Explain treatment plan  - Encourage participation in care  - Encourage verbalization of concerns/fears  - Identify coping mechanisms  - Assist in developing anxiety-reducing skills  - Administer/offer alternative therapies  - Limit or eliminate stimulants  Outcome: Progressing     Problem: DISCHARGE PLANNING  Goal: Discharge to home or other facility with appropriate resources  Description: INTERVENTIONS:  - Identify barriers to discharge w/patient and caregiver  - Arrange for needed discharge resources and transportation as appropriate  - Identify discharge learning needs (meds, wound care, etc.)  - Arrange for interpretive services to assist at discharge as needed  - Refer to Case Management Department for coordinating discharge planning if the patient needs post-hospital services based on physician/advanced practitioner order or complex needs related to functional status, cognitive ability, or social support system  Outcome: Progressing

## 2025-01-25 PROCEDURE — 99232 SBSQ HOSP IP/OBS MODERATE 35: CPT | Performed by: PSYCHIATRY & NEUROLOGY

## 2025-01-25 RX ADMIN — POLYETHYLENE GLYCOL 3350 17 G: 17 POWDER, FOR SOLUTION ORAL at 17:36

## 2025-01-25 RX ADMIN — MIRTAZAPINE 30 MG: 15 TABLET, FILM COATED ORAL at 21:18

## 2025-01-25 RX ADMIN — BUPRENORPHINE 16 MG: 8 TABLET SUBLINGUAL at 08:48

## 2025-01-25 RX ADMIN — GABAPENTIN 300 MG: 300 CAPSULE ORAL at 17:00

## 2025-01-25 RX ADMIN — CLONIDINE HYDROCHLORIDE 0.1 MG: 0.1 TABLET ORAL at 21:18

## 2025-01-25 RX ADMIN — Medication 3 MG: at 21:19

## 2025-01-25 RX ADMIN — ARIPIPRAZOLE 5 MG: 5 TABLET ORAL at 08:48

## 2025-01-25 RX ADMIN — GABAPENTIN 300 MG: 300 CAPSULE ORAL at 08:48

## 2025-01-25 RX ADMIN — GABAPENTIN 300 MG: 300 CAPSULE ORAL at 21:18

## 2025-01-25 NOTE — ASSESSMENT & PLAN NOTE
Abilify 5 mg daily, may go up on the dose.  Clonidine 0.1 mg at bedtime  Increase gabapentin 300 mg 3 times daily  Melatonin 3 mg at bedtime  Remeron to 30 mg at bedtime

## 2025-01-25 NOTE — PROGRESS NOTES
Progress Note - Behavioral Health   Name: Juan Diego Horowitz 49 y.o. male I MRN: [id number]  Unit/Bed#: -01 I Date of Admission: 1/22/2025   Date of Service: 1/25/2025 I Hospital Day: 3     Assessment & Plan  Bipolar 1 disorder (HCC)  Abilify 5 mg daily, may go up on the dose.  Clonidine 0.1 mg at bedtime  Increase gabapentin 300 mg 3 times daily  Melatonin 3 mg at bedtime  Remeron to 30 mg at bedtime  Asthma    Opioid use disorder, severe, dependence (HCC)  Continue Subutex 16 mg daily  Tobacco use disorder    Polysubstance abuse (HCC)    Medical clearance for psychiatric admission      Progress Toward Goals: progressing, not at goal    Recommended Treatment: Continue with group therapy, milieu therapy and occupational therapy.      Risks, benefits and possible side effects of Medications:   Risks, benefits, and possible side effects of medications explained to patient and patient verbalizes understanding.      History of Present Illness   Behavior over the last 24 hours:  unchanged  Sleep: normal  Appetite: normal  Medication side effects: No  ROS: no complaints    Subjective: Patient was seen, chart was reviewed, and case was discussed with the team. As per report, Juan Diego has been isolative to room, reporting depressed mood, denying SI, and appeared to sleep throughout night. On evaluation today, patient is found resting in bed. Scant and guarded on evaluation. He describes mood as depressed. He denies perceptual disturbances, SI/HI, or medication side effects.     Objective   Mental Status Evaluation:  Appearance:  casually dressed and dressed in hospital attire, appears stated age, limited eye contact   Behavior:  guarded and cooperative   Speech:  soft and scant   Mood:  depressed   Affect:  blunted and mood-congruent   Thought Process:  goal directed   Associations: intact associations   Thought Content:  No overt delusions   Perceptual Disturbances: Patient denies   Risk Potential: Suicidal  Ideations none  Homicidal Ideations none  Potential for Aggression No   Sensorium:  person, place, time/date, and situation   Memory:  recent and remote memory grossly intact   Consciousness:  alert and awake    Attention: attention span appeared shorter than expected for age   Insight:  fair   Judgment: fair   Gait/Station: In bed, not observed   Motor Activity: no abnormal movements     Medications: all current active meds have been reviewed, continue current psychiatric medications, and current meds:   Current Facility-Administered Medications:     aluminum-magnesium hydroxide-simethicone (MAALOX) oral suspension 30 mL, Q4H PRN    ARIPiprazole (ABILIFY) tablet 5 mg, Daily    haloperidol lactate (HALDOL) injection 2.5 mg, Q4H PRN Max 4/day **AND** LORazepam (ATIVAN) injection 1 mg, Q4H PRN Max 4/day **AND** benztropine (COGENTIN) injection 0.5 mg, Q4H PRN Max 4/day    haloperidol lactate (HALDOL) injection 5 mg, Q4H PRN Max 4/day **AND** LORazepam (ATIVAN) injection 2 mg, Q4H PRN Max 4/day **AND** benztropine (COGENTIN) injection 1 mg, Q4H PRN Max 4/day    benztropine (COGENTIN) injection 1 mg, Q4H PRN Max 6/day    benztropine (COGENTIN) tablet 1 mg, Q4H PRN Max 6/day    bisacodyl (DULCOLAX) rectal suppository 10 mg, Daily PRN    buprenorphine (SUBUTEX) 8 mg SL tablet 16 mg, Daily    cloNIDine (CATAPRES) tablet 0.1 mg, HS    hydrOXYzine HCL (ATARAX) tablet 50 mg, Q6H PRN Max 4/day **OR** diphenhydrAMINE (BENADRYL) injection 50 mg, Q6H PRN    gabapentin (NEURONTIN) capsule 300 mg, TID    haloperidol (HALDOL) tablet 1 mg, Q6H PRN    haloperidol (HALDOL) tablet 2.5 mg, Q4H PRN Max 4/day    haloperidol (HALDOL) tablet 5 mg, Q4H PRN Max 4/day    hydrOXYzine HCL (ATARAX) tablet 100 mg, Q6H PRN Max 4/day **OR** LORazepam (ATIVAN) injection 2 mg, Q6H PRN    hydrOXYzine HCL (ATARAX) tablet 25 mg, Q6H PRN Max 4/day    ibuprofen (MOTRIN) tablet 400 mg, Q4H PRN    ibuprofen (MOTRIN) tablet 600 mg, Q6H PRN    ibuprofen  (MOTRIN) tablet 800 mg, Q8H PRN    melatonin tablet 3 mg, HS    mirtazapine (REMERON) tablet 30 mg, HS    nicotine polacrilex (NICORETTE) gum 4 mg, Q2H PRN    polyethylene glycol (MIRALAX) packet 17 g, Daily PRN    propranolol (INDERAL) tablet 10 mg, Q8H PRN    senna-docusate sodium (SENOKOT S) 8.6-50 mg per tablet 1 tablet, Daily PRN    traZODone (DESYREL) tablet 50 mg, HS PRN.      Lab Results: I have reviewed the following results:  Most Recent Labs:   Lab Results   Component Value Date    WBC 7.35 01/22/2025    RBC 4.02 01/22/2025    HGB 11.3 (L) 01/22/2025    HCT 36.4 (L) 01/22/2025     01/22/2025    RDW 15.1 01/22/2025    NEUTROABS 3.28 01/22/2025    SODIUM 143 01/22/2025    K 4.3 01/22/2025     (H) 01/22/2025    CO2 27 01/22/2025    BUN 13 01/22/2025    CREATININE 1.02 01/22/2025    GLUC 88 01/22/2025    GLUF 88 01/22/2025    CALCIUM 8.7 01/22/2025    AST 12 (L) 01/22/2025    ALT 8 01/22/2025    ALKPHOS 60 01/22/2025    TP 6.1 (L) 01/22/2025    ALB 3.6 01/22/2025    TBILI 0.28 01/22/2025    CHOLESTEROL 196 01/22/2025    HDL 41 01/22/2025    TRIG 125 01/22/2025    LDLCALC 130 (H) 01/22/2025    NONHDLC 155 01/22/2025    QBP4NKPJNSBJ 1.154 01/22/2025    FREET4 0.97 12/05/2022    HGBA1C 5.9 (H) 01/01/2024     01/01/2024

## 2025-01-25 NOTE — NURSING NOTE
Pt compliant with medication after breakfast. Denies SI/HI or hallucination. Withdrawn to room until lunch time. After lunch, watching a movie with peers in the dayroom. Denies any question or concern at this time.

## 2025-01-25 NOTE — PLAN OF CARE
Problem: Ineffective Coping  Goal: Identifies ineffective coping skills  Outcome: Progressing  Goal: Identifies healthy coping skills  Outcome: Progressing  Goal: Demonstrates healthy coping skills  Outcome: Progressing  Goal: Patient/Family participate in treatment and DC plans  Description: Interventions:  - Provide therapeutic environment  Outcome: Progressing  Goal: Patient/Family verbalizes awareness of resources  Outcome: Progressing     Problem: Depression  Goal: Treatment Goal: Demonstrate behavioral control of depressive symptoms, verbalize feelings of improved mood/affect, and adopt new coping skills prior to discharge  Outcome: Progressing  Goal: Verbalize thoughts and feelings  Description: Interventions:  - Assess and re-assess patient's level of risk   - Engage patient in 1:1 interactions, daily, for a minimum of 15 minutes   - Encourage patient to express feelings, fears, frustrations, hopes   Outcome: Progressing  Goal: Refrain from harming self  Description: Interventions:  - Monitor patient closely, per order   - Supervise medication ingestion, monitor effects and side effects   Outcome: Progressing  Goal: Refrain from self-neglect  Outcome: Progressing  Goal: Complete daily ADLs, including personal hygiene independently, as able  Description: Interventions:  - Observe, teach, and assist patient with ADLS  -  Monitor and promote a balance of rest/activity, with adequate nutrition and elimination   Outcome: Progressing     Problem: Anxiety  Goal: Anxiety is at manageable level  Description: Interventions:  - Assess and monitor patient's anxiety level.   - Monitor for signs and symptoms (heart palpitations, chest pain, shortness of breath, headaches, nausea, feeling jumpy, restlessness, irritable, apprehensive).   - Collaborate with interdisciplinary team and initiate plan and interventions as ordered.  - [place] patient to unit/surroundings  - Explain treatment plan  - Encourage participation in  care  - Encourage verbalization of concerns/fears  - Identify coping mechanisms  - Assist in developing anxiety-reducing skills  - Administer/offer alternative therapies  - Limit or eliminate stimulants  Outcome: Progressing     Problem: DISCHARGE PLANNING  Goal: Discharge to home or other facility with appropriate resources  Description: INTERVENTIONS:  - Identify barriers to discharge w/patient and caregiver  - Arrange for needed discharge resources and transportation as appropriate  - Identify discharge learning needs (meds, wound care, etc.)  - Arrange for interpretive services to assist at discharge as needed  - Refer to Case Management Department for coordinating discharge planning if the patient needs post-hospital services based on physician/advanced practitioner order or complex needs related to functional status, cognitive ability, or social support system  Outcome: Progressing     Problem: Ineffective Coping  Goal: Participates in unit activities  Description: Interventions:  - Provide therapeutic environment   - Provide required programming   - Redirect inappropriate behaviors   Outcome: Not Progressing     Problem: Depression  Goal: Refrain from isolation  Description: Interventions:  - Develop a trusting relationship   - Encourage socialization   Outcome: Not Progressing  Goal: Attend and participate in unit activities, including therapeutic, recreational, and educational groups  Description: Interventions:  - Provide therapeutic and educational activities daily, encourage attendance and participation, and document same in the medical record   Outcome: Not Progressing

## 2025-01-26 PROCEDURE — 99232 SBSQ HOSP IP/OBS MODERATE 35: CPT | Performed by: PSYCHIATRY & NEUROLOGY

## 2025-01-26 PROCEDURE — GZHZZZZ GROUP PSYCHOTHERAPY: ICD-10-PCS | Performed by: STUDENT IN AN ORGANIZED HEALTH CARE EDUCATION/TRAINING PROGRAM

## 2025-01-26 RX ADMIN — CLONIDINE HYDROCHLORIDE 0.1 MG: 0.1 TABLET ORAL at 21:22

## 2025-01-26 RX ADMIN — MIRTAZAPINE 30 MG: 15 TABLET, FILM COATED ORAL at 21:22

## 2025-01-26 RX ADMIN — Medication 3 MG: at 21:22

## 2025-01-26 RX ADMIN — BUPRENORPHINE 16 MG: 8 TABLET SUBLINGUAL at 09:09

## 2025-01-26 RX ADMIN — GABAPENTIN 300 MG: 300 CAPSULE ORAL at 21:22

## 2025-01-26 RX ADMIN — IBUPROFEN 800 MG: 800 TABLET, FILM COATED ORAL at 09:09

## 2025-01-26 RX ADMIN — GABAPENTIN 300 MG: 300 CAPSULE ORAL at 16:50

## 2025-01-26 RX ADMIN — HYDROXYZINE HYDROCHLORIDE 100 MG: 50 TABLET, FILM COATED ORAL at 12:40

## 2025-01-26 RX ADMIN — GABAPENTIN 300 MG: 300 CAPSULE ORAL at 09:10

## 2025-01-26 RX ADMIN — POLYETHYLENE GLYCOL 3350 17 G: 17 POWDER, FOR SOLUTION ORAL at 12:05

## 2025-01-26 RX ADMIN — ARIPIPRAZOLE 5 MG: 5 TABLET ORAL at 09:09

## 2025-01-26 NOTE — PROGRESS NOTES
Progress Note - Behavioral Health   Name: Juan Diego Horowitz 49 y.o. male I MRN: [id number]  Unit/Bed#: -01 I Date of Admission: 1/22/2025   Date of Service: 1/26/2025 I Hospital Day: 4     Assessment & Plan  Bipolar 1 disorder (HCC)  Abilify 5 mg daily, may go up on the dose.  Clonidine 0.1 mg at bedtime  Increase gabapentin 300 mg 3 times daily  Melatonin 3 mg at bedtime  Remeron to 30 mg at bedtime  Asthma    Opioid use disorder, severe, dependence (HCC)  Continue Subutex 16 mg daily  Tobacco use disorder    Polysubstance abuse (HCC)    Medical clearance for psychiatric admission      Progress Toward Goals: progressing, not yet at goal    Recommended Treatment: Continue with group therapy, milieu therapy and occupational therapy.      Risks, benefits and possible side effects of Medications:   Risks, benefits, and possible side effects of medications explained to patient and patient verbalizes understanding.      History of Present Illness   Behavior over the last 24 hours:  unchanged  Sleep: normal  Appetite: normal  Medication side effects: No  ROS: no complaints    Subjective: Patient was seen, chart was reviewed, and case was discussed with team. As per report, Juan Diego reporting depressed mood, no SI/HI or AH/VH, more visible in afternoon. On evaluation today, Juan Diego is intermittently observed in milieu. Not participating in programming. He describes mood as depressed. He denies SI/HI, perceptual disturbances, or medication side effects. He feels tired this morning but reports adequate sleep overnight.    Objective   Mental Status Evaluation:  Appearance:  age appropriate and casually dressed   Behavior:  guarded   Speech:  normal pitch, normal volume, and scant   Mood:  depressed and irritable   Affect:  constricted and mood-congruent   Thought Process:  goal directed and coherent   Associations: intact associations   Thought Content:  normal   Perceptual Disturbances: None   Risk Potential:  Suicidal Ideations none  Homicidal Ideations none  Potential for Aggression No   Sensorium:  person, place, time/date, and situation   Memory:  recent and remote memory grossly intact   Consciousness:  alert and awake    Attention: attention span appeared shorter than expected for age   Insight:  fair   Judgment: fair   Gait/Station: normal gait/station and normal balance   Motor Activity: no abnormal movements     Medications: all current active meds have been reviewed, continue current psychiatric medications, and current meds:   Current Facility-Administered Medications:     aluminum-magnesium hydroxide-simethicone (MAALOX) oral suspension 30 mL, Q4H PRN    ARIPiprazole (ABILIFY) tablet 5 mg, Daily    haloperidol lactate (HALDOL) injection 2.5 mg, Q4H PRN Max 4/day **AND** LORazepam (ATIVAN) injection 1 mg, Q4H PRN Max 4/day **AND** benztropine (COGENTIN) injection 0.5 mg, Q4H PRN Max 4/day    haloperidol lactate (HALDOL) injection 5 mg, Q4H PRN Max 4/day **AND** LORazepam (ATIVAN) injection 2 mg, Q4H PRN Max 4/day **AND** benztropine (COGENTIN) injection 1 mg, Q4H PRN Max 4/day    benztropine (COGENTIN) injection 1 mg, Q4H PRN Max 6/day    benztropine (COGENTIN) tablet 1 mg, Q4H PRN Max 6/day    bisacodyl (DULCOLAX) rectal suppository 10 mg, Daily PRN    buprenorphine (SUBUTEX) 8 mg SL tablet 16 mg, Daily    cloNIDine (CATAPRES) tablet 0.1 mg, HS    hydrOXYzine HCL (ATARAX) tablet 50 mg, Q6H PRN Max 4/day **OR** diphenhydrAMINE (BENADRYL) injection 50 mg, Q6H PRN    gabapentin (NEURONTIN) capsule 300 mg, TID    haloperidol (HALDOL) tablet 1 mg, Q6H PRN    haloperidol (HALDOL) tablet 2.5 mg, Q4H PRN Max 4/day    haloperidol (HALDOL) tablet 5 mg, Q4H PRN Max 4/day    hydrOXYzine HCL (ATARAX) tablet 100 mg, Q6H PRN Max 4/day **OR** LORazepam (ATIVAN) injection 2 mg, Q6H PRN    hydrOXYzine HCL (ATARAX) tablet 25 mg, Q6H PRN Max 4/day    ibuprofen (MOTRIN) tablet 400 mg, Q4H PRN    ibuprofen (MOTRIN) tablet 600 mg,  Q6H PRN    ibuprofen (MOTRIN) tablet 800 mg, Q8H PRN    melatonin tablet 3 mg, HS    mirtazapine (REMERON) tablet 30 mg, HS    nicotine polacrilex (NICORETTE) gum 4 mg, Q2H PRN    polyethylene glycol (MIRALAX) packet 17 g, Daily PRN    propranolol (INDERAL) tablet 10 mg, Q8H PRN    senna-docusate sodium (SENOKOT S) 8.6-50 mg per tablet 1 tablet, Daily PRN    traZODone (DESYREL) tablet 50 mg, HS PRN.      Lab Results: I have reviewed the following results:  Most Recent Labs:   Lab Results   Component Value Date    WBC 7.35 01/22/2025    RBC 4.02 01/22/2025    HGB 11.3 (L) 01/22/2025    HCT 36.4 (L) 01/22/2025     01/22/2025    RDW 15.1 01/22/2025    NEUTROABS 3.28 01/22/2025    SODIUM 143 01/22/2025    K 4.3 01/22/2025     (H) 01/22/2025    CO2 27 01/22/2025    BUN 13 01/22/2025    CREATININE 1.02 01/22/2025    GLUC 88 01/22/2025    GLUF 88 01/22/2025    CALCIUM 8.7 01/22/2025    AST 12 (L) 01/22/2025    ALT 8 01/22/2025    ALKPHOS 60 01/22/2025    TP 6.1 (L) 01/22/2025    ALB 3.6 01/22/2025    TBILI 0.28 01/22/2025    CHOLESTEROL 196 01/22/2025    HDL 41 01/22/2025    TRIG 125 01/22/2025    LDLCALC 130 (H) 01/22/2025    NONHDLC 155 01/22/2025    [id number] 1.154 01/22/2025    FREET4 0.97 12/05/2022    HGBA1C 5.9 (H) 01/01/2024     01/01/2024

## 2025-01-26 NOTE — NURSING NOTE
"After breakfast, pt redirected by RN for inappropriate behaviors. Pt observed laying in bed. Compliant with medication. Reports feeling \"depressed\". Did not want to elaborate further. Pt denies SI/HI or hallucination. Assisted with shaving.  "

## 2025-01-26 NOTE — TREATMENT TEAM
01/26/25 0800   Team Meeting   Meeting Type Daily Rounds   Team Members Present   Team Members Present Physician;Nurse   Physician Team Member Arianna/Yury   Nursing Team Member Nicole   Patient/Family Present   Patient Present No   Patient's Family Present No       AM rounds- denies SI/HI, social with select peers. Declined to attend groups. Reports ongoing depression.

## 2025-01-26 NOTE — NURSING NOTE
"Pt walking in halls and very loudly banging on peers door. A peer in the next room over was startled. Pt returning through the [place], loudly banging on that peers door, what appeared to be intentionally. Writer redirected pt for this behaviors. Pt loudly yelled, \"Fuck!\" Before returning to room. Will continue to monitor behaviors.   "

## 2025-01-26 NOTE — NURSING NOTE
Pt reporting anxiety. PRN Atarax 100 mg po effective. Observed sleeping in bed, no signs of distress. Respirations non-labored.

## 2025-01-26 NOTE — PLAN OF CARE
Problem: Ineffective Coping  Goal: Identifies ineffective coping skills  Outcome: Progressing  Goal: Identifies healthy coping skills  Outcome: Progressing  Goal: Demonstrates healthy coping skills  Outcome: Progressing  Goal: Participates in unit activities  Description: Interventions:  - Provide therapeutic environment   - Provide required programming   - Redirect inappropriate behaviors   Outcome: Progressing  Goal: Patient/Family participate in treatment and DC plans  Description: Interventions:  - Provide therapeutic environment  Outcome: Progressing  Goal: Patient/Family verbalizes awareness of resources  Outcome: Progressing     Problem: Depression  Goal: Treatment Goal: Demonstrate behavioral control of depressive symptoms, verbalize feelings of improved mood/affect, and adopt new coping skills prior to discharge  Outcome: Progressing  Goal: Verbalize thoughts and feelings  Description: Interventions:  - Assess and re-assess patient's level of risk   - Engage patient in 1:1 interactions, daily, for a minimum of 15 minutes   - Encourage patient to express feelings, fears, frustrations, hopes   Outcome: Progressing  Goal: Refrain from harming self  Description: Interventions:  - Monitor patient closely, per order   - Supervise medication ingestion, monitor effects and side effects   Outcome: Progressing  Goal: Refrain from isolation  Description: Interventions:  - Develop a trusting relationship   - Encourage socialization   Outcome: Progressing  Goal: Refrain from self-neglect  Outcome: Progressing  Goal: Attend and participate in unit activities, including therapeutic, recreational, and educational groups  Description: Interventions:  - Provide therapeutic and educational activities daily, encourage attendance and participation, and document same in the medical record   Outcome: Progressing  Goal: Complete daily ADLs, including personal hygiene independently, as able  Description: Interventions:  -  Observe, teach, and assist patient with ADLS  -  Monitor and promote a balance of rest/activity, with adequate nutrition and elimination   Outcome: Progressing     Problem: Anxiety  Goal: Anxiety is at manageable level  Description: Interventions:  - Assess and monitor patient's anxiety level.   - Monitor for signs and symptoms (heart palpitations, chest pain, shortness of breath, headaches, nausea, feeling jumpy, restlessness, irritable, apprehensive).   - Collaborate with interdisciplinary team and initiate plan and interventions as ordered.  - [place] patient to unit/surroundings  - Explain treatment plan  - Encourage participation in care  - Encourage verbalization of concerns/fears  - Identify coping mechanisms  - Assist in developing anxiety-reducing skills  - Administer/offer alternative therapies  - Limit or eliminate stimulants  Outcome: Progressing     Problem: DISCHARGE PLANNING  Goal: Discharge to home or other facility with appropriate resources  Description: INTERVENTIONS:  - Identify barriers to discharge w/patient and caregiver  - Arrange for needed discharge resources and transportation as appropriate  - Identify discharge learning needs (meds, wound care, etc.)  - Arrange for interpretive services to assist at discharge as needed  - Refer to Case Management Department for coordinating discharge planning if the patient needs post-hospital services based on physician/advanced practitioner order or complex needs related to functional status, cognitive ability, or social support system  Outcome: Progressing

## 2025-01-26 NOTE — NURSING NOTE
Pt visible and social with Japanese speaking peer. Pt unchanged from earlier assessment. Pt denies SI/HI/AVH.

## 2025-01-26 NOTE — NURSING NOTE
"Staff was notified that pt had urinated into a cup and placed it inside bedside table. T redirected pt for this behavior. Pt's roommate then approached staff saying that pt is calling roommate a \"snitch.\" Staff will continue to monitor pt behaviors.   "

## 2025-01-27 PROCEDURE — 99232 SBSQ HOSP IP/OBS MODERATE 35: CPT | Performed by: STUDENT IN AN ORGANIZED HEALTH CARE EDUCATION/TRAINING PROGRAM

## 2025-01-27 RX ADMIN — GABAPENTIN 300 MG: 300 CAPSULE ORAL at 09:06

## 2025-01-27 RX ADMIN — MIRTAZAPINE 30 MG: 15 TABLET, FILM COATED ORAL at 21:18

## 2025-01-27 RX ADMIN — GABAPENTIN 300 MG: 300 CAPSULE ORAL at 21:18

## 2025-01-27 RX ADMIN — CLONIDINE HYDROCHLORIDE 0.1 MG: 0.1 TABLET ORAL at 21:18

## 2025-01-27 RX ADMIN — BUPRENORPHINE 16 MG: 8 TABLET SUBLINGUAL at 09:06

## 2025-01-27 RX ADMIN — HYDROXYZINE HYDROCHLORIDE 100 MG: 50 TABLET, FILM COATED ORAL at 13:28

## 2025-01-27 RX ADMIN — Medication 3 MG: at 21:18

## 2025-01-27 RX ADMIN — GABAPENTIN 300 MG: 300 CAPSULE ORAL at 16:16

## 2025-01-27 RX ADMIN — ARIPIPRAZOLE 5 MG: 5 TABLET ORAL at 09:06

## 2025-01-27 NOTE — PROGRESS NOTES
Progress Note - Behavioral Health   Name: Juan Diego Horowitz 49 y.o. male I MRN: [id number]  Unit/Bed#: -01 I Date of Admission: 1/22/2025   Date of Service: 1/27/2025 I Hospital Day: 5     Assessment & Plan  Bipolar 1 disorder (HCC)  Abilify 5 mg daily, may go up on the dose.  Clonidine 0.1 mg at bedtime  Increase gabapentin 300 mg 3 times daily  Melatonin 3 mg at bedtime  Remeron to 30 mg at bedtime  Asthma  As per Medicine recommendations  Opioid use disorder, severe, dependence (HCC)  Continue Subutex 16 mg daily  Tobacco use disorder  Nicotine replacement   Polysubstance abuse (HCC)  Psychotherapy and counselling  Medical clearance for psychiatric admission  Medicine consult    Recommended Treatment: Continue with pharmacotherapy, group therapy, milieu therapy and occupational therapy.   Risks, benefits and possible side effects of Medications:   Risks, benefits, alternatives, and possible side effects of patient's psychiatric medications were discussed with patient.    Progress Toward Goals: Progressing. Patient is not at goal. They are not yet ready for discharge. The patient's condition currently requires active psychopharmacological medication management, interdisciplinary coordination with case management, and the utilization of adjunctive milieu and group therapy to augment psychopharmacological efficacy. The patient's risk of morbidity, and progression or decompensation of psychiatric disease, is higher without this current treatment.    Subjective: Patient was seen, chart was reviewed, and case was discussed with the team.  As per report patient was inappropriate to peers and needed redirections.  Patient appears cooperative and apologetic for weekend behaviors.  Patient continues to endorse depressed mood and fluctuating anxiety in the context of ongoing social issues.  Sleep has improved.  Denies any thoughts to hurt himself or others.     Patient is compliant with medications. Patient denied  adverse effects to their current psychiatric medication regimen. Discussed the importance of continuing to take medications as prescribed, as well as the importance of continuing to attend groups on the unit.    Behavior over the last 24 hours:  improved  Sleep: normal  Appetite: normal  Medication side effects: No    Medical ROS: Pertinent items are noted in HPI.all other systems are negative      Scheduled medications:  Current Facility-Administered Medications   Medication Dose Route Frequency Provider Last Rate    aluminum-magnesium hydroxide-simethicone  30 mL Oral Q4H PRN April Solorio MD      ARIPiprazole  5 mg Oral Daily Juanita Gonzales MD      haloperidol lactate  2.5 mg Intramuscular Q4H PRN Max 4/day April Solorio MD      And    LORazepam  1 mg Intramuscular Q4H PRN Max 4/day April Solorio MD      And    benztropine  0.5 mg Intramuscular Q4H PRN Max 4/day April Solorio MD      haloperidol lactate  5 mg Intramuscular Q4H PRN Max 4/day April Solorio MD      And    LORazepam  2 mg Intramuscular Q4H PRN Max 4/day April Solorio MD      And    benztropine  1 mg Intramuscular Q4H PRN Max 4/day April Solorio MD      benztropine  1 mg Intramuscular Q4H PRN Max 6/day April Solorio MD      benztropine  1 mg Oral Q4H PRN Max 6/day April Solorio MD      bisacodyl  10 mg Rectal Daily PRN April Solorio MD      buprenorphine  16 mg Sublingual Daily Dereje Valente PA-C      cloNIDine  0.1 mg Oral HS Juanita Gonzales MD      hydrOXYzine HCL  50 mg Oral Q6H PRN Max 4/day April Solorio MD      Or    diphenhydrAMINE  50 mg Intramuscular Q6H PRN April Solorio MD      gabapentin  300 mg Oral TID Juanita Gonzales MD      haloperidol  1 mg Oral Q6H PRN April Solorio MD      haloperidol  2.5 mg Oral Q4H PRN Max 4/day April Solorio MD      haloperidol  5 mg Oral Q4H PRN Max 4/day April Solorio MD      hydrOXYzine HCL  100 mg  Oral Q6H PRN Max 4/day April Solorio MD      Or    LORazepam  2 mg Intramuscular Q6H PRN April Solorio MD      hydrOXYzine HCL  25 mg Oral Q6H PRN Max 4/day April Solorio MD      ibuprofen  400 mg Oral Q4H PRN April Solorio MD      ibuprofen  600 mg Oral Q6H PRN April Solorio MD      ibuprofen  800 mg Oral Q8H PRN April Solorio MD      melatonin  3 mg Oral HS April Solorio MD      mirtazapine  30 mg Oral HS April Solorio MD      nicotine polacrilex  4 mg Oral Q2H PRN April Solorio MD      polyethylene glycol  17 g Oral Daily PRN April Solorio MD      propranolol  10 mg Oral Q8H PRN April Solorio MD      senna-docusate sodium  1 tablet Oral Daily PRN April Solorio MD      traZODone  50 mg Oral HS PRN April Solorio MD        PRN:    aluminum-magnesium hydroxide-simethicone    haloperidol lactate **AND** LORazepam **AND** benztropine    haloperidol lactate **AND** LORazepam **AND** benztropine    benztropine    benztropine    bisacodyl    hydrOXYzine HCL **OR** diphenhydrAMINE    haloperidol    haloperidol    haloperidol    hydrOXYzine HCL **OR** LORazepam    hydrOXYzine HCL    ibuprofen    ibuprofen    ibuprofen    nicotine polacrilex    polyethylene glycol    propranolol    senna-docusate sodium    traZODone    - Observation: routine            - VS: as per unit protocol  - Legal status: 201  - Diet: Regular diet  - Encourage group attendance and milieu therapy  - Dispo: To be determined        Objective    Current Mental Status Evaluation:  Appearance and attitude: appeared as stated age, cooperative and attentive, dressed in hospital attire  Eye contact: fair  Motor Function: within normal limits, intact gait, No PMA/PMR  Gait/station: normal gait/station  Speech: normal for rate, rhythm, volume, latency, amount  Language: No overt abnormality  Mood/affect: depressed / Affect was constricted but reactive, mood congruent  Thought Processes: sequential and  goal-directed, logical, coherent, organized  Thought content: denies suicidal ideation or homicidal ideation; no delusions or first rank symptoms  Associations: intact associations  Perceptual disturbances: denies Auditory/Visual/Tactile Hallucinations  Orientation: oriented to time, person, place and to the situational context  Cognitive Function: intact  Memory: recent and remote memory grossly intact  Intellect: average  Fund of knowledge: aware of current events, aware of past history, and vocabulary average  Impulse control: fair  Insight/judgment: fair/fair      Vital signs in last 24 hours:    Temp:  [97.3 °F (36.3 °C)-98 °F (36.7 °C)] 98 °F (36.7 °C)  HR:  [56-70] 56  BP: (111-123)/(67-81) 121/67  Resp:  [18] 18  SpO2:  [96 %-98 %] 98 %  O2 Device: None (Room air)    Lab Results: I have reviewed the following results:   Most Recent Labs:   Lab Results   Component Value Date    WBC 7.35 01/22/2025    RBC 4.02 01/22/2025    HGB 11.3 (L) 01/22/2025    HCT 36.4 (L) 01/22/2025     01/22/2025    RDW 15.1 01/22/2025    NEUTROABS 3.28 01/22/2025    SODIUM 143 01/22/2025    K 4.3 01/22/2025     (H) 01/22/2025    CO2 27 01/22/2025    BUN 13 01/22/2025    CREATININE 1.02 01/22/2025    GLUC 88 01/22/2025    GLUF 88 01/22/2025    CALCIUM 8.7 01/22/2025    AST 12 (L) 01/22/2025    ALT 8 01/22/2025    ALKPHOS 60 01/22/2025    TP 6.1 (L) 01/22/2025    ALB 3.6 01/22/2025    TBILI 0.28 01/22/2025    CHOLESTEROL 196 01/22/2025    HDL 41 01/22/2025    TRIG 125 01/22/2025    LDLCALC 130 (H) 01/22/2025    NONHDLC 155 01/22/2025    DJU1OCHPKNDY 1.154 01/22/2025    FREET4 0.97 12/05/2022    HGBA1C 5.9 (H) 01/01/2024     01/01/2024       Counseling / Coordination of Care  Patient's progress discussed with staff in treatment team meeting.  Medications, treatment progress and treatment plan reviewed with patient.  Medication education provided to patient.  Supportive therapy provided to patient.  Cognitive  techniques utilized during the session.  Reassurance and supportive therapy provided.  Encouraged participation in milieu and group therapy on the unit.  Crisis/safety plan discussed with patient.        This note was completed in part utilizing Dragon dictation Software. Grammatical, translation, syntax errors, random word insertions, spelling mistakes, and incomplete sentences may be an occasional consequence of this system secondary to software limitations with voice recognition, ambient noise, and hardware issues. If you have any questions or concerns about the content, text, or information contained within the body of this dictation, please contact the provider for clarification.

## 2025-01-27 NOTE — NURSING NOTE
Patient flat, calm and cooperative, able to make needs known. Is visible on the module, social with select peers, attends no groups. Denies SI/HI/AVH, anxiety, reports elevated depression neither requesting or requiring intervention. Medication compliant. Will continue to monitor.

## 2025-01-27 NOTE — DISCHARGE INSTR - APPOINTMENTS
You are being discharged to your PO Office, [address] [place] [id number]. Please be advised that your PO is aware of your discharge.      Behavioral Health Nurse Navigator, Ellyn or oRsenda can be contacted after your discharge.  They will be available as an additional support, if needed.   If you wish to speak with Ellyn, you may contact her at [phone]. Since you presently do not have a phone number.

## 2025-01-27 NOTE — NURSING NOTE
Pt withdrawn to room, resting in bed this evening. Pt reports having pain from inguinal hernia. Pt offered PRN ibuprofen but politely declined, reporting it does not work. Pt reports feeling depressed related to his housing situation. Pt remains behaviorally controlled at this time. Pt denies SI/HI/AVH.

## 2025-01-27 NOTE — DISCHARGE INSTR - OTHER ORDERS
Support & Referral Helpline   Support and Referral Helpline is a 24-hour, 7 days-a-week, listening and referral service provided to all of [place].   Please call [phone] or tty [phone] to speak with one of our specialists.   The helpline is possible through partnerships with the [place] Department of Human Services and [place] for Community Resources.         The 988 Suicide & Crisis Lifeline (formerly known as the National Suicide Prevention Lifeline) provides free and confidential emotional support to people in suicidal crisis or emotional distress 24 hours a day, 7 days a week, across the [facility]. The Lifeline is comprised of a national network of over 200 local crisis centers, combining custom local care and resources with national standards and best practices.      Warm[facility] is a confidential 7 days/week telephone support service manned by trained mental health consumers.??Warmline operates daily but?is not able to accept?calls between?2AM-6AM. ? [facility] provides support, a listening ear and can provide information about available services. [facility] specializes in the concerns of mental health consumers, their families and friends.? However, we are also here for anyone who has a mental health concern, is confused about or just doesn't know anything about mental health or where to get information. To reach [facility], call [phone] accepts calls between 6:00 AM to 10:00 AM and from 4:00 PM to 12:00 AM.    Text CONNECT to 115313 from anywhere in the [place], anytime, about any type of crisis.  A live, trained Crisis Counselor receives the text and lets you know that they are here to listen.  The volunteer Crisis Counselor will help you move from a hot moment to a cool moment.     [facility] Crisis Intervention - licensed telephone and mobile crisis services that provide mental health assessments to all age groups regardless of income or insurance.? Crisis Intervention operates  24-hour/7 days a week. [facility] Crisis Intervention assists consumer who are experiencing a mental health emergency and lack the resources to assist themselves.? Immediate intervention for suicidal and depressed individuals with home visits/outreach being top priority. Crisis can be contacted at [phone].        Dial 2-1-1 to get connected/get help.  Free, confidential information & referral available 24/7: Aging Services, Child & Youth Services, Counseling, Education/Training, Food/Shelter/Clothing, Health Services, Parenting, Substance Abuse, Support Groups, Volunteer Opportunities, & much more.   Phone: 2-1-1 or [phone], Web: www.[employer], Email: 980@[facility]   The [facility] ([address] [place] [address]) offers persons with a mental illness a safe, healing environment to explore their personal and vocational potential and receive support in achieving their goals. Instead of traditional therapy, the work of the house is the rehabilitation. As members contribute meaningful work to the house, they build confidence and a sense of purpose.  Be a Member - It’s Free   Membership in the [facility] is open to any [facility] resident who is 18 or older and has a history of mental illness. Membership is free for life.      [address] [address]   Hours: Monday - Friday: 8 a.m. - 4 p.m.    With varying hours on holidays    [phone]      Drop[facility]In Center   The [facility] ([address] [place] [address]) provides a stress-free atmosphere for persons 18 and older who have experienced mental health issues.   What The Drop-In Center Offers   Activities   Games   Outings   Holiday gatherings   Recovery   Employment   Education   Community Resources   Empowerment   Helps participants achieve their goals   Enhances quality of life   Encourages  leadership      The [facility]    [address] [place] [address], [place], [place]   Hours: Monday through Friday: 4 p.m. - 9 p.m.   Saturday: 2 p.m. - 8 p.m.   Closed Sundays   Varying hours on holidays             Contact [phone]     The National [place] on Mental Illness (REYNALDO) offers various education & support groups for you & your family.  For more information visit their website at    http://www.reynaldo-lv.org/.

## 2025-01-27 NOTE — PLAN OF CARE
Problem: Ineffective Coping  Goal: Identifies ineffective coping skills  Outcome: Not Progressing  Goal: Identifies healthy coping skills  Outcome: Not Progressing  Goal: Demonstrates healthy coping skills  Outcome: Not Progressing  Goal: Participates in unit activities  Description: Interventions:  - Provide therapeutic environment   - Provide required programming   - Redirect inappropriate behaviors   Outcome: Not Progressing  Goal: Patient/Family participate in treatment and DC plans  Description: Interventions:  - Provide therapeutic environment  Outcome: Not Progressing  Goal: Patient/Family verbalizes awareness of resources  Outcome: Not Progressing     Problem: Depression  Goal: Treatment Goal: Demonstrate behavioral control of depressive symptoms, verbalize feelings of improved mood/affect, and adopt new coping skills prior to discharge  Outcome: Progressing  Goal: Verbalize thoughts and feelings  Description: Interventions:  - Assess and re-assess patient's level of risk   - Engage patient in 1:1 interactions, daily, for a minimum of 15 minutes   - Encourage patient to express feelings, fears, frustrations, hopes   Outcome: Progressing  Goal: Refrain from harming self  Description: Interventions:  - Monitor patient closely, per order   - Supervise medication ingestion, monitor effects and side effects   Outcome: Progressing  Goal: Refrain from isolation  Description: Interventions:  - Develop a trusting relationship   - Encourage socialization   Outcome: Progressing  Goal: Refrain from self-neglect  Outcome: Progressing  Goal: Attend and participate in unit activities, including therapeutic, recreational, and educational groups  Description: Interventions:  - Provide therapeutic and educational activities daily, encourage attendance and participation, and document same in the medical record   Outcome: Progressing  Goal: Complete daily ADLs, including personal hygiene independently, as able  Description:  Interventions:  - Observe, teach, and assist patient with ADLS  -  Monitor and promote a balance of rest/activity, with adequate nutrition and elimination   Outcome: Progressing     Problem: Anxiety  Goal: Anxiety is at manageable level  Description: Interventions:  - Assess and monitor patient's anxiety level.   - Monitor for signs and symptoms (heart palpitations, chest pain, shortness of breath, headaches, nausea, feeling jumpy, restlessness, irritable, apprehensive).   - Collaborate with interdisciplinary team and initiate plan and interventions as ordered.  - [place] patient to unit/surroundings  - Explain treatment plan  - Encourage participation in care  - Encourage verbalization of concerns/fears  - Identify coping mechanisms  - Assist in developing anxiety-reducing skills  - Administer/offer alternative therapies  - Limit or eliminate stimulants  Outcome: Progressing     Problem: DISCHARGE PLANNING  Goal: Discharge to home or other facility with appropriate resources  Description: INTERVENTIONS:  - Identify barriers to discharge w/patient and caregiver  - Arrange for needed discharge resources and transportation as appropriate  - Identify discharge learning needs (meds, wound care, etc.)  - Arrange for interpretive services to assist at discharge as needed  - Refer to Case Management Department for coordinating discharge planning if the patient needs post-hospital services based on physician/advanced practitioner order or complex needs related to functional status, cognitive ability, or social support system  Outcome: Progressing

## 2025-01-27 NOTE — CASE MANAGEMENT
CM called Lena Andrade at [phone], patient's CM at [facility].   CM discussed discharge plans for patient. Lena stated that patient can discharge to the probation office, and from there he will be sent to a [facility].

## 2025-01-27 NOTE — PLAN OF CARE
Pt has attended 1 group in the past 4 days    Problem: Ineffective Coping  Goal: Participates in unit activities  Description: Interventions:  - Provide therapeutic environment   - Provide required programming   - Redirect inappropriate behaviors   Outcome: Not Progressing

## 2025-01-27 NOTE — CASE MANAGEMENT
CM left a voicemail message requesting an appt for Intake at [facility].

## 2025-01-27 NOTE — CASE MANAGEMENT
CM met with patient and discussed discharge plan to discharge to probation. CM will provide patient with Forensic CM's Lena Zafar contact number. Patient was receptive.    CM discussed with patient his behaviors over the weekend. Patient elaborated on behaviors.CM encouraged patient to refrain from negative behaviors throughout his stay. Patient was in agreement.

## 2025-01-27 NOTE — NURSING NOTE
Pt condition unchanged since AM assessment. Continues to deny SI/HI/AVH, denies anxiety, still with mild depression, will continue to monitor.

## 2025-01-27 NOTE — PROGRESS NOTES
Patient is a 201. Patient has been withdrawn to room. It was reported that over the weekend patient's behaviors were inappropriate towards others. Patient also urinated in a cup in his room, while roommate was in the bathroom. Behaviors were addressed by staff.    Patient has denied SI/HI/AH/VH.    Medications: Abilify 5 mg, Subutex 8 mg, Catapres 0.1mg, Neurontin 300 mg, Melatonin 3 mg, Remeron 30 mg    Discharge Date: 1/28/25 01/27/25 0820   Team Meeting   Meeting Type Daily Rounds   Team Members Present   Team Members Present Physician;Nurse;;Other (Discipline and Name)   Physician Team Member Dr. Gonzales/ ROBI Pickett/ FRANCO Johns   Nursing Team Member Nicole/ Tommy/ Nursing Students   Care Management Team Member Andrade/ Ayesha/ Carlos/ Galileo   Other (Discipline and Name) Group Facilitator, Hernan   Patient/Family Present   Patient Present No   Patient's Family Present No

## 2025-01-27 NOTE — NURSING NOTE
Juan Diego requested medication for increased anxiety. Atarax 100mg PO given. [place] 25.    F/U he is walking in the [place]arr[place]ias with peers and reports it was mildly effective.

## 2025-01-28 VITALS
OXYGEN SATURATION: 98 % | HEIGHT: 67 IN | TEMPERATURE: 97.6 F | RESPIRATION RATE: 16 BRPM | HEART RATE: 57 BPM | SYSTOLIC BLOOD PRESSURE: 95 MMHG | DIASTOLIC BLOOD PRESSURE: 61 MMHG | WEIGHT: 173.5 LBS | BODY MASS INDEX: 27.23 KG/M2

## 2025-01-28 PROCEDURE — 99239 HOSP IP/OBS DSCHRG MGMT >30: CPT | Performed by: STUDENT IN AN ORGANIZED HEALTH CARE EDUCATION/TRAINING PROGRAM

## 2025-01-28 RX ORDER — ARIPIPRAZOLE 5 MG/1
5 TABLET ORAL DAILY
Qty: 30 TABLET | Refills: 0 | Status: SHIPPED | OUTPATIENT
Start: 2025-01-29

## 2025-01-28 RX ORDER — MIRTAZAPINE 30 MG/1
30 TABLET, FILM COATED ORAL
Qty: 30 TABLET | Refills: 0 | Status: SHIPPED | OUTPATIENT
Start: 2025-01-28

## 2025-01-28 RX ORDER — BUPRENORPHINE 8 MG/1
16 TABLET SUBLINGUAL DAILY
Qty: 14 TABLET | Refills: 0 | Status: SHIPPED | OUTPATIENT
Start: 2025-01-29 | End: 2025-02-05

## 2025-01-28 RX ORDER — GABAPENTIN 300 MG/1
300 CAPSULE ORAL 3 TIMES DAILY
Qty: 90 CAPSULE | Refills: 0 | Status: SHIPPED | OUTPATIENT
Start: 2025-01-28

## 2025-01-28 RX ORDER — CLONIDINE HYDROCHLORIDE 0.1 MG/1
0.1 TABLET ORAL
Qty: 30 TABLET | Refills: 0 | Status: SHIPPED | OUTPATIENT
Start: 2025-01-28

## 2025-01-28 RX ADMIN — ARIPIPRAZOLE 5 MG: 5 TABLET ORAL at 08:50

## 2025-01-28 RX ADMIN — BUPRENORPHINE 16 MG: 8 TABLET SUBLINGUAL at 08:50

## 2025-01-28 RX ADMIN — GABAPENTIN 300 MG: 300 CAPSULE ORAL at 08:50

## 2025-01-28 NOTE — NURSING NOTE
Pt expressed readiness for discharge, AVS reviewed and the patient denied any questions or concerns. Reports feeling anxious about his departure today, but ultimately feels safe and able to use coping skills. Patient was walked out of facility safely by staff with belongings.

## 2025-01-28 NOTE — PLAN OF CARE
Problem: Ineffective Coping  Goal: Identifies ineffective coping skills  Outcome: Adequate for Discharge  Goal: Identifies healthy coping skills  Outcome: Adequate for Discharge  Goal: Demonstrates healthy coping skills  Outcome: Adequate for Discharge  Goal: Participates in unit activities  Description: Interventions:  - Provide therapeutic environment   - Provide required programming   - Redirect inappropriate behaviors   Outcome: Adequate for Discharge  Goal: Patient/Family participate in treatment and DC plans  Description: Interventions:  - Provide therapeutic environment  Outcome: Adequate for Discharge  Goal: Patient/Family verbalizes awareness of resources  Outcome: Adequate for Discharge     Problem: Depression  Goal: Treatment Goal: Demonstrate behavioral control of depressive symptoms, verbalize feelings of improved mood/affect, and adopt new coping skills prior to discharge  Outcome: Adequate for Discharge  Goal: Verbalize thoughts and feelings  Description: Interventions:  - Assess and re-assess patient's level of risk   - Engage patient in 1:1 interactions, daily, for a minimum of 15 minutes   - Encourage patient to express feelings, fears, frustrations, hopes   Outcome: Adequate for Discharge  Goal: Refrain from harming self  Description: Interventions:  - Monitor patient closely, per order   - Supervise medication ingestion, monitor effects and side effects   Outcome: Adequate for Discharge  Goal: Refrain from isolation  Description: Interventions:  - Develop a trusting relationship   - Encourage socialization   Outcome: Adequate for Discharge  Goal: Refrain from self-neglect  Outcome: Adequate for Discharge  Goal: Attend and participate in unit activities, including therapeutic, recreational, and educational groups  Description: Interventions:  - Provide therapeutic and educational activities daily, encourage attendance and participation, and document same in the medical record   Outcome: Adequate  for Discharge  Goal: Complete daily ADLs, including personal hygiene independently, as able  Description: Interventions:  - Observe, teach, and assist patient with ADLS  -  Monitor and promote a balance of rest/activity, with adequate nutrition and elimination   Outcome: Adequate for Discharge     Problem: Anxiety  Goal: Anxiety is at manageable level  Description: Interventions:  - Assess and monitor patient's anxiety level.   - Monitor for signs and symptoms (heart palpitations, chest pain, shortness of breath, headaches, nausea, feeling jumpy, restlessness, irritable, apprehensive).   - Collaborate with interdisciplinary team and initiate plan and interventions as ordered.  - [place] patient to unit/surroundings  - Explain treatment plan  - Encourage participation in care  - Encourage verbalization of concerns/fears  - Identify coping mechanisms  - Assist in developing anxiety-reducing skills  - Administer/offer alternative therapies  - Limit or eliminate stimulants  Outcome: Adequate for Discharge     Problem: DISCHARGE PLANNING  Goal: Discharge to home or other facility with appropriate resources  Description: INTERVENTIONS:  - Identify barriers to discharge w/patient and caregiver  - Arrange for needed discharge resources and transportation as appropriate  - Identify discharge learning needs (meds, wound care, etc.)  - Arrange for interpretive services to assist at discharge as needed  - Refer to Case Management Department for coordinating discharge planning if the patient needs post-hospital services based on physician/advanced practitioner order or complex needs related to functional status, cognitive ability, or social support system  Outcome: Adequate for Discharge

## 2025-01-28 NOTE — ASSESSMENT & PLAN NOTE
Abilify 5 mg daily  Clonidine 0.1 mg at bedtime  Gabapentin 300 mg 3 times daily  Melatonin 3 mg at bedtime  Remeron to 30 mg at bedtime

## 2025-01-28 NOTE — PLAN OF CARE
Problem: Ineffective Coping  Goal: Demonstrates healthy coping skills  Outcome: Not Progressing  Goal: Participates in unit activities  Description: Interventions:  - Provide therapeutic environment   - Provide required programming   - Redirect inappropriate behaviors   Outcome: Not Progressing     Problem: Depression  Goal: Attend and participate in unit activities, including therapeutic, recreational, and educational groups  Description: Interventions:  - Provide therapeutic and educational activities daily, encourage attendance and participation, and document same in the medical record   Outcome: Not Progressing     Problem: Ineffective Coping  Goal: Identifies ineffective coping skills  Outcome: Progressing  Goal: Identifies healthy coping skills  Outcome: Progressing  Goal: Patient/Family participate in treatment and DC plans  Description: Interventions:  - Provide therapeutic environment  Outcome: Progressing  Goal: Patient/Family verbalizes awareness of resources  Outcome: Progressing     Problem: Depression  Goal: Treatment Goal: Demonstrate behavioral control of depressive symptoms, verbalize feelings of improved mood/affect, and adopt new coping skills prior to discharge  Outcome: Progressing  Goal: Verbalize thoughts and feelings  Description: Interventions:  - Assess and re-assess patient's level of risk   - Engage patient in 1:1 interactions, daily, for a minimum of 15 minutes   - Encourage patient to express feelings, fears, frustrations, hopes   Outcome: Progressing  Goal: Refrain from harming self  Description: Interventions:  - Monitor patient closely, per order   - Supervise medication ingestion, monitor effects and side effects   Outcome: Progressing  Goal: Refrain from isolation  Description: Interventions:  - Develop a trusting relationship   - Encourage socialization   Outcome: Progressing  Goal: Refrain from self-neglect  Outcome: Progressing  Goal: Complete daily ADLs, including personal  hygiene independently, as able  Description: Interventions:  - Observe, teach, and assist patient with ADLS  -  Monitor and promote a balance of rest/activity, with adequate nutrition and elimination   Outcome: Progressing     Problem: Anxiety  Goal: Anxiety is at manageable level  Description: Interventions:  - Assess and monitor patient's anxiety level.   - Monitor for signs and symptoms (heart palpitations, chest pain, shortness of breath, headaches, nausea, feeling jumpy, restlessness, irritable, apprehensive).   - Collaborate with interdisciplinary team and initiate plan and interventions as ordered.  - [place] patient to unit/surroundings  - Explain treatment plan  - Encourage participation in care  - Encourage verbalization of concerns/fears  - Identify coping mechanisms  - Assist in developing anxiety-reducing skills  - Administer/offer alternative therapies  - Limit or eliminate stimulants  Outcome: Progressing     Problem: DISCHARGE PLANNING  Goal: Discharge to home or other facility with appropriate resources  Description: INTERVENTIONS:  - Identify barriers to discharge w/patient and caregiver  - Arrange for needed discharge resources and transportation as appropriate  - Identify discharge learning needs (meds, wound care, etc.)  - Arrange for interpretive services to assist at discharge as needed  - Refer to Case Management Department for coordinating discharge planning if the patient needs post-hospital services based on physician/advanced practitioner order or complex needs related to functional status, cognitive ability, or social support system  Outcome: Progressing

## 2025-01-28 NOTE — BH TRANSITION RECORD
Contact Information: If you have any questions, concerns, pended studies, tests and/or procedures, or emergencies regarding your inpatient behavioral health visit. Please contact [place] [facility] [place] [phone] and ask to speak to a , nurse or physician. A contact is available 24 hours/ 7 days a week at this number.     Summary of Procedures Performed During your Stay:  Below is a list of major procedures performed during your hospital stay and a summary of results:  - No major procedures performed.    Pending Studies (From admission, onward)      None          Please follow up on the above pending studies with your PCP and/or referring provider.

## 2025-01-28 NOTE — DISCHARGE SUMMARY
"Discharge Summary -  Behavioral Health   Name: Juan Diego Horowitz 49 y.o. male I MRN: [id number]  Unit/Bed#: -01 I Date of Admission: 1/22/2025   Date of Service: 1/28/2025 I Hospital Day: 6      Assessment & Plan  Bipolar 1 disorder (HCC)  Abilify 5 mg daily  Clonidine 0.1 mg at bedtime  Gabapentin 300 mg 3 times daily  Melatonin 3 mg at bedtime  Remeron to 30 mg at bedtime  Asthma  As per Medicine recommendations  Opioid use disorder, severe, dependence (HCC)  Continue Subutex 16 mg daily  Tobacco use disorder  Nicotine replacement   Polysubstance abuse (HCC)  Psychotherapy and counselling  Medical clearance for psychiatric admission  Medicine consult       Admission Date: 1/22/2025         Discharge Date: No discharge date for patient encounter.    Attending Psychiatrist: Juanita Martinez*    As per H&P on 1/22:\"Per ED provider on 1/22:\"Patient is a 49-year-old male coming in for psychiatric evaluation. Reports that he does hear voices, telling him that he is no good. Is depressed, with thoughts about harming himself. Patient was recently started on Suboxone, as well as Zyprexa. Unable to pick it up due to insurance issues with the pharmacy. At this time, but was recommended to come to the emergency department for admission for hallucinations. Patient would like to sign a 201 \"     Per Crisis worker on 1/21:\"The patient is a 48 y/o [place]e-speaking M interviewed using [employer] ([id number]). Patient has a history of depression and opioid use. He has had issues with unstable housing since about 2020. Patient presented as depressed with suicidal ideation with no plan. He stated he has not been in treatment consistently since his previous provider, [facility], closed and his insurance changed to Medicare. Patient most recently completed [facility] on 1/14. He did follow up with [facility] on 1/17. He stated that he then relapsed on heroin. He reported his last use was " "1/20. UDS was positive for cocaine, opioids and Fentanyl. Patient is tired, but attentive and fully oriented. He presents as tearful and depressed. Patient stated he hears the voice of his mother berating him and telling him she is disappointed in him. Patient stated he would like to work, but he has no energy and no motivation to do so. He related that he went to get his medication filled today and learned that there is an issue with his insurance (see previous ED Crisis note). He stated that when faced with such issues, he feels defeated and gives up. He stated that [facility] is referring him to [facility], but he is still unclear on how he would get his medication. He does take Subutex 8mg, 2 pills (16 mg) daily, and, per EMR, [facility] continued this. Patient reported a history of 3 suicide attempts, one at age 20, one by hanging during an incarceration, and one about 2 years ago when he intended to jump from the [facility], but was stopped by a passerby. Patient requires more immediate treatment than can be afforded by outpatient. An acute partial hospital setting is unrealistic due to homelessness. Therefore an inpatient admission is recommended. Patient is willing to sign a voluntary agreement for an inpatient admission. \"     This is 49-year-old male with history of bipolar disorder and polysubstance abuse admitted to inpatient unit on voluntary status for worsening of mood and suicidal ideations in the context of psychosocial stressors and substance abuse.  Patient endorses depressed mood, anhedonia lack of motivation, tiredness, hopelessness, poor sleep, poor concentration and passive death wishes.  Denies any intent or plan.  Endorses irritability, racing thoughts and mood swings.  Anxiety is fluctuating. denies any symptoms of psychosis.   Psychiatric Review Of Systems:  Medication side effects: none  Sleep: Poor  Appetite: no change  Hygiene: able to tend to instrumental and " "basic ADLs  Anxiety and panic attacks: denies  Psychotic Symptoms: denies  Depression Symptoms: Yes  Manic Symptoms: denies   PTSD Symptoms: denies  Obsession/compulsions: Denies  Eating disorders: Denies  Suicidal Thoughts: denies  Homicidal Thoughts: denies     Medical Review Of Systems:   Complete ROS is negative, except as noted above.\"        Past Medical History:   Diagnosis Date    Addiction to drug (HCC)     Asthma     Back pain     Bipolar 1 disorder (HCC)     Chronic kidney disease     Depression     Gastritis     GERD (gastroesophageal reflux disease)     Hallucination     Kidney stones     Opioid withdrawal (HCC) 12/05/2023    Psychiatric illness     Renal cancer (HCC)     Substance abuse (HCC)     Suicide attempt (HCC)      Past Surgical History:   Procedure Laterality Date    ABDOMINAL SURGERY      NEPHRECTOMY      MN CYSTOURETHROSCOPY W/URETERAL CATHETERIZATION Right 09/27/2016    Procedure: CYSTOSCOPY WITH RETROGRADE PYELOGRAM;  Surgeon: Petey Hernandez MD;  Location: [facility];  Service: Urology    MN LAPAROSCOPY RADICAL NEPHRECTOMY Right 11/23/2016    Procedure: HAND ASSISTED LAPAROSCOPIC NEPHRECTOMY, converted to open, lysis of adhesions,repair of  vena cava;  Surgeon: Petey Hernandez MD;  Location: [facility];  Service: Urology    URETERAL STENT PLACEMENT Right 09/27/2016    Procedure: INSERTION STENT URETERAL;  Surgeon: Petey Hernandez MD;  Location: [facility];  Service:     VASCULAR SURGERY         Medications:    All current active medications have been reviewed.    Allergies:     No Known Allergies    Objective     Vital signs in last 24 hours:    Temp:  [96.7 °F (35.9 °C)-97.6 °F (36.4 °C)] 97.6 °F (36.4 °C)  HR:  [57-63] 57  BP: ()/(61-70) 95/61  Resp:  [16] 16  SpO2:  [96 %-98 %] 98 %  O2 Device: None (Room air)    No intake or output data in the 24 hours ending 01/28/25 0907    Hospital Course:     Patient was admitted to the inpatient psychiatric unit and started on Behavioral " "Health checks every 7 minutes. During the hospitalization patient was encouraged to attend individual therapy, group therapy, milieu therapy and occupational therapy.     Psychiatric medications were restarted during the hospital stay. To address mood symptoms, patient was treated with Abilify, clonidine, gabapentin, melatonin and Remeron. Prior to beginning of treatment medications risks and benefits and possible side effects were reviewed. Patient verbalized understanding and agreement for treatment. Upon admission patient was seen by medical service for medical clearance for inpatient treatment and medical follow up.     Patient's symptoms resolved over the hospital course. With adjustment of medications and therapeutic milieu, patient's symptoms were resolved. At the end of treatment patient was improved and mood was more stable at the time of discharge. Patient denied suicidal ideation, intent or plan at the time of discharge and denied homicidal ideation, intent or plan at the time of discharge. There was no overt psychosis at the time of discharge. Patient was participating appropriately in milieu at the time of discharge. Behavior was appropriate on the unit at the time of discharge. Sleep and appetite were improved. Patient was tolerating medications and was not reporting any significant side effects at the time of discharge.     Since patient was doing well at the end of the hospitalization, treatment team felt that patient could be safely discharged to outpatient care. Patient also felt stable and ready for discharge at the end of the hospital stay.       Mental Status at Time of Discharge:       Appearance:  age appropriate   Behavior:  pleasant, cooperative, calm   Speech:  normal rate and volume, clear, coherent   Mood:  \"Ok\"   Affect:  normal range and intensity, appropriate   Thought Process:  organized, logical, coherent, goal directed, linear, normal rate of thoughts   Associations: intact " associations   Thought Content:  no overt delusions   Perceptual Disturbances: no auditory hallucinations, no visual hallucinations   Risk Potential: Suicidal ideation - None  Homicidal ideation - None  Potential for aggression - No   Sensorium:  oriented to person, place, and time/date   Memory:  recent and remote memory grossly intact   Consciousness:  alert and awake   Attention/Concentration: attention span and concentration are age appropriate   Insight:  fair   Judgment: fair   Gait/Station: normal gait/station   Motor Activity: no abnormal movements         Suicide/Homicide Risk Assessment:    Risk of Harm to Self:   Based on today's assessment, Juan Diego presents the following risk of harm to self: none    Risk of Harm to Others:  Based on today's assessment, Juan Diego presents the following risk of harm to others: none    The following interventions are recommended: outpatient follow up with a psychiatrist, outpatient follow up with a therapist    Laboratory Results: I have personally reviewed all pertinent laboratory/tests results    Most Recent Labs:   Lab Results   Component Value Date    WBC 7.35 01/22/2025    RBC 4.02 01/22/2025    HGB 11.3 (L) 01/22/2025    HCT 36.4 (L) 01/22/2025     01/22/2025    RDW 15.1 01/22/2025    NEUTROABS 3.28 01/22/2025    TOTANEUTABS 5.79 11/22/2016    SODIUM 143 01/22/2025    K 4.3 01/22/2025     (H) 01/22/2025    CO2 27 01/22/2025    BUN 13 01/22/2025    CREATININE 1.02 01/22/2025    GLUC 88 01/22/2025    CALCIUM 8.7 01/22/2025    AST 12 (L) 01/22/2025    ALT 8 01/22/2025    ALKPHOS 60 01/22/2025    TP 6.1 (L) 01/22/2025    ALB 3.6 01/22/2025    TBILI 0.28 01/22/2025    CHOLESTEROL 196 01/22/2025    HDL 41 01/22/2025    TRIG 125 01/22/2025    LDLCALC 130 (H) 01/22/2025    NONHDLC 155 01/22/2025    XVW7QQMMPEWD 1.154 01/22/2025    FREET4 0.97 12/05/2022    SYPHILISAB Non-reactive 01/22/2025    HGBA1C 5.9 (H) 01/01/2024     01/01/2024        Assessment/Plan:    Admission Diagnosis:    Principal Problem:    Bipolar 1 disorder (HCC)  Active Problems:    Asthma    Opioid use disorder, severe, dependence (HCC)    Tobacco use disorder    Polysubstance abuse (HCC)    Medical clearance for psychiatric admission      Discharge Diagnosis:     Principal Problem:    Bipolar 1 disorder (HCC)  Active Problems:    Asthma    Opioid use disorder, severe, dependence (HCC)    Tobacco use disorder    Polysubstance abuse (HCC)    Medical clearance for psychiatric admission  Resolved Problems:    * No resolved hospital problems. *      Medical Problems       Resolved Problems  Date Reviewed: 1/28/2025   None         Discharge Medications:    See after visit summary for all reconciled discharge medications provided to patient and family.      Discharge instructions/Information to patient and family:     See after visit summary for information provided to patient and family.      Provisions for Follow-Up Care:    See after visit summary for information related to follow-up care and any pertinent home health orders.      Discharge Statement:    I spent 45 minutes discharging the patient. This time was spent on the day of discharge. I had direct contact with the patient on the day of discharge.     Additional documentation is required if more than 30 minutes were spent on discharge:    >30 minutes of time was spent on: Diagnostic results, Prognosis, Risks and benefits of tx options, Instructions for management, Patient and family education, Importance of tx compliance, Risk factor reductions, Impressions, Counseling / Coordination of care, Documenting in the medical record, and Reviewing / ordering tests, medicine, procedures  .  I reviewed with Juan Diego importance of compliance with medications and outpatient treatment after discharge.  I discussed the medication regimen and possible side effects of the medications with Juan Diego prior to discharge. At the time of discharge  he was tolerating psychiatric medications.  I discussed outpatient follow up with Juan Diego.  I reviewed with Juan Diego crisis plan and safety plan upon discharge.  I discussed with Juan Diego recommendation to follow up with outpatient drug and alcohol counseling and AA meetings.    Discharge on Two Antipsychotic Medications: Kiarra Gonzales MD 01/28/25

## 2025-02-09 ENCOUNTER — APPOINTMENT (EMERGENCY)
Dept: CT IMAGING | Facility: HOSPITAL | Age: 50
End: 2025-02-09
Payer: MEDICARE

## 2025-02-09 ENCOUNTER — HOSPITAL ENCOUNTER (EMERGENCY)
Facility: HOSPITAL | Age: 50
Discharge: HOME | End: 2025-02-10
Attending: EMERGENCY MEDICINE
Payer: MEDICARE

## 2025-02-09 VITALS
WEIGHT: 172.62 LBS | SYSTOLIC BLOOD PRESSURE: 139 MMHG | BODY MASS INDEX: 27.04 KG/M2 | HEART RATE: 63 BPM | TEMPERATURE: 98.2 F | DIASTOLIC BLOOD PRESSURE: 87 MMHG | OXYGEN SATURATION: 98 % | RESPIRATION RATE: 18 BRPM

## 2025-02-09 DIAGNOSIS — F11.10 HEROIN ABUSE (HCC): ICD-10-CM

## 2025-02-09 DIAGNOSIS — J36 PERITONSILLAR ABSCESS: ICD-10-CM

## 2025-02-09 DIAGNOSIS — F19.10 POLYSUBSTANCE ABUSE (HCC): Primary | ICD-10-CM

## 2025-02-09 DIAGNOSIS — J02.0 STREP PHARYNGITIS: ICD-10-CM

## 2025-02-09 LAB — S PYO DNA THROAT QL NAA+PROBE: DETECTED

## 2025-02-09 PROCEDURE — 99284 EMERGENCY DEPT VISIT MOD MDM: CPT

## 2025-02-09 PROCEDURE — 70491 CT SOFT TISSUE NECK W/DYE: CPT

## 2025-02-09 PROCEDURE — 96374 THER/PROPH/DIAG INJ IV PUSH: CPT

## 2025-02-09 PROCEDURE — 87651 STREP A DNA AMP PROBE: CPT | Performed by: PHYSICIAN ASSISTANT

## 2025-02-09 PROCEDURE — 99284 EMERGENCY DEPT VISIT MOD MDM: CPT | Performed by: PHYSICIAN ASSISTANT

## 2025-02-09 RX ORDER — LIDOCAINE HYDROCHLORIDE 20 MG/ML
15 SOLUTION OROPHARYNGEAL ONCE AS NEEDED
Status: DISCONTINUED | OUTPATIENT
Start: 2025-02-09 | End: 2025-02-10 | Stop reason: HOSPADM

## 2025-02-09 RX ORDER — CLINDAMYCIN HYDROCHLORIDE 150 MG/1
450 CAPSULE ORAL EVERY 8 HOURS SCHEDULED
Qty: 90 CAPSULE | Refills: 0 | Status: SHIPPED | OUTPATIENT
Start: 2025-02-09 | End: 2025-02-19

## 2025-02-09 RX ORDER — DEXAMETHASONE 4 MG/1
10 TABLET ORAL ONCE
Status: COMPLETED | OUTPATIENT
Start: 2025-02-09 | End: 2025-02-10

## 2025-02-09 RX ORDER — CLINDAMYCIN HYDROCHLORIDE 150 MG/1
450 CAPSULE ORAL ONCE
Status: COMPLETED | OUTPATIENT
Start: 2025-02-09 | End: 2025-02-10

## 2025-02-09 RX ORDER — KETOROLAC TROMETHAMINE 30 MG/ML
15 INJECTION, SOLUTION INTRAMUSCULAR; INTRAVENOUS ONCE
Status: COMPLETED | OUTPATIENT
Start: 2025-02-09 | End: 2025-02-09

## 2025-02-09 RX ADMIN — KETOROLAC TROMETHAMINE 15 MG: 30 INJECTION, SOLUTION INTRAMUSCULAR; INTRAVENOUS at 20:48

## 2025-02-09 RX ADMIN — IOHEXOL 100 ML: 350 INJECTION, SOLUTION INTRAVENOUS at 21:08

## 2025-02-09 NOTE — ED PROVIDER NOTES
Time reflects when diagnosis was documented in both MDM as applicable and the Disposition within this note       Time User Action Codes Description Comment    2/9/2025  7:04 PM Marva Smith Add [F11.10] Heroin abuse (HCC)     2/9/2025  7:04 PM Marva Smith Add [F19.10] Substance abuse (HCC)     2/9/2025  7:05 PM Marva Smith Modify [F11.10] Heroin abuse (HCC)     2/9/2025  7:05 PM Marva Smith Modify [F19.10] Substance abuse (HCC)     2/9/2025  7:05 PM Marva Smith Modify [F11.10] Heroin abuse (HCC)     2/9/2025  7:05 PM Marva Smith Remove [F19.10] Substance abuse (HCC)     2/9/2025  7:05 PM Marva Smith Add [F19.10] Polysubstance abuse (HCC)     2/9/2025  7:05 PM Marva Smith Modify [F11.10] Heroin abuse (HCC)     2/9/2025  7:05 PM Marva Smith Modify [F19.10] Polysubstance abuse (HCC)           ED Disposition       None          Assessment & Plan       Medical Decision Making  Patient presented today for a detox evaluation.  Currently using Fentanyl 1-2 bundles a day and  Crack 1-2 grams a day.  Admits that he used 30 minutes prior to coming to the emergency room.      While waiting for placement the patient admitted to having a sore throat and pain in his left ear. Admitted the pain was ongoing x 3 days.  Upon assessment he did have swelling of the left tonsil with erythema on the posterior pharynx.  No erythema of either TM.    Amount and/or Complexity of Data Reviewed  Radiology: ordered.    Risk  Prescription drug management.        ED Course as of 02/09/25 2046   Sun Feb 09, 2025   1721 Warm handoff placed.  Patient aware that he will be waiting on placement.   2010 PT awaiting placement.    2033 Pt admitted to the RN that he had a sore throat and L ear pain. Upon re assessment he had swelling of the L tonsil and erythema of the posterior pharynx. No erythema of either TM. CT neck ordered.        Medications   ketorolac (TORADOL) injection 15 mg (has no administration in time range)       ED Risk  "Strat Scores                          SBIRT 22yo+      Flowsheet Row Most Recent Value   Initial Alcohol Screen: US AUDIT-C     1. How often do you have a drink containing alcohol? 0 Filed at: 02/09/2025 1657   2. How many drinks containing alcohol do you have on a typical day you are drinking?  0 Filed at: 02/09/2025 1657   3a. Male UNDER 65: How often do you have five or more drinks on one occasion? 0 Filed at: 02/09/2025 1657   3b. FEMALE Any Age, or MALE 65+: How often do you have 4 or more drinks on one occassion? 0 Filed at: 02/09/2025 1657   Audit-C Score 0 Filed at: 02/09/2025 1657   OLEG: How many times in the past year have you...    Used an illegal drug or used a prescription medication for non-medical reasons? Daily or Almost Daily Filed at: 02/09/2025 1657   DAST-10: In the past 12 months...    1. Have you used drugs other than those required for medical reasons? 1 Filed at: 02/09/2025 1657   2. Do you use more than one drug at a time? 1 Filed at: 02/09/2025 1657   3. Have you had medical problems as a result of your drug use (e.g., memory loss, hepatitis, convulsions, bleeding, etc.)? 1 Filed at: 02/09/2025 1657   4. Have you had \"blackouts\" or \"flashbacks\" as a result of drug use?YesNo 1 Filed at: 02/09/2025 1657   5. Do you ever feel bad or guilty about your drug use? 1 Filed at: 02/09/2025 1657   6. Does your spouse (or parent) ever complain about your involvement with drugs? 1 Filed at: 02/09/2025 1657   7. Have you neglected your family because of your use of drugs? 1 Filed at: 02/09/2025 1657   8. Have you engaged in illegal activities in order to obtain drugs? 1 Filed at: 02/09/2025 1657   9. Have you ever experienced withdrawal symptoms (felt sick) when you stopped taking drugs? 1 Filed at: 02/09/2025 1657   10. Are you always able to stop using drugs when you want to? 1 Filed at: 02/09/2025 1657   DAST-10 Score 10 Filed at: 02/09/2025 1657                            History of Present Illness " "      Chief Complaint   Patient presents with    Detox Evaluation     Pt looking for help to detox from crack and fentanyl. Pt smokes 2 bundles per day. Last use PTA.        Past Medical History:   Diagnosis Date    Addiction to drug (HCC)     Asthma     Back pain     Bipolar 1 disorder (HCC)     Chronic kidney disease     Depression     Gastritis     GERD (gastroesophageal reflux disease)     Hallucination     Kidney stones     Opioid withdrawal (HCC) 12/05/2023    Psychiatric illness     Renal cancer (HCC)     Substance abuse (HCC)     Suicide attempt (HCC)       Past Surgical History:   Procedure Laterality Date    ABDOMINAL SURGERY      NEPHRECTOMY      [place] CYSTOURETHROSCOPY W/URETERAL CATHETERIZATION Right 09/27/2016    Procedure: CYSTOSCOPY WITH RETROGRADE PYELOGRAM;  Surgeon: Petey Hernandez MD;  [facility];  Service: Urology    [place] LAPAROSCOPY RADICAL NEPHRECTOMY Right 11/23/2016    Procedure: HAND ASSISTED LAPAROSCOPIC NEPHRECTOMY, converted to open, lysis of adhesions,repair of  vena cava;  Surgeon: Petey Hernandez MD;  [facility];  Service: Urology    URETERAL STENT PLACEMENT Right 09/27/2016    Procedure: INSERTION STENT URETERAL;  Surgeon: Petey Hernandez MD;  [facility];  Service:     VASCULAR SURGERY        Family History   Problem Relation Age of Onset    Stroke Mother     Heart disease Mother     HIV Father       Social History     Tobacco Use    Smoking status: Every Day     Current packs/day: 1.00     Average packs/day: 0.5 packs/day for 12.1 years (6.6 ttl pk-yrs)     Types: Cigarettes     Start date: 1/26/2012     Last attempt to quit: 1/26/2023     Passive exposure: Current    Smokeless tobacco: Never   Vaping Use    Vaping status: Never Used   Substance Use Topics    Alcohol use: Not Currently    Drug use: Yes     Types: Heroin, Fentanyl, \"Crack\" cocaine      E-Cigarette/Vaping    E-Cigarette Use Never User       E-Cigarette/Vaping Substances    Nicotine No     THC " No     CBD No     Flavoring No     Other No     Unknown No       I have reviewed and agree with the history as documented.     The patient is a 49-year-old male presenting today for a detox evaluation.  Reports he would like to detox today from fentanyl and crack.  He last used half an hour prior to coming to the emergency room.  He reports that he uses 1-2 bundles of fentanyl a day as well as 1 to 2 g of crack a day.  Has been using consistently for the last 3 weeks.  Admits he has been doing the same amount for the 3 weeks and has not increased or decreased his amount.  Does not do any other drugs.  Does not drink alcohol.  Admits that he has been through detox twice before.  According to chart review he was admitted to detox on 12/5/2023.  He was also admitted 1 year prior.  Denies active HI or SI.             Review of Systems   Constitutional:  Negative for chills and fever.   HENT:  Negative for ear pain and sore throat.    Eyes:  Negative for pain and visual disturbance.   Respiratory:  Negative for cough and shortness of breath.    Cardiovascular:  Negative for chest pain and palpitations.   Gastrointestinal:  Negative for abdominal pain and vomiting.   Genitourinary:  Negative for dysuria and hematuria.   Musculoskeletal:  Negative for arthralgias and back pain.   Skin:  Negative for color change and rash.   Neurological:  Negative for seizures and syncope.   All other systems reviewed and are negative.          Objective       ED Triage Vitals [02/09/25 1655]   Temperature Pulse Blood Pressure Respirations SpO2 Patient Position - Orthostatic VS   98.1 °F (36.7 °C) 63 139/87 18 98 % Sitting      Temp Source Heart Rate Source BP Location FiO2 (%) Pain Score    Oral Monitor Left arm -- --      Vitals      Date and Time Temp Pulse SpO2 Resp BP Pain Score FACES Pain Rating User   02/09/25 1655 98.1 °F (36.7 °C) 63 98 % 18 139/87 -- -- FK            Physical Exam  Vitals and nursing note reviewed.    Constitutional:       General: He is not in acute distress.     Appearance: Normal appearance. He is normal weight. He is not ill-appearing, toxic-appearing or diaphoretic.   Eyes:      Conjunctiva/sclera: Conjunctivae normal.   Cardiovascular:      Rate and Rhythm: Normal rate and regular rhythm.      Heart sounds: No murmur heard.     No friction rub. No gallop.   Pulmonary:      Effort: Pulmonary effort is normal. No respiratory distress.      Breath sounds: Normal breath sounds. No stridor. No wheezing, rhonchi or rales.   Chest:      Chest wall: No tenderness.   Abdominal:      General: Abdomen is flat. Bowel sounds are normal. There is no distension.      Palpations: Abdomen is soft. There is no mass.      Tenderness: There is no abdominal tenderness. There is no guarding or rebound.      Hernia: No hernia is present.   Neurological:      Mental Status: He is alert.         Results Reviewed       None            CT soft tissue neck with contrast    (Results Pending)       Procedures    ED Medication and Procedure Management   Prior to Admission Medications   Prescriptions Last Dose Informant Patient Reported? Taking?   ARIPiprazole (ABILIFY) 5 mg tablet   No No   Sig: Take 1 tablet (5 mg total) by mouth daily   cloNIDine (CATAPRES) 0.1 mg tablet   No No   Sig: Take 1 tablet (0.1 mg total) by mouth daily at bedtime   gabapentin (NEURONTIN) 300 mg capsule   No No   Sig: Take 1 capsule (300 mg total) by mouth 3 (three) times a day   melatonin 3 mg   No No   Sig: Take 1 tablet (3 mg total) by mouth daily at bedtime   mirtazapine (REMERON) 30 mg tablet   No No   Sig: Take 1 tablet (30 mg total) by mouth daily at bedtime      Facility-Administered Medications: None     Patient's Medications   Discharge Prescriptions    No medications on file     No discharge procedures on file.  ED SEPSIS DOCUMENTATION   Time reflects when diagnosis was documented in both MDM as applicable and the Disposition within this note        Time User Action Codes Description Comment    2/9/2025  7:04 PM Marva Smith Add [F11.10] Heroin abuse (HCC)     2/9/2025  7:04 PM Marva Smith Add [F19.10] Substance abuse (HCC)     2/9/2025  7:05 PM Marva Smith Modify [F11.10] Heroin abuse (HCC)     2/9/2025  7:05 PM Marva Smith Modify [F19.10] Substance abuse (HCC)     2/9/2025  7:05 PM Marva Smith Modify [F11.10] Heroin abuse (HCC)     2/9/2025  7:05 PM Marva Smith Remove [F19.10] Substance abuse (HCC)     2/9/2025  7:05 PM Marva Smith Add [F19.10] Polysubstance abuse (HCC)     2/9/2025  7:05 PM Marva Smith Modify [F11.10] Heroin abuse (HCC)     2/9/2025  7:05 PM Marva Smith Modify [F19.10] Polysubstance abuse (HCC)                  Marva Smith PA-C  02/09/25 2014       Marva Smith PA-C  02/09/25 2046

## 2025-02-10 ENCOUNTER — HOSPITAL ENCOUNTER (EMERGENCY)
Facility: HOSPITAL | Age: 50
Discharge: HOME | End: 2025-02-10
Attending: EMERGENCY MEDICINE | Admitting: EMERGENCY MEDICINE
Payer: MEDICARE

## 2025-02-10 DIAGNOSIS — Z76.5 MALINGERING: Primary | ICD-10-CM

## 2025-02-10 LAB
AMPHETAMINES SERPL QL SCN: NEGATIVE
BARBITURATES UR QL: NEGATIVE
BENZODIAZ UR QL: NEGATIVE
COCAINE UR QL: POSITIVE
ETHANOL EXG-MCNC: 0 MG/DL
FENTANYL UR QL SCN: POSITIVE
HYDROCODONE UR QL SCN: NEGATIVE
METHADONE UR QL: NEGATIVE
OPIATES UR QL SCN: NEGATIVE
OXYCODONE+OXYMORPHONE UR QL SCN: NEGATIVE
PCP UR QL: NEGATIVE
THC UR QL: POSITIVE

## 2025-02-10 PROCEDURE — 96376 TX/PRO/DX INJ SAME DRUG ADON: CPT

## 2025-02-10 PROCEDURE — 99284 EMERGENCY DEPT VISIT MOD MDM: CPT

## 2025-02-10 PROCEDURE — 99283 EMERGENCY DEPT VISIT LOW MDM: CPT

## 2025-02-10 PROCEDURE — 82075 ASSAY OF BREATH ETHANOL: CPT

## 2025-02-10 PROCEDURE — 80307 DRUG TEST PRSMV CHEM ANLYZR: CPT

## 2025-02-10 RX ORDER — CLINDAMYCIN HYDROCHLORIDE 150 MG/1
450 CAPSULE ORAL ONCE
Status: COMPLETED | OUTPATIENT
Start: 2025-02-10 | End: 2025-02-10

## 2025-02-10 RX ORDER — KETOROLAC TROMETHAMINE 30 MG/ML
15 INJECTION, SOLUTION INTRAMUSCULAR; INTRAVENOUS ONCE
Status: COMPLETED | OUTPATIENT
Start: 2025-02-10 | End: 2025-02-10

## 2025-02-10 RX ADMIN — DEXAMETHASONE 10 MG: 4 TABLET ORAL at 00:30

## 2025-02-10 RX ADMIN — KETOROLAC TROMETHAMINE 15 MG: 30 INJECTION, SOLUTION INTRAMUSCULAR; INTRAVENOUS at 02:28

## 2025-02-10 RX ADMIN — CLINDAMYCIN HYDROCHLORIDE 450 MG: 150 CAPSULE ORAL at 00:30

## 2025-02-10 RX ADMIN — CLINDAMYCIN HYDROCHLORIDE 450 MG: 150 CAPSULE ORAL at 08:54

## 2025-02-10 NOTE — DISCHARGE INSTR - OTHER ORDERS
Keyona Wilkinson  Certified     [facility]  [place], [place] and [facility]  [phone]    [facility]   [email or web address]    NA   [email or web address]    [facility]   [address]   [place] [address]  [phone]    [facility]  [address]   [facility] [address]  [phone]    [facility]  [address] [facility] [address]  [phone] ext [phone]    [facility] [phone]   [place] [phone]  [place] [phone]  [place] [phone]   [phone]  [place] [phone]     [facility]  [address]  [facility] [address]  [phone]    [place]   [phone]    [facility] [place]  [address] [facility] [place]  [address]  [phone]    [facility]   [address] [facility] [facility] [address]  [phone]    [facility]   [address] [facility] [address]  [phone]    [place] [facility]  [address] [facility] [place] [address]  [phone]    [facility]   [address] [place] [address] [place] [address]  [phone]

## 2025-02-10 NOTE — ED CARE HANDOFF
Emergency Department Sign Out Note        Sign out and transfer of care from Marva Smith PA-C. See Separate Emergency Department note.     The patient, Juan Diego Horowitz, was evaluated by the previous provider due to polysubstance use.  Patient requested detox from fentanyl and crack.  He reported using 1-2 bundles of fentanyl and 1 to 2 g of crack daily.  Patient spoke with HOST but while awaiting placement he also reports 3 days of a sore throat and left ear pain.  Exam was concerning for a left PTA.      Workup Completed:  Results Reviewed       Procedure Component Value Units Date/Time    Rapid drug screen, urine [[id number]]     Lab Status: No result Specimen: Urine     Strep A PCR [[id number]]  (Abnormal) Collected: 02/09/25 2218    Lab Status: Final result Specimen: Throat Updated: 02/09/25 2245     STREP A PCR Detected              ED Course / Workup Pending (followup):  ED Course as of 02/09/25 2349   Sun Feb 09, 2025 2055 Signout taken from Marva Smith PA-C.  Patient presented requesting detox from fentanyl and crack.  HOST placement is pending.  Shortly prior to signout the patient also reported several days of a sore throat.  Physical examination revealed erythematous and edematous tonsils, left > right.  Patient does have a history of bilateral PTAs in 2021 as well as a right PTA in 2023.  CT soft tissue neck ordered to evaluate for a peritonsillar abscess.  Rapid strep also ordered.   2221 CT soft tissue neck with contrast  IMPRESSION:  1. Left peritonsillar abscesses measuring 1.6 and 1.1 cm  2. Mild uvulitis  3. Reactive left cervical IIA and B lymphadenopathy   2245 STREP A PCR(!): Detected   2250 Patient has strep pharyngitis with mild uvulitis and a left peritonsillar abscess.  As the abscess is less than 2 cm in greatest dimension, will treat with a course of clindamycin and have the patient follow-up with ENT outpatient.  At this time he is medically cleared for detox/rehab placement.        CT soft tissue neck with contrast   Final Result by Jhonny Em MD (02/09 2216)      Left peritonsillar abscesses measuring 1.6 and 1.1 cm      Mild uvulitis      Reactive left cervical IIA and B lymphadenopathy      Workstation performed: [id number]               Procedures  None      Medical Decision Making  Patient presents requesting detox from fentanyl and crack cocaine.  He was initially medically cleared and spoke with HOST, however while waiting for placement he also endorsed a sore throat and left ear pain.  The patient does have a history of peritonsillar abscesses in the past and the initial ED provider ordered a CT to evaluate for a left PTA.  CT did confirm a left PTA and rapid strep was positive.  At this time as the patient is tolerating his secretions and the peritonsillar abscess is less than 2 cm, will not perform needle aspiration of the PTA at this time.  Patient given a dose of Decadron and a course of clindamycin was initiated in the department.  Referral for outpatient ENT follow-up placed.  Patient is medically cleared for HOST placement at this time.    Amount and/or Complexity of Data Reviewed  Labs: ordered. Decision-making details documented in ED Course.  Radiology: ordered. Decision-making details documented in ED Course.    Risk  Prescription drug management.        Disposition  Final diagnoses:   Heroin abuse (HCC)   Polysubstance abuse (HCC)   Strep pharyngitis   Peritonsillar abscess     Time reflects when diagnosis was documented in both MDM as applicable and the Disposition within this note       Time User Action Codes Description Comment    2/9/2025  7:04 PM Marva Smith Add [F11.10] Heroin abuse (HCC)     2/9/2025  7:04 PM Marva Smith Add [F19.10] Substance abuse (HCC)     2/9/2025  7:05 PM Marva Smith Modify [F11.10] Heroin abuse (HCC)     2/9/2025  7:05 PM Marva Smith Modify [F19.10] Substance abuse (HCC)     2/9/2025  7:05 PM Marva Smith Modify [F11.10]  Heroin abuse (HCC)     2/9/2025  7:05 PM Marva Smith Remove [F19.10] Substance abuse (HCC)     2/9/2025  7:05 PM Marva Smith Add [F19.10] Polysubstance abuse (HCC)     2/9/2025  7:05 PM Marva Smith Modify [F11.10] Heroin abuse (HCC)     2/9/2025  7:05 PM Marva Smith Modify [F19.10] Polysubstance abuse (HCC)     2/9/2025 11:14 PM Ct Horner [J02.0] Strep pharyngitis     2/9/2025 11:14 PM Ct Horner [J36] Peritonsillar abscess           ED Disposition       None          Follow-up Information    None       Patient's Medications   Discharge Prescriptions    CLINDAMYCIN (CLEOCIN) 150 MG CAPSULE    Take 3 capsules (450 mg total) by mouth every 8 (eight) hours for 10 days       Start Date: 2/9/2025  End Date: 2/19/2025       Order Dose: 450 mg       Quantity: 90 capsule    Refills: 0            ED Provider  Electronically Signed by     Ct Horner PA-C  02/09/25 4127

## 2025-02-10 NOTE — ED NOTES
Spoke w/ Dalila from HOST that pt is being reviewed for [facility]. This RN called [facility] and spoke with Dora who assessed pt and completed pre-screening questions. Dora requested that information regarding pt's strep, abscess and medications be faxed over to the nursing staff at [facility] when available. Fax #[phone]     Francia Carpio RN  02/09/25 7564

## 2025-02-10 NOTE — ED NOTES
Pt fell asleep while on the phone w/ HOST. They called back and this RN stood in room w/ pt to assist w/ HOST assessment. They will be looking for placement for this pt and will call back when they have placement and transportation. Pt is resting on stretcher, no other needs at this time.      Francia Carpio RN  02/09/25 1359

## 2025-02-10 NOTE — ED NOTES
HOST called back with updated p/u time 1230 to go to [facility].  Pt made aware     Osmani Schumacher RN  02/10/25 7525

## 2025-02-10 NOTE — ED PROVIDER NOTES
Time reflects when diagnosis was documented in both MDM as applicable and the Disposition within this note       Time User Action Codes Description Comment    2/10/2025 10:35 AM Phil Garcia Add [Z76.5] Malingering           ED Disposition       ED Disposition   Discharge    Condition   Stable    Date/Time   Mon Feb 10, 2025 10:35 AM    Comment   Juan Diego Horowitz discharge to home/self care.                   Assessment & Plan       Medical Decision Making           Medications - No data to display    ED Risk Strat Scores                                              History of Present Illness       No chief complaint on file.      Past Medical History:   Diagnosis Date    Addiction to drug (HCC)     Asthma     Back pain     Bipolar 1 disorder (HCC)     Chronic kidney disease     Depression     Gastritis     GERD (gastroesophageal reflux disease)     Hallucination     Kidney stones     Opioid withdrawal (HCC) 12/05/2023    Psychiatric illness     Renal cancer (HCC)     Substance abuse (HCC)     Suicide attempt (HCC)       Past Surgical History:   Procedure Laterality Date    ABDOMINAL SURGERY      NEPHRECTOMY      [place] CYSTOURETHROSCOPY W/URETERAL CATHETERIZATION Right 09/27/2016    Procedure: CYSTOSCOPY WITH RETROGRADE PYELOGRAM;  Surgeon: Petey Hernandez MD;  [facility];  Service: Urology    [place] LAPAROSCOPY RADICAL NEPHRECTOMY Right 11/23/2016    Procedure: HAND ASSISTED LAPAROSCOPIC NEPHRECTOMY, converted to open, lysis of adhesions,repair of  vena cava;  Surgeon: Petey Hernandez MD;  [facility];  Service: Urology    URETERAL STENT PLACEMENT Right 09/27/2016    Procedure: INSERTION STENT URETERAL;  Surgeon: Petey Hernandez MD;  [facility];  Service:     VASCULAR SURGERY        Family History   Problem Relation Age of Onset    Stroke Mother     Heart disease Mother     HIV Father       Social History     Tobacco Use    Smoking status: Every Day     Current packs/day: 1.00      "Average packs/day: 0.5 packs/day for 12.1 years (6.6 ttl pk-yrs)     Types: Cigarettes     Start date: 1/26/2012     Last attempt to quit: 1/26/2023     Passive exposure: Current    Smokeless tobacco: Never   Vaping Use    Vaping status: Never Used   Substance Use Topics    Alcohol use: Not Currently    Drug use: Yes     Types: Heroin, Fentanyl, \"Crack\" cocaine      E-Cigarette/Vaping    E-Cigarette Use Never User       E-Cigarette/Vaping Substances    Nicotine No     THC No     CBD No     Flavoring No     Other No     Unknown No       I have reviewed and agree with the history as documented.     Patient is a 49-year-old male with a past medical history significant for bipolar, homelessness, and substance abuse.  Patient came in approximately 22 hours ago, requesting rehab from drugs.  Patient stayed in the [facility] apartment for approximately 18 hours, when rehab was set to pick him up, patient was discharged.  Patient then almost immediately then went to the registration, requesting to sign in for psychiatric evaluation.  Patient is in no acute distress, is not a danger to himself or others currently.  Patient is unable and unwilling to explain what changed in the 10 minutes after patient was discharged and before he resigned in.  Patient repeatedly keeps stating that he is unable to  his medications on the pharmacy due to an insurance issue.  Explained to patient that it may take patient to a psychiatric facility, when he does not meet criteria, would not solve his insurance issues, however the dual program that patient is being sent to, should be able to assist patient in taking his medications.  Patient is not suicidal, and has no other complaints at this time.  Patient was unwilling to get vitals taken.  At this time, there is no indication for voluntary or involuntary commitment.  Patient does have a ride to further treatment.  Patient at this time is malingering, and after discussing this with patient, " patient ambulated out of the emerged apartment out of his own accord, without any need of assistance          Review of Systems        Objective       ED Triage Vitals   Temp Pulse BP Resp SpO2 Patient Position - Orthostatic VS   -- -- -- -- -- --      Temp src Heart Rate Source BP Location FiO2 (%) Pain Score    -- -- -- -- --      Vitals    None         Physical Exam  Vitals reviewed.   Constitutional:       Appearance: Normal appearance. He is normal weight.   HENT:      Head: Normocephalic and atraumatic.      Right Ear: External ear normal.      Left Ear: External ear normal.      Nose: Nose normal.   Eyes:      Conjunctiva/sclera: Conjunctivae normal.   Cardiovascular:      Rate and Rhythm: Normal rate.   Pulmonary:      Effort: Pulmonary effort is normal.   Musculoskeletal:         General: Normal range of motion.      Cervical back: Normal range of motion.   Skin:     General: Skin is warm and dry.   Neurological:      Mental Status: He is alert.         Results Reviewed       None            No orders to display       Procedures    ED Medication and Procedure Management   Prior to Admission Medications   Prescriptions Last Dose Informant Patient Reported? Taking?   ARIPiprazole (ABILIFY) 5 mg tablet   No No   Sig: Take 1 tablet (5 mg total) by mouth daily   clindamycin (CLEOCIN) 150 mg capsule   No No   Sig: Take 3 capsules (450 mg total) by mouth every 8 (eight) hours for 10 days   cloNIDine (CATAPRES) 0.1 mg tablet   No No   Sig: Take 1 tablet (0.1 mg total) by mouth daily at bedtime   gabapentin (NEURONTIN) 300 mg capsule   No No   Sig: Take 1 capsule (300 mg total) by mouth 3 (three) times a day   melatonin 3 mg   No No   Sig: Take 1 tablet (3 mg total) by mouth daily at bedtime   mirtazapine (REMERON) 30 mg tablet   No No   Sig: Take 1 tablet (30 mg total) by mouth daily at bedtime      Facility-Administered Medications: None     Patient's Medications   Discharge Prescriptions    No medications on file      No discharge procedures on file.  ED SEPSIS DOCUMENTATION   Time reflects when diagnosis was documented in both MDM as applicable and the Disposition within this note       Time User Action Codes Description Comment    2/10/2025 10:35 AM Phil Garcia Add [Z76.5] Jillian Garcia PA-C  02/10/25 1041

## 2025-02-11 NOTE — ED CARE HANDOFF
[facility] Warm Handoff Outcome Note    Patient name Juan Diego Horowitz  Location ED 14/ED 14 MRN [id number]  Age: 49 y.o.          Plan Type:  Warm Handoff                                                                                    Plan Date: 2/10/2025  Service:  ED Warm Handoff      Substance Use History:  polysubstance    Warm Handoff Update:  Cl was offered IP at [facility] but did not get in transport.     Warm Handoff Outcome: Non-Treatment Related Resources   blanket

## 2025-02-20 ENCOUNTER — HOSPITAL ENCOUNTER (EMERGENCY)
Facility: HOSPITAL | Age: 50
Discharge: HOME | End: 2025-02-21
Attending: EMERGENCY MEDICINE
Payer: MEDICARE

## 2025-02-20 DIAGNOSIS — F11.90 OPIOID USE: Primary | ICD-10-CM

## 2025-02-20 LAB
AMPHETAMINES SERPL QL SCN: NEGATIVE
BARBITURATES UR QL: NEGATIVE
BENZODIAZ UR QL: NEGATIVE
COCAINE UR QL: POSITIVE
ETHANOL EXG-MCNC: 0 MG/DL
FENTANYL UR QL SCN: POSITIVE
HYDROCODONE UR QL SCN: NEGATIVE
METHADONE UR QL: NEGATIVE
OPIATES UR QL SCN: NEGATIVE
OXYCODONE+OXYMORPHONE UR QL SCN: NEGATIVE
PCP UR QL: NEGATIVE
THC UR QL: NEGATIVE

## 2025-02-20 PROCEDURE — 99283 EMERGENCY DEPT VISIT LOW MDM: CPT

## 2025-02-20 PROCEDURE — 80307 DRUG TEST PRSMV CHEM ANLYZR: CPT

## 2025-02-20 PROCEDURE — 82075 ASSAY OF BREATH ETHANOL: CPT

## 2025-02-20 PROCEDURE — 99285 EMERGENCY DEPT VISIT HI MDM: CPT

## 2025-02-20 NOTE — ED NOTES
Per HOST, pt has been accepted at [facility] but they will not have a bed ready until tomorrow     Deng Scott, EFREN  02/20/25 [id number]

## 2025-02-20 NOTE — CERTIFIED RECOVERY SPECIALIST
"   Certified  Note    Patient name: Juan Diego Horowitz  Location: ED 13/ED 13  [place]: [facility]  Attending:  Adrián Roberson MD MRN [id number]  : 1975  Age: 49 y.o.    Sex: male Date 2025         Substance Use History:     Social History     Substance and Sexual Activity   Alcohol Use Not Currently        Social History     Substance and Sexual Activity   Drug Use Yes    Types: Heroin, Fentanyl, \"Crack\" cocaine       Time spent: CRS   Consult rcvd: Y  Patient seen in: ED  Stage of change:  action    Substance used:fentanyl   Frequency/quantity :daily     CRS provided introductions and explanation of service. CRS and patient engaged in conversation of hospitalization. Patient discussed needs he feels would be appropriate. CRS shared understanding.     Patient interested in going to IP  treatment. Patient has spoken to HOST and waiting placement.     Currently unsheltered.     Resources and contact information given     -    Francia Hilliard       "

## 2025-02-20 NOTE — ED PROVIDER NOTES
Time reflects when diagnosis was documented in both MDM as applicable and the Disposition within this note       Time User Action Codes Description Comment    2/20/2025 12:20 PM Marva Cantor Add [F11.90] Opioid use           ED Disposition       ED Disposition   Discharge    Condition   Stable    Date/Time   Thu Feb 20, 2025 12:20 PM    Comment   Juan Diego Horowitz discharge to home/self care.                   Assessment & Plan       Medical Decision Making  Finished detox less than 5 days ago.  Using 1.5 days, last use last night.  Medically cleared for rehab. Accepted to [facility].     Patient signed out pending bed ready/transport to [facility].     Amount and/or Complexity of Data Reviewed  Labs: ordered.        ED Course as of 02/20/25 2037   Thu Feb 20, 2025   1150 Released from 5 day stay at [facility] 5 days ago. Started using fentanyl again 2 days ago. Last use 9pm last night. Insufflation.    1151 Finshed clindamycin. Symptoms resolved. Eating and drinking normal, no throat pain. No abscess on exam.    1422 Being reviewed by [facility]    1550 Accepted to [facility]    1657 Won't have bed until tomorrow morning, ED hold until then 2/2 unhoused status.    2022 Patient requesting discharge.   2030 Patient now unsure if he wants discharge        Medications - No data to display    ED Risk Strat Scores                            SBIRT 22yo+      Flowsheet Row Most Recent Value   OLEG: How many times in the past year have you...    Used an illegal drug or used a prescription medication for non-medical reasons? Daily or Almost Daily Filed at: 02/20/2025 1115   DAST-10: In the past 12 months...    1. Have you used drugs other than those required for medical reasons? 1 Filed at: 02/20/2025 1115   2. Do you use more than one drug at a time? 1 Filed at: 02/20/2025 1115   3. Have you had medical problems as a result of your drug use (e.g., memory loss, hepatitis, convulsions,  "bleeding, etc.)? 1 Filed at: 02/20/2025 1115   4. Have you had \"blackouts\" or \"flashbacks\" as a result of drug use?YesNo 1 Filed at: 02/20/2025 1115   5. Do you ever feel bad or guilty about your drug use? 1 Filed at: 02/20/2025 1115   6. Does your spouse (or parent) ever complain about your involvement with drugs? 1 Filed at: 02/20/2025 1115   7. Have you neglected your family because of your use of drugs? 0 Filed at: 02/20/2025 1115   8. Have you engaged in illegal activities in order to obtain drugs? 1 Filed at: 02/20/2025 1115   9. Have you ever experienced withdrawal symptoms (felt sick) when you stopped taking drugs? 1 Filed at: 02/20/2025 1115   10. Are you always able to stop using drugs when you want to? 1 Filed at: 02/20/2025 1115   DAST-10 Score 9 Filed at: 02/20/2025 1115                            History of Present Illness       Chief Complaint   Patient presents with    Detox Evaluation     Pt requesting help to detox from Fentanyl. Pt uses 5 bags/day. Last use yesterday.        Past Medical History:   Diagnosis Date    Addiction to drug (HCC)     Asthma     Back pain     Bipolar 1 disorder (HCC)     Chronic kidney disease     Depression     Gastritis     GERD (gastroesophageal reflux disease)     Hallucination     Kidney stones     Opioid withdrawal (HCC) 12/05/2023    Psychiatric illness     Renal cancer (HCC)     Substance abuse (HCC)     Suicide attempt (HCC)       Past Surgical History:   Procedure Laterality Date    ABDOMINAL SURGERY      NEPHRECTOMY      ID CYSTOURETHROSCOPY W/URETERAL CATHETERIZATION Right 09/27/2016    Procedure: CYSTOSCOPY WITH RETROGRADE PYELOGRAM;  Surgeon: Petey Hernandez MD;  Location: [facility];  Service: Urology    ID LAPAROSCOPY RADICAL NEPHRECTOMY Right 11/23/2016    Procedure: HAND ASSISTED LAPAROSCOPIC NEPHRECTOMY, converted to open, lysis of adhesions,repair of  vena cava;  Surgeon: Petey Hernandez MD;  Location: [facility];  Service: Urology    URETERAL STENT " "PLACEMENT Right 09/27/2016    Procedure: INSERTION STENT URETERAL;  Surgeon: Petey Hernandez MD;  Location: [facility];  Service:     VASCULAR SURGERY        Family History   Problem Relation Age of Onset    Stroke Mother     Heart disease Mother     HIV Father       Social History     Tobacco Use    Smoking status: Every Day     Current packs/day: 1.00     Average packs/day: 0.5 packs/day for 12.1 years (6.6 ttl pk-yrs)     Types: Cigarettes     Start date: 1/26/2012     Last attempt to quit: 1/26/2023     Passive exposure: Current    Smokeless tobacco: Never   Vaping Use    Vaping status: Never Used   Substance Use Topics    Alcohol use: Not Currently    Drug use: Yes     Types: Heroin, Fentanyl, \"Crack\" cocaine      E-Cigarette/Vaping    E-Cigarette Use Never User       E-Cigarette/Vaping Substances    Nicotine No     THC No     CBD No     Flavoring No     Other No     Unknown No       I have reviewed and agree with the history as documented.     49-year-old male past medical history of polysubstance use disorder, psychiatric disorder, CKD, asthma, GERD presents to emergency department requesting detox from fentanyl.  States that 10 days ago he was admitted to [facility] where he stayed for 5 days for detox.  Then 1.5 days ago he started using fentanyl again via insufflation and used 5 bags.  Last used around 9 PM last night.  No withdrawal symptoms.  Denies other drug use.  Denies alcohol use.  Denies SI HI or hallucinations.  Denies acute medical complaints.    Hx of PTA. Denies sore throat, drooling, difficulty swallowing, voice change, neck pain or stiffness, fever, chills, rash, swelling.  Eating and drinking normally.  Did not finish entire course of antibiotics.      History provided by:  Patient  Detox Evaluation      Review of Systems   All other systems reviewed and are negative.          Objective       ED Triage Vitals [02/20/25 1116]   Temperature Pulse Blood Pressure Respirations SpO2 Patient Position " - Orthostatic VS   98.5 °F (36.9 °C) 98 144/84 16 95 % Sitting      Temp Source Heart Rate Source BP Location FiO2 (%) Pain Score    Oral Monitor Left arm -- --      Vitals      Date and Time Temp Pulse SpO2 Resp BP Pain Score FACES Pain Rating User   02/20/25 1901 -- 69 95 % -- 124/62 -- -- AB   02/20/25 1500 -- 63 96 % -- 124/67 -- -- AB   02/20/25 1116 98.5 °F (36.9 °C) 98 95 % 16 144/84 -- -- JW            Physical Exam  Vitals and nursing note reviewed.   Constitutional:       General: He is awake. He is not in acute distress.     Appearance: Normal appearance. He is not ill-appearing, toxic-appearing or diaphoretic.   HENT:      Head: Normocephalic.      Jaw: No trismus.      Mouth/Throat:      Lips: Pink.      Mouth: Mucous membranes are moist.      Pharynx: Oropharynx is clear. Uvula midline. No pharyngeal swelling or oropharyngeal exudate.      Tonsils: No tonsillar exudate or tonsillar abscesses.      Comments: Patient maintaining airway and secretions. No stridor . No brawniness under tongue.   Eyes:      General: Vision grossly intact.      Pupils: Pupils are equal, round, and reactive to light.   Cardiovascular:      Rate and Rhythm: Normal rate and regular rhythm.      Heart sounds: Normal heart sounds.   Pulmonary:      Effort: Pulmonary effort is normal. No respiratory distress.      Breath sounds: Normal breath sounds.   Abdominal:      General: There is no distension.   Musculoskeletal:      Cervical back: Normal range of motion.   Lymphadenopathy:      Cervical: No cervical adenopathy.   Skin:     General: Skin is warm and dry.      Findings: No rash.   Neurological:      Mental Status: He is alert and oriented to person, place, and time.      Gait: Gait normal.         Results Reviewed       Procedure Component Value Units Date/Time    Rapid drug screen, urine [[id number]]  (Abnormal) Collected: 02/20/25 1911    Lab Status: Final result Specimen: Urine, Clean Catch Updated: 02/20/25 1936      Amph/Meth UR Negative     Barbiturate Ur Negative     Benzodiazepine Urine Negative     Cocaine Urine Positive     Methadone Urine Negative     Opiate Urine Negative     PCP Ur Negative     THC Urine Negative     Oxycodone Urine Negative     Fentanyl Urine Positive     HYDROCODONE URINE Negative    Narrative:      Presumptive report. If requested, specimen will be sent to reference lab for confirmation.  FOR MEDICAL PURPOSES ONLY.   IF CONFIRMATION NEEDED PLEASE CONTACT THE LAB WITHIN 5 DAYS.    Drug Screen Cutoff Levels:  AMPHETAMINE/METHAMPHETAMINES  1000 ng/mL  BARBITURATES     200 ng/mL  BENZODIAZEPINES     200 ng/mL  COCAINE      300 ng/mL  METHADONE      300 ng/mL  OPIATES      300 ng/mL  PHENCYCLIDINE     25 ng/mL  THC       50 ng/mL  OXYCODONE      100 ng/mL  FENTANYL      5 ng/mL  HYDROCODONE     300 ng/mL    POCT alcohol breath test [[id number]]  (Normal) Resulted: 02/20/25 1141    Lab Status: Final result Updated: 02/20/25 1141     EXTBreath Alcohol 0.000            No orders to display       Procedures    ED Medication and Procedure Management   Prior to Admission Medications   Prescriptions Last Dose Informant Patient Reported? Taking?   ARIPiprazole (ABILIFY) 5 mg tablet   No No   Sig: Take 1 tablet (5 mg total) by mouth daily   clindamycin (CLEOCIN) 150 mg capsule   No No   Sig: Take 3 capsules (450 mg total) by mouth every 8 (eight) hours for 10 days   cloNIDine (CATAPRES) 0.1 mg tablet   No No   Sig: Take 1 tablet (0.1 mg total) by mouth daily at bedtime   gabapentin (NEURONTIN) 300 mg capsule   No No   Sig: Take 1 capsule (300 mg total) by mouth 3 (three) times a day   melatonin 3 mg   No No   Sig: Take 1 tablet (3 mg total) by mouth daily at bedtime   mirtazapine (REMERON) 30 mg tablet   No No   Sig: Take 1 tablet (30 mg total) by mouth daily at bedtime      Facility-Administered Medications: None     Patient's Medications   Discharge Prescriptions    No medications on file     No discharge  procedures on file.  ED SEPSIS DOCUMENTATION   Time reflects when diagnosis was documented in both MDM as applicable and the Disposition within this note       Time User Action Codes Description Comment    2/20/2025 12:20 PM Marva Cantor Add [F11.90] Opioid use                  Marva Cantor PA-C  02/20/25 2037

## 2025-02-21 VITALS
HEART RATE: 73 BPM | OXYGEN SATURATION: 95 % | RESPIRATION RATE: 20 BRPM | DIASTOLIC BLOOD PRESSURE: 69 MMHG | BODY MASS INDEX: 26.73 KG/M2 | WEIGHT: 170.64 LBS | TEMPERATURE: 98.5 F | SYSTOLIC BLOOD PRESSURE: 107 MMHG

## 2025-02-21 NOTE — ED CARE HANDOFF
[facility] Warm Handoff Outcome Note    Patient name Juan Diego Horowitz  Location ED 13/ED 13 MRN [id number]  Age: 49 y.o.          Plan Type:  Warm Handoff                                                                                    Plan Date: 2/21/2025  Service:  ED Warm Handoff      Substance Use History:  Polysub    Warm Handoff Update:  Pt accepted at [facility] for IP tx    Warm Handoff Outcome: Residential  Inpatient  
Left vmail with HOST re: warm handoff  
Home

## 2025-02-21 NOTE — ED NOTES
Pt informed that they are going to be here in 5 minutes to take him to [facility]. Pt dressed and left ER without difficulty     Daxa Ching RN  02/21/25 [id number]

## 2025-02-21 NOTE — DISCHARGE INSTR - OTHER ORDERS
Francia Hilliard   Certified     [facility]  [phone]  Monday-Friday 6:45am-3:15pm    [address]  [address] [phone]  [email or web address]

## 2025-02-21 NOTE — CERTIFIED RECOVERY SPECIALIST
"   Certified  Note    Patient name: Juan Diego Horowitz  Location: ED 13/ED 13  [place]: [facility]  Attending:  No att. providers found MRN [id number]  : 1975  Age: 49 y.o.    Sex: male Date 2025         Substance Use History:     Social History     Substance and Sexual Activity   Alcohol Use Not Currently        Social History     Substance and Sexual Activity   Drug Use Yes    Types: Heroin, Fentanyl, \"Crack\" cocaine        Time spent with Patient 10 min     CRS follow up with patient.     CRS and patient had conversation that supports and encourages recovery. Discussed plans for aftercare and continued sobriety. Patient presents as pleasant and optimistic about future and happy to be going to [facility]. Patient reports he has been there before and looking forward to \"starting over\"    Patient is being picked up at 1030 for transport, CRS let patient know and that breakfast tray will be coming priot to him leaving.     Resources provided and contact information given      Francia Hilliard       "

## 2025-02-21 NOTE — ED NOTES
Pt updated on status on ride to rehab. Pt ate breakfast. Cranberry juice given to pt as requested from pt. AM care stuff provided to pt. Pt also requested the phone - phone provided to pt     Daxa Ching RN  02/21/25 0161

## 2025-02-24 ENCOUNTER — HOSPITAL ENCOUNTER (EMERGENCY)
Facility: HOSPITAL | Age: 50
Discharge: HOME | End: 2025-02-24
Attending: EMERGENCY MEDICINE | Admitting: EMERGENCY MEDICINE
Payer: MEDICARE

## 2025-02-24 VITALS
DIASTOLIC BLOOD PRESSURE: 58 MMHG | HEART RATE: 64 BPM | RESPIRATION RATE: 18 BRPM | SYSTOLIC BLOOD PRESSURE: 108 MMHG | TEMPERATURE: 98.2 F | OXYGEN SATURATION: 95 %

## 2025-02-24 DIAGNOSIS — G89.29 CHRONIC LOW BACK PAIN: ICD-10-CM

## 2025-02-24 DIAGNOSIS — K40.90 RIGHT INGUINAL HERNIA: Primary | ICD-10-CM

## 2025-02-24 DIAGNOSIS — M54.50 CHRONIC LOW BACK PAIN: ICD-10-CM

## 2025-02-24 LAB
ALBUMIN SERPL BCG-MCNC: 3.9 G/DL (ref 3.5–5)
ALP SERPL-CCNC: 64 U/L (ref 34–104)
ALT SERPL W P-5'-P-CCNC: 11 U/L (ref 7–52)
ANION GAP SERPL CALCULATED.3IONS-SCNC: 5 MMOL/L (ref 4–13)
AST SERPL W P-5'-P-CCNC: 12 U/L (ref 13–39)
BACTERIA UR QL AUTO: NORMAL /HPF
BASOPHILS # BLD AUTO: 0.05 THOUSANDS/ÂΜL (ref 0–0.1)
BASOPHILS NFR BLD AUTO: 0 % (ref 0–1)
BILIRUB SERPL-MCNC: 0.2 MG/DL (ref 0.2–1)
BILIRUB UR QL STRIP: NEGATIVE
BUN SERPL-MCNC: 16 MG/DL (ref 5–25)
CALCIUM SERPL-MCNC: 8.9 MG/DL (ref 8.4–10.2)
CHLORIDE SERPL-SCNC: 107 MMOL/L (ref 96–108)
CLARITY UR: CLEAR
CO2 SERPL-SCNC: 27 MMOL/L (ref 21–32)
COLOR UR: YELLOW
CREAT SERPL-MCNC: 1.04 MG/DL (ref 0.6–1.3)
EOSINOPHIL # BLD AUTO: 0.39 THOUSAND/ÂΜL (ref 0–0.61)
EOSINOPHIL NFR BLD AUTO: 3 % (ref 0–6)
ERYTHROCYTE [DISTWIDTH] IN BLOOD BY AUTOMATED COUNT: 14.5 % (ref 11.6–15.1)
GFR SERPL CREATININE-BSD FRML MDRD: 85 ML/MIN/1.73SQ M
GLUCOSE SERPL-MCNC: 107 MG/DL (ref 65–140)
GLUCOSE UR STRIP-MCNC: NEGATIVE MG/DL
HCT VFR BLD AUTO: 45.6 % (ref 36.5–49.3)
HGB BLD-MCNC: 14.1 G/DL (ref 12–17)
HGB UR QL STRIP.AUTO: NEGATIVE
IMM GRANULOCYTES # BLD AUTO: 0.05 THOUSAND/UL (ref 0–0.2)
IMM GRANULOCYTES NFR BLD AUTO: 0 % (ref 0–2)
KETONES UR STRIP-MCNC: ABNORMAL MG/DL
LEUKOCYTE ESTERASE UR QL STRIP: NEGATIVE
LIPASE SERPL-CCNC: 27 U/L (ref 11–82)
LYMPHOCYTES # BLD AUTO: 2.97 THOUSANDS/ÂΜL (ref 0.6–4.47)
LYMPHOCYTES NFR BLD AUTO: 25 % (ref 14–44)
MCH RBC QN AUTO: 27.7 PG (ref 26.8–34.3)
MCHC RBC AUTO-ENTMCNC: 30.9 G/DL (ref 31.4–37.4)
MCV RBC AUTO: 90 FL (ref 82–98)
MONOCYTES # BLD AUTO: 0.71 THOUSAND/ÂΜL (ref 0.17–1.22)
MONOCYTES NFR BLD AUTO: 6 % (ref 4–12)
NEUTROPHILS # BLD AUTO: 7.64 THOUSANDS/ÂΜL (ref 1.85–7.62)
NEUTS SEG NFR BLD AUTO: 66 % (ref 43–75)
NITRITE UR QL STRIP: NEGATIVE
NON-SQ EPI CELLS URNS QL MICRO: NORMAL /HPF
NRBC BLD AUTO-RTO: 0 /100 WBCS
PH UR STRIP.AUTO: 5.5 [PH]
PLATELET # BLD AUTO: 347 THOUSANDS/UL (ref 149–390)
PMV BLD AUTO: 8.5 FL (ref 8.9–12.7)
POTASSIUM SERPL-SCNC: 4.5 MMOL/L (ref 3.5–5.3)
PROT SERPL-MCNC: 6.7 G/DL (ref 6.4–8.4)
PROT UR STRIP-MCNC: ABNORMAL MG/DL
RBC # BLD AUTO: 5.09 MILLION/UL (ref 3.88–5.62)
RBC #/AREA URNS AUTO: NORMAL /HPF
SODIUM SERPL-SCNC: 139 MMOL/L (ref 135–147)
SP GR UR STRIP.AUTO: >=1.03 (ref 1–1.03)
UROBILINOGEN UR STRIP-ACNC: <2 MG/DL
WBC # BLD AUTO: 11.81 THOUSAND/UL (ref 4.31–10.16)
WBC #/AREA URNS AUTO: NORMAL /HPF

## 2025-02-24 PROCEDURE — 83690 ASSAY OF LIPASE: CPT

## 2025-02-24 PROCEDURE — 99284 EMERGENCY DEPT VISIT MOD MDM: CPT

## 2025-02-24 PROCEDURE — 80053 COMPREHEN METABOLIC PANEL: CPT

## 2025-02-24 PROCEDURE — 96372 THER/PROPH/DIAG INJ SC/IM: CPT

## 2025-02-24 PROCEDURE — 81001 URINALYSIS AUTO W/SCOPE: CPT

## 2025-02-24 PROCEDURE — 36415 COLL VENOUS BLD VENIPUNCTURE: CPT

## 2025-02-24 PROCEDURE — 85025 COMPLETE CBC W/AUTO DIFF WBC: CPT

## 2025-02-24 PROCEDURE — 99283 EMERGENCY DEPT VISIT LOW MDM: CPT

## 2025-02-24 RX ORDER — KETOROLAC TROMETHAMINE 30 MG/ML
30 INJECTION, SOLUTION INTRAMUSCULAR; INTRAVENOUS ONCE
Status: COMPLETED | OUTPATIENT
Start: 2025-02-24 | End: 2025-02-24

## 2025-02-24 RX ORDER — ACETAMINOPHEN 325 MG/1
975 TABLET ORAL ONCE
Status: COMPLETED | OUTPATIENT
Start: 2025-02-24 | End: 2025-02-24

## 2025-02-24 RX ADMIN — KETOROLAC TROMETHAMINE 30 MG: 30 INJECTION, SOLUTION INTRAMUSCULAR at 11:29

## 2025-02-24 RX ADMIN — ACETAMINOPHEN 975 MG: 325 TABLET, FILM COATED ORAL at 11:29

## 2025-02-24 NOTE — ED PROVIDER NOTES
Time reflects when diagnosis was documented in both MDM as applicable and the Disposition within this note       Time User Action Codes Description Comment    2/24/2025 11:16 AM Clarissa Tellez [K40.90] Right inguinal hernia     2/24/2025 11:20 AM Clarissa Tellez [M54.50,  G89.29] Chronic low back pain           ED Disposition       ED Disposition   Discharge    Condition   Stable    Date/Time   Mon Feb 24, 2025 11:16 AM    Comment   Juan Diego Horowitz discharge to home/self care.                   Assessment & Plan       Medical Decision Making  DDx including but not limited to: groin strain, hernia, cellulitis; considerable less likely UTI    The patient presents for 1 year of right inguinal hernia pain that has been steadily worsening over the last few months.  On exam does have a right inguinal hernia that is soft, reducible, slightly tender on palpation.  He has no other abdominal tenderness, no guarding, no rebound.  No overlying skin changes to the site of the hernia including no erythema or warmth consistent with cellulitis, no ecchymosis.  No suspicion for incarcerated or strangulated hernia at this time.  First nurse orders placed including blood work, labs currently at baseline without electrolyte derangement, organ dysfunction, or anemia.  Patient has a contract with pain addiction medicine for Subutex, will have him continue that plan, with the addition of Tylenol and NSAIDs as needed with follow-up with general surgery for reevaluation.    Problems Addressed:  Chronic low back pain: acute illness or injury  Right inguinal hernia: acute illness or injury    Risk  OTC drugs.  Prescription drug management.             Medications   ketorolac (TORADOL) injection 30 mg (30 mg Intramuscular Given 2/24/25 1129)   acetaminophen (TYLENOL) tablet 975 mg (975 mg Oral Given 2/24/25 1129)       ED Risk Strat Scores                            SBIRT 20yo+      Flowsheet Row Most Recent Value   Initial Alcohol  Screen: US AUDIT-C     1. How often do you have a drink containing alcohol? 0 Filed at: 02/24/2025 1128   2. How many drinks containing alcohol do you have on a typical day you are drinking?  0 Filed at: 02/24/2025 1128   3a. Male UNDER 65: How often do you have five or more drinks on one occasion? 0 Filed at: 02/24/2025 1128   3b. FEMALE Any Age, or MALE 65+: How often do you have 4 or more drinks on one occassion? 0 Filed at: 02/24/2025 1128   Audit-C Score 0 Filed at: 02/24/2025 1128   OLEG: How many times in the past year have you...    Used an illegal drug or used a prescription medication for non-medical reasons? Never Filed at: 02/24/2025 1128                            History of Present Illness       Chief Complaint   Patient presents with    Hernia     Patient c/o hernia pain in his groin. Patient has had this hernia x 1 year.        Past Medical History:   Diagnosis Date    Addiction to drug (HCC)     Asthma     Back pain     Bipolar 1 disorder (HCC)     Chronic kidney disease     Depression     Gastritis     GERD (gastroesophageal reflux disease)     Hallucination     Kidney stones     Opioid withdrawal (HCC) 12/05/2023    Psychiatric illness     Renal cancer (HCC)     Substance abuse (HCC)     Suicide attempt (HCC)       Past Surgical History:   Procedure Laterality Date    ABDOMINAL SURGERY      NEPHRECTOMY      HI CYSTOURETHROSCOPY W/URETERAL CATHETERIZATION Right 09/27/2016    Procedure: CYSTOSCOPY WITH RETROGRADE PYELOGRAM;  Surgeon: Petey Hernandez MD;  Location: [facility];  Service: Urology    HI LAPAROSCOPY RADICAL NEPHRECTOMY Right 11/23/2016    Procedure: HAND ASSISTED LAPAROSCOPIC NEPHRECTOMY, converted to open, lysis of adhesions,repair of  vena cava;  Surgeon: Petey Hernandez MD;  Location: [facility];  Service: Urology    URETERAL STENT PLACEMENT Right 09/27/2016    Procedure: INSERTION STENT URETERAL;  Surgeon: Petey Hernandez MD;  Location: [facility];  Service:     VASCULAR SURGERY       "  Family History   Problem Relation Age of Onset    Stroke Mother     Heart disease Mother     HIV Father       Social History     Tobacco Use    Smoking status: Every Day     Current packs/day: 1.00     Average packs/day: 0.5 packs/day for 12.1 years (6.6 ttl pk-yrs)     Types: Cigarettes     Start date: 1/26/2012     Last attempt to quit: 1/26/2023     Passive exposure: Current    Smokeless tobacco: Never   Vaping Use    Vaping status: Never Used   Substance Use Topics    Alcohol use: Not Currently    Drug use: Yes     Types: Heroin, Fentanyl, \"Crack\" cocaine      E-Cigarette/Vaping    E-Cigarette Use Never User       E-Cigarette/Vaping Substances    Nicotine No     THC No     CBD No     Flavoring No     Other No     Unknown No       I have reviewed and agree with the history as documented.     The patient is a 49-year-old male with PMH of bipolar 1 disorder, depression, prior substance abuse currently on Subutex, asthma, GERD, gastritis, and CKD presenting for evaluation of 1 year of low back pain as well as right inguinal hernia pain.  The patient notes having this right inguinal hernia for a while and it has been steadily worsening over the last few months.  He presents today for ongoing pain as the Subutex that he takes is \"not enough\".  He denies associated upper abdominal pain, nausea, vomiting, change in bowel, flank pain, urinary urgency or frequency, foul-smelling urine, hematuria, and dysuria.  He notes the pain is worsened when leaning forward, walking, and with tensing up his abdominal muscles.  It is relieved by laying flat and holding pressure to the right groin.  He has not seen a specialist for this right inguinal hernia before.      History provided by:  Patient   used: Yes ([id number])        Review of Systems   Respiratory:  Negative for shortness of breath.    Cardiovascular:  Negative for chest pain.   Gastrointestinal:  Negative for abdominal pain, diarrhea, nausea and " vomiting.   Genitourinary:  Negative for difficulty urinating, dysuria, flank pain, frequency, hematuria and urgency.        Right groin pain x 1 year   Musculoskeletal:  Positive for back pain (Chronic, x 1 year, unchanged from baseline). Negative for arthralgias, gait problem and joint swelling.   Skin:  Negative for color change, rash and wound.   All other systems reviewed and are negative.          Objective       ED Triage Vitals [02/24/25 1038]   Temperature Pulse Blood Pressure Respirations SpO2 Patient Position - Orthostatic VS   98.2 °F (36.8 °C) 64 108/58 18 95 % Sitting      Temp Source Heart Rate Source BP Location FiO2 (%) Pain Score    Temporal Monitor Left arm -- 7      Vitals      Date and Time Temp Pulse SpO2 Resp BP Pain Score FACES Pain Rating User   02/24/25 1129 -- -- -- -- -- 7 -- LK   02/24/25 1125 -- -- -- -- -- 7 --    02/24/25 1038 98.2 °F (36.8 °C) 64 95 % 18 108/58 7 -- AM            Physical Exam  Vitals and nursing note reviewed.   Constitutional:       General: He is not in acute distress.     Appearance: Normal appearance. He is normal weight. He is not ill-appearing or toxic-appearing.   HENT:      Head: Normocephalic and atraumatic.      Nose: Nose normal.      Mouth/Throat:      Mouth: Mucous membranes are moist.      Pharynx: Oropharynx is clear.   Eyes:      Extraocular Movements: Extraocular movements intact.      Conjunctiva/sclera: Conjunctivae normal.   Cardiovascular:      Rate and Rhythm: Normal rate.   Pulmonary:      Effort: Pulmonary effort is normal. No respiratory distress.   Abdominal:      General: Bowel sounds are normal. There is no distension.      Palpations: Abdomen is soft.      Tenderness: There is no abdominal tenderness. There is no guarding or rebound.      Hernia: A hernia is present. Hernia is present in the right inguinal area (Soft, reducible). There is no hernia in the umbilical area, ventral area, left inguinal area, right femoral area or left  femoral area.   Genitourinary:     Comments: Deferred   Musculoskeletal:         General: Normal range of motion.      Cervical back: Normal range of motion and neck supple.   Skin:     General: Skin is warm and dry.      Capillary Refill: Capillary refill takes less than 2 seconds.      Findings: No rash or wound.   Neurological:      General: No focal deficit present.      Mental Status: He is alert and oriented to person, place, and time. Mental status is at baseline.         Results Reviewed       Procedure Component Value Units Date/Time    [place] draw [[id number]] Collected: 02/24/25 1046    Lab Status: Final result Specimen: Blood from Arm, Left Updated: 02/24/25 1201    Narrative:      The following orders were created for panel order [place] draw.  Procedure                               Abnormality         Status                     ---------                               -----------         ------                     Light Blue Top on hold[[id number]]                           Final result               Gold top on hold[[id number]]                                 Final result                 Please view results for these tests on the individual orders.    Urine Microscopic [[id number]]  (Normal) Collected: 02/24/25 1127    Lab Status: Final result Specimen: Urine, Clean Catch Updated: 02/24/25 1157     RBC, UA None Seen /hpf      WBC, UA None Seen /hpf      Epithelial Cells None Seen /hpf      Bacteria, UA None Seen /hpf     UA w Reflex to Microscopic w Reflex to Culture [[id number]]  (Abnormal) Collected: 02/24/25 1127    Lab Status: Final result Specimen: Urine, Clean Catch Updated: 02/24/25 1153     Color, UA Yellow     Clarity, UA Clear     Specific Gravity, UA >=1.030     pH, UA 5.5     Leukocytes, UA Negative     Nitrite, UA Negative     Protein, UA Trace mg/dl      Glucose, UA Negative mg/dl      Ketones, UA Trace mg/dl      Urobilinogen, UA <2.0 mg/dl      Bilirubin, UA Negative     Occult Blood, UA  Negative    Comprehensive metabolic panel [[id number]]  (Abnormal) Collected: 02/24/25 1046    Lab Status: Final result Specimen: Blood from Arm, Left Updated: 02/24/25 1107     Sodium 139 mmol/L      Potassium 4.5 mmol/L      Chloride 107 mmol/L      CO2 27 mmol/L      ANION GAP 5 mmol/L      BUN 16 mg/dL      Creatinine 1.04 mg/dL      Glucose 107 mg/dL      Calcium 8.9 mg/dL      AST 12 U/L      ALT 11 U/L      Alkaline Phosphatase 64 U/L      Total Protein 6.7 g/dL      Albumin 3.9 g/dL      Total Bilirubin 0.20 mg/dL      eGFR 85 ml/min/1.73sq m     Narrative:      [employer] guidelines for Chronic Kidney Disease (CKD):     Stage 1 with normal or high GFR (GFR > 90 mL/min/1.73 square meters)    Stage 2 Mild CKD (GFR = 60-89 mL/min/1.73 square meters)    Stage 3A Moderate CKD (GFR = 45-59 mL/min/1.73 square meters)    Stage 3B Moderate CKD (GFR = 30-44 mL/min/1.73 square meters)    Stage 4 Severe CKD (GFR = 15-29 mL/min/1.73 square meters)    Stage 5 End Stage CKD (GFR <15 mL/min/1.73 square meters)  Note: GFR calculation is accurate only with a steady state creatinine    Lipase [[id number]]  (Normal) Collected: 02/24/25 1046    Lab Status: Final result Specimen: Blood from Arm, Left Updated: 02/24/25 1107     Lipase 27 u/L     CBC and differential [[id number]]  (Abnormal) Collected: 02/24/25 1046    Lab Status: Final result Specimen: Blood from Arm, Left Updated: 02/24/25 1052     WBC 11.81 Thousand/uL      RBC 5.09 Million/uL      Hemoglobin 14.1 g/dL      Hematocrit 45.6 %      MCV 90 fL      MCH 27.7 pg      MCHC 30.9 g/dL      RDW 14.5 %      MPV 8.5 fL      Platelets 347 Thousands/uL      nRBC 0 /100 WBCs      Segmented % 66 %      Immature Grans % 0 %      Lymphocytes % 25 %      Monocytes % 6 %      Eosinophils Relative 3 %      Basophils Relative 0 %      Absolute Neutrophils 7.64 Thousands/µL      Absolute Immature Grans 0.05 Thousand/uL      Absolute Lymphocytes 2.97 Thousands/µL       Absolute Monocytes 0.71 Thousand/µL      Eosinophils Absolute 0.39 Thousand/µL      Basophils Absolute 0.05 Thousands/µL             No orders to display       Procedures    ED Medication and Procedure Management   Prior to Admission Medications   Prescriptions Last Dose Informant Patient Reported? Taking?   ARIPiprazole (ABILIFY) 5 mg tablet   No No   Sig: Take 1 tablet (5 mg total) by mouth daily   cloNIDine (CATAPRES) 0.1 mg tablet   No No   Sig: Take 1 tablet (0.1 mg total) by mouth daily at bedtime   gabapentin (NEURONTIN) 300 mg capsule   No No   Sig: Take 1 capsule (300 mg total) by mouth 3 (three) times a day   melatonin 3 mg   No No   Sig: Take 1 tablet (3 mg total) by mouth daily at bedtime   mirtazapine (REMERON) 30 mg tablet   No No   Sig: Take 1 tablet (30 mg total) by mouth daily at bedtime      Facility-Administered Medications: None     Patient's Medications   Discharge Prescriptions    No medications on file       ED SEPSIS DOCUMENTATION   Time reflects when diagnosis was documented in both MDM as applicable and the Disposition within this note       Time User Action Codes Description Comment    2/24/2025 11:16 AM Clarissa Tellez [K40.90] Right inguinal hernia     2/24/2025 11:20 AM Clarissa Tellez [M54.50,  G89.29] Chronic low back pain                  OLGA West  02/24/25 1202

## 2025-02-24 NOTE — H&P (VIEW-ONLY)
Time reflects when diagnosis was documented in both MDM as applicable and the Disposition within this note       Time User Action Codes Description Comment    2/24/2025 11:16 AM Clarissa Tellez [K40.90] Right inguinal hernia     2/24/2025 11:20 AM Clarissa Tellez [M54.50,  G89.29] Chronic low back pain           ED Disposition       ED Disposition   Discharge    Condition   Stable    Date/Time   Mon Feb 24, 2025 11:16 AM    Comment   Juan Diego Horowitz discharge to home/self care.                   Assessment & Plan       Medical Decision Making  DDx including but not limited to: groin strain, hernia, cellulitis; considerable less likely UTI    The patient presents for 1 year of right inguinal hernia pain that has been steadily worsening over the last few months.  On exam does have a right inguinal hernia that is soft, reducible, slightly tender on palpation.  He has no other abdominal tenderness, no guarding, no rebound.  No overlying skin changes to the site of the hernia including no erythema or warmth consistent with cellulitis, no ecchymosis.  No suspicion for incarcerated or strangulated hernia at this time.  First nurse orders placed including blood work, labs currently at baseline without electrolyte derangement, organ dysfunction, or anemia.  Patient has a contract with pain addiction medicine for Subutex, will have him continue that plan, with the addition of Tylenol and NSAIDs as needed with follow-up with general surgery for reevaluation.    Problems Addressed:  Chronic low back pain: acute illness or injury  Right inguinal hernia: acute illness or injury    Risk  OTC drugs.  Prescription drug management.             Medications   ketorolac (TORADOL) injection 30 mg (30 mg Intramuscular Given 2/24/25 1129)   acetaminophen (TYLENOL) tablet 975 mg (975 mg Oral Given 2/24/25 1129)       ED Risk Strat Scores                            SBIRT 20yo+      Flowsheet Row Most Recent Value   Initial Alcohol  Screen: US AUDIT-C     1. How often do you have a drink containing alcohol? 0 Filed at: 02/24/2025 1128   2. How many drinks containing alcohol do you have on a typical day you are drinking?  0 Filed at: 02/24/2025 1128   3a. Male UNDER 65: How often do you have five or more drinks on one occasion? 0 Filed at: 02/24/2025 1128   3b. FEMALE Any Age, or MALE 65+: How often do you have 4 or more drinks on one occassion? 0 Filed at: 02/24/2025 1128   Audit-C Score 0 Filed at: 02/24/2025 1128   OLEG: How many times in the past year have you...    Used an illegal drug or used a prescription medication for non-medical reasons? Never Filed at: 02/24/2025 1128                            History of Present Illness       Chief Complaint   Patient presents with    Hernia     Patient c/o hernia pain in his groin. Patient has had this hernia x 1 year.        Past Medical History:   Diagnosis Date    Addiction to drug (HCC)     Asthma     Back pain     Bipolar 1 disorder (HCC)     Chronic kidney disease     Depression     Gastritis     GERD (gastroesophageal reflux disease)     Hallucination     Kidney stones     Opioid withdrawal (HCC) 12/05/2023    Psychiatric illness     Renal cancer (HCC)     Substance abuse (HCC)     Suicide attempt (HCC)       Past Surgical History:   Procedure Laterality Date    ABDOMINAL SURGERY      NEPHRECTOMY      [place] CYSTOURETHROSCOPY W/URETERAL CATHETERIZATION Right 09/27/2016    Procedure: CYSTOSCOPY WITH RETROGRADE PYELOGRAM;  Surgeon: Petey Hernandez MD;  Location: [facility];  Service: Urology    [place] LAPAROSCOPY RADICAL NEPHRECTOMY Right 11/23/2016    Procedure: HAND ASSISTED LAPAROSCOPIC NEPHRECTOMY, converted to open, lysis of adhesions,repair of  vena cava;  Surgeon: Petey Hernandez MD;  Location: [facility];  Service: Urology    URETERAL STENT PLACEMENT Right 09/27/2016    Procedure: INSERTION STENT URETERAL;  Surgeon: Petey Hernandez MD;  Location: [facility];  Service:     VASCULAR SURGERY       "  Family History   Problem Relation Age of Onset    Stroke Mother     Heart disease Mother     HIV Father       Social History     Tobacco Use    Smoking status: Every Day     Current packs/day: 1.00     Average packs/day: 0.5 packs/day for 12.1 years (6.6 ttl pk-yrs)     Types: Cigarettes     Start date: 1/26/2012     Last attempt to quit: 1/26/2023     Passive exposure: Current    Smokeless tobacco: Never   Vaping Use    Vaping status: Never Used   Substance Use Topics    Alcohol use: Not Currently    Drug use: Yes     Types: Heroin, Fentanyl, \"Crack\" cocaine      E-Cigarette/Vaping    E-Cigarette Use Never User       E-Cigarette/Vaping Substances    Nicotine No     THC No     CBD No     Flavoring No     Other No     Unknown No       I have reviewed and agree with the history as documented.     The patient is a 49-year-old male with PMH of bipolar 1 disorder, depression, prior substance abuse currently on Subutex, asthma, GERD, gastritis, and CKD presenting for evaluation of 1 year of low back pain as well as right inguinal hernia pain.  The patient notes having this right inguinal hernia for a while and it has been steadily worsening over the last few months.  He presents today for ongoing pain as the Subutex that he takes is \"not enough\".  He denies associated upper abdominal pain, nausea, vomiting, change in bowel, flank pain, urinary urgency or frequency, foul-smelling urine, hematuria, and dysuria.  He notes the pain is worsened when leaning forward, walking, and with tensing up his abdominal muscles.  It is relieved by laying flat and holding pressure to the right groin.  He has not seen a specialist for this right inguinal hernia before.      History provided by:  Patient   used: Yes ([id number])        Review of Systems   Respiratory:  Negative for shortness of breath.    Cardiovascular:  Negative for chest pain.   Gastrointestinal:  Negative for abdominal pain, diarrhea, nausea and " vomiting.   Genitourinary:  Negative for difficulty urinating, dysuria, flank pain, frequency, hematuria and urgency.        Right groin pain x 1 year   Musculoskeletal:  Positive for back pain (Chronic, x 1 year, unchanged from baseline). Negative for arthralgias, gait problem and joint swelling.   Skin:  Negative for color change, rash and wound.   All other systems reviewed and are negative.          Objective       ED Triage Vitals [02/24/25 1038]   Temperature Pulse Blood Pressure Respirations SpO2 Patient Position - Orthostatic VS   98.2 °F (36.8 °C) 64 108/58 18 95 % Sitting      Temp Source Heart Rate Source BP Location FiO2 (%) Pain Score    Temporal Monitor Left arm -- 7      Vitals      Date and Time Temp Pulse SpO2 Resp BP Pain Score FACES Pain Rating User   02/24/25 1129 -- -- -- -- -- 7 -- LK   02/24/25 1125 -- -- -- -- -- 7 --    02/24/25 1038 98.2 °F (36.8 °C) 64 95 % 18 108/58 7 -- AM            Physical Exam  Vitals and nursing note reviewed.   Constitutional:       General: He is not in acute distress.     Appearance: Normal appearance. He is normal weight. He is not ill-appearing or toxic-appearing.   HENT:      Head: Normocephalic and atraumatic.      Nose: Nose normal.      Mouth/Throat:      Mouth: Mucous membranes are moist.      Pharynx: Oropharynx is clear.   Eyes:      Extraocular Movements: Extraocular movements intact.      Conjunctiva/sclera: Conjunctivae normal.   Cardiovascular:      Rate and Rhythm: Normal rate.   Pulmonary:      Effort: Pulmonary effort is normal. No respiratory distress.   Abdominal:      General: Bowel sounds are normal. There is no distension.      Palpations: Abdomen is soft.      Tenderness: There is no abdominal tenderness. There is no guarding or rebound.      Hernia: A hernia is present. Hernia is present in the right inguinal area (Soft, reducible). There is no hernia in the umbilical area, ventral area, left inguinal area, right femoral area or left  femoral area.   Genitourinary:     Comments: Deferred   Musculoskeletal:         General: Normal range of motion.      Cervical back: Normal range of motion and neck supple.   Skin:     General: Skin is warm and dry.      Capillary Refill: Capillary refill takes less than 2 seconds.      Findings: No rash or wound.   Neurological:      General: No focal deficit present.      Mental Status: He is alert and oriented to person, place, and time. Mental status is at baseline.         Results Reviewed       Procedure Component Value Units Date/Time    [place] draw [[id number]] Collected: 02/24/25 1046    Lab Status: Final result Specimen: Blood from Arm, Left Updated: 02/24/25 1201    Narrative:      The following orders were created for panel order [place] draw.  Procedure                               Abnormality         Status                     ---------                               -----------         ------                     Light Blue Top on hold[[id number]]                           Final result               Gold top on hold[[id number]]                                 Final result                 Please view results for these tests on the individual orders.    Urine Microscopic [[id number]]  (Normal) Collected: 02/24/25 1127    Lab Status: Final result Specimen: Urine, Clean Catch Updated: 02/24/25 1157     RBC, UA None Seen /hpf      WBC, UA None Seen /hpf      Epithelial Cells None Seen /hpf      Bacteria, UA None Seen /hpf     UA w Reflex to Microscopic w Reflex to Culture [[id number]]  (Abnormal) Collected: 02/24/25 1127    Lab Status: Final result Specimen: Urine, Clean Catch Updated: 02/24/25 1153     Color, UA Yellow     Clarity, UA Clear     Specific Gravity, UA >=1.030     pH, UA 5.5     Leukocytes, UA Negative     Nitrite, UA Negative     Protein, UA Trace mg/dl      Glucose, UA Negative mg/dl      Ketones, UA Trace mg/dl      Urobilinogen, UA <2.0 mg/dl      Bilirubin, UA Negative     Occult Blood, UA  Negative    Comprehensive metabolic panel [[id number]]  (Abnormal) Collected: 02/24/25 1046    Lab Status: Final result Specimen: Blood from Arm, Left Updated: 02/24/25 1107     Sodium 139 mmol/L      Potassium 4.5 mmol/L      Chloride 107 mmol/L      CO2 27 mmol/L      ANION GAP 5 mmol/L      BUN 16 mg/dL      Creatinine 1.04 mg/dL      Glucose 107 mg/dL      Calcium 8.9 mg/dL      AST 12 U/L      ALT 11 U/L      Alkaline Phosphatase 64 U/L      Total Protein 6.7 g/dL      Albumin 3.9 g/dL      Total Bilirubin 0.20 mg/dL      eGFR 85 ml/min/1.73sq m     Narrative:      National Kidney Disease Foundation guidelines for Chronic Kidney Disease (CKD):     Stage 1 with normal or high GFR (GFR > 90 mL/min/1.73 square meters)    Stage 2 Mild CKD (GFR = 60-89 mL/min/1.73 square meters)    Stage 3A Moderate CKD (GFR = 45-59 mL/min/1.73 square meters)    Stage 3B Moderate CKD (GFR = 30-44 mL/min/1.73 square meters)    Stage 4 Severe CKD (GFR = 15-29 mL/min/1.73 square meters)    Stage 5 End Stage CKD (GFR <15 mL/min/1.73 square meters)  Note: GFR calculation is accurate only with a steady state creatinine    Lipase [[id number]]  (Normal) Collected: 02/24/25 1046    Lab Status: Final result Specimen: Blood from Arm, Left Updated: 02/24/25 1107     Lipase 27 u/L     CBC and differential [[id number]]  (Abnormal) Collected: 02/24/25 1046    Lab Status: Final result Specimen: Blood from Arm, Left Updated: 02/24/25 1052     WBC 11.81 Thousand/uL      RBC 5.09 Million/uL      Hemoglobin 14.1 g/dL      Hematocrit 45.6 %      MCV 90 fL      MCH 27.7 pg      MCHC 30.9 g/dL      RDW 14.5 %      MPV 8.5 fL      Platelets 347 Thousands/uL      nRBC 0 /100 WBCs      Segmented % 66 %      Immature Grans % 0 %      Lymphocytes % 25 %      Monocytes % 6 %      Eosinophils Relative 3 %      Basophils Relative 0 %      Absolute Neutrophils 7.64 Thousands/µL      Absolute Immature Grans 0.05 Thousand/uL      Absolute Lymphocytes 2.97 Thousands/µL       Absolute Monocytes 0.71 Thousand/µL      Eosinophils Absolute 0.39 Thousand/µL      Basophils Absolute 0.05 Thousands/µL             No orders to display       Procedures    ED Medication and Procedure Management   Prior to Admission Medications   Prescriptions Last Dose Informant Patient Reported? Taking?   ARIPiprazole (ABILIFY) 5 mg tablet   No No   Sig: Take 1 tablet (5 mg total) by mouth daily   cloNIDine (CATAPRES) 0.1 mg tablet   No No   Sig: Take 1 tablet (0.1 mg total) by mouth daily at bedtime   gabapentin (NEURONTIN) 300 mg capsule   No No   Sig: Take 1 capsule (300 mg total) by mouth 3 (three) times a day   melatonin 3 mg   No No   Sig: Take 1 tablet (3 mg total) by mouth daily at bedtime   mirtazapine (REMERON) 30 mg tablet   No No   Sig: Take 1 tablet (30 mg total) by mouth daily at bedtime      Facility-Administered Medications: None     Patient's Medications   Discharge Prescriptions    No medications on file       ED SEPSIS DOCUMENTATION   Time reflects when diagnosis was documented in both MDM as applicable and the Disposition within this note       Time User Action Codes Description Comment    2/24/2025 11:16 AM Clarissa Tellez [K40.90] Right inguinal hernia     2/24/2025 11:20 AM Clarissa Tellez [M54.50,  G89.29] Chronic low back pain                  OLGA West  02/24/25 1202

## 2025-02-24 NOTE — ED NOTES
[facility] contacted for transport back to facility      Kisha Avendaño RN  02/24/25 5555

## 2025-02-24 NOTE — DISCHARGE INSTRUCTIONS
You have a right inguinal hernia which will be worse when leaning forward, coughing, sneezing, vomiting, when bearing down to have a bowel movement, or with walking/activity.  Tried to splint the area by holding your right hand and applying light pressure to the right groin.  Continue taking your prescribed Subutex as directed by your pain and addiction medicine specialist.  Add 600 mg ibuprofen (Motrin) 3 times daily for the next 3 to 5 days to help with pain and inflammation.  You may also use 1000 mg of acetaminophen (extra strength Tylenol) up to 3 times a day (every 8 hours) as needed for ongoing pain.    Follow-up with general surgery in 1 to 2 weeks for reevaluation.    Return to the ER if the area becomes discolored, severely worse with pain with inability to reduce the hernia, vomiting, difficulty breathing, weakness, confusion, or lethargy.

## 2025-02-27 ENCOUNTER — CONSULT (OUTPATIENT)
Dept: SURGERY | Facility: HOSPITAL | Age: 50
End: 2025-02-27
Payer: MEDICARE

## 2025-02-27 VITALS
HEIGHT: 67 IN | HEART RATE: 86 BPM | DIASTOLIC BLOOD PRESSURE: 70 MMHG | BODY MASS INDEX: 26.37 KG/M2 | OXYGEN SATURATION: 96 % | SYSTOLIC BLOOD PRESSURE: 102 MMHG | TEMPERATURE: 97.9 F | WEIGHT: 168 LBS

## 2025-02-27 DIAGNOSIS — C64.1 RENAL CELL CARCINOMA OF RIGHT KIDNEY (HCC): ICD-10-CM

## 2025-02-27 DIAGNOSIS — K40.90 RIGHT INGUINAL HERNIA: ICD-10-CM

## 2025-02-27 DIAGNOSIS — K42.9 UMBILICAL HERNIA WITHOUT OBSTRUCTION OR GANGRENE: Primary | ICD-10-CM

## 2025-02-27 PROCEDURE — 99203 OFFICE O/P NEW LOW 30 MIN: CPT | Performed by: SURGERY

## 2025-03-10 ENCOUNTER — ANESTHESIA EVENT (OUTPATIENT)
Dept: PERIOP | Facility: HOSPITAL | Age: 50
End: 2025-03-10
Payer: COMMERCIAL

## 2025-03-10 RX ORDER — DICYCLOMINE HCL 20 MG
20 TABLET ORAL 4 TIMES DAILY PRN
Status: ON HOLD | COMMUNITY

## 2025-03-10 RX ORDER — ONDANSETRON 4 MG/1
4 TABLET, FILM COATED ORAL EVERY 8 HOURS PRN
Status: ON HOLD | COMMUNITY

## 2025-03-10 RX ORDER — GUAIFENESIN 200 MG/10ML
200 LIQUID ORAL EVERY 4 HOURS PRN
Status: ON HOLD | COMMUNITY

## 2025-03-10 RX ORDER — DIPHENHYDRAMINE HCL 25 MG
25 CAPSULE ORAL 3 TIMES DAILY PRN
Status: ON HOLD | COMMUNITY

## 2025-03-10 RX ORDER — BUPRENORPHINE 8 MG/1
16 TABLET SUBLINGUAL EVERY 24 HOURS
Status: ON HOLD | COMMUNITY
Start: 2025-02-21

## 2025-03-10 RX ORDER — ACETAMINOPHEN 500 MG
1000 TABLET ORAL EVERY 6 HOURS PRN
Status: ON HOLD | COMMUNITY

## 2025-03-10 RX ORDER — LOPERAMIDE HYDROCHLORIDE 2 MG/1
2 TABLET ORAL EVERY 2 HOUR PRN
Status: ON HOLD | COMMUNITY

## 2025-03-10 RX ORDER — BISACODYL 5 MG/1
10 TABLET, DELAYED RELEASE ORAL DAILY PRN
Status: ON HOLD | COMMUNITY

## 2025-03-10 RX ORDER — HYDROXYZINE PAMOATE 50 MG/1
50 CAPSULE ORAL 3 TIMES DAILY PRN
Status: ON HOLD | COMMUNITY

## 2025-03-10 RX ORDER — IBUPROFEN 400 MG/1
TABLET, FILM COATED ORAL EVERY 4 HOURS PRN
COMMUNITY
End: 2025-03-11

## 2025-03-10 NOTE — PRE-PROCEDURE INSTRUCTIONS
Pre-Surgery Instructions:   Medication Instructions    acetaminophen (TYLENOL) 500 mg tablet Uses PRN- OK to take day of surgery    ARIPiprazole (ABILIFY) 5 mg tablet Take day of surgery.    bisacodyl (DULCOLAX) 5 mg EC tablet Uses PRN- DO NOT take day of surgery    buprenorphine (SUBUTEX) 8 mg Hold day of surgery.    cloNIDine (CATAPRES) 0.1 mg tablet Take night before surgery    dicyclomine (BENTYL) 20 mg tablet Uses PRN- OK to take day of surgery    diphenhydrAMINE (BENADRYL) 25 mg capsule Uses PRN- OK to take day of surgery    gabapentin (NEURONTIN) 300 mg capsule Take day of surgery.    guaiFENesin (ROBITUSSIN) 100 MG/5ML oral liquid Uses PRN- OK to take day of surgery    hydrOXYzine pamoate (VISTARIL) 50 mg capsule Uses PRN- OK to take day of surgery    ibuprofen (MOTRIN) 400 mg tablet Stop taking 1 day prior to surgery.    loperamide (IMODIUM A-D) 2 MG tablet Uses PRN- OK to take day of surgery    mirtazapine (REMERON) 30 mg tablet Take night before surgery    ondansetron (ZOFRAN) 4 mg tablet Uses PRN- OK to take day of surgery   On the day of surgery, give pills or capsules with a small sip of water (less than ¼ cup).  Do not give pudding, applesauce, or thickening agents.     Besides medications, patient cannot have anything to eat or drink after midnight the night before surgery. This includes candy and chewing gum. Patients should not drink alcohol and should try not to smoke at least 24hrs before surgery.    Shower or bathe the patient the evening before surgery with CHG or antibacterial soap.   Do not use a blade to shave the surgical area 1 week before surgery. It is okay to use a clean electric clipper up to 24 hours before surgery.     Do not apply any lotions, creams, including makeup, cologne, deodorant, or perfumes after showering on the day of surgery.   Do not use dry shampoo, hair spray, hair gel, or any type of hair products.     No contact lenses, eye make-up, or artificial eyelashes. Remove  nail polish, including gel polish, and any artificial, gel, or acrylic nails if possible. Remove all jewelry including rings and body piercing jewelry.     Continuous blood glucose monitors may need to be removed in pre op or can be damaged during procedures. If possible, arrange to have extra supplies available to replace the glucose monitor after surgery.     Patients should wear loose and comfortable clothing that is easy to put on and take off.     Please do not send any valuables or jewelry.    Call the surgeon's office with any new illnesses, exposures, or additional questions prior to surgery.    Send with Patient on day of Surgery:    Completed Communication Form (page 4 of previous fax) indicating which medications were given on the day of the patient's surgery.    Updated MAR-including last time each medication was administered    Cane or walker, only if the patient currently uses one    Any specially ordered equipment (sling, braces) if indicated.   Glasses, dentures, or hearing aids (with cases if able)    Photo ID; required to verify patient identity; and Insurance Card    Send a copy of a Living Will or Power of  (if available)    POA must accompany patient to the hospital or be available by phone    If the patient has an ostomy, send an extra pouch and supplies     Please call the Ambulatory Surgery department 2 business days prior to surgery, if your facility needs an arrival time to arrange transportation. Otherwise, you will receive a phone call the business day prior (between 2pm- 8pm) with arrival time.  If you have not heard from anyone by 8pm, please call the hospital supervisor through the hospital  at [phone]. ([place] [phone] or [place] [phone]).    [facility]: [phone] [facility]: [phone] [place]: [phone]   [place]: [phone] [place]: [phone] [place]: [phone]   [facility] ([place]): [phone] [place]:  [phone] [place]: [phone]   [place]: [phone] [place]: [phone] [place] [phone]   [place]: [phone]       If you have any questions regarding pre op instructions, please call our Network PAT number at [phone]. One of our PAT RN's will return your call within one business day.

## 2025-03-11 ENCOUNTER — TELEPHONE (OUTPATIENT)
Age: 50
End: 2025-03-11

## 2025-03-11 ENCOUNTER — HOSPITAL ENCOUNTER (OUTPATIENT)
Facility: HOSPITAL | Age: 50
Setting detail: OUTPATIENT SURGERY
Discharge: HOME | End: 2025-03-11
Attending: SURGERY | Admitting: SURGERY
Payer: COMMERCIAL

## 2025-03-11 ENCOUNTER — ANESTHESIA (OUTPATIENT)
Dept: PERIOP | Facility: HOSPITAL | Age: 50
End: 2025-03-11
Payer: COMMERCIAL

## 2025-03-11 VITALS
HEART RATE: 60 BPM | WEIGHT: 169 LBS | TEMPERATURE: 97.5 F | DIASTOLIC BLOOD PRESSURE: 80 MMHG | OXYGEN SATURATION: 97 % | RESPIRATION RATE: 19 BRPM | HEIGHT: 70 IN | BODY MASS INDEX: 24.2 KG/M2 | SYSTOLIC BLOOD PRESSURE: 151 MMHG

## 2025-03-11 DIAGNOSIS — K40.90 RIGHT INGUINAL HERNIA: ICD-10-CM

## 2025-03-11 DIAGNOSIS — K42.9 UMBILICAL HERNIA WITHOUT OBSTRUCTION OR GANGRENE: Primary | ICD-10-CM

## 2025-03-11 PROCEDURE — C1781 MESH (IMPLANTABLE): HCPCS | Performed by: SURGERY

## 2025-03-11 PROCEDURE — 49650 LAP ING HERNIA REPAIR INIT: CPT | Performed by: SURGERY

## 2025-03-11 PROCEDURE — 49650 LAP ING HERNIA REPAIR INIT: CPT | Performed by: PHYSICIAN ASSISTANT

## 2025-03-11 DEVICE — 3DMAX MESH, 10.8 CM X 16.0 CM (4.3" X 6.3"), RIGHT, LARGE
Type: IMPLANTABLE DEVICE | Site: INGUINAL | Status: FUNCTIONAL
Brand: 3DMAX

## 2025-03-11 RX ORDER — KETAMINE HCL IN NACL, ISO-OSM 100MG/10ML
SYRINGE (ML) INJECTION AS NEEDED
Status: DISCONTINUED | OUTPATIENT
Start: 2025-03-11 | End: 2025-03-11

## 2025-03-11 RX ORDER — HYDROMORPHONE HCL/PF 1 MG/ML
1 SYRINGE (ML) INJECTION
Status: DISCONTINUED | OUTPATIENT
Start: 2025-03-11 | End: 2025-03-11 | Stop reason: HOSPADM

## 2025-03-11 RX ORDER — FENTANYL CITRATE 50 UG/ML
INJECTION, SOLUTION INTRAMUSCULAR; INTRAVENOUS CONTINUOUS PRN
Status: DISCONTINUED | OUTPATIENT
Start: 2025-03-11 | End: 2025-03-11

## 2025-03-11 RX ORDER — CEFAZOLIN SODIUM 2 G/50ML
2000 SOLUTION INTRAVENOUS ONCE
Status: COMPLETED | OUTPATIENT
Start: 2025-03-11 | End: 2025-03-11

## 2025-03-11 RX ORDER — OXYCODONE HYDROCHLORIDE 5 MG/1
5 TABLET ORAL EVERY 6 HOURS PRN
Qty: 16 TABLET | Refills: 0 | Status: ON HOLD | OUTPATIENT
Start: 2025-03-11 | End: 2025-03-21

## 2025-03-11 RX ORDER — PROPOFOL 10 MG/ML
INJECTION, EMULSION INTRAVENOUS AS NEEDED
Status: DISCONTINUED | OUTPATIENT
Start: 2025-03-11 | End: 2025-03-11

## 2025-03-11 RX ORDER — HYDROMORPHONE HYDROCHLORIDE 1 MG/ML
INJECTION, SOLUTION INTRAMUSCULAR; INTRAVENOUS; SUBCUTANEOUS AS NEEDED
Status: DISCONTINUED | OUTPATIENT
Start: 2025-03-11 | End: 2025-03-11

## 2025-03-11 RX ORDER — CEFAZOLIN SODIUM 2 G/50ML
2000 SOLUTION INTRAVENOUS ONCE
Status: CANCELLED | OUTPATIENT
Start: 2025-03-11 | End: 2025-03-11

## 2025-03-11 RX ORDER — KETOROLAC TROMETHAMINE 30 MG/ML
INJECTION, SOLUTION INTRAMUSCULAR; INTRAVENOUS AS NEEDED
Status: DISCONTINUED | OUTPATIENT
Start: 2025-03-11 | End: 2025-03-11

## 2025-03-11 RX ORDER — ONDANSETRON 2 MG/ML
INJECTION INTRAMUSCULAR; INTRAVENOUS AS NEEDED
Status: DISCONTINUED | OUTPATIENT
Start: 2025-03-11 | End: 2025-03-11

## 2025-03-11 RX ORDER — ACETAMINOPHEN 10 MG/ML
1000 INJECTION, SOLUTION INTRAVENOUS ONCE
Status: COMPLETED | OUTPATIENT
Start: 2025-03-11 | End: 2025-03-11

## 2025-03-11 RX ORDER — SODIUM CHLORIDE, SODIUM LACTATE, POTASSIUM CHLORIDE, CALCIUM CHLORIDE 600; 310; 30; 20 MG/100ML; MG/100ML; MG/100ML; MG/100ML
125 INJECTION, SOLUTION INTRAVENOUS CONTINUOUS
Status: CANCELLED | OUTPATIENT
Start: 2025-03-11

## 2025-03-11 RX ORDER — METOCLOPRAMIDE HYDROCHLORIDE 5 MG/ML
10 INJECTION INTRAMUSCULAR; INTRAVENOUS ONCE AS NEEDED
Status: DISCONTINUED | OUTPATIENT
Start: 2025-03-11 | End: 2025-03-11 | Stop reason: HOSPADM

## 2025-03-11 RX ORDER — GLYCOPYRROLATE 0.2 MG/ML
INJECTION INTRAMUSCULAR; INTRAVENOUS AS NEEDED
Status: DISCONTINUED | OUTPATIENT
Start: 2025-03-11 | End: 2025-03-11

## 2025-03-11 RX ORDER — SODIUM CHLORIDE, SODIUM LACTATE, POTASSIUM CHLORIDE, CALCIUM CHLORIDE 600; 310; 30; 20 MG/100ML; MG/100ML; MG/100ML; MG/100ML
125 INJECTION, SOLUTION INTRAVENOUS CONTINUOUS
Status: DISCONTINUED | OUTPATIENT
Start: 2025-03-11 | End: 2025-03-11 | Stop reason: HOSPADM

## 2025-03-11 RX ORDER — LIDOCAINE HYDROCHLORIDE 10 MG/ML
INJECTION, SOLUTION EPIDURAL; INFILTRATION; INTRACAUDAL; PERINEURAL AS NEEDED
Status: DISCONTINUED | OUTPATIENT
Start: 2025-03-11 | End: 2025-03-11

## 2025-03-11 RX ORDER — ALBUTEROL SULFATE 0.83 MG/ML
2.5 SOLUTION RESPIRATORY (INHALATION) EVERY 6 HOURS PRN
Status: DISCONTINUED | OUTPATIENT
Start: 2025-03-11 | End: 2025-03-11 | Stop reason: HOSPADM

## 2025-03-11 RX ORDER — MIDAZOLAM HYDROCHLORIDE 2 MG/2ML
INJECTION, SOLUTION INTRAMUSCULAR; INTRAVENOUS AS NEEDED
Status: DISCONTINUED | OUTPATIENT
Start: 2025-03-11 | End: 2025-03-11

## 2025-03-11 RX ORDER — NEOSTIGMINE METHYLSULFATE 1 MG/ML
INJECTION INTRAVENOUS AS NEEDED
Status: DISCONTINUED | OUTPATIENT
Start: 2025-03-11 | End: 2025-03-11

## 2025-03-11 RX ORDER — ROCURONIUM BROMIDE 10 MG/ML
INJECTION, SOLUTION INTRAVENOUS AS NEEDED
Status: DISCONTINUED | OUTPATIENT
Start: 2025-03-11 | End: 2025-03-11

## 2025-03-11 RX ORDER — ONDANSETRON 2 MG/ML
4 INJECTION INTRAMUSCULAR; INTRAVENOUS ONCE AS NEEDED
Status: DISCONTINUED | OUTPATIENT
Start: 2025-03-11 | End: 2025-03-11 | Stop reason: HOSPADM

## 2025-03-11 RX ADMIN — NEOSTIGMINE METHYLSULFATE 3 MG: 1 INJECTION INTRAVENOUS at 08:31

## 2025-03-11 RX ADMIN — Medication 10 MG: at 08:03

## 2025-03-11 RX ADMIN — LIDOCAINE HYDROCHLORIDE 50 MG: 10 INJECTION, SOLUTION EPIDURAL; INFILTRATION; INTRACAUDAL; PERINEURAL at 07:37

## 2025-03-11 RX ADMIN — ACETAMINOPHEN 1000 MG: 10 INJECTION INTRAVENOUS at 08:30

## 2025-03-11 RX ADMIN — Medication 10 MG: at 08:25

## 2025-03-11 RX ADMIN — MIDAZOLAM 2 MG: 1 INJECTION INTRAMUSCULAR; INTRAVENOUS at 07:30

## 2025-03-11 RX ADMIN — HYDROMORPHONE HYDROCHLORIDE 1 MG: 1 INJECTION, SOLUTION INTRAMUSCULAR; INTRAVENOUS; SUBCUTANEOUS at 07:30

## 2025-03-11 RX ADMIN — Medication 10 MG: at 08:11

## 2025-03-11 RX ADMIN — KETOROLAC TROMETHAMINE 15 MG: 30 INJECTION, SOLUTION INTRAMUSCULAR; INTRAVENOUS at 08:26

## 2025-03-11 RX ADMIN — Medication 10 MG: at 08:16

## 2025-03-11 RX ADMIN — ROCURONIUM BROMIDE 50 MG: 10 INJECTION INTRAVENOUS at 07:37

## 2025-03-11 RX ADMIN — PROPOFOL 200 MG: 10 INJECTION, EMULSION INTRAVENOUS at 07:37

## 2025-03-11 RX ADMIN — Medication 10 MG: at 07:37

## 2025-03-11 RX ADMIN — HYDROMORPHONE HYDROCHLORIDE 0.5 MG: 1 INJECTION, SOLUTION INTRAMUSCULAR; INTRAVENOUS; SUBCUTANEOUS at 09:31

## 2025-03-11 RX ADMIN — HYDROMORPHONE HYDROCHLORIDE 1 MG: 1 INJECTION, SOLUTION INTRAMUSCULAR; INTRAVENOUS; SUBCUTANEOUS at 09:14

## 2025-03-11 RX ADMIN — SODIUM CHLORIDE, SODIUM LACTATE, POTASSIUM CHLORIDE, AND CALCIUM CHLORIDE: .6; .31; .03; .02 INJECTION, SOLUTION INTRAVENOUS at 07:23

## 2025-03-11 RX ADMIN — ONDANSETRON 4 MG: 2 INJECTION, SOLUTION INTRAMUSCULAR; INTRAVENOUS at 07:37

## 2025-03-11 RX ADMIN — GLYCOPYRROLATE 0.4 MCG: 0.2 INJECTION, SOLUTION INTRAMUSCULAR; INTRAVENOUS at 08:31

## 2025-03-11 RX ADMIN — HYDROMORPHONE HYDROCHLORIDE 1 MG: 1 INJECTION, SOLUTION INTRAMUSCULAR; INTRAVENOUS; SUBCUTANEOUS at 07:39

## 2025-03-11 RX ADMIN — CEFAZOLIN SODIUM 2000 MG: 2 SOLUTION INTRAVENOUS at 07:34

## 2025-03-11 NOTE — DISCHARGE INSTR - AVS FIRST PAGE
[facility] General Surgery [place]  Post-Operative Care Instructions  Dr. Blayne Anguiano MD, [facility]  [phone]    1. General: You will feel pulling sensations around the wound or funny aches and pains around the incisions. This is normal. Even minor surgery is a change in your body and this is your body's way of reacting to it. If you have had abdominal surgery, it may help to support the incision with a small pillow or blanket for comfort when moving or coughing.    2. Wound care:  Okay to shower.  The glue will fall off over the next week or 2.   Use ice for the first 5 days after surgery.  Do not use for longer than 20 minutes at a time. Use ice 5 times per day.    3. Water: You may shower over the wounds. Do not bathe or use a pool or hot tub until cleared by the physician.   If you were discharged with a drain, make sure drain site is covered with plastic wrap before showering.    4. Activity: You may go up and down stairs, walk as much as you are comfortable, but walk at least 3 times each day. If you have had abdominal surgery, do not lift anything heavier than 15 lbs for the 1st 2 weeks and 25 lbs for weeks 3 and 4.     5. Diet: You may resume a regular diet. If you had a same-day surgery or overnight stay surgery, you may wish to eat lightly for a few days: soups, crackers, and sandwiches. You may resume a regular diet when ready.    6. Medications: Resume all of your previous medications, unless told otherwise by the doctor. Avoid aspirin products for 2-3 days after the date of surgery. You may, at that time, begin to take them again. Use Tylenol  for pain control.    You should use ice to assist with pain control as above.  You do not need to take narcotic pain medication unless you are having significant pain.       7. Driving: You will need someone to drive you home on the day of surgery or discharge. Do not drive or make any important decisions while on narcotic pain medication or 24 hours and  after anesthesia or sedation for surgery. Generally, you may drive when your off all narcotic pain medications and you are comfortable.     8. Upset Stomach: You may take Maalox, Tums, or similar items for an upset stomach. If your narcotic pain medication causes an upset stomach, do not take it on an empty stomach. Try taking it with at least some crackers or toast.     9. Constipation: Patients often experience constipation after surgery. You may take over-the-counter medication for this, such as Metamucil, Senokot, Dulcolax, milk of magnesia, etc. You may take a suppository unless you have had anorectal surgery such as a procedure on your hemorrhoids. If you experience significant nausea or vomiting after abdominal surgery, call the office before trying any of these medications.    10. Call the office: If you are experiencing any of the following: fevers above 101.5°, significant nausea or vomiting, if the wound develops drainage and/or there is excessive redness around the wound, or if you have significant diarrhea or other worsening symptoms.    11. Pain: You may be given a prescription for pain medication.  This will be sent to your pharmacy prior to discharge.    12. Sexual Activity: You may resume sexual activity when you feel ready and comfortable and your incision is sealed and healed without apparent infection risk.    13. Urination: If you have not urinated in 6 hours, go directly to the ER for evaluation for urinary retention.     14. Follow-up in 2 weeks.      ***READ ONLY IF YOU HAVE BEEN DISCHARGED WITH A URINARY CATHETER***  Rodriguez Insertion for Post-Op Urinary Retention    - A prescription for Flomax will be sent to your pharmacy.  This should be taken daily while the urinary catheter remains in place.    You will not be given a prescription for Flomax if your prostate has been removed.  If you are already taking Flomax, continue the medication as prescribed.    - We will send a message to the  urology group who will contact you within the next 48 hours with further instructions and to schedule an appointment for voiding trial and catheter removal.  The urinary catheter will remain in place for approximately 1 week.  If you are not contacted within the next 48 hours please call our office to assist with scheduling your follow-up.    - If you have your own urologist, you should contact your physician the day after discharge for instructions and to schedule a voiding trial and catheter removal.

## 2025-03-11 NOTE — ASSESSMENT & PLAN NOTE
Right ? Left inguinal hernia  and umbilical- discussed operative vs conservative mgt, surgical approaches, risks and benefits and patient has decided to proceed with laparoscopic right ? left inguinal hernia and open umbilical repair. I will schedule at their earliest convenience.

## 2025-03-11 NOTE — TELEPHONE ENCOUNTER
Received call from patients [facility] pharmacy.    Patient had surgery today with Dr [email or web address].  She needs to clarify Oxycodone order. Patient takes Subutex daily, last filled on 3/6/25.    Please clarify order or submit alternative.  #[phone]

## 2025-03-11 NOTE — INTERIM OP NOTE
REPAIR HERNIA INGUINAL, LAPAROSCOPIC RIGHT, REPAIR HERNIA UMBILICAL  Postoperative Note  PATIENT NAME: Juan Diego Horowitz  : 1975  MRN: [id number]  [facility] OR ROOM 04    Surgery Date: 3/11/2025    Preop Diagnosis:  Right inguinal hernia [K40.90]  Umbilical hernia without obstruction or gangrene [K42.9]    Post-Op Diagnosis Codes:     * Right inguinal hernia [K40.90]     * Umbilical hernia without obstruction or gangrene [K42.9]    Procedure(s) (LRB):  REPAIR HERNIA INGUINAL, LAPAROSCOPIC RIGHT (Right)  REPAIR HERNIA UMBILICAL (N/A)    Surgeons and Role:     * Blayne Anguiano MD - Primary     * Brittany Lancaster PA-C - Assisting    Specimens:  * No specimens in log *    Estimated Blood Loss:   Minimal    Anesthesia Type:   General     Findings:    Inguinal hernia, umbilical hernia    Complications:   None    Hernia Surgery Operative Note    Name: Juan Diego Horowitz    Gender: male    Age: 49 y.o.    Race:     BMI: Body mass index is 24.25 kg/m².    DIAGNOSIS: as above, inguinal hernia, R sided, umbilical hernia    Diabetes Mellitis: No    Coronary Heart Disease: Yes    Cancer: Yes    Steroid Use: No    Tobacco use: Yes   Last used: current   Type: cigarettes   Frequency (per day): 1   Duration (years): 7    Alcohol use: No    Location of Hernia: R indirect  Length:3  Width:2  Primary: Yes  Recurrent: No   Number of recurrences:    Access: Laparoscope    Component Separation: No    Mesh:   Yes -  -3D Max Large    Location of Hernia: R direct  Length: 2  Width:2  Primary: Yes  Recurrent: No   Number of recurrences:    Access: Laparoscope    Component Separation: No    Mesh:   Yes -  -3D Max Large    Location of Hernia: umbilical  Length: 1  Width: 1  Primary: Yes  Recurrent: No   Number of recurrences:    Access: open     Component Separation: No    Mesh:   No    Operative Time: 39mins             SIGNATURE: Brittany Lancaster PA-C   DATE: 2025   TIME: 8:43 AM

## 2025-03-11 NOTE — ANESTHESIA PREPROCEDURE EVALUATION
Procedure:  REPAIR HERNIA INGUINAL, LAPAROSCOPIC RIGHT POSSIBLE LEFT (Right: Groin)  REPAIR HERNIA UMBILICAL (Abdomen)    Relevant Problems   ANESTHESIA (within normal limits)   (-) PONV (postoperative nausea and vomiting)      CARDIO (within normal limits)      /RENAL   (+) Renal cell carcinoma of right kidney (HCC)      NEURO/PSYCH   (+) Depression with anxiety      PULMONARY   (+) Asthma   (-) URI (upper respiratory infection)      Behavioral Health   (+) Bipolar 1 disorder (HCC)   (+) Opioid use disorder, severe, dependence (HCC)   (+) Polysubstance abuse (HCC)   (+) Tobacco use disorder      Physical Exam    Airway    Mallampati score: II  TM Distance: >3 FB  Neck ROM: full     Dental   No notable dental hx     Cardiovascular      Pulmonary      Other Findings      Lab Results   Component Value Date    WBC 11.81 (H) 02/24/2025    HGB 14.1 02/24/2025     02/24/2025     Lab Results   Component Value Date    SODIUM 139 02/24/2025    K 4.5 02/24/2025    BUN 16 02/24/2025    CREATININE 1.04 02/24/2025    EGFR 85 02/24/2025    GLUCOSE 142 (H) 11/23/2016     Lab Results   Component Value Date    PTT 32 01/08/2024      Blood type A    Lab Results   Component Value Date    HGBA1C 5.9 (H) 01/01/2024       Anesthesia Plan  ASA Score- 3     Anesthesia Type- general with ASA Monitors.         Additional Monitors:     Airway Plan: ETT.    Comment: Petey discussion regarding pain management, pt on subutex with hx fentanyl/opiate/cocaine abuse.  Will give dilaudid preop to assess for effect but anticipate minimal utility of opiates given subutex.  IV tylenol/toradol (GFR ok)/ketamine adjuncts planned.  Pt will be going home with a friend to manage his post op meds  .       Plan Factors-Exercise tolerance (METS): >4 METS.    Chart reviewed.   Existing labs reviewed. Patient summary reviewed.    Patient is a current smoker.              Induction- intravenous.    Postoperative Plan-         Informed Consent- Anesthetic  plan and risks discussed with patient.  I personally reviewed this patient with the CRNA. Discussed and agreed on the Anesthesia Plan with the CRNA..      NPO Status:  Vitals Value Taken Time   Date of last liquid 03/11/25 03/11/25 0609   Time of last liquid 0500 03/11/25 0609   Date of last solid 03/10/25 03/11/25 0609   Time of last solid 2000 03/11/25 0609

## 2025-03-11 NOTE — PROGRESS NOTES
Assessment/Plan:    Umbilical hernia without obstruction or gangrene  See above    Right inguinal hernia  Right ? Left inguinal hernia  and umbilical- discussed operative vs conservative mgt, surgical approaches, risks and benefits and patient has decided to proceed with laparoscopic right ? left inguinal hernia and open umbilical repair. I will schedule at their earliest convenience.      Preoperative Clearance: None    HPI:  Juan Diego Horowitz is a 49 y.o.male who was referred for evaluation of Inguinal Hernia (Juan Diego horowitz is a pt who presents symptoms of a inguinal hernia pt states pain in stomach when he cough or sneezes he feels the bulge come out)  .    Currently ing hernia.     ROS:  General ROS: negative  negative for - chills, fatigue, fever or night sweats, weight loss  Respiratory ROS: no cough, shortness of breath, or wheezing  Cardiovascular ROS: no chest pain or dyspnea on exertion  Genito-Urinary ROS: no dysuria, trouble voiding, or hematuria  Musculoskeletal ROS: negative for - gait disturbance, joint pain or muscle pain  Neurological ROS: no TIA or stroke symptoms  Ing hernia    [unfilled]  Patient has no known allergies.  No current facility-administered medications for this visit.  No current outpatient medications on file.    Facility-Administered Medications Ordered in Other Visits:     acetaminophen (Ofirmev) injection 1,000 mg, 1,000 mg, Intravenous, Once, Alyson Millan MD    ceFAZolin (ANCEF) IVPB (premix in dextrose) 2,000 mg 50 mL, 2,000 mg, Intravenous, Once, Blayne Anguiano MD    lactated ringers infusion, 125 mL/hr, Intravenous, Continuous, Alyson Millan MD    lactated ringers infusion, 125 mL/hr, Intravenous, Continuous, Blayne Anguiano MD  Past Medical History:   Diagnosis Date    Addiction to drug (HCC)     Asthma     Back pain     Bipolar 1 disorder (HCC)     Chronic kidney disease     Depression     Gastritis     GERD (gastroesophageal reflux disease)      "Hallucination     Kidney stones     Opioid withdrawal (HCC) 12/05/2023    Psychiatric illness     Renal cancer (HCC)     Substance abuse (HCC)     Suicide attempt (HCC)      Past Surgical History:   Procedure Laterality Date    ABDOMINAL SURGERY      NEPHRECTOMY      HI CYSTOURETHROSCOPY W/URETERAL CATHETERIZATION Right 09/27/2016    Procedure: CYSTOSCOPY WITH RETROGRADE PYELOGRAM;  Surgeon: Petey Hernandez MD;  Location: [facility];  Service: Urology    HI LAPAROSCOPY RADICAL NEPHRECTOMY Right 11/23/2016    Procedure: HAND ASSISTED LAPAROSCOPIC NEPHRECTOMY, converted to open, lysis of adhesions,repair of  vena cava;  Surgeon: Petey Hernandez MD;  Location: [facility];  Service: Urology    URETERAL STENT PLACEMENT Right 09/27/2016    Procedure: INSERTION STENT URETERAL;  Surgeon: Petey Hernandez MD;  Location: [facility];  Service:     VASCULAR SURGERY       Family History   Problem Relation Age of Onset    Stroke Mother     Heart disease Mother     HIV Father       reports that he has been smoking cigarettes. He started smoking about 13 years ago. He has a 6.7 pack-year smoking history. He has been exposed to tobacco smoke. He has never used smokeless tobacco. He reports that he does not currently use alcohol. He reports current drug use. Drugs: Heroin, Fentanyl, and \"Crack\" cocaine.    Labs:   Lab Results   Component Value Date    WBC 11.81 (H) 02/24/2025    WBC 7.35 01/22/2025    WBC 8.28 01/10/2024    WBC 10.63 (H) 03/20/2015    WBC 7.49 08/02/2014    HGB 14.1 02/24/2025    HGB 11.3 (L) 01/22/2025    HGB 13.1 01/10/2024    HGB 14.8 03/20/2015    HGB 14.8 08/02/2014     02/24/2025     01/22/2025     01/10/2024     03/20/2015     08/02/2014     Lab Results   Component Value Date    ALT 11 02/24/2025    ALT 8 01/22/2025    ALT 13 08/16/2024    ALT 15 01/08/2024    ALT 16 01/03/2024    ALT 14 01/02/2024    AST 12 (L) 02/24/2025    AST 12 (L) 01/22/2025    AST 23 08/16/2024    AST " "13 01/08/2024    AST 10 01/03/2024    AST 8 01/02/2024     This SmartLink has not been configured with any valid records.       PHYSICAL EXAM  General Appearance:    Alert, cooperative, no distress,    Head:    Normocephalic without obvious abnormality   Eyes:    PERRL, conjunctiva/corneas clear, EOM's intact        Neck:   Supple, no adenopathy, no JVD   Back:     Symmetric, no spinal or CVA tenderness   Lungs:     Clear to auscultation bilaterally, no wheezing or rhonchi   Heart:    Regular rate and rhythm, S1 and S2 normal, no murmur   Abdomen:     Soft +BS ND NT + right inguinal hernia and umbilical hernia   Extremities:   Extremities normal. No clubbing, cyanosis or edema   Psych:   Normal Affect, AOx3.    Neurologic:  Skin:   CNII-XII intact. Strength symmetric, speech intact    Warm, dry, intact, no visible rashes or lesions         Physical Exam              Some portions of this record may have been generated with voice recognition software. There may be translation, syntax,  or grammatical errors. Occasional wrong word or \"sound-a-like\" substitutions may have occurred due to the inherent limitations of the voice recognition software. Read the chart carefully and recognize, using context, where substitutions may have occurred. If you have any questions, please contact the dictating provider for clarification or correction, as needed. This encounter has been coded by a non-certified coder.   "

## 2025-03-11 NOTE — ANESTHESIA POSTPROCEDURE EVALUATION
Post-Op Assessment Note    Last Filed PACU Vitals:  Vitals Value Taken Time   Temp 97.5 °F (36.4 °C) 03/11/25 0850   Pulse 64 03/11/25 1000   /71 03/11/25 1000   Resp 22 03/11/25 1000   SpO2 96 % 03/11/25 1000       Modified Seda:     Vitals Value Taken Time   Activity 2 03/11/25 1000   Respiration 2 03/11/25 1000   Circulation 2 03/11/25 1000   Consciousness 2 03/11/25 1000   Oxygen Saturation 2 03/11/25 1000     Modified Seda Score: 10

## 2025-03-11 NOTE — INTERVAL H&P NOTE
H&P reviewed. After examining the patient I find no changes in the patients condition since the H&P had been written.    Vitals:    03/11/25 0631   BP: 108/65   Pulse:    Resp:    Temp:    SpO2:

## 2025-03-11 NOTE — ANESTHESIA POSTPROCEDURE EVALUATION
Post-Op Assessment Note    CV Status:  Stable  Pain Score: 0    Pain management: adequate       Mental Status:  Alert and awake   Hydration Status:  Euvolemic   PONV Controlled:  Controlled   Airway Patency:  Patent     Post Op Vitals Reviewed: Yes    No anethesia notable event occurred.    Staff: CRNA           Last Filed PACU Vitals:  Vitals Value Taken Time   Temp 97.5    Pulse 56    /93    Resp 16    SpO2 98

## 2025-03-11 NOTE — OP NOTE
Inguinal Hernia, Laparocopic, Procedure Note    Name: Juan Diego Horowitz   : 1975  MRN: [id number]  Date: 3/11/2025    Indications: The patient presented with a history of a right, not reducible inguinal hernia and umbilical hernia    Pre-operative Diagnosis: right not reducible inguinal hernia and umbilical hernia    Post-operative Diagnosis: right not reducible direct and/or indirect inguinal hernia and umbilical hernia    Procedure: Laparoscopic repair of right inguinal hernia with mesh, Laparoscopic assisted bilateral TAP block, open umbilical hernia repair (primary)    Surgeons and Role:     * Blayne Anguiano MD - Primary     * HOLLY BowdenC - Assisting    No qualified resident available.  PA was medically necessary for the surgical safety of the case, including suturing, retraction, hemostasis.    Anesthesia: General endotracheal anesthesia    Procedure Details   The patient was seen again in the Holding Room. The risks, benefits, complications, treatment options, and expected outcomes were discussed with the patient. The possibilities of reaction to medication, pulmonary aspiration, perforation of viscus, bleeding, postoperative short or long term nerve entrapment causing pain,recurrent infection, the need for additional procedures, and development of a complication requiring transfusion or further operation were discussed with the patient and/or family. There was concurrence with the proposed plan, and informed consent was obtained. The site of surgery was properly noted/marked. The patient was taken to the Operating Room, identified as Juan Diego Horowitz and the procedure verified as hernia repair. A Time Out was held and the above information confirmed.    The patient was prepped and draped in a sterile fashion.  A timeout was again performed.  Local anesthesia was used in the incision. An umbilical incision was made and a Susana was passed around the umbilical stalk and this was   from underlying umbilical hernia sac using cautery. A susan trocar was inserted into the abdomen via the umbilical hernia defect and the abdomen was insufflated to appropriate pressure. Two additional 5mm trocars were placed lateral to rectus muscle approximately at the level of the umbilicus.  At this point the patient was placed into Trendelenburg position and noted to have right inguinal hernia .  A laparoscopic assisted bilateral TAP block was performed using a combination of lidocaine and marcaine. The peritoneum was incised from the medial umbilical fold out laterally past the internal ring on the right. Using peanut the superior flap was dissected. Next the direct space was mobilized by exposing Carlos's ligament all the way along its length to the pubic tubercle. If a direct hernia defect was seen this was dissected and reduced. If there was indirect hernia sac this was carefully mobilized off the cord structures with care to avoid injury to the gonadal vessels or spermatic cord. The remainder of the inferior flap was created. At this point Bard 3-D max mesh was selected and placed into the preperitoneal space. The mesh was secured inferiorly at Carlos's. Medially at the pubic tubercle, and laterally at the anterior abdominal wall. Of note no clips were placed lateral to the gonadal vessels or inferior to the iliopubic tract. The peritoneum was closed using running V-lock suture.    The umbilical hernia defect was closed with multiple figure of eight prolene sutures. The wound was closed in multiple layers using 3-0 Vicryl sutures and the skin of all ports were closed using a 4-0 Monocryl subcuticular stitch. The wound was dressed with Histacryl.  The patient was anatomically correct at the end of the procedure.  The patient tolerated the procedure in good condition.    Instrument, sponge, and needle counts were correct prior to closure and at the conclusion of the case.    This text is generated with voice  recognition software. There may be translation, syntax,  or grammatical errors. If you have any questions, please contact the dictating provider.     Findings: right not reducible direct and/or indirect inguinal hernia and umbilical hernia    Estimated Blood Loss: Minimal       Specimens: All specimens were sent for pathology.   No specimens collected during this procedure.         Complications: None; patient tolerated the procedure well.           Disposition: PACU            Condition: stable    Signature:   Blayne Anguiano MD  Date: 3/11/2025 Time: 8:41 AM

## 2025-03-18 ENCOUNTER — APPOINTMENT (EMERGENCY)
Dept: ULTRASOUND IMAGING | Facility: HOSPITAL | Age: 50
DRG: 885 | End: 2025-03-18
Payer: COMMERCIAL

## 2025-03-18 ENCOUNTER — APPOINTMENT (EMERGENCY)
Dept: CT IMAGING | Facility: HOSPITAL | Age: 50
DRG: 885 | End: 2025-03-18
Payer: COMMERCIAL

## 2025-03-18 ENCOUNTER — HOSPITAL ENCOUNTER (INPATIENT)
Facility: HOSPITAL | Age: 50
LOS: 16 days | Discharge: ANOTHER HOSPITAL | DRG: 885 | End: 2025-04-04
Attending: EMERGENCY MEDICINE | Admitting: PSYCHIATRY & NEUROLOGY
Payer: COMMERCIAL

## 2025-03-18 DIAGNOSIS — F31.9 BIPOLAR 1 DISORDER (HCC): Chronic | ICD-10-CM

## 2025-03-18 DIAGNOSIS — Z00.8 MEDICAL CLEARANCE FOR PSYCHIATRIC ADMISSION: ICD-10-CM

## 2025-03-18 DIAGNOSIS — F11.20 OPIOID USE DISORDER, SEVERE, DEPENDENCE (HCC): ICD-10-CM

## 2025-03-18 DIAGNOSIS — N43.3 HYDROCELE: ICD-10-CM

## 2025-03-18 DIAGNOSIS — F39 UNSPECIFIED MOOD (AFFECTIVE) DISORDER (HCC): Primary | ICD-10-CM

## 2025-03-18 DIAGNOSIS — F32.A DEPRESSION: ICD-10-CM

## 2025-03-18 DIAGNOSIS — R63.8 ALTERATION IN NUTRITION: ICD-10-CM

## 2025-03-18 DIAGNOSIS — R45.851 SUICIDAL IDEATION: ICD-10-CM

## 2025-03-18 LAB
ALBUMIN SERPL BCG-MCNC: 4 G/DL (ref 3.5–5)
ALP SERPL-CCNC: 74 U/L (ref 34–104)
ALT SERPL W P-5'-P-CCNC: 7 U/L (ref 7–52)
ANION GAP SERPL CALCULATED.3IONS-SCNC: 10 MMOL/L (ref 4–13)
APAP SERPL-MCNC: <2 UG/ML (ref 10–20)
AST SERPL W P-5'-P-CCNC: 11 U/L (ref 13–39)
BASOPHILS # BLD AUTO: 0.04 THOUSANDS/ÂΜL (ref 0–0.1)
BASOPHILS NFR BLD AUTO: 0 % (ref 0–1)
BILIRUB SERPL-MCNC: 0.24 MG/DL (ref 0.2–1)
BUN SERPL-MCNC: 15 MG/DL (ref 5–25)
CALCIUM SERPL-MCNC: 9.2 MG/DL (ref 8.4–10.2)
CHLORIDE SERPL-SCNC: 101 MMOL/L (ref 96–108)
CO2 SERPL-SCNC: 26 MMOL/L (ref 21–32)
CREAT SERPL-MCNC: 1.08 MG/DL (ref 0.6–1.3)
EOSINOPHIL # BLD AUTO: 0.38 THOUSAND/ÂΜL (ref 0–0.61)
EOSINOPHIL NFR BLD AUTO: 4 % (ref 0–6)
ERYTHROCYTE [DISTWIDTH] IN BLOOD BY AUTOMATED COUNT: 14 % (ref 11.6–15.1)
ETHANOL SERPL-MCNC: <10 MG/DL
GFR SERPL CREATININE-BSD FRML MDRD: 81 ML/MIN/1.73SQ M
GLUCOSE SERPL-MCNC: 102 MG/DL (ref 65–140)
HCT VFR BLD AUTO: 39.4 % (ref 36.5–49.3)
HGB BLD-MCNC: 12.8 G/DL (ref 12–17)
IMM GRANULOCYTES # BLD AUTO: 0.03 THOUSAND/UL (ref 0–0.2)
IMM GRANULOCYTES NFR BLD AUTO: 0 % (ref 0–2)
LIPASE SERPL-CCNC: 7 U/L (ref 11–82)
LYMPHOCYTES # BLD AUTO: 2.06 THOUSANDS/ÂΜL (ref 0.6–4.47)
LYMPHOCYTES NFR BLD AUTO: 22 % (ref 14–44)
MCH RBC QN AUTO: 27.5 PG (ref 26.8–34.3)
MCHC RBC AUTO-ENTMCNC: 32.5 G/DL (ref 31.4–37.4)
MCV RBC AUTO: 85 FL (ref 82–98)
MONOCYTES # BLD AUTO: 0.73 THOUSAND/ÂΜL (ref 0.17–1.22)
MONOCYTES NFR BLD AUTO: 8 % (ref 4–12)
NEUTROPHILS # BLD AUTO: 5.97 THOUSANDS/ÂΜL (ref 1.85–7.62)
NEUTS SEG NFR BLD AUTO: 66 % (ref 43–75)
NRBC BLD AUTO-RTO: 0 /100 WBCS
PLATELET # BLD AUTO: 394 THOUSANDS/UL (ref 149–390)
PMV BLD AUTO: 8.4 FL (ref 8.9–12.7)
POTASSIUM SERPL-SCNC: 3.9 MMOL/L (ref 3.5–5.3)
PROT SERPL-MCNC: 6.5 G/DL (ref 6.4–8.4)
RBC # BLD AUTO: 4.66 MILLION/UL (ref 3.88–5.62)
SALICYLATES SERPL-MCNC: <5 MG/DL (ref 3–20)
SODIUM SERPL-SCNC: 137 MMOL/L (ref 135–147)
WBC # BLD AUTO: 9.21 THOUSAND/UL (ref 4.31–10.16)

## 2025-03-18 PROCEDURE — 36415 COLL VENOUS BLD VENIPUNCTURE: CPT | Performed by: PHYSICIAN ASSISTANT

## 2025-03-18 PROCEDURE — 85025 COMPLETE CBC W/AUTO DIFF WBC: CPT | Performed by: PHYSICIAN ASSISTANT

## 2025-03-18 PROCEDURE — 82077 ASSAY SPEC XCP UR&BREATH IA: CPT | Performed by: PHYSICIAN ASSISTANT

## 2025-03-18 PROCEDURE — 93005 ELECTROCARDIOGRAM TRACING: CPT

## 2025-03-18 PROCEDURE — 99285 EMERGENCY DEPT VISIT HI MDM: CPT

## 2025-03-18 PROCEDURE — 83690 ASSAY OF LIPASE: CPT | Performed by: PHYSICIAN ASSISTANT

## 2025-03-18 PROCEDURE — 76870 US EXAM SCROTUM: CPT

## 2025-03-18 PROCEDURE — 74177 CT ABD & PELVIS W/CONTRAST: CPT

## 2025-03-18 PROCEDURE — 80179 DRUG ASSAY SALICYLATE: CPT | Performed by: PHYSICIAN ASSISTANT

## 2025-03-18 PROCEDURE — 99285 EMERGENCY DEPT VISIT HI MDM: CPT | Performed by: PHYSICIAN ASSISTANT

## 2025-03-18 PROCEDURE — 80053 COMPREHEN METABOLIC PANEL: CPT | Performed by: PHYSICIAN ASSISTANT

## 2025-03-18 PROCEDURE — 80143 DRUG ASSAY ACETAMINOPHEN: CPT | Performed by: PHYSICIAN ASSISTANT

## 2025-03-18 PROCEDURE — 71260 CT THORAX DX C+: CPT

## 2025-03-18 RX ADMIN — IOHEXOL 100 ML: 350 INJECTION, SOLUTION INTRAVENOUS at 21:52

## 2025-03-18 NOTE — LETTER
ID # [id number]    [facility] # [id number]     UR Phone # [phone]    DISCHARGE SUMMARY

## 2025-03-19 LAB
AMPHETAMINES SERPL QL SCN: NEGATIVE
ATRIAL RATE: 80 BPM
BACTERIA UR QL AUTO: NORMAL /HPF
BARBITURATES UR QL: NEGATIVE
BENZODIAZ UR QL: NEGATIVE
BILIRUB UR QL STRIP: NEGATIVE
CLARITY UR: CLEAR
COCAINE UR QL: POSITIVE
COLOR UR: YELLOW
FENTANYL UR QL SCN: POSITIVE
GLUCOSE UR STRIP-MCNC: NEGATIVE MG/DL
HGB UR QL STRIP.AUTO: NEGATIVE
HYDROCODONE UR QL SCN: NEGATIVE
KETONES UR STRIP-MCNC: ABNORMAL MG/DL
LEUKOCYTE ESTERASE UR QL STRIP: NEGATIVE
METHADONE UR QL: NEGATIVE
NITRITE UR QL STRIP: NEGATIVE
NON-SQ EPI CELLS URNS QL MICRO: NORMAL /HPF
OPIATES UR QL SCN: NEGATIVE
OXYCODONE+OXYMORPHONE UR QL SCN: NEGATIVE
P AXIS: 41 DEGREES
PCP UR QL: NEGATIVE
PH UR STRIP.AUTO: 5 [PH]
PR INTERVAL: 140 MS
PROT UR STRIP-MCNC: ABNORMAL MG/DL
QRS AXIS: 2 DEGREES
QRSD INTERVAL: 88 MS
QT INTERVAL: 386 MS
QTC INTERVAL: 446 MS
RBC #/AREA URNS AUTO: NORMAL /HPF
SP GR UR STRIP.AUTO: 1.01 (ref 1–1.04)
T WAVE AXIS: 34 DEGREES
THC UR QL: NEGATIVE
UROBILINOGEN UA: NEGATIVE MG/DL
VENTRICULAR RATE: 80 BPM
WBC #/AREA URNS AUTO: NORMAL /HPF

## 2025-03-19 PROCEDURE — 81001 URINALYSIS AUTO W/SCOPE: CPT | Performed by: PHYSICIAN ASSISTANT

## 2025-03-19 PROCEDURE — 93010 ELECTROCARDIOGRAM REPORT: CPT | Performed by: INTERNAL MEDICINE

## 2025-03-19 PROCEDURE — 80307 DRUG TEST PRSMV CHEM ANLYZR: CPT | Performed by: PHYSICIAN ASSISTANT

## 2025-03-19 RX ORDER — BENZTROPINE MESYLATE 1 MG/ML
1 INJECTION, SOLUTION INTRAMUSCULAR; INTRAVENOUS
Status: DISCONTINUED | OUTPATIENT
Start: 2025-03-19 | End: 2025-04-04 | Stop reason: HOSPADM

## 2025-03-19 RX ORDER — DIPHENHYDRAMINE HYDROCHLORIDE 50 MG/ML
50 INJECTION, SOLUTION INTRAMUSCULAR; INTRAVENOUS EVERY 6 HOURS PRN
Status: DISCONTINUED | OUTPATIENT
Start: 2025-03-19 | End: 2025-04-04 | Stop reason: HOSPADM

## 2025-03-19 RX ORDER — OLANZAPINE 2.5 MG/1
2.5 TABLET, FILM COATED ORAL
Status: DISCONTINUED | OUTPATIENT
Start: 2025-03-19 | End: 2025-04-04 | Stop reason: HOSPADM

## 2025-03-19 RX ORDER — MIRTAZAPINE 30 MG/1
30 TABLET, FILM COATED ORAL
Status: DISCONTINUED | OUTPATIENT
Start: 2025-03-19 | End: 2025-03-20

## 2025-03-19 RX ORDER — OLANZAPINE 10 MG/2ML
5 INJECTION, POWDER, FOR SOLUTION INTRAMUSCULAR
Status: DISCONTINUED | OUTPATIENT
Start: 2025-03-19 | End: 2025-04-04 | Stop reason: HOSPADM

## 2025-03-19 RX ORDER — BENZTROPINE MESYLATE 1 MG/1
1 TABLET ORAL
Status: DISCONTINUED | OUTPATIENT
Start: 2025-03-19 | End: 2025-04-04 | Stop reason: HOSPADM

## 2025-03-19 RX ORDER — POLYETHYLENE GLYCOL 3350 17 G/17G
17 POWDER, FOR SOLUTION ORAL DAILY PRN
Status: DISCONTINUED | OUTPATIENT
Start: 2025-03-19 | End: 2025-03-25

## 2025-03-19 RX ORDER — HYDROXYZINE HYDROCHLORIDE 25 MG/1
25 TABLET, FILM COATED ORAL
Status: DISCONTINUED | OUTPATIENT
Start: 2025-03-19 | End: 2025-04-04 | Stop reason: HOSPADM

## 2025-03-19 RX ORDER — ACETAMINOPHEN 325 MG/1
650 TABLET ORAL EVERY 6 HOURS PRN
Status: DISCONTINUED | OUTPATIENT
Start: 2025-03-19 | End: 2025-03-21 | Stop reason: SDUPTHER

## 2025-03-19 RX ORDER — IPRATROPIUM BROMIDE AND ALBUTEROL SULFATE 2.5; .5 MG/3ML; MG/3ML
3 SOLUTION RESPIRATORY (INHALATION) ONCE
Status: COMPLETED | OUTPATIENT
Start: 2025-03-19 | End: 2025-03-19

## 2025-03-19 RX ORDER — CLONIDINE HYDROCHLORIDE 0.1 MG/1
0.1 TABLET ORAL
Status: DISCONTINUED | OUTPATIENT
Start: 2025-03-19 | End: 2025-03-20

## 2025-03-19 RX ORDER — AMOXICILLIN 250 MG
1 CAPSULE ORAL DAILY PRN
Status: DISCONTINUED | OUTPATIENT
Start: 2025-03-19 | End: 2025-04-04 | Stop reason: HOSPADM

## 2025-03-19 RX ORDER — OLANZAPINE 10 MG/2ML
10 INJECTION, POWDER, FOR SOLUTION INTRAMUSCULAR
Status: DISCONTINUED | OUTPATIENT
Start: 2025-03-19 | End: 2025-04-04 | Stop reason: HOSPADM

## 2025-03-19 RX ORDER — ACETAMINOPHEN 325 MG/1
650 TABLET ORAL EVERY 6 HOURS PRN
Status: DISCONTINUED | OUTPATIENT
Start: 2025-03-19 | End: 2025-04-04 | Stop reason: HOSPADM

## 2025-03-19 RX ORDER — LORAZEPAM 2 MG/ML
2 INJECTION INTRAMUSCULAR EVERY 6 HOURS PRN
Status: DISCONTINUED | OUTPATIENT
Start: 2025-03-19 | End: 2025-04-04 | Stop reason: HOSPADM

## 2025-03-19 RX ORDER — HYDROXYZINE HYDROCHLORIDE 50 MG/1
100 TABLET, FILM COATED ORAL
Status: DISCONTINUED | OUTPATIENT
Start: 2025-03-19 | End: 2025-04-04 | Stop reason: HOSPADM

## 2025-03-19 RX ORDER — HYDROXYZINE HYDROCHLORIDE 50 MG/1
50 TABLET, FILM COATED ORAL
Status: DISCONTINUED | OUTPATIENT
Start: 2025-03-19 | End: 2025-04-04 | Stop reason: HOSPADM

## 2025-03-19 RX ORDER — PREDNISONE 20 MG/1
60 TABLET ORAL ONCE
Status: COMPLETED | OUTPATIENT
Start: 2025-03-19 | End: 2025-03-19

## 2025-03-19 RX ORDER — OLANZAPINE 5 MG/1
5 TABLET ORAL
Status: DISCONTINUED | OUTPATIENT
Start: 2025-03-19 | End: 2025-04-04 | Stop reason: HOSPADM

## 2025-03-19 RX ORDER — MAGNESIUM HYDROXIDE/ALUMINUM HYDROXICE/SIMETHICONE 120; 1200; 1200 MG/30ML; MG/30ML; MG/30ML
30 SUSPENSION ORAL EVERY 4 HOURS PRN
Status: DISCONTINUED | OUTPATIENT
Start: 2025-03-19 | End: 2025-04-04 | Stop reason: HOSPADM

## 2025-03-19 RX ORDER — PROPRANOLOL HYDROCHLORIDE 10 MG/1
10 TABLET ORAL EVERY 8 HOURS PRN
Status: DISCONTINUED | OUTPATIENT
Start: 2025-03-19 | End: 2025-04-02

## 2025-03-19 RX ORDER — NICOTINE 21 MG/24HR
1 PATCH, TRANSDERMAL 24 HOURS TRANSDERMAL DAILY
Status: DISCONTINUED | OUTPATIENT
Start: 2025-03-19 | End: 2025-04-01

## 2025-03-19 RX ORDER — ACETAMINOPHEN 325 MG/1
650 TABLET ORAL EVERY 4 HOURS PRN
Status: DISCONTINUED | OUTPATIENT
Start: 2025-03-19 | End: 2025-04-04 | Stop reason: HOSPADM

## 2025-03-19 RX ORDER — BUPRENORPHINE 8 MG/1
16 TABLET SUBLINGUAL EVERY 24 HOURS
Status: DISCONTINUED | OUTPATIENT
Start: 2025-03-19 | End: 2025-03-20

## 2025-03-19 RX ORDER — OLANZAPINE 10 MG/1
10 TABLET ORAL
Status: DISCONTINUED | OUTPATIENT
Start: 2025-03-19 | End: 2025-04-04 | Stop reason: HOSPADM

## 2025-03-19 RX ORDER — GABAPENTIN 300 MG/1
300 CAPSULE ORAL 3 TIMES DAILY
Status: DISCONTINUED | OUTPATIENT
Start: 2025-03-19 | End: 2025-03-24

## 2025-03-19 RX ORDER — ONDANSETRON 4 MG/1
4 TABLET, ORALLY DISINTEGRATING ORAL EVERY 8 HOURS PRN
Status: DISCONTINUED | OUTPATIENT
Start: 2025-03-19 | End: 2025-04-04 | Stop reason: HOSPADM

## 2025-03-19 RX ORDER — ACETAMINOPHEN 325 MG/1
975 TABLET ORAL EVERY 6 HOURS PRN
Status: DISCONTINUED | OUTPATIENT
Start: 2025-03-19 | End: 2025-04-04 | Stop reason: HOSPADM

## 2025-03-19 RX ADMIN — GABAPENTIN 300 MG: 300 CAPSULE ORAL at 09:05

## 2025-03-19 RX ADMIN — GABAPENTIN 300 MG: 300 CAPSULE ORAL at 16:57

## 2025-03-19 RX ADMIN — MIRTAZAPINE 30 MG: 30 TABLET, FILM COATED ORAL at 20:52

## 2025-03-19 RX ADMIN — Medication 3 MG: at 20:53

## 2025-03-19 RX ADMIN — BUPRENORPHINE HCL 16 MG: 8 TABLET SUBLINGUAL at 09:04

## 2025-03-19 RX ADMIN — IPRATROPIUM BROMIDE AND ALBUTEROL SULFATE 3 ML: 2.5; .5 SOLUTION RESPIRATORY (INHALATION) at 09:17

## 2025-03-19 RX ADMIN — MIRTAZAPINE 30 MG: 30 TABLET, FILM COATED ORAL at 01:57

## 2025-03-19 RX ADMIN — PREDNISONE 60 MG: 20 TABLET ORAL at 10:21

## 2025-03-19 RX ADMIN — GABAPENTIN 300 MG: 300 CAPSULE ORAL at 20:52

## 2025-03-19 NOTE — ED NOTES
Pt finally woke up. Pt ate breakfast, fresh water given to pt. Pt offered shower/wash up, to brush teeth and change linens and scrubs - pt refused.      Daxa Ching RN  03/19/25 0985

## 2025-03-19 NOTE — NURSING NOTE
Med rec complete, notified Dr. Mendelson, obtained from discharge medication from hospital admission 7 days prior.

## 2025-03-19 NOTE — ED PROVIDER NOTES
Time reflects when diagnosis was documented in both MDM as applicable and the Disposition within this note       Time User Action Codes Description Comment    3/19/2025  1:42 AM Tootie Leblanc [F32.A] Depression     3/19/2025  1:42 AM Tootie Leblanc [R45.851] Suicidal ideation     3/19/2025  1:42 AM Tootie Leblanc [N43.3] Hydrocele           ED Disposition       ED Disposition   Transfer to Behavioral Health Condition   --    Date/Time   Wed Mar 19, 2025  1:37 AM    Comment   Juan Diego Horowitz should be transferred out to  and has been medically cleared.               Assessment & Plan       Medical Decision Making  Patient with significant past medical history presented to the emergency department with multiple complaints.  Patient recently had hernia repair on 3/11 and was experiencing right-sided abdominal pain as well as testicular swelling.  Patient also had a mild cough but denied any fever or nausea or vomiting.  Patient also expressed some concerns for constipation but states that he was having small hard bowel movements.  During my initial conversation with the patient patient became tearful and began to state that he was having suicidal ideation.  Patient states that he got his surgery done and realized that he did not have anyone to take care of him postoperatively which made him depressed patient states that this made him take more than the prescribed dose of his postop medications.  Patient states that he did this 2 days ago and has not taken any more medications since then.  Patient has a history of substance abuse and was prescribed oxycodone for his recent surgery.  Patient underwent laboratory testing as well as CT chest abdomen and pelvis.  Findings in CT were sent to surgeon on-call who states that findings are normal expected postoperative changes and that the patient can follow-up outpatient unless otherwise admitted psychiatrically.  At this time patient was  medically cleared for psychiatric placement.  Patient met with crisis worker and signed 201.  Currently awaiting bed placement.  Care signed out to attending at change of shift.    Amount and/or Complexity of Data Reviewed  Labs: ordered.  Radiology: ordered.    Risk  OTC drugs.  Prescription drug management.  Decision regarding hospitalization.        ED Course as of 03/19/25 0529   Wed Mar 19, 2025   0011 Pt medically cleared for crisis consult        Medications   acetaminophen (TYLENOL) tablet 650 mg (has no administration in time range)   cloNIDine (CATAPRES) tablet 0.1 mg (0.1 mg Oral Not Given 3/19/25 0155)   gabapentin (NEURONTIN) capsule 300 mg (has no administration in time range)   mirtazapine (REMERON) tablet 30 mg (30 mg Oral Given 3/19/25 0157)   ondansetron (ZOFRAN-ODT) dispersible tablet 4 mg (has no administration in time range)   buprenorphine (SUBUTEX) 8 mg SL tablet 16 mg (has no administration in time range)   iohexol (OMNIPAQUE) 350 MG/ML injection (MULTI-DOSE) 100 mL (100 mL Intravenous Given 3/18/25 2152)       ED Risk Strat Scores                            SBIRT 20yo+      Flowsheet Row Most Recent Value   Initial Alcohol Screen: US AUDIT-C     1. How often do you have a drink containing alcohol? 0 Filed at: 03/18/2025 2011   2. How many drinks containing alcohol do you have on a typical day you are drinking?  0 Filed at: 03/18/2025 2011   3a. Male UNDER 65: How often do you have five or more drinks on one occasion? 0 Filed at: 03/18/2025 2011   Audit-C Score 0 Filed at: 03/18/2025 2011   OLEG: How many times in the past year have you...    Used an illegal drug or used a prescription medication for non-medical reasons? Never Filed at: 03/18/2025 2011                            History of Present Illness       Chief Complaint   Patient presents with    Abdominal Pain     Pt had hernia surgery and is having increased pain, back pain, lack of appetite, burning with urination. Last BM about a  week ago. No meds pta. No discharge from surgery site. Having R testicular pain. Denies swelling       Past Medical History:   Diagnosis Date    Addiction to drug (HCC)     Asthma     Back pain     Bipolar 1 disorder (HCC)     Chronic kidney disease     Depression     Gastritis     GERD (gastroesophageal reflux disease)     Hallucination     Kidney stones     Opioid withdrawal (HCC) 12/05/2023    Psychiatric illness     Renal cancer (HCC)     Substance abuse (HCC)     Suicide attempt (HCC)       Past Surgical History:   Procedure Laterality Date    ABDOMINAL SURGERY      NEPHRECTOMY      IA CYSTOURETHROSCOPY W/URETERAL CATHETERIZATION Right 09/27/2016    Procedure: CYSTOSCOPY WITH RETROGRADE PYELOGRAM;  Surgeon: Petey Hernandez MD;  Location: [facility];  Service: Urology    IA LAPAROSCOPY RADICAL NEPHRECTOMY Right 11/23/2016    Procedure: HAND ASSISTED LAPAROSCOPIC NEPHRECTOMY, converted to open, lysis of adhesions,repair of  vena cava;  Surgeon: Petey Hernandez MD;  Location: [facility];  Service: Urology    IA LAPAROSCOPY SURG RPR INITIAL INGUINAL HERNIA Right 3/11/2025    Procedure: REPAIR HERNIA INGUINAL, LAPAROSCOPIC RIGHT;  Surgeon: Blayne Anguiano MD;  Location: [facility];  Service: General    IA RPR AA HERNIA 1ST < 3 CM REDUCIBLE N/A 3/11/2025    Procedure: REPAIR HERNIA UMBILICAL;  Surgeon: Blayne Anguiano MD;  Location: [facility];  Service: General    URETERAL STENT PLACEMENT Right 09/27/2016    Procedure: INSERTION STENT URETERAL;  Surgeon: Petey Hernandez MD;  Location: [facility];  Service:     VASCULAR SURGERY        Family History   Problem Relation Age of Onset    Stroke Mother     Heart disease Mother     HIV Father       Social History     Tobacco Use    Smoking status: Every Day     Current packs/day: 1.00     Average packs/day: 0.5 packs/day for 12.2 years (6.7 ttl pk-yrs)     Types: Cigarettes     Start date: 1/26/2012     Last attempt to quit: 1/26/2023     Passive exposure: Current     "Smokeless tobacco: Never   Vaping Use    Vaping status: Never Used   Substance Use Topics    Alcohol use: Not Currently    Drug use: Yes     Types: Heroin, Fentanyl, \"Crack\" cocaine      E-Cigarette/Vaping    E-Cigarette Use Never User       E-Cigarette/Vaping Substances    Nicotine No     THC No     CBD No     Flavoring No     Other No     Unknown No       I have reviewed and agree with the history as documented.     49-year-old male with significant past medical history including depression, suicide attempt, substance abuse and recent inguinal/umbilical hernia repair on March 11, 2025 presents today with several complaints.  Patient presents stating that he has right-sided abdominal discomfort that radiates to his right testicle as well as dysuria.  Patient also admits to a productive cough for the past few days.  During my evaluation of the patient patient admitted to suicidal ideation as well as taking several pills 2 days ago in an attempt to harm himself.  Patient admits to taking oxycodone, clonidine and another medication he does not remember but states that he last took these 2 days ago and has not taken medications since then.  Patient is still feeling depressed with thoughts of wanting to harm himself.  Denies fevers.  Has not followed up with his surgeon yet.  Patient also complaining of difficulty having a bowel movement for the past 6 days.  Denies any other complaints.      History provided by:  Patient   used: No        Review of Systems   Constitutional: Negative.  Negative for chills and fatigue.   HENT:  Negative for ear pain and sore throat.    Eyes:  Negative for photophobia and redness.   Respiratory:  Positive for cough. Negative for apnea and shortness of breath.    Cardiovascular:  Negative for chest pain.   Gastrointestinal:  Positive for abdominal pain, constipation and nausea. Negative for vomiting.   Genitourinary:  Positive for dysuria and scrotal swelling. "   Musculoskeletal:  Negative for arthralgias, neck pain and neck stiffness.   Skin:  Negative for rash.   Neurological:  Negative for dizziness, tremors, syncope and weakness.   Psychiatric/Behavioral:  Positive for dysphoric mood. Negative for suicidal ideas.            Objective       ED Triage Vitals   Temperature Pulse Blood Pressure Respirations SpO2 Patient Position - Orthostatic VS   03/18/25 2014 03/18/25 2014 03/18/25 2013 03/18/25 2013 03/18/25 2014 03/18/25 2013   98.2 °F (36.8 °C) 92 128/61 18 96 % Sitting      Temp Source Heart Rate Source BP Location FiO2 (%) Pain Score    03/18/25 2014 03/18/25 2014 03/18/25 2013 -- --    Oral Monitor Left arm        Vitals      Date and Time Temp Pulse SpO2 Resp BP Pain Score FACES Pain Rating User   03/19/25 0158 -- 69 97 % 16 102/69 -- -- EY   03/19/25 0155 -- -- -- -- 102/69 -- -- EY   03/19/25 0041 -- 81 97 % 18 107/69 -- -- EY   03/18/25 2014 98.2 °F (36.8 °C) Simultaneous filing. User may not have seen previous data. 92 96 % 18 -- -- -- CO   03/18/25 2013 -- -- -- 18 128/61 -- -- VD            Physical Exam  Constitutional:       General: He is not in acute distress.     Appearance: He is well-developed. He is not diaphoretic.   Eyes:      Pupils: Pupils are equal, round, and reactive to light.   Cardiovascular:      Rate and Rhythm: Normal rate and regular rhythm.   Pulmonary:      Effort: Pulmonary effort is normal. No respiratory distress.      Breath sounds: Wheezing and rhonchi present.      Comments: Bilateral bibasilar rhonchi with diffuse mild expiratory wheezes.  No respiratory distress.  Abdominal:      General: Bowel sounds are normal. There is no distension.      Palpations: Abdomen is soft.      Tenderness: There is abdominal tenderness. There is guarding. There is no right CVA tenderness, left CVA tenderness or rebound.      Comments: Prior surgical scars noted, well-healing.  Minor ecchymosis appreciated.  Tenderness with some guarding in the  right lower quadrant.  No rebound tenderness.  No rigidity.  No distention.   Genitourinary:     Comments: Right-sided scrotal swelling and tenderness appreciated  Musculoskeletal:         General: Normal range of motion.      Cervical back: Normal range of motion and neck supple.   Skin:     General: Skin is warm and dry.   Neurological:      Mental Status: He is alert and oriented to person, place, and time.         Results Reviewed       Procedure Component Value Units Date/Time    Comprehensive metabolic panel [[id number]]  (Abnormal) Collected: 03/18/25 2047    Lab Status: Final result Specimen: Blood from Arm, Right Updated: 03/18/25 2116     Sodium 137 mmol/L      Potassium 3.9 mmol/L      Chloride 101 mmol/L      CO2 26 mmol/L      ANION GAP 10 mmol/L      BUN 15 mg/dL      Creatinine 1.08 mg/dL      Glucose 102 mg/dL      Calcium 9.2 mg/dL      AST 11 U/L      ALT 7 U/L      Alkaline Phosphatase 74 U/L      Total Protein 6.5 g/dL      Albumin 4.0 g/dL      Total Bilirubin 0.24 mg/dL      eGFR 81 ml/min/1.73sq m     Narrative:      [employer] guidelines for Chronic Kidney Disease (CKD):     Stage 1 with normal or high GFR (GFR > 90 mL/min/1.73 square meters)    Stage 2 Mild CKD (GFR = 60-89 mL/min/1.73 square meters)    Stage 3A Moderate CKD (GFR = 45-59 mL/min/1.73 square meters)    Stage 3B Moderate CKD (GFR = 30-44 mL/min/1.73 square meters)    Stage 4 Severe CKD (GFR = 15-29 mL/min/1.73 square meters)    Stage 5 End Stage CKD (GFR <15 mL/min/1.73 square meters)  Note: GFR calculation is accurate only with a steady state creatinine    Lipase [[id number]]  (Abnormal) Collected: 03/18/25 2047    Lab Status: Final result Specimen: Blood from Arm, Right Updated: 03/18/25 2116     Lipase 7 u/L     Salicylate level [[id number]]  (Normal) Collected: 03/18/25 2047    Lab Status: Final result Specimen: Blood from Arm, Right Updated: 03/18/25 2116     Salicylate Lvl <5 mg/dL     Acetaminophen  level-If concentration is detectable, please discuss with medical  on call. [[id number]]  (Abnormal) Collected: 03/18/25 2047    Lab Status: Final result Specimen: Blood from Arm, Right Updated: 03/18/25 2116     Acetaminophen Level <2 ug/mL     Ethanol [[id number]]  (Normal) Collected: 03/18/25 2047    Lab Status: Final result Specimen: Blood from Arm, Right Updated: 03/18/25 2114     Ethanol Lvl <10 mg/dL     CBC and differential [[id number]]  (Abnormal) Collected: 03/18/25 2047    Lab Status: Final result Specimen: Blood from Arm, Right Updated: 03/18/25 2056     WBC 9.21 Thousand/uL      RBC 4.66 Million/uL      Hemoglobin 12.8 g/dL      Hematocrit 39.4 %      MCV 85 fL      MCH 27.5 pg      MCHC 32.5 g/dL      RDW 14.0 %      MPV 8.4 fL      Platelets 394 Thousands/uL      nRBC 0 /100 WBCs      Segmented % 66 %      Immature Grans % 0 %      Lymphocytes % 22 %      Monocytes % 8 %      Eosinophils Relative 4 %      Basophils Relative 0 %      Absolute Neutrophils 5.97 Thousands/µL      Absolute Immature Grans 0.03 Thousand/uL      Absolute Lymphocytes 2.06 Thousands/µL      Absolute Monocytes 0.73 Thousand/µL      Eosinophils Absolute 0.38 Thousand/µL      Basophils Absolute 0.04 Thousands/µL     UA (URINE) with reflex to Scope [[id number]]     Lab Status: No result Specimen: Urine     Rapid drug screen, urine [[id number]]     Lab Status: No result Specimen: Urine             CT chest abdomen pelvis w contrast   Final Interpretation by Cholo Senior MD (03/18 2321)      No focal airspace consolidation or effusion.      Postsurgical changes of recent right inguinal and umbilical hernia repair (POD day #7) with moderate degree of edematous/inflammatory fat stranding in the right lower quadrant extending into the inguinal canal as above. No free intraperitoneal air,    discrete organizing collection, or hematoma identified. Clinical correlation and surgical follow-up recommended.      Subcutaneous  infiltrative changes in the umbilical region with singular tiny focus of soft tissue gas, presumably postsurgical. Clinical correlation for superimposed infection/cellulitis recommended.      Cholelithiasis.               Workstation performed: [id number]         US scrotum and testicles   Final Interpretation by Chano Lowery MD (03/18 2229)      Bilateral testes unremarkable.   Trace right hydrocele containing debris.      Bilateral epididymal appendices.   Trace Doppler flow present on the right, no evidence of torsion.      The study was marked in EPIC for immediate notification.      Workstation performed: [id number]             ECG 12 Lead Documentation Only    Date/Time: 3/18/2025 9:03 PM    Performed by: Tootie Leblanc PA-C  Authorized by: Tootie Leblanc PA-C    Indications / Diagnosis:  SOB  ECG reviewed by me, the ED Provider: yes    Patient location:  ED  Interpretation:     Interpretation: normal    Rate:     ECG rate:  80    ECG rate assessment: normal    Rhythm:     Rhythm: sinus rhythm    Ectopy:     Ectopy: none    QRS:     QRS axis:  Normal    QRS intervals:  Normal  Conduction:     Conduction: normal    ST segments:     ST segments:  Normal  T waves:     T waves: normal        ED Medication and Procedure Management   Prior to Admission Medications   Prescriptions Last Dose Informant Patient Reported? Taking?   ARIPiprazole (ABILIFY) 5 mg tablet Not Taking  No No   Sig: Take 1 tablet (5 mg total) by mouth daily   Patient not taking: Reported on 3/18/2025   acetaminophen (TYLENOL) 500 mg tablet   Yes No   Sig: Take 1,000 mg by mouth every 6 (six) hours as needed for mild pain Not to exceed 3 doses   bisacodyl (DULCOLAX) 5 mg EC tablet  Other (Specify) Yes No   Sig: Take 10 mg by mouth daily as needed for constipation   buprenorphine (SUBUTEX) 8 mg  Other (Specify) Yes No   Sig: Place 16 mg under the tongue every 24 hours   cloNIDine (CATAPRES) 0.1 mg tablet   No No   Sig: Take 1  tablet (0.1 mg total) by mouth daily at bedtime   dicyclomine (BENTYL) 20 mg tablet   Yes No   Sig: Take 20 mg by mouth 4 (four) times a day as needed   diphenhydrAMINE (BENADRYL) 25 mg capsule  Other (Specify) Yes No   Sig: Take 25 mg by mouth 3 (three) times a day as needed for itching   gabapentin (NEURONTIN) 300 mg capsule   No No   Sig: Take 1 capsule (300 mg total) by mouth 3 (three) times a day   guaiFENesin (ROBITUSSIN) 100 MG/5ML oral liquid  Other (Specify) Yes No   Sig: Take 200 mg by mouth every 4 (four) hours as needed for cough   hydrOXYzine pamoate (VISTARIL) 50 mg capsule Not Taking  Yes No   Sig: Take 50 mg by mouth 3 (three) times a day as needed for anxiety Severe anxiety or for S/S of withdrawal   Patient not taking: Reported on 3/18/2025   loperamide (IMODIUM A-D) 2 MG tablet  Other (Specify) Yes No   Sig: Take 2 mg by mouth every 2 (two) hours as needed for diarrhea Not to exceed 16 mg/24 hours   melatonin 3 mg   No No   Sig: Take 1 tablet (3 mg total) by mouth daily at bedtime   Patient taking differently: Take 6 mg by mouth daily at bedtime as needed   mirtazapine (REMERON) 30 mg tablet   No No   Sig: Take 1 tablet (30 mg total) by mouth daily at bedtime   ondansetron (ZOFRAN) 4 mg tablet   Yes No   Sig: Take 4 mg by mouth every 8 (eight) hours as needed for nausea or vomiting   oxyCODONE (Roxicodone) 5 immediate release tablet   No No   Sig: Take 1 tablet (5 mg total) by mouth every 6 (six) hours as needed for moderate pain or severe pain for up to 10 days Max Daily Amount: 20 mg      Facility-Administered Medications: None     Patient's Medications   Discharge Prescriptions    No medications on file     No discharge procedures on file.  ED SEPSIS DOCUMENTATION   Time reflects when diagnosis was documented in both MDM as applicable and the Disposition within this note       Time User Action Codes Description Comment    3/19/2025  1:42 AM Tootie Leblanc Add [F32.A] Depression     3/19/2025   1:42 AM Tootie Leblanc [R45.851] Suicidal ideation     3/19/2025  1:42 AM Tootie Leblanc [N43.3] Shanti Leblanc PA-C  03/19/25 0529

## 2025-03-19 NOTE — PLAN OF CARE
Problem: Potential for Falls  Goal: Patient will remain free of falls  Description: INTERVENTIONS:  - Educate patient/family on patient safety including physical limitations  - Instruct patient to call for assistance with activity   - Consult OT/PT to assist with strengthening/mobility   - Keep Call bell within reach  - Keep bed low and locked with side rails adjusted as appropriate  - Keep care items and personal belongings within reach  - Initiate and maintain comfort rounds  - Make Fall Risk Sign visible to staff  - Offer Toileting every    Hours, in advance of need  - Initiate/Maintain alarm  - Obtain necessary fall risk management equipment:   - Apply yellow socks and bracelet for high fall risk patients  - Consider moving patient to room near nurses station  Outcome: Progressing     Problem: SELF HARM/SUICIDALITY  Goal: Will have no self-injury during hospital stay  Description: INTERVENTIONS:  - Q 15 MINUTES: Routine safety checks  - Q WAKING SHIFT & PRN: Assess risk to determine if routine checks are adequate to maintain patient safety  - Encourage patient to participate actively in care by formulating a plan to combat response to suicidal ideation, identify supports and resources  Outcome: Progressing     Problem: DEPRESSION  Goal: Will be euthymic at discharge  Description: INTERVENTIONS:  - Administer medication as ordered  - Provide emotional support via 1:1 interaction with staff  - Encourage involvement in milieu/groups/activities  - Monitor for social isolation  Outcome: Progressing     Problem: ANXIETY  Goal: Will report anxiety at manageable levels  Description: INTERVENTIONS:  - Administer medication as ordered  - Teach and encourage coping skills  - Provide emotional support  - Assess patient/family for anxiety and ability to cope  Outcome: Progressing  Goal: By discharge: Patient will verbalize 2 strategies to deal with anxiety  Description: Interventions:  - Identify any obvious source/trigger  to anxiety  - Staff will assist patient in applying identified coping technique/skills  - Encourage attendance of scheduled groups and activities  Outcome: Progressing     Problem: ANXIETY  Goal: Will report anxiety at manageable levels  Description: INTERVENTIONS:  - Administer medication as ordered  - Teach and encourage coping skills  - Provide emotional support  - Assess patient/family for anxiety and ability to cope  Outcome: Progressing  Goal: By discharge: Patient will verbalize 2 strategies to deal with anxiety  Description: Interventions:  - Identify any obvious source/trigger to anxiety  - Staff will assist patient in applying identified coping technique/skills  - Encourage attendance of scheduled groups and activities  Outcome: Progressing     Problem: SUBSTANCE USE/ABUSE  Goal: Will have no detox symptoms and will verbalize plan for changing substance-related behavior  Description: INTERVENTIONS:  - Monitor physical status and assess for symptoms of withdrawal  - Administer medication as ordered  - Provide emotional support with 1 on 1 interaction with staff  - Encourage recovery focused program/ addiction education  - Assess for verbalization of changing behaviors related to substance abuse  - Initiate consults and referrals as appropriate (Case Management, Spiritual Care, etc.)  Outcome: Progressing  Goal: By discharge, will develop insight into their chemical dependency and sustain motivation to continue in recovery  Description: INTERVENTIONS:  - Attends all daily group sessions and scheduled AA groups  - Actively practices coping skills through participation in the therapeutic community and adherence to program rules  - Reviews and completes assignments from individual treatment plan  - Assist patient development of understanding of their personal cycle of addiction and relapse triggers  Outcome: Progressing  Goal: By discharge, patient will have ongoing treatment plan addressing chemical  dependency  Description: INTERVENTIONS:  - Assist patient with resources and/or appointments for ongoing recovery based living  Outcome: Progressing     Problem: DISCHARGE PLANNING  Goal: Discharge to home or other facility with appropriate resources  Description: INTERVENTIONS:  - Identify barriers to discharge w/patient and caregiver  - Arrange for needed discharge resources and transportation as appropriate  - Identify discharge learning needs (meds, wound care, etc.)  - Arrange for interpretive services to assist at discharge as needed  - Refer to Case Management Department for coordinating discharge planning if the patient needs post-hospital services based on physician/advanced practitioner order or complex needs related to functional status, cognitive ability, or social support system  Outcome: Progressing     Problem: DISCHARGE PLANNING  Goal: Discharge to home or other facility with appropriate resources  Description: INTERVENTIONS:  - Identify barriers to discharge w/patient and caregiver  - Arrange for needed discharge resources and transportation as appropriate  - Identify discharge learning needs (meds, wound care, etc.)  - Arrange for interpretive services to assist at discharge as needed  - Refer to Case Management Department for coordinating discharge planning if the patient needs post-hospital services based on physician/advanced practitioner order or complex needs related to functional status, cognitive ability, or social support system  Outcome: Progressing

## 2025-03-19 NOTE — ED NOTES
The patient is a 49 year old male presenting to the ED via self-referral for abdominal pain. During the ED provider's assessment, the patient disclosed that he's been having suicidal thoughts and two days ago attempted to overdose on medication. The patient is cooperative, well-appearing, and alert and oriented to all spheres. Introduced self and role, patient agrees to speak with this writer. The patient is primarily Croatian-speaking but does understand some english. This writer confirmed with the patient that he would prefer to use an interpretor. [employer] #[id number] (Beni) utilized for entirety of assessment.     The patient reports recent thoughts to commit suicide over the past couple of days to weeks. He reports two days ago, he attempted to overdose on medication but he doesn't remember which medication it was. This writer asked if it would've been his psychotropic medications which he affirms it probably was. He also doesn't remember how many he attempted to take. He endorses active suicidal ideation to this writer but denies a plan at this time. He endorses ongoing feelings of depression and helplessness. He denies homicidal ideation and is experiencing no psychosis symptoms such as hallucinations. He reports that he does have outpatient psychiatry but doesn't remember who they are. Per chart review, the patient has been followed by street medicine in the past but it's unknown the last time that he saw them. He reports feeling like his current medication regimen needs adjustment. Sleep and appetite patterns are disrupted, he states he can't accurately tell me how much sleep he's getting. Food insecurity is primarily related to homelessness. Stressors are that he's currently unemployed, homeless, had a recent surgery, has a limited support system, and has had ongoing insurance issues when attempting to receive his medications. Patient reports that he's still taking Subutex (8mg), last fill date  3/6.     The patient states he is willing to sign himself in for inpatient treatment at this time.

## 2025-03-19 NOTE — ED NOTES
Recipient    Name: ISRAEL RAMIREZ KIARA   Recipient ID: [id number]   YOB: 1975   Gender: Male           Eligibility Summary    Type Name   Medicaid Category: MHX  Program Status: 00  Service Program: EPOMS[facility] Funding Only - Non-Medic   [id number]   Medicaid Category: PH  Program Status: 80  Service Program: HCB50-ADULT   Other or Additional Payor MEDICARE PART B   Other or Additional Payor MEDICARE PART A   Other or Additional Payor [facility] FOR LIFE

## 2025-03-19 NOTE — ED CARE HANDOFF
Emergency Department Sign Out Note        Sign out and transfer of care from KISHOR Leblanc. See Separate Emergency Department note.     The patient, Juan Diego Horowitz, was evaluated by the previous provider for SI.    Workup Completed:  As above    ED Course / Workup Pending (followup):  201                                     Procedures  Medical Decision Making  Amount and/or Complexity of Data Reviewed  Labs: ordered.  Radiology: ordered.    Risk  OTC drugs.  Prescription drug management.  Decision regarding hospitalization.            Disposition  Final diagnoses:   Depression   Suicidal ideation   Hydrocele     Time reflects when diagnosis was documented in both MDM as applicable and the Disposition within this note       Time User Action Codes Description Comment    3/19/2025  1:42 AM Tootie Leblanc [F32.A] Depression     3/19/2025  1:42 AM Tootie Leblanc Add [R45.851] Suicidal ideation     3/19/2025  1:42 AM Tootie Leblanc [N43.3] Hydrocele           ED Disposition       ED Disposition   Transfer to Behavioral Health Condition   --    Date/Time   Wed Mar 19, 2025  1:37 AM    Comment   Juan Diego Horowitz should be transferred out to  and has been medically cleared.               MD Documentation      Flowsheet Row Most Recent Value   Sending MD Dr. Barraza, DO          Follow-up Information    None       Patient's Medications   Discharge Prescriptions    No medications on file     No discharge procedures on file.       ED Provider  Electronically Signed by     Adrián Roberson MD  03/19/25 0702

## 2025-03-19 NOTE — ED NOTES
Insurance Authorization for admission:   [facility] portal submission completed  Level of care: Inpatient mental health   Authorization # [id number]

## 2025-03-19 NOTE — ED NOTES
Patient is accepted at  3B  Patient is accepted by Dr. Rodriguez     Patient may go to the floor at 1315        Nurse report is to be called to [phone]   prior to patient transfer.

## 2025-03-19 NOTE — NURSING NOTE
"Patient is a 201 primarily Romanian speaking 48yo male brought in from [facility] ED initially presenting with abdominal pain with unremarkable workout, also verbalized increased depression and SI. In the ED, patient reported overdosing on psychotropic medication of unknown quanitity or type 2 days prior to coming into the ED. He was recently at [facility] for opioid and cocaine use disorders and discharged recently in order to get surgery for a hernia. He is post op day 8 for laparoscopic inguinal hernia repair done on the 11th of this month.     Patient arrived to  at 1350 via transport. 2 RN skin check completed. Patient is calm with soft speech and a depressed affect. Says he is here because \"I don't like my life.\" Reports depression without anxiety or HI/AVH. No current active SI as he says he feels safe on the unit and is hopeful for treatment. Reports a history of Bipolar disorder and ADHD. Says he has heard voices at time during the past few weeks saying \"I'm no good, I'm trash, I have no house.\" Due to language barrier, unsure how recent patient was having AH or if he was describing past depressive episodes. Recent stressors include homelessness, limited support system, recent surgery, and ongoing insurance  issues for refilling his medication.     Smokes 1 pack per day, UDS positive for cocaine and fentanyl. Patient admits to crack cocaine and fentanyl use in the past week. Has 2 aborted suicide attempt via poisoning and hanging. No SIB history.     Patient shown unit, now resting in bedroom laying in bed.           "

## 2025-03-19 NOTE — ED NOTES
requested pt to urinated multiple times, pt cont to decline, pt aware that we need urine sample     Nataliya Castro, RN  03/18/25 8660

## 2025-03-19 NOTE — ED NOTES
Inpatient process, patient's rights, and bed search process explained to the patient at this time, patient verbalizes understanding.     Voluntary commitment forms (201) signed by both the patient and ED attending, Dr. Christi DO, at this time. Original document placed on the patient's chart, copy in crisis office. The patient states he has no preferences for placement at this time.     The patient's 201 has been emailed to [facility] for morning review.

## 2025-03-20 PROBLEM — F39 UNSPECIFIED MOOD (AFFECTIVE) DISORDER (HCC): Status: ACTIVE | Noted: 2025-03-20

## 2025-03-20 PROBLEM — E78.5 HYPERLIPIDEMIA: Status: ACTIVE | Noted: 2025-03-20

## 2025-03-20 PROBLEM — F11.90 OPIOID USE DISORDER: Status: ACTIVE | Noted: 2025-03-20

## 2025-03-20 PROBLEM — F14.90 COCAINE USE: Status: ACTIVE | Noted: 2025-03-20

## 2025-03-20 LAB
25(OH)D3 SERPL-MCNC: 13.7 NG/ML (ref 30–100)
ALBUMIN SERPL BCG-MCNC: 3.9 G/DL (ref 3.5–5)
ALP SERPL-CCNC: 68 U/L (ref 34–104)
ALT SERPL W P-5'-P-CCNC: 7 U/L (ref 7–52)
ANION GAP SERPL CALCULATED.3IONS-SCNC: 9 MMOL/L (ref 4–13)
AST SERPL W P-5'-P-CCNC: 10 U/L (ref 13–39)
BASOPHILS # BLD AUTO: 0.02 THOUSANDS/ÂΜL (ref 0–0.1)
BASOPHILS NFR BLD AUTO: 0 % (ref 0–1)
BILIRUB SERPL-MCNC: 0.22 MG/DL (ref 0.2–1)
BUN SERPL-MCNC: 15 MG/DL (ref 5–25)
CALCIUM SERPL-MCNC: 9.6 MG/DL (ref 8.4–10.2)
CHLORIDE SERPL-SCNC: 105 MMOL/L (ref 96–108)
CHOLEST SERPL-MCNC: 250 MG/DL (ref ?–200)
CO2 SERPL-SCNC: 26 MMOL/L (ref 21–32)
CREAT SERPL-MCNC: 1.08 MG/DL (ref 0.6–1.3)
EOSINOPHIL # BLD AUTO: 0.11 THOUSAND/ÂΜL (ref 0–0.61)
EOSINOPHIL NFR BLD AUTO: 1 % (ref 0–6)
ERYTHROCYTE [DISTWIDTH] IN BLOOD BY AUTOMATED COUNT: 13.8 % (ref 11.6–15.1)
FOLATE SERPL-MCNC: 8.8 NG/ML
GFR SERPL CREATININE-BSD FRML MDRD: 81 ML/MIN/1.73SQ M
GLUCOSE P FAST SERPL-MCNC: 102 MG/DL (ref 65–99)
GLUCOSE SERPL-MCNC: 102 MG/DL (ref 65–140)
HCT VFR BLD AUTO: 42.1 % (ref 36.5–49.3)
HDLC SERPL-MCNC: 41 MG/DL
HGB BLD-MCNC: 13.4 G/DL (ref 12–17)
IMM GRANULOCYTES # BLD AUTO: 0.04 THOUSAND/UL (ref 0–0.2)
IMM GRANULOCYTES NFR BLD AUTO: 0 % (ref 0–2)
LDLC SERPL CALC-MCNC: 184 MG/DL (ref 0–100)
LYMPHOCYTES # BLD AUTO: 2.96 THOUSANDS/ÂΜL (ref 0.6–4.47)
LYMPHOCYTES NFR BLD AUTO: 25 % (ref 14–44)
MCH RBC QN AUTO: 27.3 PG (ref 26.8–34.3)
MCHC RBC AUTO-ENTMCNC: 31.8 G/DL (ref 31.4–37.4)
MCV RBC AUTO: 86 FL (ref 82–98)
MONOCYTES # BLD AUTO: 0.97 THOUSAND/ÂΜL (ref 0.17–1.22)
MONOCYTES NFR BLD AUTO: 8 % (ref 4–12)
NEUTROPHILS # BLD AUTO: 7.8 THOUSANDS/ÂΜL (ref 1.85–7.62)
NEUTS SEG NFR BLD AUTO: 66 % (ref 43–75)
NONHDLC SERPL-MCNC: 209 MG/DL
NRBC BLD AUTO-RTO: 0 /100 WBCS
PLATELET # BLD AUTO: 457 THOUSANDS/UL (ref 149–390)
PMV BLD AUTO: 8.4 FL (ref 8.9–12.7)
POTASSIUM SERPL-SCNC: 3.9 MMOL/L (ref 3.5–5.3)
PROT SERPL-MCNC: 7 G/DL (ref 6.4–8.4)
RBC # BLD AUTO: 4.91 MILLION/UL (ref 3.88–5.62)
SODIUM SERPL-SCNC: 140 MMOL/L (ref 135–147)
T4 FREE SERPL-MCNC: 1.07 NG/DL (ref 0.61–1.12)
TREPONEMA PALLIDUM IGG+IGM AB [PRESENCE] IN SERUM OR PLASMA BY IMMUNOASSAY: NORMAL
TRIGL SERPL-MCNC: 123 MG/DL (ref ?–150)
TSH SERPL DL<=0.05 MIU/L-ACNC: 0.26 UIU/ML (ref 0.45–4.5)
VIT B12 SERPL-MCNC: 149 PG/ML (ref 180–914)
WBC # BLD AUTO: 11.9 THOUSAND/UL (ref 4.31–10.16)

## 2025-03-20 PROCEDURE — 99223 1ST HOSP IP/OBS HIGH 75: CPT | Performed by: PSYCHIATRY & NEUROLOGY

## 2025-03-20 PROCEDURE — 82746 ASSAY OF FOLIC ACID SERUM: CPT

## 2025-03-20 PROCEDURE — 85025 COMPLETE CBC W/AUTO DIFF WBC: CPT

## 2025-03-20 PROCEDURE — 82306 VITAMIN D 25 HYDROXY: CPT

## 2025-03-20 PROCEDURE — 82607 VITAMIN B-12: CPT

## 2025-03-20 PROCEDURE — 86780 TREPONEMA PALLIDUM: CPT

## 2025-03-20 PROCEDURE — 84443 ASSAY THYROID STIM HORMONE: CPT

## 2025-03-20 PROCEDURE — 80061 LIPID PANEL: CPT

## 2025-03-20 PROCEDURE — 99448 NTRPROF PH1/NTRNET/EHR 21-30: CPT | Performed by: EMERGENCY MEDICINE

## 2025-03-20 PROCEDURE — 84439 ASSAY OF FREE THYROXINE: CPT

## 2025-03-20 PROCEDURE — 80053 COMPREHEN METABOLIC PANEL: CPT

## 2025-03-20 PROCEDURE — 99253 IP/OBS CNSLTJ NEW/EST LOW 45: CPT | Performed by: NURSE PRACTITIONER

## 2025-03-20 RX ORDER — ARIPIPRAZOLE 5 MG/1
5 TABLET ORAL DAILY
Status: DISCONTINUED | OUTPATIENT
Start: 2025-03-20 | End: 2025-04-04 | Stop reason: HOSPADM

## 2025-03-20 RX ORDER — BUPRENORPHINE 8 MG/1
8 TABLET SUBLINGUAL 2 TIMES DAILY
Status: DISCONTINUED | OUTPATIENT
Start: 2025-03-21 | End: 2025-03-22

## 2025-03-20 RX ORDER — ESCITALOPRAM OXALATE 10 MG/1
10 TABLET ORAL DAILY
Status: DISCONTINUED | OUTPATIENT
Start: 2025-03-20 | End: 2025-03-23

## 2025-03-20 RX ORDER — MIRTAZAPINE 15 MG/1
15 TABLET, FILM COATED ORAL
Status: COMPLETED | OUTPATIENT
Start: 2025-03-20 | End: 2025-03-20

## 2025-03-20 RX ORDER — ATORVASTATIN CALCIUM 10 MG/1
10 TABLET, FILM COATED ORAL
Status: DISCONTINUED | OUTPATIENT
Start: 2025-03-20 | End: 2025-04-04 | Stop reason: HOSPADM

## 2025-03-20 RX ADMIN — Medication 2000 UNITS: at 13:49

## 2025-03-20 RX ADMIN — ACETAMINOPHEN 975 MG: 325 TABLET, FILM COATED ORAL at 10:30

## 2025-03-20 RX ADMIN — GABAPENTIN 300 MG: 300 CAPSULE ORAL at 21:11

## 2025-03-20 RX ADMIN — CYANOCOBALAMIN TAB 1000 MCG 1000 MCG: 1000 TAB at 13:49

## 2025-03-20 RX ADMIN — ESCITALOPRAM OXALATE 10 MG: 10 TABLET ORAL at 13:49

## 2025-03-20 RX ADMIN — ARIPIPRAZOLE 5 MG: 5 TABLET ORAL at 13:49

## 2025-03-20 RX ADMIN — ATORVASTATIN CALCIUM 10 MG: 10 TABLET, FILM COATED ORAL at 17:09

## 2025-03-20 RX ADMIN — GABAPENTIN 300 MG: 300 CAPSULE ORAL at 08:30

## 2025-03-20 RX ADMIN — GABAPENTIN 300 MG: 300 CAPSULE ORAL at 16:18

## 2025-03-20 RX ADMIN — MIRTAZAPINE 15 MG: 15 TABLET, FILM COATED ORAL at 21:12

## 2025-03-20 RX ADMIN — Medication 3 MG: at 21:11

## 2025-03-20 RX ADMIN — BUPRENORPHINE HCL 16 MG: 8 TABLET SUBLINGUAL at 09:47

## 2025-03-20 NOTE — TELEMEDICINE
e-Consult (IPC)  - Medical Toxicology   Name: Juan Diego Horowitz 49 y.o. male I MRN: [id number]  Unit/Bed#: -01 I Date of Admission: 3/18/2025   Date of Service: 3/20/2025 I Hospital Day: 1  Inpatient consult to Toxicology  Consult performed by: Roque Hilliard DO  Consult ordered by: Brooke Mendelson, DO        Physician Requesting Evaluation: Jose Rodriguez MD   Reason for Evaluation / Principal Problem: Polysubstance use    Assessment & Plan  Unspecified mood (affective) disorder, MDD with or without psychotic features vs Bipolar disorder    Opioid use disorder  Patient maintained on 16 mg of Subutex daily.    Try 8 mg of Suboxone twice daily starting tomorrow which has been ordered by primary team.  Reach out the following day for an update with any issues or consideration of alteration of dosing.    Please discharge patient with a take-home naloxone kit for harm reduction purposes  Cocaine use  Recommend contingency management or CBT.  Buprenorphine is not expected to have significant impact for cocaine cravings.  Opioid use disorder, severe, dependence (HCC)    Right inguinal hernia        For further questions, please contact the medical  on call via SecureChat between 8am and 9pm. If between 9pm and 8am, please reach out to the Poison [facility] at [phone].     Hx and PE limited by the dynamics of a phone consultation. I have not personally interviewed or evaluated the patient, but only advised based on the information provided to me. Primary provider is responsible for all clinical decisions.     History of Present Illness   Juan Diego Horowitz is a 49 y.o. year old male who presents with suicidal ideation.  History of polysubstance use disorder.  Is currently on buprenorphine 16 mg daily.  Reports that he has continued cravings for heroin and cocaine.  Toxicology contacted for consideration of twice daily dosing versus increased dose.    Historical Information  "  Medical History Review: I have reviewed the patient's PMH, PSH, Social History, Family History, Meds, and Allergies   Social History     Tobacco Use    Smoking status: Every Day     Current packs/day: 1.00     Average packs/day: 0.5 packs/day for 12.2 years (6.7 ttl pk-yrs)     Types: Cigarettes     Start date: 1/26/2012     Last attempt to quit: 1/26/2023     Passive exposure: Current    Smokeless tobacco: Never   Vaping Use    Vaping status: Never Used   Substance and Sexual Activity    Alcohol use: Not Currently    Drug use: Yes     Types: Heroin, Fentanyl, \"Crack\" cocaine    Sexual activity: Yes     Partners: Female     Family History   Problem Relation Age of Onset    Stroke Mother     Heart disease Mother     HIV Father        Meds/Allergies   Prior to Admission medications    Medication Sig Start Date End Date Taking? Authorizing Provider   ARIPiprazole (ABILIFY) 5 mg tablet Take 1 tablet (5 mg total) by mouth daily 1/29/25  Yes Juanita Gonzales MD   buprenorphine (SUBUTEX) 8 mg Place 16 mg under the tongue every 24 hours 2/21/25  Yes Historical Provider, MD   cloNIDine (CATAPRES) 0.1 mg tablet Take 1 tablet (0.1 mg total) by mouth daily at bedtime 1/28/25  Yes Juanita Gonzales MD   gabapentin (NEURONTIN) 300 mg capsule Take 1 capsule (300 mg total) by mouth 3 (three) times a day 1/28/25  Yes Juanita Gonzales MD   melatonin 3 mg Take 1 tablet (3 mg total) by mouth daily at bedtime  Patient taking differently: Take 6 mg by mouth daily at bedtime as needed 1/28/25  Yes Juanita Gonzales MD   mirtazapine (REMERON) 30 mg tablet Take 1 tablet (30 mg total) by mouth daily at bedtime 1/28/25  Yes Juanita Gonzales MD   oxyCODONE (Roxicodone) 5 immediate release tablet Take 1 tablet (5 mg total) by mouth every 6 (six) hours as needed for moderate pain or severe pain for up to 10 days Max Daily Amount: 20 mg 3/11/25 3/21/25 Yes Brittany Lancaster, " ROBI   acetaminophen (TYLENOL) 500 mg tablet Take 1,000 mg by mouth every 6 (six) hours as needed for mild pain Not to exceed 3 doses  Patient not taking: Reported on 3/19/2025    Historical Provider, MD   bisacodyl (DULCOLAX) 5 mg EC tablet Take 10 mg by mouth daily as needed for constipation  Patient not taking: Reported on 3/19/2025    Historical Provider, MD   dicyclomine (BENTYL) 20 mg tablet Take 20 mg by mouth 4 (four) times a day as needed  Patient not taking: Reported on 3/19/2025    Historical Provider, MD   diphenhydrAMINE (BENADRYL) 25 mg capsule Take 25 mg by mouth 3 (three) times a day as needed for itching  Patient not taking: Reported on 3/19/2025    Historical Provider, MD   guaiFENesin (ROBITUSSIN) 100 MG/5ML oral liquid Take 200 mg by mouth every 4 (four) hours as needed for cough  Patient not taking: Reported on 3/19/2025    Historical Provider, MD   hydrOXYzine pamoate (VISTARIL) 50 mg capsule Take 50 mg by mouth 3 (three) times a day as needed for anxiety Severe anxiety or for S/S of withdrawal  Patient not taking: Reported on 3/18/2025    Historical Provider, MD   loperamide (IMODIUM A-D) 2 MG tablet Take 2 mg by mouth every 2 (two) hours as needed for diarrhea Not to exceed 16 mg/24 hours  Patient not taking: Reported on 3/19/2025    Historical Provider, MD   ondansetron (ZOFRAN) 4 mg tablet Take 4 mg by mouth every 8 (eight) hours as needed for nausea or vomiting  Patient not taking: Reported on 3/19/2025    Historical Provider, MD     Current Facility-Administered Medications:     acetaminophen (TYLENOL) tablet 650 mg, 650 mg, Oral, Q6H PRN, Tootie Leblanc PA-C    acetaminophen (TYLENOL) tablet 650 mg, 650 mg, Oral, Q6H PRN, Brooke Mendelson, DO    acetaminophen (TYLENOL) tablet 650 mg, 650 mg, Oral, Q4H PRN, Brooke Mendelson, DO    acetaminophen (TYLENOL) tablet 975 mg, 975 mg, Oral, Q6H PRN, Brooke Mendelson, DO, 975 mg at 03/20/25 1030    aluminum-magnesium hydroxide-simethicone  (MAALOX) oral suspension 30 mL, 30 mL, Oral, Q4H PRN, Brooke Mendelson, DO    ARIPiprazole (ABILIFY) tablet 5 mg, 5 mg, Oral, Daily, Brooke Mendelson, DO    atorvastatin (LIPITOR) tablet 10 mg, 10 mg, Oral, Daily With Dinner, OLGA Whittington    benztropine (COGENTIN) injection 1 mg, 1 mg, Intramuscular, Q4H PRN Max 6/day, Brooke Mendelson, DO    benztropine (COGENTIN) tablet 1 mg, 1 mg, Oral, Q4H PRN Max 6/day, Brooke Mendelson, DO    [START ON 3/21/2025] buprenorphine (SUBUTEX) 8 mg SL tablet 8 mg, 8 mg, Sublingual, BID, Brooke Mendelson, DO    hydrOXYzine HCL (ATARAX) tablet 50 mg, 50 mg, Oral, Q6H PRN Max 4/day **OR** diphenhydrAMINE (BENADRYL) injection 50 mg, 50 mg, Intramuscular, Q6H PRN, Brooke Mendelson, DO    escitalopram (LEXAPRO) tablet 10 mg, 10 mg, Oral, Daily, Brooke Mendelson, DO    gabapentin (NEURONTIN) capsule 300 mg, 300 mg, Oral, TID, Tootie Leblanc PA-C, 300 mg at 03/20/25 0830    hydrOXYzine HCL (ATARAX) tablet 100 mg, 100 mg, Oral, Q6H PRN Max 4/day **OR** LORazepam (ATIVAN) injection 2 mg, 2 mg, Intramuscular, Q6H PRN, Brooke Mendelson, DO    hydrOXYzine HCL (ATARAX) tablet 25 mg, 25 mg, Oral, Q6H PRN Max 4/day, Brooke Mendelson, DO    melatonin tablet 3 mg, 3 mg, Oral, HS PRN, Brooke Mendelson, DO, 3 mg at 03/19/25 2053    mirtazapine (REMERON) tablet 15 mg, 15 mg, Oral, HS, Brooke Mendelson, DO    nicotine (NICODERM CQ) 14 mg/24hr TD 24 hr patch 1 patch, 1 patch, Transdermal, Daily, Brooke Mendelson, DO    OLANZapine (ZyPREXA) tablet 10 mg, 10 mg, Oral, Q3H PRN Max 3/day **OR** OLANZapine (ZyPREXA) IM injection 10 mg, 10 mg, Intramuscular, Q3H PRN Max 3/day, Brooke Mendelson, DO    OLANZapine (ZyPREXA) tablet 5 mg, 5 mg, Oral, Q3H PRN Max 6/day **OR** OLANZapine (ZyPREXA) IM injection 5 mg, 5 mg, Intramuscular, Q3H PRN Max 6/day, Brooke Mendelson, DO    OLANZapine (ZyPREXA) tablet 2.5 mg, 2.5 mg, Oral, Q3H PRN Max 8/day, Brooke Mendelson, DO    ondansetron (ZOFRAN-ODT)  dispersible tablet 4 mg, 4 mg, Oral, Q8H PRN, Tootie Leblanc PA-C    polyethylene glycol (MIRALAX) packet 17 g, 17 g, Oral, Daily PRN, Brooke Mendelson, DO    propranolol (INDERAL) tablet 10 mg, 10 mg, Oral, Q8H PRN, Brooke Mendelson, DO    senna-docusate sodium (SENOKOT S) 8.6-50 mg per tablet 1 tablet, 1 tablet, Oral, Daily PRN, Brooke Mendelson, DO   No Known Allergies    Objective :  Temp:  [97.5 °F (36.4 °C)-98.1 °F (36.7 °C)] 98.1 °F (36.7 °C)  HR:  [66-71] 71  BP: (106-122)/(55-71) 115/62  Resp:  [15-16] 16  SpO2:  [96 %-97 %] 96 %  O2 Device: None (Room air)    No intake or output data in the 24 hours ending 03/20/25 1310    Physical exam not performed.      Lab Results: I have reviewed the following results:  Results from last 7 days   Lab Units 03/20/25  0753 03/18/25  2047   WBC Thousand/uL 11.90* 9.21   HEMOGLOBIN g/dL 13.4 12.8   HEMATOCRIT % 42.1 39.4   PLATELETS Thousands/uL 457* 394*   SEGS PCT % 66 66   LYMPHO PCT % 25 22   MONO PCT % 8 8   EOS PCT % 1 4      Results from last 7 days   Lab Units 03/20/25  0753   POTASSIUM mmol/L 3.9   CHLORIDE mmol/L 105   CO2 mmol/L 26   BUN mg/dL 15   CREATININE mg/dL 1.08   CALCIUM mg/dL 9.6   ALBUMIN g/dL 3.9   ALK PHOS U/L 68   ALT U/L 7   AST U/L 10*                       Results from last 7 days   Lab Units 03/18/25  2047   ACETAMINOPHEN LVL ug/mL <2*   ETHANOL LVL mg/dL <10   SALICYLATE LVL mg/dL <5     Imaging Results Review: I reviewed radiology reports from this admission including: CT chest and CT abdomen/pelvis.  Other Study Results Review: EKG was personally reviewed and my interpretation is: NSR. HR 80.  Intervals normal.  Normal axis.  No NELI...    Administrative Statements    21-30 minutes, >50% of the total time devoted to medical consultative verbal/EMR discussion between providers. Written report will be generated in the EMR.    Patient or appropriate family member was verbally informed by Dr. Mendelson of this consultative service on their  behalf to provide more timely access to specialty care in lieu of an in person consultation. Verbal consent was obtained.

## 2025-03-20 NOTE — ASSESSMENT & PLAN NOTE
Patient maintained on 16 mg of Subutex daily.    Try 8 mg of Suboxone twice daily starting tomorrow which has been ordered by primary team.  Reach out the following day for an update with any issues or consideration of alteration of dosing.    Please discharge patient with a take-home naloxone kit for harm reduction purposes

## 2025-03-20 NOTE — NURSING NOTE
Pt isolative to room this late evening. Denies SI/HI/AVH on assessment. Denies pain and anxiety/need for PRNs for same. Offered and accepted PRN melatonin to promote sleep. HS scheduled clonidine held for parameters. Pt offers no unmet needs or complaints. Safety checks ongoing.    2153 melatonin reassessment- pt appears to be sleeping.

## 2025-03-20 NOTE — PLAN OF CARE
Problem: Potential for Falls  Goal: Patient will remain free of falls  Description: INTERVENTIONS:  - Keep bed low and locked with side rails adjusted as appropriate  - Keep care items and personal belongings within reach  - Initiate and maintain comfort rounds  - Make Fall Risk Sign visible to staff  - Apply yellow socks and bracelet for high fall risk patients  - Consider moving patient to room near nurses station  Outcome: Progressing     Problem: SELF HARM/SUICIDALITY  Goal: Will have no self-injury during hospital stay  Description: INTERVENTIONS:  - Q 15 MINUTES: Routine safety checks  - Q WAKING SHIFT & PRN: Assess risk to determine if routine checks are adequate to maintain patient safety  - Encourage patient to participate actively in care by formulating a plan to combat response to suicidal ideation, identify supports and resources  Outcome: Progressing     Problem: DEPRESSION  Goal: Will be euthymic at discharge  Description: INTERVENTIONS:  - Administer medication as ordered  - Provide emotional support via 1:1 interaction with staff  - Encourage involvement in milieu/groups/activities  - Monitor for social isolation  Outcome: Progressing     Problem: ANXIETY  Goal: Will report anxiety at manageable levels  Description: INTERVENTIONS:  - Administer medication as ordered  - Teach and encourage coping skills  - Provide emotional support  - Assess patient/family for anxiety and ability to cope  Outcome: Progressing  Goal: By discharge: Patient will verbalize 2 strategies to deal with anxiety  Description: Interventions:  - Identify any obvious source/trigger to anxiety  - Staff will assist patient in applying identified coping technique/skills  - Encourage attendance of scheduled groups and activities  Outcome: Progressing     Problem: SUBSTANCE USE/ABUSE  Goal: Will have no detox symptoms and will verbalize plan for changing substance-related behavior  Description: INTERVENTIONS:  - Monitor physical  status and assess for symptoms of withdrawal  - Administer medication as ordered  - Provide emotional support with 1 on 1 interaction with staff  - Encourage recovery focused program/ addiction education  - Assess for verbalization of changing behaviors related to substance abuse  - Initiate consults and referrals as appropriate (Case Management, Spiritual Care, etc.)  Outcome: Progressing  Goal: By discharge, will develop insight into their chemical dependency and sustain motivation to continue in recovery  Description: INTERVENTIONS:  - Attends all daily group sessions and scheduled AA groups  - Actively practices coping skills through participation in the therapeutic community and adherence to program rules  - Reviews and completes assignments from individual treatment plan  - Assist patient development of understanding of their personal cycle of addiction and relapse triggers  Outcome: Progressing  Goal: By discharge, patient will have ongoing treatment plan addressing chemical dependency  Description: INTERVENTIONS:  - Assist patient with resources and/or appointments for ongoing recovery based living  Outcome: Progressing

## 2025-03-20 NOTE — H&P
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    Administrative Statements     Counseling / Coordination of Care:   Patient's progress discussed with staff in treatment team meeting.  Medication changes reviewed with staff in treatment team meeting.  Medication changes discussed with patient.  Stressors leading to admission reviewed with patient including drug use problems, family problems, financial problems, medical problems, being homeless, limited support, social difficulties, and chronic mental illness.  Events leading to admission reviewed with patient.  Importance of medication and treatment compliance reviewed with patient.  Reassurance and supportive therapy provided.  Encouraged participation in milieu and group therapy on the unit..    Inpatient Psychiatric Certification:  Estimated length of stay: 7 midnights   Based upon physical, mental and social evaluations, I certify that inpatient psychiatric services are medically necessary for this patient for a duration of 7 midnights for the treatment of <principal problem not specified>    Brooke Mendelson, DO 03/20/25

## 2025-03-20 NOTE — NURSING NOTE
Patient is calm and cooperative on the unit. Isolative to bedroom. Denies SI, HI, SIB, auditory and visual hallucinations. Patient is med and meal compliant. Encouraged increased group participation. Denies any unmet needs at this time. Maintaining Q15 safety checks.

## 2025-03-20 NOTE — QUICK NOTE
Pt was asked to complete labs this morning, asked if he could complete later before breakfast. RN notified.

## 2025-03-20 NOTE — CONSULTS
"Consultation - Hospitalist   Name: Juan Diego Horowitz 49 y.o. male I MRN: [id number]  Unit/Bed#: [facility] 351-01 I Date of Admission: 3/18/2025   Date of Service: 3/20/2025 I Hospital Day: 1   Inpatient consult for Medical Clearance for  patient  Consult performed by: OLGA Whittington  Consult ordered by: Brooke Mendelson, DO        Physician Requesting Evaluation: Jose Rodriguez MD   Reason for Evaluation / Principal Problem: [facility]      Assessment & Plan  Medical clearance for psychiatric admission  Admission labs: CBC, CMP, lipid panel, TSH acceptable  B12 and Vitamin D low, will replete   Vitals stable   UA unremarkable   Patient is medically cleared for admission to [facility] and treatment of underlying psychiatric illness based on available results  Please contact [facility] with any questions or concerns  Right inguinal hernia  S/p Laparoscopic repair of right inguinal hernia with mesh, Laparoscopic assisted bilateral TAP block, open umbilical hernia repair (primary) on 3/11   Pain control  Outpatient follow-up   Hyperlipidemia    Add statin  Encourage TLCs   Opioid use disorder, severe, dependence (HCC)  On Subutex, verified PDMP  Tobacco use disorder  Smoking cessation education and counseling   Nicotine patch while hospitalized   Unspecified mood (affective) disorder, MDD with or without psychotic features vs Bipolar disorder  [facility]  Management per primary service   Cocaine use  UDS (+) for cocaine, fentanyl   Substance cessation education and counseling   Opioid use disorder  As above     Please contact the SecureChat role,\" \", with any questions/concerns.   psychiatry medical provider     Collaboration of Care: Were Recommendations Directly Discussed with Primary Treatment Team? - Yes     History of Present Illness   Juan Diego Horowitz is a 49 y.o. male who is originally admitted to the psychiatry service due to suicide ideation. We are consulted for medical " clearance for admission to Behavioral Health Unit and treatment of underlying psychiatric illness.  Patient recently underwent right inguinal hernia repair.  Following his surgery he had increased depression and suicide ideation.  He noted some stressors which included no one being able to take care of him.  He was abusing his pain medication.  He presented to the emergency department for help.  He was seen by crisis.  He signed a 201 and was admitted to inpatient behavioral health.  Currently, he is resting comfortably in bed and offers no complaints.    Review of Systems   Constitutional:  Negative for chills and fever.   HENT:  Negative for ear pain and sore throat.    Eyes:  Negative for pain and visual disturbance.   Respiratory:  Negative for cough and shortness of breath.    Cardiovascular:  Negative for chest pain and palpitations.   Gastrointestinal:  Negative for abdominal pain and vomiting.   Genitourinary:  Negative for dysuria and hematuria.   Musculoskeletal:  Negative for arthralgias and back pain.   Skin:  Negative for color change and rash.   Neurological:  Negative for seizures and syncope.   All other systems reviewed and are negative.    Historical Information   Past Medical History:   Diagnosis Date    Addiction to drug (HCC)     Asthma     Back pain     Bipolar 1 disorder (HCC)     Chronic kidney disease     Depression     Gastritis     GERD (gastroesophageal reflux disease)     Hallucination     Kidney stones     Opioid withdrawal ([facility]) 12/05/2023    Psychiatric illness     Renal cancer (HCC)     Substance abuse (HCC)     Suicide attempt ([facility])      Past Surgical History:   Procedure Laterality Date    ABDOMINAL SURGERY      NEPHRECTOMY      MI CYSTOURETHROSCOPY W/URETERAL CATHETERIZATION Right 09/27/2016    Procedure: CYSTOSCOPY WITH RETROGRADE PYELOGRAM;  Surgeon: Petey Hernandez MD;  Location: [facility] MAIN OR;  Service: Urology    MI LAPAROSCOPY RADICAL NEPHRECTOMY Right 11/23/2016    Procedure:  "HAND ASSISTED LAPAROSCOPIC NEPHRECTOMY, converted to open, lysis of adhesions,repair of  vena cava;  Surgeon: Petey Hernandez MD;  Location: [facility] MAIN OR;  Service: Urology    MN LAPAROSCOPY SURG RPR INITIAL INGUINAL HERNIA Right 3/11/2025    Procedure: REPAIR HERNIA INGUINAL, LAPAROSCOPIC RIGHT;  Surgeon: Blayne Anguiano MD;  Location: [facility] MAIN OR;  Service: General    MN RPR AA HERNIA 1ST < 3 CM REDUCIBLE N/A 3/11/2025    Procedure: REPAIR HERNIA UMBILICAL;  Surgeon: Blayne Anguiano MD;  Location: [facility] MAIN OR;  Service: General    URETERAL STENT PLACEMENT Right 09/27/2016    Procedure: INSERTION STENT URETERAL;  Surgeon: Petey Hernandez MD;  Location: [facility] MAIN [facility];  Service:     VASCULAR SURGERY       Social History     Tobacco Use    Smoking status: Every Day     Current packs/day: 1.00     Average packs/day: 0.5 packs/day for 12.2 years (6.7 ttl pk-yrs)     Types: Cigarettes     Start date: 1/26/2012     Last attempt to quit: 1/26/2023     Passive exposure: Current    Smokeless tobacco: Never   Vaping Use    Vaping status: Never Used   Substance and Sexual Activity    Alcohol use: Not Currently    Drug use: Yes     Types: Heroin, Fentanyl, \"Crack\" cocaine    Sexual activity: Yes     Partners: Female     E-Cigarette/Vaping    E-Cigarette Use Never User      E-Cigarette/Vaping Substances    Nicotine No     THC No     CBD No     Flavoring No     Other No     Unknown No      Family history non-contributory  Marital Status: Single    Meds/Allergies   I have reviewed home medications using recent Epic encounter.  Prior to Admission medications    Medication Sig Start Date End Date Taking? Authorizing Provider   ARIPiprazole (ABILIFY) 5 mg tablet Take 1 tablet (5 mg total) by mouth daily 1/29/25  Yes Juanita Gonzales MD   buprenorphine (SUBUTEX) 8 mg Place 16 mg under the tongue every 24 hours 2/21/25  Yes Historical Provider, MD   cloNIDine (CATAPRES) 0.1 mg tablet Take 1 tablet (0.1 mg total) by mouth " daily at bedtime 1/28/25  Yes Juanita Gonzales MD   gabapentin (NEURONTIN) 300 mg capsule Take 1 capsule (300 mg total) by mouth 3 (three) times a day 1/28/25  Yes Juanita Gonzales MD   melatonin 3 mg Take 1 tablet (3 mg total) by mouth daily at bedtime  Patient taking differently: Take 6 mg by mouth daily at bedtime as needed 1/28/25  Yes Juanita Gonzales MD   mirtazapine (REMERON) 30 mg tablet Take 1 tablet (30 mg total) by mouth daily at bedtime 1/28/25  Yes Juanita Gonzales MD   oxyCODONE (Roxicodone) 5 immediate release tablet Take 1 tablet (5 mg total) by mouth every 6 (six) hours as needed for moderate pain or severe pain for up to 10 days Max Daily Amount: 20 mg 3/11/25 3/21/25 Yes Brittany Lancaster PA-C   acetaminophen (TYLENOL) 500 mg tablet Take 1,000 mg by mouth every 6 (six) hours as needed for mild pain Not to exceed 3 doses  Patient not taking: Reported on 3/19/2025    Historical Provider, MD   bisacodyl (DULCOLAX) 5 mg EC tablet Take 10 mg by mouth daily as needed for constipation  Patient not taking: Reported on 3/19/2025    Historical Provider, MD   dicyclomine (BENTYL) 20 mg tablet Take 20 mg by mouth 4 (four) times a day as needed  Patient not taking: Reported on 3/19/2025    Historical Provider, MD   diphenhydrAMINE (BENADRYL) 25 mg capsule Take 25 mg by mouth 3 (three) times a day as needed for itching  Patient not taking: Reported on 3/19/2025    Historical Provider, MD   guaiFENesin (ROBITUSSIN) 100 MG/5ML oral liquid Take 200 mg by mouth every 4 (four) hours as needed for cough  Patient not taking: Reported on 3/19/2025    Historical Provider, MD   hydrOXYzine pamoate (VISTARIL) 50 mg capsule Take 50 mg by mouth 3 (three) times a day as needed for anxiety Severe anxiety or for S/S of withdrawal  Patient not taking: Reported on 3/18/2025    Historical Provider, MD   loperamide (IMODIUM A-D) 2 MG tablet Take 2 mg by mouth every 2 (two)  "hours as needed for diarrhea Not to exceed 16 mg/24 hours  Patient not taking: Reported on 3/19/2025    Historical Provider, MD   ondansetron (ZOFRAN) 4 mg tablet Take 4 mg by mouth every 8 (eight) hours as needed for nausea or vomiting  Patient not taking: Reported on 3/19/2025    Historical Provider, MD     No Known Allergies    Objective :  Temp:  [97.5 °F (36.4 °C)-98.1 °F (36.7 °C)] 98.1 °F (36.7 °C)  HR:  [66-71] 71  BP: (106-122)/(55-71) 115/62  Resp:  [15-16] 16  SpO2:  [96 %-97 %] 96 %  O2 Device: None (Room air)    Height: 5' 8\" (172.7 cm) (03/19/25 1352)  Weight - Scale: 75 kg (165 lb 5.5 oz) (03/19/25 1352)  Physical Exam  HENT:      Head: Normocephalic.      Mouth/Throat:      Mouth: Mucous membranes are moist.   Cardiovascular:      Rate and Rhythm: Normal rate.   Pulmonary:      Effort: Pulmonary effort is normal. No respiratory distress.      Breath sounds: Normal breath sounds.   Abdominal:      General: Abdomen is flat. Bowel sounds are normal. There is no distension.      Palpations: Abdomen is soft.      Tenderness: There is no abdominal tenderness. There is no guarding or rebound.   Musculoskeletal:         General: No tenderness. Normal range of motion.      Cervical back: Normal range of motion.      Right lower leg: No edema.      Left lower leg: No edema.   Skin:     General: Skin is warm and dry.      Capillary Refill: Capillary refill takes less than 2 seconds.      Findings: No erythema.   Neurological:      Mental Status: He is alert and oriented to person, place, and time.   Psychiatric:         Behavior: Behavior normal.           Lab Results: I have reviewed the following results:  Results from last 7 days   Lab Units 03/20/25  0753   WBC Thousand/uL 11.90*   HEMOGLOBIN g/dL 13.4   HEMATOCRIT % 42.1   PLATELETS Thousands/uL 457*   SEGS PCT % 66   LYMPHO PCT % 25   MONO PCT % 8   EOS PCT % 1     Results from last 7 days   Lab Units 03/20/25  0753   SODIUM mmol/L 140   POTASSIUM mmol/L " 3.9   CHLORIDE mmol/L 105   CO2 mmol/L 26   BUN mg/dL 15   CREATININE mg/dL 1.08   ANION GAP mmol/L 9   CALCIUM mg/dL 9.6   ALBUMIN g/dL 3.9   TOTAL BILIRUBIN mg/dL 0.22   ALK PHOS U/L 68   ALT U/L 7   AST U/L 10*   GLUCOSE RANDOM mg/dL 102             Lab Results   Component Value Date/Time    HGBA1C 5.9 (H) 01/01/2024 07:22 AM           Imaging Results Review: I reviewed radiology reports from this admission including: CT chest and CT abdomen/pelvis.  Other Study Results Review: EKG was personally reviewed and my interpretation is: NSR. HR 80..    Administrative Statements   I have spent a total time of  minutes in caring for this patient on the day of the visit/encounter including Diagnostic results, Instructions for management, Patient and family education, Importance of tx compliance, Risk factor reductions, Impressions, Counseling / Coordination of care, Documenting in the medical record, Reviewing/placing orders in the medical record (including tests, medications, and/or procedures), Obtaining or reviewing history  , and Communicating with other healthcare professionals .  ** Please Note: This note has been constructed using a voice recognition system. **

## 2025-03-20 NOTE — ASSESSMENT & PLAN NOTE
Recent inguinal and umbilical hernia surgery on 3/11/2025  Continue to monitor pain  Plan per medical

## 2025-03-20 NOTE — PROGRESS NOTES
03/20/25 0917   Team Meeting   Meeting Type Daily Rounds   Team Members Present   Team Members Present Physician;Nurse;   Physician Team Member Jennifer   [facility]ing Team Member [facility] Team Member Virginia   Patient/Family Present   Patient Present No   Patient's Family Present No     Readmit score 26. Pt is a new admit here on a 201 for increased SI and depression. Pt reports OD 2 days PTA on unknown substances/medications. Reporting AH of voices telling him negative things. Pt pleasant, calm, cooperative upon admission. Pt seclusive to his room.

## 2025-03-20 NOTE — ASSESSMENT & PLAN NOTE
Admission labs: CBC, CMP, lipid panel, TSH acceptable  B12 and Vitamin D low, will replete   Vitals stable   UA unremarkable   Patient is medically cleared for admission to U and treatment of underlying psychiatric illness based on available results  Please contact [facility] with any questions or concerns

## 2025-03-20 NOTE — ASSESSMENT & PLAN NOTE
Juan Diego Horowitz is a 49 y.o. [place] male from [facility],  single, currently homeless, unemployed, receives social security, w/ PMH of recent hernia surgery and kidney cancer s/p nephrectomy and PPH of depression, bipolar disorder, ADHD, 1 prior psychiatric admissions, 5 prior SA, h/o self-injurious behavior with hitting head, currently 201 voluntary commitment who presented to  ED for suicidal ideations, auditory hallucinations, and recent suicide attempt by overdose.     Plan:   Start Lexapro 10 mg on 3/20 for depression   Qtc 446  Decrease Remeron from 30 mg nightly to 15 mg tonight for one dose then discontinue   Start Abilify 5 mg daily on 3/20  Consider transition to BAXTER Aristada once tolerated on PO, patient agrees with plan

## 2025-03-20 NOTE — ASSESSMENT & PLAN NOTE
S/p Laparoscopic repair of right inguinal hernia with mesh, Laparoscopic assisted bilateral TAP block, open umbilical hernia repair (primary) on 3/11   Pain control  Outpatient follow-up

## 2025-03-20 NOTE — ASSESSMENT & PLAN NOTE
Per toxicology recommend contingency management or CBT   Substance cessation education and counseling

## 2025-03-20 NOTE — ASSESSMENT & PLAN NOTE
Currently on Subutex 16 mg daily, will change to 8 mg BID dosing starting 3/21  Discontinue clonidine as patient was placed on this in the past for withdrawal symptoms  Toxicology consult placed, appreciate recommendations   Continue to monitor for cravings and follow up with Toxicology   Substance cessation education and counseling

## 2025-03-20 NOTE — ASSESSMENT & PLAN NOTE
Recommend contingency management or CBT.  Buprenorphine is not expected to have significant impact for cocaine cravings.
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
## 2025-03-20 NOTE — TREATMENT PLAN
TREATMENT PLAN REVIEW - Behavioral Health Juan Diego Lor 49 y.o. 1975 male MRN: [id number]    [facility] 3B BEHAVIORAL [facility] Room / Bed: [facility] 351/[facility] 351-01 Encounter: [id number]        Admit Date/Time:  3/18/2025  8:11 PM    Treatment Team:   MD Migdalia Ayoub MD Catherine E Conolly, OLGA Kirkpatrick  [facility]    Diagnosis: Active Problems:    Opioid use disorder, severe, dependence (HCC)    Right inguinal hernia    Unspecified mood (affective) disorder, MDD with or without psychotic features vs Bipolar disorder    Opioid use disorder    Cocaine use      Patient Strengths/Assets: adapts well, capable of independent living, cooperative, communication skills, compliant with medication, good past treatment response, negotiates basic needs, patient is on a voluntary commitment, patient is willing to work on problems, resoureceful, work skills      Patient Barriers/Limitations: financial instability, homeless, lack of financial means, lack of social/family support, lack of stable employment, legal problems, limited education, limited family ties, limited support system, low self esteem, medical problems, substance abuse, unstable housing situation    Short Term Goals: decrease in depressive symptoms, decrease in suicidal thoughts, ability to stay safe on the unit, improvement in self care, sleep improvement, improvement in appetite, tolerate medications, mood stabilization, increase in group attendance, increase in socialization with peers on the unit, acceptance of need for psychiatric treatment, acceptance of psychiatric medications    Long Term Goals: improvement in depression, stabilization of mood, free of suicidal thoughts, no self abusive behavior, improved insight, able to express basic needs, acceptance of need for psychiatric follow up after discharge,  acceptance of psychiatric diagnosis, adequate self care, adequate sleep, adequate appetite, appropriate interaction with peers, stable living arrangements upon discharge    Progress Towards Goals: starting psychiatric medications as prescribed, no longer suicidal    Recommended Treatment: medication management, patient medication education, group therapy, milieu therapy, continued Behavioral Health psychiatric evaluation/assessment process     Treatment Frequency: daily medication monitoring, group and milieu therapy daily, monitoring through interdisciplinary rounds, monitoring through weekly patient care conferences    Expected Discharge Date: 7 days - 3/27/2025    Discharge Plan: discharge to a homeless shelter, placement in alternative living arrangement, referral for outpatient drug and alcohol counseling, referral for outpatient medication management with a psychiatrist, referral for outpatient psychotherapy, referral for case management services    Treatment Plan Created/Updated By: Brooke Mendelson, DO

## 2025-03-21 PROCEDURE — 99233 SBSQ HOSP IP/OBS HIGH 50: CPT | Performed by: PSYCHIATRY & NEUROLOGY

## 2025-03-21 RX ADMIN — Medication 2000 UNITS: at 08:21

## 2025-03-21 RX ADMIN — BUPRENORPHINE HCL 8 MG: 8 TABLET SUBLINGUAL at 08:21

## 2025-03-21 RX ADMIN — BUPRENORPHINE HCL 8 MG: 8 TABLET SUBLINGUAL at 21:05

## 2025-03-21 RX ADMIN — ESCITALOPRAM OXALATE 10 MG: 10 TABLET ORAL at 08:21

## 2025-03-21 RX ADMIN — Medication 3 MG: at 21:05

## 2025-03-21 RX ADMIN — ACETAMINOPHEN 975 MG: 325 TABLET, FILM COATED ORAL at 15:44

## 2025-03-21 RX ADMIN — GABAPENTIN 300 MG: 300 CAPSULE ORAL at 15:44

## 2025-03-21 RX ADMIN — ATORVASTATIN CALCIUM 10 MG: 10 TABLET, FILM COATED ORAL at 15:44

## 2025-03-21 RX ADMIN — GABAPENTIN 300 MG: 300 CAPSULE ORAL at 08:21

## 2025-03-21 RX ADMIN — CYANOCOBALAMIN TAB 1000 MCG 1000 MCG: 1000 TAB at 08:23

## 2025-03-21 RX ADMIN — ARIPIPRAZOLE 5 MG: 5 TABLET ORAL at 08:21

## 2025-03-21 RX ADMIN — GABAPENTIN 300 MG: 300 CAPSULE ORAL at 21:05

## 2025-03-21 NOTE — ASSESSMENT & PLAN NOTE
Per toxicology recommend contingency management or CBT   Substance cessation education and counseling    Detail Level: Detailed Detail Level: Simple

## 2025-03-21 NOTE — PROGRESS NOTES
03/21/25 0911   Team Meeting   Meeting Type Daily Rounds   Team Members Present   Team Members Present Physician;Nurse;   Physician Team Member Jennifer   Nursing Team Member Vanesa   Care Management Team Member Virginia   Patient/Family Present   Patient Present No   Patient's Family Present No     Pt med/meal compliant. Visible on the unit, calm, cooperative. Pt started on Lexapro yesterday. Pt restarted on subutex.

## 2025-03-21 NOTE — NURSING NOTE
Patient denies SI, HI, AHs and VHs. Denies anxiety and depression. Patient is medication and meal compliant, with exception of nicotine patch which he continues to decline. Ate breakfast with peers. Somewhat irritable with staff. He denies any needs at this time.

## 2025-03-21 NOTE — PROGRESS NOTES
Progress Note - Behavioral Health   Name: Juan Diego Horowitz 49 y.o. male I MRN: [id number]  Unit/Bed#: -01 I Date of Admission: 3/18/2025   Date of Service: 3/21/2025 I Hospital Day: 2    Assessment & Plan  Unspecified mood (affective) disorder, MDD with or without psychotic features vs Bipolar disorder  Juan Diego Horowitz is a 49 y.o. [place] male from [facility],  single, currently homeless, unemployed, receives social security, w/ PMH of recent hernia surgery and kidney cancer s/p nephrectomy and PPH of depression, bipolar disorder, ADHD, 1 prior psychiatric admissions, 5 prior SA, h/o self-injurious behavior with hitting head, currently 201 voluntary commitment who presented to  ED for suicidal ideations, auditory hallucinations, and recent suicide attempt by overdose.     Plan:   Continue Lexapro 10 mg started on 3/20 for depression   Qtc 446  Discontinued Remeron after taper off from 30 mg nightly to 15 mg  Continue Abilify 5 mg daily started on 3/20  Consider transition to BAXTER Aristada once tolerated on PO, patient agrees with plan   Melatonin 3 mg nightly for insomnia   Opioid use disorder, severe, dependence (HCC)  Continue Subutex 8 mg BID  Discontinue clonidine as patient was placed on this in the past for withdrawal symptoms  Toxicology consult placed, appreciate recommendations   Continue to monitor for cravings and follow up with Toxicology   Substance cessation education and counseling   Cocaine use  Per toxicology recommend contingency management or CBT   Substance cessation education and counseling   Right inguinal hernia  Recent inguinal and umbilical hernia surgery on 3/11/2025  Continue to monitor pain  Plan per medical   Tobacco use disorder  Nicotine patch daily   Medical clearance for psychiatric admission  Per medical   Hyperlipidemia  Per medical     Current medications:  Current Facility-Administered Medications   Medication Dose Route Frequency Provider Last Rate     acetaminophen  650 mg Oral Q6H PRN Tootie Leblanc PA-C      acetaminophen  650 mg Oral Q6H PRN Jael Mendelson, DO      acetaminophen  650 mg Oral Q4H PRN Jael Mendelson, DO      acetaminophen  975 mg Oral Q6H PRN Jael Mendelson, DO      aluminum-magnesium hydroxide-simethicone  30 mL Oral Q4H PRN Brooke Mendelson, DO      ARIPiprazole  5 mg Oral Daily Brooke Mendelson, DO      atorvastatin  10 mg Oral Daily With Dinner Akanksha Grant, OLGA      benztropine  1 mg Intramuscular Q4H PRN Max 6/day Jael Mendelson, DO      benztropine  1 mg Oral Q4H PRN Max 6/day Brooke Mendelson, DO      buprenorphine  8 mg Sublingual BID Brooke Mendelson, DO      Cholecalciferol  2,000 Units Oral Daily Akanksha Emmanuelval Grant, BRAYDENNP      cyanocobalamin  1,000 mcg Oral Daily Akanksha Katharina Grant, OLGA      hydrOXYzine HCL  50 mg Oral Q6H PRN Max 4/day Brooke Mendelson, DO      Or    diphenhydrAMINE  50 mg Intramuscular Q6H PRN Jael Mendelson, DO      escitalopram  10 mg Oral Daily Brooke Mendelson, DO      gabapentin  300 mg Oral TID Tootie Leblanc PA-C      hydrOXYzine HCL  100 mg Oral Q6H PRN Max 4/day Brooke Mendelson, DO      Or    LORazepam  2 mg Intramuscular Q6H PRN Brooke Mendelson, DO      hydrOXYzine HCL  25 mg Oral Q6H PRN Max 4/day Jael Mendelson, DO      melatonin  3 mg Oral HS Jael Mendelson, DO      melatonin  3 mg Oral HS PRN Jael Mendelson, DO      nicotine  1 patch Transdermal Daily Jael Mendelson, DO      OLANZapine  10 mg Oral Q3H PRN Max 3/day Jael Mendelson, DO      Or    OLANZapine  10 mg Intramuscular Q3H PRN Max 3/day Jael Mendelson, DO      OLANZapine  5 mg Oral Q3H PRN Max 6/day Jael Mendelson, DO      Or    OLANZapine  5 mg Intramuscular Q3H PRN Max 6/day Jael Mendelson, DO      OLANZapine  2.5 mg Oral Q3H PRN Max 8/day Jael Mendelson, DO      ondansetron  4 mg Oral Q8H PRN Tootie Leblanc PA-C      polyethylene glycol  17 g Oral Daily PRN Brooke Mendelson,  DO      propranolol  10 mg Oral Q8H PRN Brooke Mendelson, DO      senna-docusate sodium  1 tablet Oral Daily PRN Brooke Mendelson, DO          Risks/Benefits of Treatment:     Risks, benefits, and possible side effects of medications explained to patient and patient verbalizes understanding and agreement for treatment.    Progress Toward Goals: progressing    Treatment Planning:     All current active medications have been reviewed.  Continue to monitor response to treatment and assess for potential side effects of medications.  Encourage group therapy, milieu therapy and occupational therapy  Collaboration with medical service for medical comorbidities as indicated.  Behavioral Health checks for safety monitoring.    Estimated Discharge Day: 3/26/2025       Subjective     Behavior over the last 24 hours: some improvement.    Patient was visited on unit for continuing care; chart reviewed and discussed with multidisciplinary treatment team.  On approach, the patient was cooperative, guarded, scant. He reports eating well and only woke up once overnight without any other complaints. No problem initiating and maintaining sleep.  Denied A/VH currently.  Denied SI/HI, intent or plan upon direct inquiry at this time. The patient consented for safety and agreed to verbalize any frustration or concerns to the nursing staff, immediately. Encouraged patient to attend groups when able and feeling up to it and patient stated he would today. He reports going to one group yesterday. He continues to feel decreased concentration and focus and asks about receiving medication for his ADHD, ensured patient the medications he is on should help his concentration and patient agreed. He agrees with BID dosing of Subutex.     Patient continues to be intermittently visible in the milieu and interacts with select staff and peers. No reports of aggression or self-injurious behavior on unit. No PRN medications used in the past 24  "hours.    Patient accepted all offered medications and no adverse effects of medications noted or reported.    Sleep: improved  Appetite: improved  Medication side effects: No  ROS: review of systems as noted above in HPI/Subjective report, all other systems are negative    Objective :  Temp:  [97.4 °F (36.3 °C)] 97.4 °F (36.3 °C)  HR:  [58] 58  BP: (108)/(56) 108/56  Resp:  [16] 16  SpO2:  [96 %] 96 %  O2 Device: None (Room air)    Temp:  [97.4 °F (36.3 °C)] 97.4 °F (36.3 °C)  HR:  [58] 58  BP: (108)/(56) 108/56  Resp:  [16] 16  SpO2:  [96 %] 96 %  O2 Device: None (Room air)    Mental Status Evaluation:    Appearance:  marginal hygiene, looks older than stated age, tattooed, blanket pulled up around face, overtly appearing  male   Behavior:  cooperative, calm, guarded, intermittent eye contact   Speech:  slow, scant, soft, monotone   Mood:  \"I'm here\"   Affect:  constricted, dysphoric   Thought Process:  negative thinking, goal oriented    Thought Content:  no overt delusions   Perceptual Disturbances: denies auditory or visual hallucinations when asked, but appears distracted, does not appear responding to internal stimuli   Risk Potential: Suicidal Ideation -   denies   Homicidal Ideation -   denies   Potential for Aggression - Not at present   Sensorium:  oriented to person, place, and situation   Memory:  recent and remote memory grossly intact   Consciousness:  alert and awake   Attention/Concentration: attention span and concentration appear shorter than expected for age   Insight:  limited   Judgment: limited   Gait/Station: in bed   Motor Activity: no abnormal movements       Lab Results: I have reviewed the following results:      Administrative Statements     Counseling / Coordination of Care:   Patient's progress discussed with staff in treatment team meeting.  Medication changes reviewed with staff in treatment team meeting..    Brooke Mendelson, DO 03/21/25  "

## 2025-03-21 NOTE — DISCHARGE INSTR - OTHER ORDERS
CRISIS INFORMATION  If you are experiencing a mental health emergency, you may call the [facility] Crisis Intervention Office 24 hours a day, 7 days per week at [phone].    In [facility], call [phone].    [facility] is a confidential 24/7 telephone support service manned by trained mental health consumers.  [facility] provides support, a listening ear and can provide information about available services.[facility] specializes in the concerns of mental health consumers, their families and friends.  However, we are also here for anyone who has a mental health concern, is confused about or just doesn't know anything about mental health or where to get information.  To reach [facility], call [phone].    HOW TO GET SUBSTANCE ABUSE HELP:  If you or someone you know has a drug or alcohol problem, there is help:  [facility] Abuse [facility]: [phone]  [facility] Abuse [facility]: [phone]  An assessment is the first step.   In addition to those listed there are other programs available in the area but assessment is best to determine an appropriate level of care.      If you HAVE Medical Assistance, an assessment can be scheduled at one of these providers:  [facility]  [address] [place] [address]  [phone]   [facility]  [address] [place] [address]  [phone]   [facility]  [address], [place] [address]  [phone]  [address] [place] [address], [address], [place] [address] [phone]   [facility]  [address] [place] [address] [place] [address]  [phone]   [facility]  [address] [place] [address]  [phone]   NET ([facility])  [address] [place] [address]  [phone]   [facility]  [address] [place] [address]  [phone]   [facility]  [address] [place] [address] [place] [address]   [phone]   [facility]  [address] [place] [address]  [phone]         From the [facility] website [place]guides[place]opioid-epidemic/#GetNaloxone    How do I get naloxone?  Family members and friends can access naloxone by:    Obtaining a prescription from their family doctor  Using the standing order issued by Acting Physician General Lori Fernández. A standing order is a prescription written for the general public, rather than specifically for an individual.  To use the standing order, print it and take it with you to the pharmacy or have the digital version on your phone. Download the standing order from the Department of [facility] (PDF).    If you are unable to print it or use the digital version, the standing order is kept on file at many pharmacies. If a pharmacy does not have it on file, they may have the ability to look it up.    Naloxone prescriptions can be filled at most pharmacies. Although the medication might not be available for same-day pickup, it often can be ordered and available within a day or two.

## 2025-03-21 NOTE — PROGRESS NOTES
Discussed with patient: UDS/Identified Substance(s) used: Fentanyl and Cocaine  Risks discussed included: Financial, legal, physical health, housing   Recommendations discussed: IP vs IOP  Patient's response: Pt states he is open to considering inpatient rehab at [facility].

## 2025-03-21 NOTE — DISCHARGE INSTR - APPOINTMENTS
Behavioral Health Nurse Navigator, Ellyn or Rosenda will be calling you after your discharge, on the phone number that you provided.  They will be available as an additional support, if needed.   If you wish to speak with Ellyn, you may contact her at [phone].

## 2025-03-21 NOTE — PLAN OF CARE
Problem: Potential for Falls  Goal: Patient will remain free of falls  Description: INTERVENTIONS:  - Keep care items and personal belongings within reach  - Initiate and maintain comfort rounds  - Make Fall Risk Sign visible to staff  - Apply yellow socks and bracelet for high fall risk patients  - Consider moving patient to room near nurses station  Outcome: Progressing     Problem: SELF HARM/SUICIDALITY  Goal: Will have no self-injury during hospital stay  Description: INTERVENTIONS:  - Q 15 MINUTES: Routine safety checks  - Q WAKING SHIFT & PRN: Assess risk to determine if routine checks are adequate to maintain patient safety  - Encourage patient to participate actively in care by formulating a plan to combat response to suicidal ideation, identify supports and resources  Outcome: Progressing     Problem: DEPRESSION  Goal: Will be euthymic at discharge  Description: INTERVENTIONS:  - Administer medication as ordered  - Provide emotional support via 1:1 interaction with staff  - Encourage involvement in milieu/groups/activities  - Monitor for social isolation  Outcome: Progressing     Problem: ANXIETY  Goal: Will report anxiety at manageable levels  Description: INTERVENTIONS:  - Administer medication as ordered  - Teach and encourage coping skills  - Provide emotional support  - Assess patient/family for anxiety and ability to cope  Outcome: Progressing  Goal: By discharge: Patient will verbalize 2 strategies to deal with anxiety  Description: Interventions:  - Identify any obvious source/trigger to anxiety  - Staff will assist patient in applying identified coping technique/skills  - Encourage attendance of scheduled groups and activities  Outcome: Progressing     Problem: SUBSTANCE USE/ABUSE  Goal: Will have no detox symptoms and will verbalize plan for changing substance-related behavior  Description: INTERVENTIONS:  - Monitor physical status and assess for symptoms of withdrawal  - Administer medication  as ordered  - Provide emotional support with 1 on 1 interaction with staff  - Encourage recovery focused program/ addiction education  - Assess for verbalization of changing behaviors related to substance abuse  - Initiate consults and referrals as appropriate (Case Management, Spiritual Care, etc.)  Outcome: Progressing  Goal: By discharge, will develop insight into their chemical dependency and sustain motivation to continue in recovery  Description: INTERVENTIONS:  - Attends all daily group sessions and scheduled AA groups  - Actively practices coping skills through participation in the therapeutic community and adherence to program rules  - Reviews and completes assignments from individual treatment plan  - Assist patient development of understanding of their personal cycle of addiction and relapse triggers  Outcome: Progressing  Goal: By discharge, patient will have ongoing treatment plan addressing chemical dependency  Description: INTERVENTIONS:  - Assist patient with resources and/or appointments for ongoing recovery based living  Outcome: Progressing

## 2025-03-21 NOTE — ASSESSMENT & PLAN NOTE
Continue Subutex 8 mg BID  Discontinue clonidine as patient was placed on this in the past for withdrawal symptoms  Toxicology consult placed, appreciate recommendations   Continue to monitor for cravings and follow up with Toxicology   Substance cessation education and counseling

## 2025-03-21 NOTE — NURSING NOTE
Patient remains isolative to his room and bed, sleeping most of the evening and night. Does not report any unmet needs at this time and denies all psych symptoms upon assessment. Compliant with nightly medications. Q 15 min rounding maintained for safety.

## 2025-03-21 NOTE — PROGRESS NOTES
03/21/25 1520    Admission Notification   Notification of Admission Provided to: Other   Other Notified via: Phone call  ( - Glenis Cavazos - [phone])     Voicemail left for pt's PO advising of inpatient admission. Requested return phone call to confirm pt's inpatient status.

## 2025-03-21 NOTE — ASSESSMENT & PLAN NOTE
Juan Diego Horowitz is a 49 y.o. Spanish male from [facility],  single, currently homeless, unemployed, receives social security, w/ PMH of recent hernia surgery and kidney cancer s/p nephrectomy and PPH of depression, bipolar disorder, ADHD, 1 prior psychiatric admissions, 5 prior SA, h/o self-injurious behavior with hitting head, currently 201 voluntary commitment who presented to  ED for suicidal ideations, auditory hallucinations, and recent suicide attempt by overdose.     Plan:   Continue Lexapro 10 mg started on 3/20 for depression   Qtc 446  Discontinued Remeron after taper off from 30 mg nightly to 15 mg  Continue Abilify 5 mg daily started on 3/20  Consider transition to BAXTER Aristada once tolerated on PO, patient agrees with plan   Melatonin 3 mg nightly for insomnia

## 2025-03-21 NOTE — PROGRESS NOTES
03/21/25 1523   Team Meeting   Meeting Type Tx Team Meeting   Initial Conference Date 03/21/25   Team Members Present   Team Members Present Physician;Nurse;   Physician Team Member Mendelson   Nursing Team Member [facility] Team Member [place]   Patient/Family Present   Patient Present Yes   Patient's Family Present No     Tx plan was reviewed and discussed with Pt. Pt was encouraged to attend groups. Medication was discussed with Pt. Pt signed tx plan.

## 2025-03-22 PROCEDURE — 99232 SBSQ HOSP IP/OBS MODERATE 35: CPT | Performed by: PSYCHIATRY & NEUROLOGY

## 2025-03-22 RX ORDER — BUPRENORPHINE AND NALOXONE 8; 2 MG/1; MG/1
8 FILM, SOLUBLE BUCCAL; SUBLINGUAL 2 TIMES DAILY
Status: DISCONTINUED | OUTPATIENT
Start: 2025-03-22 | End: 2025-03-22

## 2025-03-22 RX ORDER — BUPRENORPHINE 8 MG/1
16 TABLET SUBLINGUAL 2 TIMES DAILY
Status: DISCONTINUED | OUTPATIENT
Start: 2025-03-22 | End: 2025-03-22

## 2025-03-22 RX ORDER — BUPRENORPHINE AND NALOXONE 8; 2 MG/1; MG/1
8 FILM, SOLUBLE BUCCAL; SUBLINGUAL 3 TIMES DAILY
Status: DISCONTINUED | OUTPATIENT
Start: 2025-03-22 | End: 2025-03-25

## 2025-03-22 RX ADMIN — CYANOCOBALAMIN TAB 1000 MCG 1000 MCG: 1000 TAB at 08:36

## 2025-03-22 RX ADMIN — GABAPENTIN 300 MG: 300 CAPSULE ORAL at 20:57

## 2025-03-22 RX ADMIN — ATORVASTATIN CALCIUM 10 MG: 10 TABLET, FILM COATED ORAL at 16:35

## 2025-03-22 RX ADMIN — BUPRENORPHINE HCL 8 MG: 8 TABLET SUBLINGUAL at 08:36

## 2025-03-22 RX ADMIN — BUPRENORPHINE AND NALOXONE 8 MG: 8; 2 FILM BUCCAL; SUBLINGUAL at 20:57

## 2025-03-22 RX ADMIN — Medication 2000 UNITS: at 08:36

## 2025-03-22 RX ADMIN — ESCITALOPRAM OXALATE 10 MG: 10 TABLET ORAL at 08:36

## 2025-03-22 RX ADMIN — GABAPENTIN 300 MG: 300 CAPSULE ORAL at 08:36

## 2025-03-22 RX ADMIN — ARIPIPRAZOLE 5 MG: 5 TABLET ORAL at 08:36

## 2025-03-22 RX ADMIN — Medication 3 MG: at 21:01

## 2025-03-22 RX ADMIN — GABAPENTIN 300 MG: 300 CAPSULE ORAL at 16:35

## 2025-03-22 RX ADMIN — BUPRENORPHINE AND NALOXONE 8 MG: 8; 2 FILM BUCCAL; SUBLINGUAL at 16:35

## 2025-03-22 NOTE — QUICK NOTE
Patient reports that he continues to use to have cravings.  He reports that he was also previously on 16 mg twice daily of Suboxone.  Review of PDMP does not reflect this.      Administer additional 8 mg dose of Suboxone now, and continue 8 mg three times daily maintenance therapy and we will reassess tomorrow.  If patient continues to have cravings after that, we will discuss other potential options aside from increasing dose or frequency of Suboxone.

## 2025-03-22 NOTE — PROGRESS NOTES
Progress Note - Behavioral Health   Name: Juan Diego Horowitz 49 y.o. male I MRN: [id number]  Unit/Bed#: -01 I Date of Admission: 3/18/2025   Date of Service: 3/22/2025 I Hospital Day: 3     Assessment & Plan  Unspecified mood (affective) disorder, MDD with or without psychotic features vs Bipolar disorder  Juan Diego Horowitz is a 49 y.o. [place]ian male from [facility],  single, currently homeless, unemployed, receives social security, w/ PMH of recent hernia surgery and kidney cancer s/p nephrectomy and PPH of depression, bipolar disorder, ADHD, 1 prior psychiatric admissions, 5 prior SA, h/o self-injurious behavior with hitting head, currently 201 voluntary commitment who presented to  ED for suicidal ideations, auditory hallucinations, and recent suicide attempt by overdose.     Plan:   No psychiatric medication changes were made on 03/22/25.   Continue Lexapro 10 mg started on 3/20 for depression   Qtc 446  Discontinued Remeron after taper off from 30 mg nightly to 15 mg  Continue Abilify 5 mg daily started on 3/20  Consider transition to BAXTER Aristada once tolerated on PO, patient agrees with plan   Melatonin 3 mg nightly for insomnia   Opioid use disorder, severe, dependence (HCC)  Spoke with toxicology team today who increased Suboxone to 8 mg 3 times daily in the setting of continued cravings  Toxicology will reassess patient tomorrow 3/23/2025 to see if new dose of Suboxone is adequate and tolerated  Discontinue clonidine as patient was placed on this in the past for withdrawal symptoms  Toxicology consult placed, appreciate recommendations   Continue to monitor for cravings and follow up with Toxicology   Substance cessation education and counseling   Cocaine use  Per toxicology recommend contingency management or CBT   Substance cessation education and counseling   Right inguinal hernia  Recent inguinal and umbilical hernia surgery on 3/11/2025  Continue to monitor pain  Plan per  medical   Tobacco use disorder  Nicotine patch daily   Medical clearance for psychiatric admission  Per medical   Hyperlipidemia  Per medical     Current medications:  Current Facility-Administered Medications   Medication Dose Route Frequency Provider Last Rate    acetaminophen  650 mg Oral Q6H PRN Brooke Mendelson, DO      acetaminophen  650 mg Oral Q4H PRN Brooke Mendelson, DO      acetaminophen  975 mg Oral Q6H PRN Brooke Mendelson, DO      aluminum-magnesium hydroxide-simethicone  30 mL Oral Q4H PRN Brooke Mendelson, DO      ARIPiprazole  5 mg Oral Daily Brooke Mendelson, DO      atorvastatin  10 mg Oral Daily With Dinner OLGA Whittington      benztropine  1 mg Intramuscular Q4H PRN Max 6/day Brooke Mendelson, DO      benztropine  1 mg Oral Q4H PRN Max 6/day Brooke Mendelson, DO      buprenorphine  8 mg Sublingual BID Brooke Mendelson, DO      Cholecalciferol  2,000 Units Oral Daily OLGA Whittington      cyanocobalamin  1,000 mcg Oral Daily OLGA Whittington      hydrOXYzine HCL  50 mg Oral Q6H PRN Max 4/day Brooke Mendelson, DO      Or    diphenhydrAMINE  50 mg Intramuscular Q6H PRN Brooke Mendelson, DO      escitalopram  10 mg Oral Daily Brooke Mendelson, DO      gabapentin  300 mg Oral TID Tootie Leblanc PA-C      hydrOXYzine HCL  100 mg Oral Q6H PRN Max 4/day Brooke Mendelson, DO      Or    LORazepam  2 mg Intramuscular Q6H PRN Brooke Mendelson, DO      hydrOXYzine HCL  25 mg Oral Q6H PRN Max 4/day Brooke Mendelson, DO      melatonin  3 mg Oral HS Brooke Mendelson, DO      melatonin  3 mg Oral HS PRN Brooke Mendelson, DO      nicotine  1 patch Transdermal Daily Brooke Mendelson, DO      OLANZapine  10 mg Oral Q3H PRN Max 3/day Brooke Mendelson, DO      Or    OLANZapine  10 mg Intramuscular Q3H PRN Max 3/day Brooke Mendelson, DO      OLANZapine  5 mg Oral Q3H PRN Max 6/day Brooke Mendelson, DO      Or    OLANZapine  5 mg Intramuscular Q3H PRN Max 6/day Jael  "Mendelson, DO      OLANZapine  2.5 mg Oral Q3H PRN Max 8/day Jael Mendelson, DO      ondansetron  4 mg Oral Q8H PRN Tootie Leblanc PA-C      polyethylene glycol  17 g Oral Daily PRN Jael Mendelson, DO      propranolol  10 mg Oral Q8H PRN Jael Mendelson, DO      senna-docusate sodium  1 tablet Oral Daily PRN Jael Mendelson, DO          Risks/Benefits of Treatment:     Risks, benefits, and possible side effects of medications explained to patient and patient verbalizes understanding and agreement for treatment.    Progress Toward Goals: progressing    Treatment Planning:     All current active medications have been reviewed.  Continue to monitor response to treatment and assess for potential side effects of medications.  Encourage group therapy, milieu therapy and occupational therapy  Collaboration with medical service for medical comorbidities as indicated.  Behavioral Health checks for safety monitoring.    Estimated Discharge Day: 3/26/2025   Legal Status : Voluntary 201 commitment.    Subjective   Patient's chart was reviewed, and patient's progress and plan was discussed with treatment team.    Per nursing: No overt behavioral issues , Medication compliant , Meal compliant, Withdrawn except for meds and meals.  Irritable edge at times with nursing      PRNs over the past 24 hrs: No psychiatric PRNs     Juan Diego was evaluated this morning in unit milieu for continuity of care.  Patient is mostly Georgian-speaking so this writer used a  for evaluation.  Two different interpreters were used ID number [id number] and [id number]. Patient was cooperative with interview. Patient reports mood today as \" a bit depressed.\"  Patient denies anxiety today.  However, patient later stated internal restlessness and anxiety secondary to his cravings for opioids.  Patient endorses that he feels safe on the unit.  Patient denies any paranoid ideations at this time.  As stated above, patient says that his cravings for " "opioids are still present even after taking Suboxone.  Patient confirms that his cravings are tracked for opioids and not cocaine.  Patient was pleased when this writer returned to say that toxicology was going to be increasing his Suboxone.  At the time of today's evaluation, the patient denies AVH, active SI, thoughts to self harm,  and HI.  When asked about passive death wishes or passive SI patient said \"maybe\".  He denied any current plan or intent.  He contracted for safety.        Behavior over the last 24 hours: unchanged.  Sleep: Patient reports decreased sleep  Appetite: Patient reports decreased appetite today  Medication side effects: No  ROS: No complaints    Objective :  Temp:  [98.4 °F (36.9 °C)-98.6 °F (37 °C)] 98.4 °F (36.9 °C)  HR:  [62-76] 62  BP: ()/(50-64) 93/50  Resp:  [16] 16  SpO2:  [95 %-96 %] 95 %  O2 Device: None (Room air)    Temp:  [98.4 °F (36.9 °C)-98.6 °F (37 °C)] 98.4 °F (36.9 °C)  HR:  [62-76] 62  BP: ()/(50-64) 93/50  Resp:  [16] 16  SpO2:  [95 %-96 %] 95 %  O2 Device: None (Room air)    Mental Status Evaluation:  Appearance:  male , alert, casually dressed, appears slightly older than stated age , abdominal scars from hernia   Behavior:  cooperative, standing , adequate eye contact    Speech:  soft, Hungarian-speaking only   Mood:  \"A little depressed\"   Affect:  constricted with a brief moment of slight brightness   Thought Process:  Organized, logical, goal-directed, linear, linear, goal-directed   Thought Content:  no verbalized delusions or overt paranoia, reports continued cravings for opioids   Perceptual Disturbances: denies current auditory or visual hallucinations, does not appear to be responding to internal stimuli at this time   Risk Potential: Suicidal ideation -denies active SI, when asked about passive SI/death wishes patient said \"maybe\".  Denies active plan or intent.  Contracts for safety  Homicidal ideation - Denies at present  Potential for aggression " - Not at present   Sensorium:  oriented to person, place, and situation   Memory:  recent and remote memory grossly intact   Consciousness:  alert, awake   Attention/Concentration: attention span and concentration are age appropriate   Insight:  limited   Judgment: limited   Gait/Station: normal gait/station   Motor Activity: no abnormal movements observed           Lab Results: I have reviewed the following results:  Most Recent Labs:   Lab Results   Component Value Date    WBC 11.90 (H) 03/20/2025    RBC 4.91 03/20/2025    HGB 13.4 03/20/2025    HCT 42.1 03/20/2025     (H) 03/20/2025    RDW 13.8 03/20/2025    NEUTROABS 7.80 (H) 03/20/2025    TOTANEUTABS 5.79 11/22/2016    SODIUM 140 03/20/2025    K 3.9 03/20/2025     03/20/2025    CO2 26 03/20/2025    BUN 15 03/20/2025    CREATININE 1.08 03/20/2025    GLUC 102 03/20/2025    CALCIUM 9.6 03/20/2025    AST 10 (L) 03/20/2025    ALT 7 03/20/2025    ALKPHOS 68 03/20/2025    TP 7.0 03/20/2025    ALB 3.9 03/20/2025    TBILI 0.22 03/20/2025    CHOLESTEROL 250 (H) 03/20/2025    HDL 41 03/20/2025    TRIG 123 03/20/2025    LDLCALC 184 (H) 03/20/2025    NONHDLC 209 03/20/2025    FLE0QXVGOFPH 0.259 (L) 03/20/2025    FREET4 1.07 03/20/2025    SYPHILISAB Non-reactive 01/22/2025    HGBA1C 5.9 (H) 01/01/2024     01/01/2024       Administrative Statements     Counseling / Coordination of Care:   Patient's progress discussed with staff in treatment team meeting.  Medication changes reviewed with staff in treatment team meeting.  Reviewed with patient the changes to his Suboxone  Followed up with toxicology regarding patient's current Suboxone regimen..    Meera Bhatia DO 03/22/25

## 2025-03-22 NOTE — NURSING NOTE
"Patient is visible on the unit but remains withdrawn to self. He is pleasant and cooperative on approach and does not report any unmet needs at this time. Patient reports feeling \"better\" upon assessment and denies all psych symptoms. Compliant with nightly meds. Q 15 min rounding maintained for safety.     "

## 2025-03-22 NOTE — NURSING NOTE
Patient denies SI, HI, AHs and VHs. Denies anxiety, although he reports feeling depressed. Patient is medication and meal compliant. Visible on the unit and social with peers, whom he ate breakfast with. Went back to bed after breakfast. He denies any needs at this time.

## 2025-03-22 NOTE — ASSESSMENT & PLAN NOTE
Juan Diego Horowitz is a 49 y.o. [place] male from [facility],  single, currently homeless, unemployed, receives social security, w/ PMH of recent hernia surgery and kidney cancer s/p nephrectomy and PPH of depression, bipolar disorder, ADHD, 1 prior psychiatric admissions, 5 prior SA, h/o self-injurious behavior with hitting head, currently 201 voluntary commitment who presented to  ED for suicidal ideations, auditory hallucinations, and recent suicide attempt by overdose.     Plan:   No psychiatric medication changes were made on 03/22/25.   Continue Lexapro 10 mg started on 3/20 for depression   Qtc 446  Discontinued Remeron after taper off from 30 mg nightly to 15 mg  Continue Abilify 5 mg daily started on 3/20  Consider transition to BAXTER Aristada once tolerated on PO, patient agrees with plan   Melatonin 3 mg nightly for insomnia

## 2025-03-22 NOTE — PLAN OF CARE
Problem: Potential for Falls  Goal: Patient will remain free of falls  Description: INTERVENTIONS:  - Keep care items and personal belongings within reach  - Initiate and maintain comfort rounds  - Make Fall Risk Sign visible to staff  - Apply yellow socks and bracelet for high fall risk patients  - Consider moving patient to room near nurses station  Outcome: Progressing     Problem: SELF HARM/SUICIDALITY  Goal: Will have no self-injury during hospital stay  Description: INTERVENTIONS:  - Q 15 MINUTES: Routine safety checks  - Q WAKING SHIFT & PRN: Assess risk to determine if routine checks are adequate to maintain patient safety  - Encourage patient to participate actively in care by formulating a plan to combat response to suicidal ideation, identify supports and resources  Outcome: Progressing     Problem: DEPRESSION  Goal: Will be euthymic at discharge  Description: INTERVENTIONS:  - Administer medication as ordered  - Provide emotional support via 1:1 interaction with staff  - Encourage involvement in milieu/groups/activities  - Monitor for social isolation  Outcome: Progressing     Problem: ANXIETY  Goal: Will report anxiety at manageable levels  Description: INTERVENTIONS:  - Administer medication as ordered  - Teach and encourage coping skills  - Provide emotional support  - Assess patient/family for anxiety and ability to cope  Outcome: Progressing  Goal: By discharge: Patient will verbalize 2 strategies to deal with anxiety  Description: Interventions:  - Identify any obvious source/trigger to anxiety  - Staff will assist patient in applying identified coping technique/skills  - Encourage attendance of scheduled groups and activities  Outcome: Progressing     Problem: SUBSTANCE USE/ABUSE  Goal: Will have no detox symptoms and will verbalize plan for changing substance-related behavior  Description: INTERVENTIONS:  - Monitor physical status and assess for symptoms of withdrawal  - Administer medication  as ordered  - Provide emotional support with 1 on 1 interaction with staff  - Encourage recovery focused program/ addiction education  - Assess for verbalization of changing behaviors related to substance abuse  - Initiate consults and referrals as appropriate (Case Management, Spiritual Care, etc.)  Outcome: Progressing  Goal: By discharge, will develop insight into their chemical dependency and sustain motivation to continue in recovery  Description: INTERVENTIONS:  - Attends all daily group sessions and scheduled AA groups  - Actively practices coping skills through participation in the therapeutic community and adherence to program rules  - Reviews and completes assignments from individual treatment plan  - Assist patient development of understanding of their personal cycle of addiction and relapse triggers  Outcome: Progressing  Goal: By discharge, patient will have ongoing treatment plan addressing chemical dependency  Description: INTERVENTIONS:  - Assist patient with resources and/or appointments for ongoing recovery based living  Outcome: Progressing     Problem: Ineffective Coping  Goal: Participates in unit activities  Description: Interventions:- Provide therapeutic environment - Provide required programming - Redirect inappropriate behaviors   Outcome: Progressing

## 2025-03-23 PROCEDURE — 99232 SBSQ HOSP IP/OBS MODERATE 35: CPT | Performed by: PSYCHIATRY & NEUROLOGY

## 2025-03-23 RX ORDER — IBUPROFEN 400 MG/1
400 TABLET, FILM COATED ORAL EVERY 12 HOURS PRN
Status: DISCONTINUED | OUTPATIENT
Start: 2025-03-23 | End: 2025-04-04 | Stop reason: HOSPADM

## 2025-03-23 RX ADMIN — CYANOCOBALAMIN TAB 1000 MCG 1000 MCG: 1000 TAB at 08:45

## 2025-03-23 RX ADMIN — GABAPENTIN 300 MG: 300 CAPSULE ORAL at 16:27

## 2025-03-23 RX ADMIN — ATORVASTATIN CALCIUM 10 MG: 10 TABLET, FILM COATED ORAL at 16:28

## 2025-03-23 RX ADMIN — ARIPIPRAZOLE 5 MG: 5 TABLET ORAL at 08:45

## 2025-03-23 RX ADMIN — GABAPENTIN 300 MG: 300 CAPSULE ORAL at 08:45

## 2025-03-23 RX ADMIN — IBUPROFEN 400 MG: 400 TABLET, FILM COATED ORAL at 14:03

## 2025-03-23 RX ADMIN — BUPRENORPHINE AND NALOXONE 8 MG: 8; 2 FILM BUCCAL; SUBLINGUAL at 08:45

## 2025-03-23 RX ADMIN — BUPRENORPHINE AND NALOXONE 8 MG: 8; 2 FILM BUCCAL; SUBLINGUAL at 20:54

## 2025-03-23 RX ADMIN — BUPRENORPHINE AND NALOXONE 8 MG: 8; 2 FILM BUCCAL; SUBLINGUAL at 16:28

## 2025-03-23 RX ADMIN — ESCITALOPRAM OXALATE 10 MG: 10 TABLET ORAL at 08:45

## 2025-03-23 RX ADMIN — GABAPENTIN 300 MG: 300 CAPSULE ORAL at 20:54

## 2025-03-23 RX ADMIN — Medication 2000 UNITS: at 08:45

## 2025-03-23 RX ADMIN — Medication 6 MG: at 21:32

## 2025-03-23 RX ADMIN — ACETAMINOPHEN 650 MG: 325 TABLET, FILM COATED ORAL at 11:35

## 2025-03-23 NOTE — QUICK NOTE
8 mg TID suboxone has helped with the patient's cravings and withdrawal symptoms.      We will sign off at this time.  Please notify us with any other questions.  Thank you for involving us in this patient's care.   Yes he said he needs sent to the pharmacy he lost the cap but was not sure if the whole set needs to be sent in since he just needs the cap that goes on the needle?

## 2025-03-23 NOTE — PLAN OF CARE
Problem: Potential for Falls  Goal: Patient will remain free of falls  Description: INTERVENTIONS:  - Keep care items and personal belongings within reach  - Initiate and maintain comfort rounds  - Make Fall Risk Sign visible to staff  - Apply yellow socks and bracelet for high fall risk patients  - Consider moving patient to room near nurses station  Outcome: Progressing     Problem: SELF HARM/SUICIDALITY  Goal: Will have no self-injury during hospital stay  Description: INTERVENTIONS:  - Q 15 MINUTES: Routine safety checks  - Q WAKING SHIFT & PRN: Assess risk to determine if routine checks are adequate to maintain patient safety  - Encourage patient to participate actively in care by formulating a plan to combat response to suicidal ideation, identify supports and resources  Outcome: Progressing     Problem: DEPRESSION  Goal: Will be euthymic at discharge  Description: INTERVENTIONS:  - Administer medication as ordered  - Provide emotional support via 1:1 interaction with staff  - Encourage involvement in milieu/groups/activities  - Monitor for social isolation  Outcome: Progressing     Problem: ANXIETY  Goal: Will report anxiety at manageable levels  Description: INTERVENTIONS:  - Administer medication as ordered  - Teach and encourage coping skills  - Provide emotional support  - Assess patient/family for anxiety and ability to cope  Outcome: Progressing  Goal: By discharge: Patient will verbalize 2 strategies to deal with anxiety  Description: Interventions:  - Identify any obvious source/trigger to anxiety  - Staff will assist patient in applying identified coping technique/skills  - Encourage attendance of scheduled groups and activities  Outcome: Progressing     Problem: SUBSTANCE USE/ABUSE  Goal: Will have no detox symptoms and will verbalize plan for changing substance-related behavior  Description: INTERVENTIONS:  - Monitor physical status and assess for symptoms of withdrawal  - Administer medication  as ordered  - Provide emotional support with 1 on 1 interaction with staff  - Encourage recovery focused program/ addiction education  - Assess for verbalization of changing behaviors related to substance abuse  - Initiate consults and referrals as appropriate (Case Management, Spiritual Care, etc.)  Outcome: Progressing  Goal: By discharge, will develop insight into their chemical dependency and sustain motivation to continue in recovery  Description: INTERVENTIONS:  - Attends all daily group sessions and scheduled AA groups  - Actively practices coping skills through participation in the therapeutic community and adherence to program rules  - Reviews and completes assignments from individual treatment plan  - Assist patient development of understanding of their personal cycle of addiction and relapse triggers  Outcome: Progressing  Goal: By discharge, patient will have ongoing treatment plan addressing chemical dependency  Description: INTERVENTIONS:  - Assist patient with resources and/or appointments for ongoing recovery based living  Outcome: Progressing     Problem: DISCHARGE PLANNING  Goal: Discharge to home or other facility with appropriate resources  Description: INTERVENTIONS:  - Identify barriers to discharge w/patient and caregiver  - Arrange for needed discharge resources and transportation as appropriate  - Identify discharge learning needs (meds, wound care, etc.)  - Arrange for interpretive services to assist at discharge as needed  - Refer to Case Management Department for coordinating discharge planning if the patient needs post-hospital services based on physician/advanced practitioner order or complex needs related to functional status, cognitive ability, or social support system  Outcome: Progressing     Problem: Ineffective Coping  Goal: Participates in unit activities  Description: Interventions:- Provide therapeutic environment - Provide required programming - Redirect inappropriate  behaviors   Outcome: Progressing

## 2025-03-23 NOTE — ASSESSMENT & PLAN NOTE
Juan Diego Horowitz is a 49 y.o. [place]oc[place] male from [facility],  single, currently homeless, unemployed, receives social security, w/ PMH of recent hernia surgery and kidney cancer s/p nephrectomy and PPH of depression, bipolar disorder, ADHD, 1 prior psychiatric admissions, 5 prior SA, h/o self-injurious behavior with hitting head, currently 201 voluntary commitment who presented to  ED for suicidal ideations, auditory hallucinations, and recent suicide attempt by overdose.     Plan:   Increase Lexapro to 15 mg starting tomorrow 3/24/2025 for treatment of depression  Most recent Qtc was 446 on 3/18/2025  Can recheck QTc again if patient begins to have cardiac like symptoms that may raise concern for prolonged QT  Discontinued Remeron after taper off from 30 mg nightly to 15 mg  Continue Abilify 5 mg daily started on 3/20  Consider transition to BAXTER Aristada once tolerated on PO, patient agrees with plan   Increase melatonin to 6 mg nightly for insomnia   Patient asked for Seroquel or Remeron stating that medications like trazodone do not work for him.  In the setting of recent discontinuation of Remeron by primary psychiatry team, Remeron was not restarted or made a PRN

## 2025-03-23 NOTE — ASSESSMENT & PLAN NOTE
Followed up with toxicology today 3/23/2025  Continue Suboxone to 8 mg 3 times daily   Toxicology feels comfortable keeping Suboxone at this current dose.  Toxicology will sign off.  Can reach out to toxicology with any further concerns or questions if needed  Discontinue clonidine as patient was placed on this in the past for withdrawal symptoms  Toxicology was consulted and provided recommendations  Substance cessation education and counseling

## 2025-03-23 NOTE — NURSING NOTE
Patient denies SI, HI, AHs and VHs. Denies  anxiety, reports depression. Patient is medication and meal compliant. Intermittently visible on the unit, with frequent returns to his bed. Limited socialization with peers but did make a phone call. He denies any needs at this time.

## 2025-03-23 NOTE — PROGRESS NOTES
Progress Note - Behavioral Health   Name: Juan Diego Horowitz 49 y.o. male I MRN: [id number]  Unit/Bed#: -01 I Date of Admission: 3/18/2025   Date of Service: 3/23/2025 I Hospital Day: 4     Assessment & Plan  Unspecified mood (affective) disorder, MDD with or without psychotic features vs Bipolar disorder  Juan Diego Horowitz is a 49 y.o. Mac[place] male from [facility],  single, currently homeless, unemployed, receives social security, w/ PMH of recent hernia surgery and kidney cancer s/p nephrectomy and PPH of depression, bipolar disorder, ADHD, 1 prior psychiatric admissions, 5 prior SA, h/o self-injurious behavior with hitting head, currently 201 voluntary commitment who presented to  ED for suicidal ideations, auditory hallucinations, and recent suicide attempt by overdose.     Plan:   Increase Lexapro to 15 mg starting tomorrow 3/24/2025 for treatment of depression  Most recent Qtc was 446 on 3/18/2025  Can recheck QTc again if patient begins to have cardiac like symptoms that may raise concern for prolonged QT  Discontinued Remeron after taper off from 30 mg nightly to 15 mg  Continue Abilify 5 mg daily started on 3/20  Consider transition to BAXTER Aristada once tolerated on PO, patient agrees with plan   Increase melatonin to 6 mg nightly for insomnia   Patient asked for Seroquel or Remeron stating that medications like trazodone do not work for him.  In the setting of recent discontinuation of Remeron by primary psychiatry team, Remeron was not restarted or made a PRN   Opioid use disorder, severe, dependence (HCC)  Followed up with toxicology today 3/23/2025  Continue Suboxone to 8 mg 3 times daily   Toxicology feels comfortable keeping Suboxone at this current dose.  Toxicology will sign off.  Can reach out to toxicology with any further concerns or questions if needed  Discontinue clonidine as patient was placed on this in the past for withdrawal symptoms  Toxicology was consulted and  provided recommendations  Substance cessation education and counseling   Cocaine use  Per toxicology recommend contingency management or CBT   Substance cessation education and counseling   Right inguinal hernia  Recent inguinal and umbilical hernia surgery on 3/11/2025  Continue to monitor pain  Plan per medical   Tobacco use disorder  Nicotine patch daily   Medical clearance for psychiatric admission  Per medical   Hyperlipidemia  Per medical     Current medications:  Current Facility-Administered Medications   Medication Dose Route Frequency Provider Last Rate    acetaminophen  650 mg Oral Q6H PRN Jael Mendelson, DO      acetaminophen  650 mg Oral Q4H PRN Brooke Mendelson, DO      acetaminophen  975 mg Oral Q6H PRN Brooke Mendelson, DO      aluminum-magnesium hydroxide-simethicone  30 mL Oral Q4H PRN Brooke Mendelson, DO      ARIPiprazole  5 mg Oral Daily Brooke Mendelson, DO      atorvastatin  10 mg Oral Daily With Dinner OLGA Whittington      benztropine  1 mg Intramuscular Q4H PRN Max 6/day Brooke Mendelson, DO      benztropine  1 mg Oral Q4H PRN Max 6/day Brooke Mendelson, DO      buprenorphine-naloxone  8 mg Sublingual TID Rachelle Fine MD      Cholecalciferol  2,000 Units Oral Daily OLGA Whittington      cyanocobalamin  1,000 mcg Oral Daily OLGA Whittington      hydrOXYzine HCL  50 mg Oral Q6H PRN Max 4/day Brooke Mendelson, DO      Or    diphenhydrAMINE  50 mg Intramuscular Q6H PRN Brooke Mendelson, DO      [START ON 3/24/2025] escitalopram  15 mg Oral Daily Meera Bhatia DO      gabapentin  300 mg Oral TID Tootie Leblanc PA-C      hydrOXYzine HCL  100 mg Oral Q6H PRN Max 4/day Brooke Mendelson, DO      Or    LORazepam  2 mg Intramuscular Q6H PRN Brooke Mendelson, DO      hydrOXYzine HCL  25 mg Oral Q6H PRN Max 4/day Brooke Mendelson, DO      ibuprofen  400 mg Oral Q12H PRN OLGA Whittington      melatonin  3 mg Oral HS PRN Brooke Mendelson, DO       "melatonin  6 mg Oral HS Meera Bhatia,       nicotine  1 patch Transdermal Daily Jael Mendelson, DO      OLANZapine  10 mg Oral Q3H PRN Max 3/day Jael Mendelson, DO      Or    OLANZapine  10 mg Intramuscular Q3H PRN Max 3/day Jael Mendelson, DO      OLANZapine  5 mg Oral Q3H PRN Max 6/day Jael Mendelson, DO      Or    OLANZapine  5 mg Intramuscular Q3H PRN Max 6/day Jael Mendelson, DO      OLANZapine  2.5 mg Oral Q3H PRN Max 8/day Jael Mendelson, DO      ondansetron  4 mg Oral Q8H PRN Tootie Leblanc PA-C      polyethylene glycol  17 g Oral Daily PRN Jael Mendelson, DO      propranolol  10 mg Oral Q8H PRN Jael Mendelson, DO      senna-docusate sodium  1 tablet Oral Daily PRN Jael Mendelson, DO          Risks/Benefits of Treatment:     Risks, benefits, and possible side effects of medications explained to patient and patient verbalizes understanding and agreement for treatment.    Progress Toward Goals: progressing    Treatment Planning:     All current active medications have been reviewed.  Continue to monitor response to treatment and assess for potential side effects of medications.  Encourage group therapy, milieu therapy and occupational therapy  Collaboration with medical service for medical comorbidities as indicated.  Behavioral Health checks for safety monitoring.    Estimated Discharge Day: 3/26/2025   Legal Status : Voluntary 201 commitment.    Subjective   Patient's chart was reviewed, and patient's progress and plan was discussed with treatment team.    Per nursing: No overt behavioral issues , Medication compliant , Meal compliant    PRNs over the past 24 hrs: No psychiatric PRNs     Juan Diego was evaluated this morning in patient's room for continuity of care.  Prior to this evaluation patient approached this writer stating that he is \"very depressed\" today.  Greenlandic-speaking staff assisted this writer with translation during evaluation.  Patient was cooperative with " "interview. Patient reports mood today as \" really depressed.\"  Patient denies anxiety today.  Patient reports that his cravings for opioids have decreased with the increase of Suboxone to 8 mg 3 times daily.  He intimated that he preferred Subutex.  This writer educated the patient on the fact that Suboxone is the preferred MAT secondary to its safety mechanisms.  Patient expressed understanding.  Patient says that he is having a hard time eating secondary to nausea.  Patient says his last bowel movement was 2 days ago which is not uncommon for him.  Patient says that he is having some pain/discomfort at the site of his hernia surgery incisions which appear to be healing well.  Patient rates this pain as a 5 or 6/10.  The medical service was notified of the symptoms.  Patient reports that he is struggling to sleep and that medications like trazodone do not help.  Patient was recently tapered off of Remeron by primary psychiatry team.  Patient says that Seroquel also helps.  This writer offered an increase in melatonin which patient reluctantly agreed to.  At the time of today's evaluation, the patient denies AVH, active SI, thoughts to self harm,  and HI.  Patient reports that he is having intermittent passive SI with no plan or intent.  Patient contracts for safety.    Behavior over the last 24 hours: unchanged.    Sleep:  Patient reports decreased sleep  Appetite: Reports some nausea with eating-medical made aware  Medication side effects: Denies  ROS: Nausea with eating, abdominal pain at the site of hernia surgery incisions-medical made aware    Objective :  Temp:  [97.3 °F (36.3 °C)-97.7 °F (36.5 °C)] 97.7 °F (36.5 °C)  HR:  [55-69] 55  BP: (123-133)/(73-74) 123/73  Resp:  [16] 16  SpO2:  [95 %-97 %] 97 %  O2 Device: None (Room air)    Temp:  [97.3 °F (36.3 °C)-97.7 °F (36.5 °C)] 97.7 °F (36.5 °C)  HR:  [55-69] 55  BP: (123-133)/(73-74) 123/73  Resp:  [16] 16  SpO2:  [95 %-97 %] 97 %  O2 Device: None (Room " "air)    Mental Status Evaluation:  Appearance:  male , alert, casually dressed, appears slightly older than stated age    Behavior:  cooperative, adequate eye contact    Speech:  Tajik-speaking, spontaneous, normal volume, normal rate   Mood:  \"Really depressed\"   Affect:  Appears dysphoric and anxious at times, reactive   Thought Process:  linear, goal-directed   Thought Content:  no verbalized delusions or overt paranoia, reports a decrease in cravings for opioids   Perceptual Disturbances: denies current auditory or visual hallucinations, does not appear to be responding to internal stimuli at this time   Risk Potential: Suicidal ideation -  Denies active SI, but reports passive SI - contracts for safety stating he will inform staff if thoughts worsen and/or become active.  Homicidal ideation - Denies at present  Potential for aggression - Not at present   Sensorium:  oriented to person, place, and situation   Memory:  recent and remote memory grossly intact   Consciousness:  alert, awake   Attention/Concentration: attention span and concentration are age appropriate   Insight:  limited   Judgment: limited   Gait/Station: normal gait/station   Motor Activity: no abnormal movements observed          Lab Results: I have reviewed the following results:  Most Recent Labs:   Lab Results   Component Value Date    WBC 11.90 (H) 03/20/2025    RBC 4.91 03/20/2025    HGB 13.4 03/20/2025    HCT 42.1 03/20/2025     (H) 03/20/2025    RDW 13.8 03/20/2025    NEUTROABS 7.80 (H) 03/20/2025    TOTANEUTABS 5.79 11/22/2016    SODIUM 140 03/20/2025    K 3.9 03/20/2025     03/20/2025    CO2 26 03/20/2025    BUN 15 03/20/2025    CREATININE 1.08 03/20/2025    GLUC 102 03/20/2025    CALCIUM 9.6 03/20/2025    AST 10 (L) 03/20/2025    ALT 7 03/20/2025    ALKPHOS 68 03/20/2025    TP 7.0 03/20/2025    ALB 3.9 03/20/2025    TBILI 0.22 03/20/2025    CHOLESTEROL 250 (H) 03/20/2025    HDL 41 03/20/2025    TRIG 123 03/20/2025    " LDLCALC 184 (H) 03/20/2025    NONHDLC 209 03/20/2025    YNS6CPNJSTEL 0.259 (L) 03/20/2025    FREET4 1.07 03/20/2025    SYPHILISAB Non-reactive 01/22/2025    HGBA1C 5.9 (H) 01/01/2024     01/01/2024       Administrative Statements     Counseling / Coordination of Care:   Patient's progress discussed with staff in treatment team meeting.  Medication changes reviewed with staff in treatment team meeting.  Medication changes discussed with patient.  Medication education provided to patient.  Supportive therapy provided to patient.  Reassurance and supportive therapy provided..    Meera Bhatia DO 03/23/25

## 2025-03-23 NOTE — NURSING NOTE
Patient has been more visible this evening in the milieu and is responsive to peers interacting with him. He has been using the telephone and came promptly to get HS medications and he was cooperative with scheduled medications. He denies S.I.H.I.A/H V/H

## 2025-03-23 NOTE — TREATMENT TEAM
03/23/25 1403   Integumentary   Integumentary (WDL) X   Skin Color Appropriate for ethnicity   Skin Condition/Temp Dry;Warm   Skin Integrity Laceration   Skin Location lower abdomen healing incisions   Skin Turgor Non-tenting

## 2025-03-24 PROCEDURE — 99233 SBSQ HOSP IP/OBS HIGH 50: CPT | Performed by: PSYCHIATRY & NEUROLOGY

## 2025-03-24 RX ORDER — GABAPENTIN 300 MG/1
300 CAPSULE ORAL 2 TIMES DAILY
Status: DISCONTINUED | OUTPATIENT
Start: 2025-03-24 | End: 2025-04-01

## 2025-03-24 RX ORDER — GABAPENTIN 300 MG/1
600 CAPSULE ORAL
Status: DISCONTINUED | OUTPATIENT
Start: 2025-03-24 | End: 2025-04-01

## 2025-03-24 RX ORDER — GABAPENTIN 100 MG/1
100 CAPSULE ORAL
Status: DISCONTINUED | OUTPATIENT
Start: 2025-03-24 | End: 2025-03-24

## 2025-03-24 RX ORDER — MIRTAZAPINE 15 MG/1
15 TABLET, FILM COATED ORAL
Status: DISCONTINUED | OUTPATIENT
Start: 2025-03-24 | End: 2025-03-24

## 2025-03-24 RX ORDER — GABAPENTIN 300 MG/1
300 CAPSULE ORAL
Status: DISCONTINUED | OUTPATIENT
Start: 2025-03-24 | End: 2025-03-24

## 2025-03-24 RX ADMIN — CYANOCOBALAMIN TAB 1000 MCG 1000 MCG: 1000 TAB at 08:15

## 2025-03-24 RX ADMIN — ARIPIPRAZOLE 5 MG: 5 TABLET ORAL at 08:15

## 2025-03-24 RX ADMIN — GABAPENTIN 300 MG: 300 CAPSULE ORAL at 08:15

## 2025-03-24 RX ADMIN — GABAPENTIN 300 MG: 300 CAPSULE ORAL at 16:42

## 2025-03-24 RX ADMIN — ESCITALOPRAM OXALATE 15 MG: 5 TABLET, FILM COATED ORAL at 08:15

## 2025-03-24 RX ADMIN — BUPRENORPHINE AND NALOXONE 8 MG: 8; 2 FILM BUCCAL; SUBLINGUAL at 08:15

## 2025-03-24 RX ADMIN — Medication 6 MG: at 21:03

## 2025-03-24 RX ADMIN — BUPRENORPHINE AND NALOXONE 8 MG: 8; 2 FILM BUCCAL; SUBLINGUAL at 21:03

## 2025-03-24 RX ADMIN — ATORVASTATIN CALCIUM 10 MG: 10 TABLET, FILM COATED ORAL at 16:42

## 2025-03-24 RX ADMIN — Medication 3 MG: at 22:24

## 2025-03-24 RX ADMIN — Medication 2000 UNITS: at 08:15

## 2025-03-24 RX ADMIN — BUPRENORPHINE AND NALOXONE 8 MG: 8; 2 FILM BUCCAL; SUBLINGUAL at 16:42

## 2025-03-24 RX ADMIN — GABAPENTIN 600 MG: 300 CAPSULE ORAL at 21:03

## 2025-03-24 NOTE — ASSESSMENT & PLAN NOTE
Juan Diego Horowitz is a 49 y.o. [place] male from [facility],  single, currently homeless, unemployed, receives social security, w/ PMH of recent hernia surgery and kidney cancer s/p nephrectomy and PPH of depression, bipolar disorder, ADHD, 1 prior psychiatric admissions, 5 prior SA, h/o self-injurious behavior with hitting head, currently 201 voluntary commitment who presented to  ED for suicidal ideations, auditory hallucinations, and recent suicide attempt by overdose.     Plan:   Lexapro 15 mg daily for treatment of depression  Most recent Qtc was 446 on 3/18/2025  Can recheck QTc again if patient begins to have cardiac like symptoms that may raise concern for prolonged QT  Discontinued Remeron after taper off from 30 mg nightly to 15 mg (due to previous suicide attempt overdosing on it)  Continue Abilify 5 mg daily started on 3/20  Consider transition to BAXTER Aristada once tolerated on PO, patient agrees with plan   Increase melatonin to 6 mg nightly for insomnia   Patient currently on Gabapentin 300 mg tid, increased evening dose to 600 mg.   Patient asked for Seroquel or Remeron stating that medications like trazodone do not work for him.  In the setting of recent discontinuation of Remeron by primary psychiatry team, Remeron was not restarted or made a PRN .

## 2025-03-24 NOTE — NURSING NOTE
"Patient visible in small TV room with peers all shift. Calm and cooperative on approach. States, \"I'm feeling so-so.\" Denies SI/HI and AH/VH at this time.  Reporting slight abdominal discomfort r/t suboxone. States, \"this is not what I normally take.\" Denies any additional needs at this time.   "

## 2025-03-24 NOTE — CERTIFIED RECOVERY SPECIALIST
Time spent: 10 minutes      Purpose: CRS met with patient, to offer support for patient's CHRISTIE and resources. Patient was sleeping but awoke to his name and said he is willing and waiting to go to treatment.     HISTORY: Patient reported he is homeless and also looking for help with housing when finished with treatment. CRS encouraged patient to speak to his treatment counselor about extended treatment and next steps to [place]. Patient was receptive to suggestions and resources.    Plan: Provider confirmed [facility] will be picking up patient at 10am this morning.    Outcome: CRS provided business card and [place] resources for patient.   Cardiogenic shock    Per chart, pt is a 72-year-old male patient past medical history of NICM since 2011, HFrEF LVEF 25-30% s/p MDT CRT-D with baseline CHB, VT/VF, a.fib s/p GIANFRANCO ligation/MAZE, MV/TV repair, PVC ablation 3/2024 intolerant to AADs in the past who initially presented to the [facility] for AICD shocks/VT, transferred for further management.

## 2025-03-24 NOTE — NURSING NOTE
Patient is medication and meal compliant. No complaints of pain or discomfort. Patient is visible on the unit and social with select peers. Patient is calm and cooperative with staff. Patient refused Nicotine Patch expressing his desire to continue to smoke when discharged.  Patient reports some depression and some anxiety but it is not overwhelming to him at this time. Patient had Suboxone increased on Saturday and seems to be doing well on the new dose with no complaints offered. Will continue to monitor patient for changes in mood or behavior.

## 2025-03-24 NOTE — PROGRESS NOTES
Progress Note - Behavioral Health   Name: Juan Diego Horowitz 49 y.o. male I MRN: [id number]  Unit/Bed#: -01 I Date of Admission: 3/18/2025   Date of Service: 3/24/2025 I Hospital Day: 5    Assessment & Plan  Unspecified mood (affective) disorder, MDD with or without psychotic features vs Bipolar disorder  Juan Diego Horowitz is a 49 y.o. Afghan male from [facility],  single, currently homeless, unemployed, receives social security, w/ PMH of recent hernia surgery and kidney cancer s/p nephrectomy and PPH of depression, bipolar disorder, ADHD, 1 prior psychiatric admissions, 5 prior SA, h/o self-injurious behavior with hitting head, currently 201 voluntary commitment who presented to  ED for suicidal ideations, auditory hallucinations, and recent suicide attempt by overdose.     Plan:   Lexapro 15 mg daily for treatment of depression  Most recent Qtc was 446 on 3/18/2025  Can recheck QTc again if patient begins to have cardiac like symptoms that may raise concern for prolonged QT  Discontinued Remeron after taper off from 30 mg nightly to 15 mg (due to previous suicide attempt overdosing on it)  Continue Abilify 5 mg daily started on 3/20  Consider transition to BAXTER Aristada once tolerated on PO, patient agrees with plan   Increase melatonin to 6 mg nightly for insomnia   Patient currently on Gabapentin 300 mg tid, increased evening dose to 600 mg.   Patient asked for Seroquel or Remeron stating that medications like trazodone do not work for him.  In the setting of recent discontinuation of Remeron by primary psychiatry team, Remeron was not restarted or made a PRN .   Opioid use disorder, severe, dependence (HCC)  Followed up with toxicology today 3/23/2025  Continue Suboxone to 8 mg 3 times daily   Toxicology feels comfortable keeping Suboxone at this current dose.  Toxicology will sign off.  Can reach out to toxicology with any further concerns or questions if needed  Discontinue  "clonidine as patient was placed on this in the past for withdrawal symptoms  Toxicology was consulted and provided recommendations  Substance cessation education and counseling   Cocaine use  Per toxicology recommend contingency management or CBT   Substance cessation education and counseling   Right inguinal hernia  Recent inguinal and umbilical hernia surgery on 3/11/2025  Continue to monitor pain  Plan per medical   Tobacco use disorder  Nicotine patch daily   Medical clearance for psychiatric admission  Per medical   Hyperlipidemia  Per medical         ------------------------------------------------------------    Recommended Treatment Plan:   Behavioral checks every 15 minutes  Encourage participation in group therapy, milieu therapy and occupational therapy.  Assess for side effects of medications.  Collaboration with ROSIO for medical co-morbidities as indicated.  Continue discussion with CM/SW to assist with obtaining collateral, disposition planning, and the implementation of patient-centered individualized plan of care.  Estimated Discharge Date: 3/26/2025    Risks, benefits and possible side effects of Medications: Risks, benefits, and possible side effects of medications have been explained to the patient, who verbalizes understanding    Legal status: 201    Disposition: to be determined      Subjective: Patient's chart was reviewed. Patient's progress and plan was discussed with treatment team. Per nursing report, Juan Diego has been calm, pleasant, cooperative on the unit. He remains compliant with medications.     Prn medications over the past 24 hours: Motrin and Tylenol at 1403 for pain, effective.     Juan Diego was evaluated this morning for continuity of care. On examination, Juan Diego is pleasant, cooperative, with good eye contact. He is laying in bed and normal speech volume and rate. He states his mood is \"depressed.\" He reports sleeping poorly since discontinuing the Remeron. His appetite has been " "poor and would like a nutrition consult so he can have Ensure which he usually has at home as he has overall poor appetite. He denies adverse effects from medications. He denies active or passive homicidal ideation. Patient does endorse passive SI throughout the day and feels things would be easier if he was not alive. Patient denies a plan. When asked what is causing his depression and suicidality he reports he often things about all his stressors like being homeless and not having any support from friends/family. Patient reports all his friends and family are in [place] and he has not been back there to visit in over 15 years.  He denies auditory or visual hallucinations. He does note instance where he thought he saw a man in his room while he was in bed but when he sat up there was nobody there. I advised patient if this happens again or worsens to let this writer know.     VS: Reviewed, within normal limits    Progress Toward Goals: medication and meal compliant, calm, cooperative    Psychiatric Review of Systems:  Behavior over the last 24 hours: unchanged  Sleep: insomnia  Appetite: poor oral intake  Medication side effects: none verbalized  Medical ROS: Complete review of systems is negative except as noted above.    Vital signs in last 24 hours:  Temp:  [97.4 °F (36.3 °C)-97.6 °F (36.4 °C)] 97.6 °F (36.4 °C)  HR:  [60-64] 64  BP: (128-135)/(70-74) 128/70  Resp:  [16] 16  SpO2:  [96 %-98 %] 96 %  O2 Device: None (Room air)    Mental Status Exam:    Appearance:  overtly appearing  male, good eye contact, and wrapped in blanket   Behavior:  calm and cooperative   Speech:  spontaneous and coherent   Mood:  \"depressed\"   Affect:  normal range and intensity   Thought Process:  Organized, logical, goal-directed   Associations: intact associations   Thought Content:  no verbalized delusions or overt paranoia   Perceptual Disturbances: no reported hallucinations, denies current auditory or visual " hallucinations, and does not appear to be responding to internal stimuli at this time   Risk Potential: Suicidal ideation -  Passive death wishes   Homicidal ideation - Denies at present  Potential for aggression - Not at present   Sensorium:  oriented to person and place   Memory:  recent and remote memory grossly intact   Consciousness:  alert and awake   Attention/Concentration: attention span and concentration are age appropriate   Insight:  improving   Judgment: improving   Gait/Station: normal gait/station   Motor Activity: no abnormal movements     Current Medications:  Current Facility-Administered Medications   Medication Dose Route Frequency Provider Last Rate    acetaminophen  650 mg Oral Q6H PRN Jael Mendelson, DO      acetaminophen  650 mg Oral Q4H PRN Jael Mendelson, DO      acetaminophen  975 mg Oral Q6H PRN Brooke Mendelson, DO      aluminum-magnesium hydroxide-simethicone  30 mL Oral Q4H PRN Brooke Mendelson, DO      ARIPiprazole  5 mg Oral Daily Brooke Mendelson, DO      atorvastatin  10 mg Oral Daily With Dinner OLGA Whittington      benztropine  1 mg Intramuscular Q4H PRN Max 6/day Brooke Mendelson, DO      benztropine  1 mg Oral Q4H PRN Max 6/day Brooke Mendelson, DO      buprenorphine-naloxone  8 mg Sublingual TID Rachelle Fine MD      Cholecalciferol  2,000 Units Oral Daily OGLA Whittington      cyanocobalamin  1,000 mcg Oral Daily OLGA Whittington      hydrOXYzine HCL  50 mg Oral Q6H PRN Max 4/day Brooke Mendelson, DO      Or    diphenhydrAMINE  50 mg Intramuscular Q6H PRN Brooke Mendelson, DO      escitalopram  15 mg Oral Daily Meera Bhatia, DO      gabapentin  300 mg Oral TID Tootie Leblanc PA-C      gabapentin  300 mg Oral HS Magui Good, DO      hydrOXYzine HCL  100 mg Oral Q6H PRN Max 4/day Brooke Mendelson, DO      Or    LORazepam  2 mg Intramuscular Q6H PRN Brooke Mendelson, DO      hydrOXYzine HCL  25 mg Oral Q6H PRN Max 4/day Jael  Mendelson, DO      ibuprofen  400 mg Oral Q12H PRN Akankshahosea EmmanuelOLGA Hughes      melatonin  3 mg Oral HS PRN Jael Mendelson, DO      melatonin  6 mg Oral HS Meera Bhatia,       nicotine  1 patch Transdermal Daily Jael Mendelson, DO      OLANZapine  10 mg Oral Q3H PRN Max 3/day Jael Mendelson, DO      Or    OLANZapine  10 mg Intramuscular Q3H PRN Max 3/day Jael Mendelson, DO      OLANZapine  5 mg Oral Q3H PRN Max 6/day Jael Mendelson, DO      Or    OLANZapine  5 mg Intramuscular Q3H PRN Max 6/day Jael Mendelson, DO      OLANZapine  2.5 mg Oral Q3H PRN Max 8/day Jael Mendelson, DO      ondansetron  4 mg Oral Q8H PRN Tootie Leblanc PA-C      polyethylene glycol  17 g Oral Daily PRN Jael Mendelson, DO      propranolol  10 mg Oral Q8H PRN Jael Mendelson, DO      senna-docusate sodium  1 tablet Oral Daily PRN Jael Mendelson, DO         Behavioral Health Medications: all current active meds have been reviewed. Changes as in plan section above.    Laboratory results:  I have personally reviewed all pertinent laboratory/tests results.   No results found for this or any previous visit (from the past 48 hours).     Counseling / Coordination of Care:  Total floor / unit time spent today 20 minutes. Greater than 50% of total time was spent with the patient and / or family counseling and / or coordination of care. A description of counseling / coordination of care:  Patient's progress discussed with staff in treatment team meeting.      Magui Good DO 03/24/25  Psychiatry Residency, PGY-1  This note has been constructed using a voice recognition system. There may be translation, syntax, or grammatical errors. If you have any questions, please contact the dictating author.

## 2025-03-24 NOTE — NURSING NOTE
Patient has been in the milieu all evening and interacts some with select peers. He has watched TV, used the telephone this evening. He was cooperative with HS medications and he denies S.I.H.I.A/H V/H

## 2025-03-24 NOTE — PLAN OF CARE
Problem: Potential for Falls  Goal: Patient will remain free of falls  Description: INTERVENTIONS:  - Keep care items and personal belongings within reach  - Initiate and maintain comfort rounds  - Make Fall Risk Sign visible to staff  - Apply yellow socks and bracelet for high fall risk patients  - Consider moving patient to room near nurses station  Outcome: Progressing     Problem: SELF HARM/SUICIDALITY  Goal: Will have no self-injury during hospital stay  Description: INTERVENTIONS:  - Q 15 MINUTES: Routine safety checks  - Q WAKING SHIFT & PRN: Assess risk to determine if routine checks are adequate to maintain patient safety  - Encourage patient to participate actively in care by formulating a plan to combat response to suicidal ideation, identify supports and resources  Outcome: Progressing     Problem: DEPRESSION  Goal: Will be euthymic at discharge  Description: INTERVENTIONS:  - Administer medication as ordered  - Provide emotional support via 1:1 interaction with staff  - Encourage involvement in milieu/groups/activities  - Monitor for social isolation  Outcome: Progressing     Problem: ANXIETY  Goal: Will report anxiety at manageable levels  Description: INTERVENTIONS:  - Administer medication as ordered  - Teach and encourage coping skills  - Provide emotional support  - Assess patient/family for anxiety and ability to cope  Outcome: Progressing  Goal: By discharge: Patient will verbalize 2 strategies to deal with anxiety  Description: Interventions:  - Identify any obvious source/trigger to anxiety  - Staff will assist patient in applying identified coping technique/skills  - Encourage attendance of scheduled groups and activities  Outcome: Progressing     Problem: SUBSTANCE USE/ABUSE  Goal: Will have no detox symptoms and will verbalize plan for changing substance-related behavior  Description: INTERVENTIONS:  - Monitor physical status and assess for symptoms of withdrawal  - Administer medication  as ordered  - Provide emotional support with 1 on 1 interaction with staff  - Encourage recovery focused program/ addiction education  - Assess for verbalization of changing behaviors related to substance abuse  - Initiate consults and referrals as appropriate (Case Management, Spiritual Care, etc.)  Outcome: Progressing  Goal: By discharge, will develop insight into their chemical dependency and sustain motivation to continue in recovery  Description: INTERVENTIONS:  - Attends all daily group sessions and scheduled AA groups  - Actively practices coping skills through participation in the therapeutic community and adherence to program rules  - Reviews and completes assignments from individual treatment plan  - Assist patient development of understanding of their personal cycle of addiction and relapse triggers  Outcome: Progressing  Goal: By discharge, patient will have ongoing treatment plan addressing chemical dependency  Description: INTERVENTIONS:  - Assist patient with resources and/or appointments for ongoing recovery based living  Outcome: Progressing     Problem: DISCHARGE PLANNING  Goal: Discharge to home or other facility with appropriate resources  Description: INTERVENTIONS:  - Identify barriers to discharge w/patient and caregiver  - Arrange for needed discharge resources and transportation as appropriate  - Identify discharge learning needs (meds, wound care, etc.)  - Arrange for interpretive services to assist at discharge as needed  - Refer to Case Management Department for coordinating discharge planning if the patient needs post-hospital services based on physician/advanced practitioner order or complex needs related to functional status, cognitive ability, or social support system  Outcome: Progressing     Problem: Ineffective Coping  Goal: Participates in unit activities  Description: Interventions:- Provide therapeutic environment - Provide required programming - Redirect inappropriate  behaviors   Outcome: Progressing      no

## 2025-03-24 NOTE — PROGRESS NOTES
03/24/25 0933   Team Meeting   Meeting Type Daily Rounds   Team Members Present   Team Members Present Physician;Nurse;   Physician Team Member Jennifer   [facility] [place]   Patient/Family Present   Patient Present No   Patient's Family Present No     Pt denies SI/HI/AVH. Suboxone increased over the weekend. Pt pleasant, calm, cooperative.

## 2025-03-25 PROBLEM — R63.0 APPETITE LOSS: Status: ACTIVE | Noted: 2025-03-25

## 2025-03-25 PROCEDURE — 99233 SBSQ HOSP IP/OBS HIGH 50: CPT | Performed by: PSYCHIATRY & NEUROLOGY

## 2025-03-25 RX ORDER — POLYETHYLENE GLYCOL 3350 17 G/17G
17 POWDER, FOR SOLUTION ORAL DAILY
Status: DISCONTINUED | OUTPATIENT
Start: 2025-03-25 | End: 2025-04-02

## 2025-03-25 RX ORDER — HYDROXYZINE HYDROCHLORIDE 25 MG/1
25 TABLET, FILM COATED ORAL
Status: DISCONTINUED | OUTPATIENT
Start: 2025-03-25 | End: 2025-03-28

## 2025-03-25 RX ORDER — ESCITALOPRAM OXALATE 10 MG/1
20 TABLET ORAL DAILY
Status: DISCONTINUED | OUTPATIENT
Start: 2025-03-26 | End: 2025-04-04 | Stop reason: HOSPADM

## 2025-03-25 RX ORDER — BUPRENORPHINE 8 MG/1
8 TABLET SUBLINGUAL 3 TIMES DAILY
Status: DISCONTINUED | OUTPATIENT
Start: 2025-03-25 | End: 2025-04-04 | Stop reason: HOSPADM

## 2025-03-25 RX ADMIN — CYANOCOBALAMIN TAB 1000 MCG 1000 MCG: 1000 TAB at 08:59

## 2025-03-25 RX ADMIN — ARIPIPRAZOLE 5 MG: 5 TABLET ORAL at 09:00

## 2025-03-25 RX ADMIN — GABAPENTIN 600 MG: 300 CAPSULE ORAL at 21:06

## 2025-03-25 RX ADMIN — ATORVASTATIN CALCIUM 10 MG: 10 TABLET, FILM COATED ORAL at 16:02

## 2025-03-25 RX ADMIN — BUPRENORPHINE AND NALOXONE 8 MG: 8; 2 FILM BUCCAL; SUBLINGUAL at 09:00

## 2025-03-25 RX ADMIN — ESCITALOPRAM OXALATE 15 MG: 5 TABLET, FILM COATED ORAL at 08:59

## 2025-03-25 RX ADMIN — Medication 6 MG: at 21:07

## 2025-03-25 RX ADMIN — GABAPENTIN 300 MG: 300 CAPSULE ORAL at 16:02

## 2025-03-25 RX ADMIN — Medication 2000 UNITS: at 09:03

## 2025-03-25 RX ADMIN — POLYETHYLENE GLYCOL 3350 17 G: 17 POWDER, FOR SOLUTION ORAL at 10:47

## 2025-03-25 RX ADMIN — BUPRENORPHINE HCL 8 MG: 8 TABLET SUBLINGUAL at 21:06

## 2025-03-25 RX ADMIN — BUPRENORPHINE HCL 8 MG: 8 TABLET SUBLINGUAL at 16:02

## 2025-03-25 RX ADMIN — GABAPENTIN 300 MG: 300 CAPSULE ORAL at 08:59

## 2025-03-25 RX ADMIN — HYDROXYZINE HYDROCHLORIDE 25 MG: 25 TABLET ORAL at 21:06

## 2025-03-25 NOTE — PROGRESS NOTES
Progress Note - Behavioral Health   Name: Juan Diego Horowitz 49 y.o. male I MRN: [id number]  Unit/Bed#: -01 I Date of Admission: 3/18/2025   Date of Service: 3/25/2025 I Hospital Day: 6    Assessment & Plan  Unspecified mood (affective) disorder, MDD with or without psychotic features vs Bipolar disorder  Juan Diego Horowitz is a 49 y.o. Portuguese male from [facility],  single, currently homeless, unemployed, receives social security, w/ PMH of recent hernia surgery and kidney cancer s/p nephrectomy and PPH of depression, bipolar disorder, ADHD, 1 prior psychiatric admissions, 5 prior SA, h/o self-injurious behavior with hitting head, currently 201 voluntary commitment who presented to  ED for suicidal ideations, auditory hallucinations, and recent suicide attempt by overdose.     Plan:   Increased Lexapro to 20 mg starting tomorrow 3/26 for depressive symptoms.   Most recent Qtc was 446 on 3/18/2025  Can recheck QTc again if patient begins to have cardiac like symptoms that may raise concern for prolonged QT  Discontinued Remeron after taper off from 30 mg nightly to 15 mg (due to previous suicide attempt overdosing on it)  Continue Abilify 5 mg daily started on 3/20  Consider transition to BAXTER Aristada once tolerated on PO, patient agrees with plan   Increase melatonin to 6 mg nightly for insomnia   Scheduled Atarax 25 mg hs starting on 3/25  Patient currently on Gabapentin 300 mg tid, increased evening dose to 600 mg.   Patient asked for Seroquel or Remeron stating that medications like trazodone do not work for him.  In the setting of recent discontinuation of Remeron by primary psychiatry team, Remeron was not restarted or made a PRN .   Opioid use disorder, severe, dependence (HCC)  Followed up with toxicology today 3/23/2025  Continue Suboxone to 8 mg 3 times daily   Patient is complaining of abdominal pain with Suboxone. Would like to switch back to Subutex. Reached out to toxicology  for further management.   Discontinue clonidine as patient was placed on this in the past for withdrawal symptoms  Toxicology was consulted and provided recommendations  Substance cessation education and counseling   Cocaine use  Per toxicology recommend contingency management or CBT   Substance cessation education and counseling   Right inguinal hernia  Recent inguinal and umbilical hernia surgery on 3/11/2025  Continue to monitor pain  Plan per medical   Tobacco use disorder  Nicotine patch daily   Medical clearance for psychiatric admission  Per medical   Hyperlipidemia  Per medical   Appetite loss  Patient complains of ongoing decreased appetite, unable to eat his meals. Placed a nutrition consult.  Patient also has infrequent bowel movements so ordered daily Miralax.       ------------------------------------------------------------    Recommended Treatment Plan:   Behavioral checks every 15 minutes  Encourage participation in group therapy, milieu therapy and occupational therapy.  Assess for side effects of medications.  Collaboration with ROSIO for medical co-morbidities as indicated.  Continue discussion with CM/SW to assist with obtaining collateral, disposition planning, and the implementation of patient-centered individualized plan of care.  Estimated Discharge Date: 3/31/2025    Risks, benefits and possible side effects of Medications: Risks, benefits, and possible side effects of medications have been explained to the patient, who verbalizes understanding    Legal status: 201    Disposition: to be determined      Subjective: Patient's chart was reviewed. Patient's progress and plan was discussed with treatment team. Per nursing report, Juan Diego has been pleasant, calm, cooperative on the unit. He remains compliant with medications.     Prn medications over the past 24 hours: Melatonin 3 mg 2224, ineffective.     Juan Diego was evaluated this morning for continuity of care. On examination, Juan Diego is  "pleasant, awake, calm, cooperative. He has normal speech rate and volume. He states his mood is \"still depressed, a little better.\" He reports sleeping poorly, he only had Melatonin. His appetite has been poor, he continues to have abdominal pain with Suboxone. He denies adverse effects from psychiatric medications. He denies active homicidal ideation. He endorses passive SI and wanting to not live. He denies auditory or visual hallucinations.    VS: Reviewed, within normal limits    Progress Toward Goals: medication and meal compliant, calm, cooperative    Psychiatric Review of Systems:  Behavior over the last 24 hours: unchanged  Sleep: insomnia  Appetite: decreased from baseline  Medication side effects: none verbalized  Medical ROS: Complete review of systems is negative except as noted above.    Vital signs in last 24 hours:  Temp:  [97.5 °F (36.4 °C)-98.9 °F (37.2 °C)] 98.9 °F (37.2 °C)  HR:  [57-70] 57  BP: ()/(54-57) 99/54  Resp:  [17-18] 18  SpO2:  [96 %-98 %] 96 %  O2 Device: None (Room air)    Mental Status Exam:    Appearance:  overtly appearing  male, alert, marginal grooming and hygiene, and good eye contact   Behavior:  calm, cooperative, and laying in bed   Speech:  spontaneous, soft, and coherent   Mood:  \"Depressed, a little better\"   Affect:  normal range and intensity   Thought Process:  Organized, logical, goal-directed   Associations: intact associations   Thought Content:  no verbalized delusions or overt paranoia   Perceptual Disturbances: no reported hallucinations, denies current auditory or visual hallucinations, and does not appear to be responding to internal stimuli at this time   Risk Potential: Suicidal ideation -  Passive death wishes   Homicidal ideation - Denies at present  Potential for aggression - Not at present   Sensorium:  oriented to person, place, and time/date   Memory:  recent and remote memory grossly intact   Consciousness:  alert and awake "   Attention/Concentration: attention span and concentration are age appropriate   Insight:  improving   Judgment: improving   Gait/Station: normal gait/station   Motor Activity: no abnormal movements     Current Medications:  Current Facility-Administered Medications   Medication Dose Route Frequency Provider Last Rate    acetaminophen  650 mg Oral Q6H PRN Brooke Mendelson, DO      acetaminophen  650 mg Oral Q4H PRN Brooke Mendelson, DO      acetaminophen  975 mg Oral Q6H PRN Brooke Mendelson, DO      aluminum-magnesium hydroxide-simethicone  30 mL Oral Q4H PRN Brooke Mendelson, DO      ARIPiprazole  5 mg Oral Daily Brooke Mendelson, DO      atorvastatin  10 mg Oral Daily With Dinner OLGA Whittington      benztropine  1 mg Intramuscular Q4H PRN Max 6/day Brooke Mendelson, DO      benztropine  1 mg Oral Q4H PRN Max 6/day Brooke Mendelson, DO      buprenorphine-naloxone  8 mg Sublingual TID Rachelle Fine MD      Cholecalciferol  2,000 Units Oral Daily OLGA Whittington      cyanocobalamin  1,000 mcg Oral Daily OLGA Whittington      hydrOXYzine HCL  50 mg Oral Q6H PRN Max 4/day Brooke Mendelson, DO      Or    diphenhydrAMINE  50 mg Intramuscular Q6H PRN Brooke Mendelson, DO      [START ON 3/26/2025] escitalopram  20 mg Oral Daily Magui Good, DO      gabapentin  300 mg Oral BID Jose Rodriguez MD      gabapentin  600 mg Oral HS Jose Rodriguez MD      hydrOXYzine HCL  100 mg Oral Q6H PRN Max 4/day Brooke Mendelson, DO      Or    LORazepam  2 mg Intramuscular Q6H PRN Brooke Mendelson, DO      hydrOXYzine HCL  25 mg Oral Q6H PRN Max 4/day Brooke Mendelson, DO      hydrOXYzine HCL  25 mg Oral HS Magui Good, DO      ibuprofen  400 mg Oral Q12H PRN OLGA Whittington      melatonin  3 mg Oral HS PRN Brooke Mendelson, DO      melatonin  6 mg Oral HS Meera Bhatia DO      nicotine  1 patch Transdermal Daily Brooke Mendelson, DO      OLANZapine  10 mg Oral Q3H PRN Max 3/day  Jael Mendelson, DO      Or    OLANZapine  10 mg Intramuscular Q3H PRN Max 3/day Jael Mendelson, DO      OLANZapine  5 mg Oral Q3H PRN Max 6/day Jael Mendelson, DO      Or    OLANZapine  5 mg Intramuscular Q3H PRN Max 6/day Jael Mendelson, DO      OLANZapine  2.5 mg Oral Q3H PRN Max 8/day Jael Mendelson, DO      ondansetron  4 mg Oral Q8H PRN Tootie Leblanc PA-C      polyethylene glycol  17 g Oral Daily Magui Good,       propranolol  10 mg Oral Q8H PRN Jael Mendelson, DO      senna-docusate sodium  1 tablet Oral Daily PRN Jael Mendelson, DO         Behavioral Health Medications: all current active meds have been reviewed. Changes as in plan section above.    Laboratory results:  I have personally reviewed all pertinent laboratory/tests results.   No results found for this or any previous visit (from the past 48 hours).     Counseling / Coordination of Care:  Total floor / unit time spent today 20 minutes. Greater than 50% of total time was spent with the patient and / or family counseling and / or coordination of care. A description of counseling / coordination of care:  Patient's progress discussed with staff in treatment team meeting.      Magui Good DO 03/25/25  Psychiatry Residency, PGY-1  This note has been constructed using a voice recognition system. There may be translation, syntax, or grammatical errors. If you have any questions, please contact the dictating author.

## 2025-03-25 NOTE — ASSESSMENT & PLAN NOTE
Followed up with toxicology today 3/23/2025  Continue Suboxone to 8 mg 3 times daily   Patient is complaining of abdominal pain with Suboxone. Would like to switch back to Subutex. Reached out to toxicology for further management.   Discontinue clonidine as patient was placed on this in the past for withdrawal symptoms  Toxicology was consulted and provided recommendations  Substance cessation education and counseling

## 2025-03-25 NOTE — PROGRESS NOTES
03/25/25 0922   Team Meeting   Meeting Type Daily Rounds   Team Members Present   Team Members Present Physician;Nurse;   Physician Team Member Jennifer   [facility]   Care Management Team Member [place]   Patient/Family Present   Patient Present No   Patient's Family Present No     Pt med/meal compliant. Seclusive to room. Denies SI/HI/AVH. PRN Melatonin for sleep was effective.

## 2025-03-25 NOTE — QUICK NOTE
Patient discussed with this writer that he had side effects with Suboxone in the past and would like to be switched back to Subutex which was he was stabilized on in the outpatient setting prior to admission.  Patient describes that he has a bad taste in his mouth and feels abdominal discomfort.  Discussed the situation with toxicology attending Dr. Bowden who explained that the medications are the same but is comfortable with switching back to Subutex.  Confirmed dosing with Dr. Bowden and patient is now switched to Subutex 8 mg 3 times daily

## 2025-03-25 NOTE — NURSING NOTE
Pt c/o insomnia. PRN PO melatonin 3mg given @ 2224. 2324: Effective. Pt observed resting in bed at this time, no signs of discomfort.

## 2025-03-25 NOTE — NURSING NOTE
Patient reports ongoing depression that has not improved since admission. Denies anxiety, SI/HI/AVH. Soft spoken and scant in conversation with a depressed affect. Withdrawn to bedroom, no unmet needs currently.

## 2025-03-25 NOTE — CONSULTS
Documented intake 100% of most meals. Pt states his appetite is not too good and would like Ensure. States he drank it PTA. Will order with meals.

## 2025-03-25 NOTE — ASSESSMENT & PLAN NOTE
Juan Diego Horowitz is a 49 y.o. [place] male from [facility],  single, currently homeless, unemployed, receives social security, w/ PMH of recent hernia surgery and kidney cancer s/p nephrectomy and PPH of depression, bipolar disorder, ADHD, 1 prior psychiatric admissions, 5 prior SA, h/o self-injurious behavior with hitting head, currently 201 voluntary commitment who presented to  ED for suicidal ideations, auditory hallucinations, and recent suicide attempt by overdose.     Plan:   Increased Lexapro to 20 mg starting tomorrow 3/26 for depressive symptoms.   Most recent Qtc was 446 on 3/18/2025  Can recheck QTc again if patient begins to have cardiac like symptoms that may raise concern for prolonged QT  Discontinued Remeron after taper off from 30 mg nightly to 15 mg (due to previous suicide attempt overdosing on it)  Continue Abilify 5 mg daily started on 3/20  Consider transition to BAXTER Aristada once tolerated on PO, patient agrees with plan   Increase melatonin to 6 mg nightly for insomnia   Scheduled Atarax 25 mg hs starting on 3/25  Patient currently on Gabapentin 300 mg tid, increased evening dose to 600 mg.   Patient asked for Seroquel or Remeron stating that medications like trazodone do not work for him.  In the setting of recent discontinuation of Remeron by primary psychiatry team, Remeron was not restarted or made a PRN .

## 2025-03-25 NOTE — ASSESSMENT & PLAN NOTE
Patient complains of ongoing decreased appetite, unable to eat his meals. Placed a nutrition consult.  Patient also has infrequent bowel movements so ordered daily Miralax.

## 2025-03-26 PROCEDURE — 99232 SBSQ HOSP IP/OBS MODERATE 35: CPT | Performed by: PSYCHIATRY & NEUROLOGY

## 2025-03-26 RX ADMIN — GABAPENTIN 300 MG: 300 CAPSULE ORAL at 16:27

## 2025-03-26 RX ADMIN — GABAPENTIN 300 MG: 300 CAPSULE ORAL at 08:36

## 2025-03-26 RX ADMIN — HYDROXYZINE HYDROCHLORIDE 25 MG: 25 TABLET ORAL at 21:10

## 2025-03-26 RX ADMIN — BUPRENORPHINE HCL 8 MG: 8 TABLET SUBLINGUAL at 21:10

## 2025-03-26 RX ADMIN — HYDROXYZINE HYDROCHLORIDE 25 MG: 25 TABLET ORAL at 14:30

## 2025-03-26 RX ADMIN — GABAPENTIN 600 MG: 300 CAPSULE ORAL at 21:10

## 2025-03-26 RX ADMIN — BUPRENORPHINE HCL 8 MG: 8 TABLET SUBLINGUAL at 16:27

## 2025-03-26 RX ADMIN — CYANOCOBALAMIN TAB 1000 MCG 1000 MCG: 1000 TAB at 08:36

## 2025-03-26 RX ADMIN — POLYETHYLENE GLYCOL 3350 17 G: 17 POWDER, FOR SOLUTION ORAL at 08:37

## 2025-03-26 RX ADMIN — ESCITALOPRAM OXALATE 20 MG: 10 TABLET ORAL at 08:36

## 2025-03-26 RX ADMIN — ATORVASTATIN CALCIUM 10 MG: 10 TABLET, FILM COATED ORAL at 17:14

## 2025-03-26 RX ADMIN — ARIPIPRAZOLE 5 MG: 5 TABLET ORAL at 08:36

## 2025-03-26 RX ADMIN — Medication 6 MG: at 21:10

## 2025-03-26 RX ADMIN — Medication 2000 UNITS: at 08:37

## 2025-03-26 RX ADMIN — BUPRENORPHINE HCL 8 MG: 8 TABLET SUBLINGUAL at 08:36

## 2025-03-26 NOTE — SOCIAL WORK
Phone call placed to pt's , Glenis Cavazos. Voicemail left requesting return phone call.    Manual Repair Warning Statement: We plan on removing the manually selected variable below in favor of our much easier automatic structured text blocks found in the previous tab. We decided to do this to help make the flow better and give you the full power of structured data. Manual selection is never going to be ideal in our platform and I would encourage you to avoid using manual selection from this point on, especially since I will be sunsetting this feature. It is important that you do one of two things with the customized text below. First, you can save all of the text in a word file so you can have it for future reference. Second, transfer the text to the appropriate area in the Library tab. Lastly, if there is a flap or graft type which we do not have you need to let us know right away so I can add it in before the variable is hidden. No need to panic, we plan to give you roughly 6 months to make the change.

## 2025-03-26 NOTE — PROGRESS NOTES
Progress Note - Behavioral Health   Name: Juan Diego Horowitz 49 y.o. male I MRN: [id number]  Unit/Bed#: -01 I Date of Admission: 3/18/2025   Date of Service: 3/26/2025 I Hospital Day: 7    Assessment & Plan  Unspecified mood (affective) disorder, MDD with or without psychotic features vs Bipolar disorder  Juan Diego Horowitz is a 49 y.o. [place] male from [facility],  single, currently homeless, unemployed, receives social security, w/ PMH of recent hernia surgery and kidney cancer s/p nephrectomy and PPH of depression, bipolar disorder, ADHD, 1 prior psychiatric admissions, 5 prior SA, h/o self-injurious behavior with hitting head, currently 201 voluntary commitment who presented to  ED for suicidal ideations, auditory hallucinations, and recent suicide attempt by overdose.     Plan:   Continue Lexapro 20 mg daily for depressive symptoms.  Most recent Qtc was 446 on 3/18/2025  Can recheck QTc again if patient begins to have cardiac like symptoms that may raise concern for prolonged QT  Discontinued Remeron after taper off from 30 mg nightly to 15 mg (due to previous suicide attempt overdosing on it)  Continue Abilify 5 mg daily started on 3/20  Consider transition to BAXTER Aristada once tolerated on PO, patient agrees with plan   Continue melatonin to 6 mg nightly for insomnia   Continue Atarax 25 mg hs for insomnia  Patient currently on Gabapentin 300 mg tid, increased evening dose to 600 mg.   Patient asked for Seroquel or Remeron stating that medications like trazodone do not work for him.  In the setting of recent discontinuation of Remeron by primary psychiatry team, Remeron was not restarted or made a PRN .   Opioid use disorder, severe, dependence (HCC)  Continue Suboxone to 8 mg 3 times daily   Patient is complaining of abdominal pain with Suboxone. Would like to switch back to Subutex. Reached out to toxicology for further management.   Discontinue clonidine as patient was placed on  "this in the past for withdrawal symptoms  Toxicology was consulted and provided recommendations  Substance cessation education and counseling   Cocaine use  Per toxicology recommend contingency management or CBT   Substance cessation education and counseling   Right inguinal hernia  Recent inguinal and umbilical hernia surgery on 3/11/2025  Continue to monitor pain  Plan per medical   Tobacco use disorder  Nicotine patch daily   Medical clearance for psychiatric admission  Per medical   Hyperlipidemia  Per medical   Appetite loss  Patient complains of ongoing decreased appetite, unable to eat his meals. Placed a nutrition consult.  Patient also has infrequent bowel movements so ordered daily Miralax.         ------------------------------------------------------------    Recommended Treatment Plan:   Behavioral checks every 15 minutes  Encourage participation in group therapy, milieu therapy and occupational therapy.  Assess for side effects of medications.  Collaboration with ROSIO for medical co-morbidities as indicated.  Continue discussion with CM/SW to assist with obtaining collateral, disposition planning, and the implementation of patient-centered individualized plan of care.  Estimated Discharge Date: 3/31/2025    Risks, benefits and possible side effects of Medications: Risks, benefits, and possible side effects of medications have been explained to the patient, who verbalizes understanding    Legal status: 201    Disposition: to be determined      Subjective: Patient's chart was reviewed. Patient's progress and plan was discussed with treatment team. Per nursing report, Juan Diego has been pleasant, cooperative on the unit. He remains compliant with medications.     Prn medications over the past 24 hours: none    Juan Diego was evaluated this morning for continuity of care. On examination, Juan Diego is resting in bed. He appears brighter than previous and has good eye contact. He states his mood is \"calm.\" He " "reports sleeping better last night up to 4 hours. His appetite has been better yesterday. He denies adverse effects from medications. He denies active or passive suicidal ideation and homicidal ideation. He feels slightly better today. He denies auditory or visual hallucinations. Patient feels improved since switching to Subutex from Suboxone.    VS: Reviewed, within normal limits    Progress Toward Goals: medication and meal compliant, calm, cooperative    Psychiatric Review of Systems:  Behavior over the last 24 hours: improved  Sleep: improving  Appetite: improving  Medication side effects: none verbalized  Medical ROS: Complete review of systems is negative except as noted above.    Vital signs in last 24 hours:  Temp:  [97.2 °F (36.2 °C)] 97.2 °F (36.2 °C)  HR:  [60] 60  BP: (103)/(69) 103/69  Resp:  [16] 16  SpO2:  [95 %] 95 %  O2 Device: None (Room air)    Mental Status Exam:    Appearance:  overtly appearing  male, alert, marginal grooming and hygiene, good eye contact, and wrapped in blanket   Behavior:  calm and cooperative   Speech:  spontaneous, soft, and coherent   Mood:  \"calm\"   Affect:  brighter   Thought Process:  Organized, logical, goal-directed   Associations: intact associations   Thought Content:  no verbalized delusions or overt paranoia   Perceptual Disturbances: no reported hallucinations, denies current auditory or visual hallucinations, and does not appear to be responding to internal stimuli at this time   Risk Potential: Suicidal ideation -  Denies at present   Homicidal ideation - Denies at present  Potential for aggression - Not at present   Sensorium:  oriented to person and place   Memory:  recent and remote memory grossly intact   Consciousness:  alert and awake   Attention/Concentration: attention span and concentration are age appropriate   Insight:  improved   Judgment: improved   Gait/Station: in bed   Motor Activity: no abnormal movements     Current " Medications:  Current Facility-Administered Medications   Medication Dose Route Frequency Provider Last Rate    acetaminophen  650 mg Oral Q6H PRN Brooke Mendelson, DO      acetaminophen  650 mg Oral Q4H PRN Brooke Mendelson, DO      acetaminophen  975 mg Oral Q6H PRN Brooke Mendelson, DO      aluminum-magnesium hydroxide-simethicone  30 mL Oral Q4H PRN Brooke Mendelson, DO      ARIPiprazole  5 mg Oral Daily Brooke Mendelson, DO      atorvastatin  10 mg Oral Daily With Dinner OLGA Whittington      benztropine  1 mg Intramuscular Q4H PRN Max 6/day Brooke Mendelson, DO      benztropine  1 mg Oral Q4H PRN Max 6/day Brooke Mendelson, DO      buprenorphine  8 mg Sublingual TID Magui Good DO      Cholecalciferol  2,000 Units Oral Daily OLGA Whittington      cyanocobalamin  1,000 mcg Oral Daily OLGA Whittington      hydrOXYzine HCL  50 mg Oral Q6H PRN Max 4/day Brooke Mendelson, DO      Or    diphenhydrAMINE  50 mg Intramuscular Q6H PRN Brooke Mendelson, DO      escitalopram  20 mg Oral Daily Magui Good, DO      gabapentin  300 mg Oral BID Jose Rodriguez MD      gabapentin  600 mg Oral HS Jose Rodriguez MD      hydrOXYzine HCL  100 mg Oral Q6H PRN Max 4/day Brooke Mendelson, DO      Or    LORazepam  2 mg Intramuscular Q6H PRN Brooke Mendelson, DO      hydrOXYzine HCL  25 mg Oral Q6H PRN Max 4/day Brooke Mendelson, DO      hydrOXYzine HCL  25 mg Oral HS Magui Good, DO      ibuprofen  400 mg Oral Q12H PRN OLGA Whittington      melatonin  3 mg Oral HS PRN Brooke Mendelson, DO      melatonin  6 mg Oral HS Meera Bhatia DO      nicotine  1 patch Transdermal Daily Brooke Mendelson, DO      OLANZapine  10 mg Oral Q3H PRN Max 3/day Brooke Mendelson, DO      Or    OLANZapine  10 mg Intramuscular Q3H PRN Max 3/day Brooke Mendelson, DO      OLANZapine  5 mg Oral Q3H PRN Max 6/day Brooke Mendelson, DO      Or    OLANZapine  5 mg Intramuscular Q3H PRN Max 6/day Jael  Mendelson, DO      OLANZapine  2.5 mg Oral Q3H PRN Max 8/day Brooke Mendelson, DO      ondansetron  4 mg Oral Q8H PRN Tootie Leblanc PA-C      polyethylene glycol  17 g Oral Daily Magui Good,       propranolol  10 mg Oral Q8H PRN Brooke Mendelson, DO      senna-docusate sodium  1 tablet Oral Daily PRN Brooke Mendelson, DO         Behavioral Health Medications: all current active meds have been reviewed. Changes as in plan section above.    Laboratory results:  I have personally reviewed all pertinent laboratory/tests results.   No results found for this or any previous visit (from the past 48 hours).     Counseling / Coordination of Care:  Total floor / unit time spent today 20 minutes. Greater than 50% of total time was spent with the patient and / or family counseling and / or coordination of care. A description of counseling / coordination of care:  Patient's progress discussed with staff in treatment team meeting.      Magui Good DO 03/26/25  Psychiatry Residency, PGY-1  This note has been constructed using a voice recognition system. There may be translation, syntax, or grammatical errors. If you have any questions, please contact the dictating author.

## 2025-03-26 NOTE — ASSESSMENT & PLAN NOTE
Juan Diego Horowitz is a 49 y.o. [place] male from [facility],  single, currently homeless, unemployed, receives social security, w/ PMH of recent hernia surgery and kidney cancer s/p nephrectomy and PPH of depression, bipolar disorder, ADHD, 1 prior psychiatric admissions, 5 prior SA, h/o self-injurious behavior with hitting head, currently 201 voluntary commitment who presented to  ED for suicidal ideations, auditory hallucinations, and recent suicide attempt by overdose.     Plan:   Continue Lexapro 20 mg daily for depressive symptoms.  Most recent Qtc was 446 on 3/18/2025  Can recheck QTc again if patient begins to have cardiac like symptoms that may raise concern for prolonged QT  Discontinued Remeron after taper off from 30 mg nightly to 15 mg (due to previous suicide attempt overdosing on it)  Continue Abilify 5 mg daily started on 3/20  Consider transition to BAXTER Aristada once tolerated on PO, patient agrees with plan   Continue melatonin to 6 mg nightly for insomnia   Continue Atarax 25 mg hs for insomnia  Patient currently on Gabapentin 300 mg tid, increased evening dose to 600 mg.   Patient asked for Seroquel or Remeron stating that medications like trazodone do not work for him.  In the setting of recent discontinuation of Remeron by primary psychiatry team, Remeron was not restarted or made a PRN .

## 2025-03-26 NOTE — NURSING NOTE
Pt came up to nurses station requesting medication for anxiety. States the anxiety occurred after speaking with doctor but did not elaborate further. HAM score 16. Pt given PRN atarax 25mg po. Will monitor for effectiveness.

## 2025-03-26 NOTE — PROGRESS NOTES
03/26/25 2618   Team Meeting   Meeting Type Daily Rounds   Team Members Present   Team Members Present Physician;Nurse;   Physician Team Member Jennifer   [facility] [place]   Patient/Family Present   Patient Present No   Patient's Family Present No     Pt med/meal compliant. Visible on the unit. Pleasant, calm, cooperative.

## 2025-03-26 NOTE — NURSING NOTE
Pt calm and cooperative. Visible in the milieu walking the halls or watching tv. Denies SI/HI/AH/VH. Scant and flat but pleasant in conversation. Medication and meal compliant. Makes needs known.

## 2025-03-26 NOTE — NURSING NOTE
Patient was visible for breakfast and was medication compliant.  Patient did not report SI/HI or A/V hallucinations.  He is withdrawn to self and appears depressed. He is calm and cooperative.

## 2025-03-26 NOTE — NURSING NOTE
Pt no longer c/o anxiety. Calmly sitting watching tv in milieu. PRN atarax 25mg po effective at this time.

## 2025-03-26 NOTE — NURSING NOTE
Pt visible on unit at times but mainly isolative to room. Denies SI/HI/AVH, continues depressed. Denies pain and anxiety. Compliant with unit routines, care and meds. Safety checks ongoing.

## 2025-03-26 NOTE — ASSESSMENT & PLAN NOTE
Continue Suboxone to 8 mg 3 times daily   Patient is complaining of abdominal pain with Suboxone. Would like to switch back to Subutex. Reached out to toxicology for further management.   Discontinue clonidine as patient was placed on this in the past for withdrawal symptoms  Toxicology was consulted and provided recommendations  Substance cessation education and counseling

## 2025-03-26 NOTE — CMS CERTIFICATION NOTE
Certification: Based upon physical, mental and social evaluations, I certify that inpatient psychiatric services are medically necessary for this patient for a duration of 10 midnights for the treatment of unspecified mood disorder.  Available alternative community resources do not meet the patient's mental health care needs.  I further attest that an established written individualized plan of care has been implemented and is outlined in the patient's medical records.

## 2025-03-27 PROCEDURE — 99232 SBSQ HOSP IP/OBS MODERATE 35: CPT | Performed by: PSYCHIATRY & NEUROLOGY

## 2025-03-27 RX ADMIN — BUPRENORPHINE HCL 8 MG: 8 TABLET SUBLINGUAL at 08:18

## 2025-03-27 RX ADMIN — GABAPENTIN 300 MG: 300 CAPSULE ORAL at 08:17

## 2025-03-27 RX ADMIN — GABAPENTIN 600 MG: 300 CAPSULE ORAL at 21:13

## 2025-03-27 RX ADMIN — SENNOSIDES AND DOCUSATE SODIUM 1 TABLET: 50; 8.6 TABLET ORAL at 21:16

## 2025-03-27 RX ADMIN — BUPRENORPHINE HCL 8 MG: 8 TABLET SUBLINGUAL at 21:13

## 2025-03-27 RX ADMIN — CYANOCOBALAMIN TAB 1000 MCG 1000 MCG: 1000 TAB at 08:18

## 2025-03-27 RX ADMIN — GABAPENTIN 300 MG: 300 CAPSULE ORAL at 16:11

## 2025-03-27 RX ADMIN — HYDROXYZINE HYDROCHLORIDE 25 MG: 25 TABLET ORAL at 21:13

## 2025-03-27 RX ADMIN — Medication 2000 UNITS: at 08:18

## 2025-03-27 RX ADMIN — ARIPIPRAZOLE 5 MG: 5 TABLET ORAL at 08:18

## 2025-03-27 RX ADMIN — HYDROXYZINE HYDROCHLORIDE 100 MG: 50 TABLET, FILM COATED ORAL at 12:32

## 2025-03-27 RX ADMIN — BUPRENORPHINE HCL 8 MG: 8 TABLET SUBLINGUAL at 16:11

## 2025-03-27 RX ADMIN — ESCITALOPRAM OXALATE 20 MG: 10 TABLET ORAL at 08:18

## 2025-03-27 RX ADMIN — ATORVASTATIN CALCIUM 10 MG: 10 TABLET, FILM COATED ORAL at 17:13

## 2025-03-27 RX ADMIN — Medication 6 MG: at 21:13

## 2025-03-27 NOTE — ASSESSMENT & PLAN NOTE
Continue Suboxone to 8 mg 3 times daily   Patient is complaining of abdominal pain with Suboxone. Would like to switch back to Subutex. Reached out to toxicology for further management.   Discontinue clonidine as patient was placed on this in the past for withdrawal symptoms  Toxicology was consulted and provided recommendations  Substance cessation education and counseling   Patient spoke with his PO and she informed him that he may be in violation of his parole. She explained to patient that he must comply with any outpatient recommendations by psych treatment team. I explained to patient that we recommend he go to inpatient rehab on discharge and he is agreeable. Plan to do bed search in next few days with further stabilization.

## 2025-03-27 NOTE — PLAN OF CARE
Problem: Potential for Falls  Goal: Patient will remain free of falls  Description: INTERVENTIONS:  - Keep care items and personal belongings within reach  - Initiate and maintain comfort rounds  - Make Fall Risk Sign visible to staff  - Apply yellow socks and bracelet for high fall risk patients  - Consider moving patient to room near nurses station  Outcome: Progressing     Problem: SELF HARM/SUICIDALITY  Goal: Will have no self-injury during hospital stay  Description: INTERVENTIONS:  - Q 15 MINUTES: Routine safety checks  - Q WAKING SHIFT & PRN: Assess risk to determine if routine checks are adequate to maintain patient safety  - Encourage patient to participate actively in care by formulating a plan to combat response to suicidal ideation, identify supports and resources  Outcome: Progressing     Problem: DEPRESSION  Goal: Will be euthymic at discharge  Description: INTERVENTIONS:  - Administer medication as ordered  - Provide emotional support via 1:1 interaction with staff  - Encourage involvement in milieu/groups/activities  - Monitor for social isolation  Outcome: Progressing     Problem: ANXIETY  Goal: Will report anxiety at manageable levels  Description: INTERVENTIONS:  - Administer medication as ordered  - Teach and encourage coping skills  - Provide emotional support  - Assess patient/family for anxiety and ability to cope  Outcome: Progressing  Goal: By discharge: Patient will verbalize 2 strategies to deal with anxiety  Description: Interventions:  - Identify any obvious source/trigger to anxiety  - Staff will assist patient in applying identified coping technique/skills  - Encourage attendance of scheduled groups and activities  Outcome: Progressing     Problem: SUBSTANCE USE/ABUSE  Goal: Will have no detox symptoms and will verbalize plan for changing substance-related behavior  Description: INTERVENTIONS:  - Monitor physical status and assess for symptoms of withdrawal  - Administer medication  as ordered  - Provide emotional support with 1 on 1 interaction with staff  - Encourage recovery focused program/ addiction education  - Assess for verbalization of changing behaviors related to substance abuse  - Initiate consults and referrals as appropriate (Case Management, Spiritual Care, etc.)  Outcome: Progressing  Goal: By discharge, will develop insight into their chemical dependency and sustain motivation to continue in recovery  Description: INTERVENTIONS:  - Attends all daily group sessions and scheduled AA groups  - Actively practices coping skills through participation in the therapeutic community and adherence to program rules  - Reviews and completes assignments from individual treatment plan  - Assist patient development of understanding of their personal cycle of addiction and relapse triggers  Outcome: Progressing  Goal: By discharge, patient will have ongoing treatment plan addressing chemical dependency  Description: INTERVENTIONS:  - Assist patient with resources and/or appointments for ongoing recovery based living  Outcome: Progressing     Problem: DISCHARGE PLANNING  Goal: Discharge to home or other facility with appropriate resources  Description: INTERVENTIONS:  - Identify barriers to discharge w/patient and caregiver  - Arrange for needed discharge resources and transportation as appropriate  - Identify discharge learning needs (meds, wound care, etc.)  - Arrange for interpretive services to assist at discharge as needed  - Refer to Case Management Department for coordinating discharge planning if the patient needs post-hospital services based on physician/advanced practitioner order or complex needs related to functional status, cognitive ability, or social support system  Outcome: Progressing

## 2025-03-27 NOTE — NURSING NOTE
Pt approached nurses station and asked for PRN for severe anxiety. Pt appears anxious/tense. 1232 PRN Hydroxyzine 100 mg PO given. 1332 pt appears more relaxed. No further c/o anxiety at this time.

## 2025-03-27 NOTE — NURSING NOTE
Pt denies SI/HI/AH/VH. Present in dayroom and milieu but is mostly isolative to his room. Minimally social with select peers. Medication( refused Miralax)-last reported bowel movement 3/23. Pt denied need for Prn at this time and states he would take one later. Meal compliant. Scant/guarded with communication. Pt later had c/o severe anxiety and asked for PRN ( see note). Compliant with Lunch. No further concerns as of present. Plan of care ongoing.

## 2025-03-27 NOTE — ASSESSMENT & PLAN NOTE
Juan Diego Horowitz is a 49 y.o. [place] male from [facility],  single, currently homeless, unemployed, receives social security, w/ PMH of recent hernia surgery and kidney cancer s/p nephrectomy and PPH of depression, bipolar disorder, ADHD, 1 prior psychiatric admissions, 5 prior SA, h/o self-injurious behavior with hitting head, currently 201 voluntary commitment who presented to  ED for suicidal ideations, auditory hallucinations, and recent suicide attempt by overdose.     Plan:   Continue Lexapro 20 mg daily for depressive symptoms.  Most recent Qtc was 446 on 3/18/2025  Can recheck QTc again if patient begins to have cardiac like symptoms that may raise concern for prolonged QT  Will check ecg on 3/28  Discontinued Remeron after taper off from 30 mg nightly to 15 mg (due to previous suicide attempt overdosing on it)  Continue Abilify 5 mg daily started on 3/20  Consider transition to BAXTER Aristada once tolerated on PO, patient agrees with plan   Continue melatonin to 6 mg nightly for insomnia   Continue Atarax 25 mg hs for insomnia  Patient currently on Gabapentin 300 mg tid, increased evening dose to 600 mg.   Patient asked for Seroquel or Remeron stating that medications like trazodone do not work for him.  In the setting of recent discontinuation of Remeron by primary psychiatry team, Remeron was not restarted or made a PRN .    Pre-Hospital Care Report (PCR)

## 2025-03-27 NOTE — PROGRESS NOTES
Progress Note - Behavioral Health   Name: Juan Diego Horowitz 49 y.o. male I MRN: [id number]  Unit/Bed#: -01 I Date of Admission: 3/18/2025   Date of Service: 3/27/2025 I Hospital Day: 8    Assessment & Plan  Unspecified mood (affective) disorder, MDD with or without psychotic features vs Bipolar disorder  Juan Diego Horowitz is a 49 y.o. [place] male from [facility],  single, currently homeless, unemployed, receives social security, w/ PMH of recent hernia surgery and kidney cancer s/p nephrectomy and PPH of depression, bipolar disorder, ADHD, 1 prior psychiatric admissions, 5 prior SA, h/o self-injurious behavior with hitting head, currently 201 voluntary commitment who presented to  ED for suicidal ideations, auditory hallucinations, and recent suicide attempt by overdose.     Plan:   Continue Lexapro 20 mg daily for depressive symptoms.  Most recent Qtc was 446 on 3/18/2025  Can recheck QTc again if patient begins to have cardiac like symptoms that may raise concern for prolonged QT  Will check ecg on 3/28  Discontinued Remeron after taper off from 30 mg nightly to 15 mg (due to previous suicide attempt overdosing on it)  Continue Abilify 5 mg daily started on 3/20  Consider transition to BAXTER Aristada once tolerated on PO, patient agrees with plan   Continue melatonin to 6 mg nightly for insomnia   Continue Atarax 25 mg hs for insomnia  Patient currently on Gabapentin 300 mg tid, increased evening dose to 600 mg.   Patient asked for Seroquel or Remeron stating that medications like trazodone do not work for him.  In the setting of recent discontinuation of Remeron by primary psychiatry team, Remeron was not restarted or made a PRN .   Opioid use disorder, severe, dependence (HCC)  Continue Suboxone to 8 mg 3 times daily   Patient is complaining of abdominal pain with Suboxone. Would like to switch back to Subutex. Reached out to toxicology for further management.   Discontinue clonidine as  patient was placed on this in the past for withdrawal symptoms  Toxicology was consulted and provided recommendations  Substance cessation education and counseling   Patient spoke with his PO and she informed him that he may be in violation of his parole. She explained to patient that he must comply with any outpatient recommendations by psych treatment team. I explained to patient that we recommend he go to inpatient rehab on discharge and he is agreeable. Plan to do bed search in next few days with further stabilization.   Cocaine use  Per toxicology recommend contingency management or CBT   Substance cessation education and counseling   Right inguinal hernia  Recent inguinal and umbilical hernia surgery on 3/11/2025  Continue to monitor pain  Plan per medical   Tobacco use disorder  Nicotine patch daily   Medical clearance for psychiatric admission  Per medical   Hyperlipidemia  Per medical   Appetite loss  Patient complains of ongoing decreased appetite, unable to eat his meals. Placed a nutrition consult.  Patient also has infrequent bowel movements so ordered daily Miralax.     Current medications:  Current Facility-Administered Medications   Medication Dose Route Frequency Provider Last Rate    acetaminophen  650 mg Oral Q6H PRN Jael Mendelson, DO      acetaminophen  650 mg Oral Q4H PRN Jael Mendelson, DO      acetaminophen  975 mg Oral Q6H PRN Jael Mendelson, DO      aluminum-magnesium hydroxide-simethicone  30 mL Oral Q4H PRN Jael Mendelson, DO      ARIPiprazole  5 mg Oral Daily Jaele MendelMendelson, DO      atorvastatin  10 mg Oral Daily With Dinner OLGA Whittingtno      benztropine  1 mg Intramuscular Q4H PRN Max 6/day Brooke Mendelson, DO      benztropine  1 mg Oral Q4H PRN Max 6/day Brooke Mendelson, DO      buprenorphine  8 mg Sublingual TID Magui Good, DO      Cholecalciferol  2,000 Units Oral Daily OLGA Whittington      cyanocobalamin  1,000 mcg Oral Daily Akanksha Posadas  OLGA Grant      hydrOXYzine HCL  50 mg Oral Q6H PRN Max 4/day Jael Mendelson, DO      Or    diphenhydrAMINE  50 mg Intramuscular Q6H PRN Jael Mendelson, DO      escitalopram  20 mg Oral Daily Gazal Jalinda, DO      gabapentin  300 mg Oral BID Jose Rodriguez MD      gabapentin  600 mg Oral HS Jose Rodriguez MD      hydrOXYzine HCL  100 mg Oral Q6H PRN Max 4/day Jael Mendelson, DO      Or    LORazepam  2 mg Intramuscular Q6H PRN Jael Mendelson, DO      hydrOXYzine HCL  25 mg Oral Q6H PRN Max 4/day Jael Mendelson, DO      hydrOXYzine HCL  25 mg Oral HS Gazal Jalinda, DO      ibuprofen  400 mg Oral Q12H PRN OLGA Whittington      melatonin  3 mg Oral HS PRN Jael Mendelson, DO      melatonin  6 mg Oral HS Meera Bhatia, DO      nicotine  1 patch Transdermal Daily Jael Mendelson, DO      OLANZapine  10 mg Oral Q3H PRN Max 3/day Jael Mendelson, DO      Or    OLANZapine  10 mg Intramuscular Q3H PRN Max 3/day Jael Mendelson, DO      OLANZapine  5 mg Oral Q3H PRN Max 6/day Jael Mendelson, DO      Or    OLANZapine  5 mg Intramuscular Q3H PRN Max 6/day Jael Mendelson, DO      OLANZapine  2.5 mg Oral Q3H PRN Max 8/day Jael Mendelson, DO      ondansetron  4 mg Oral Q8H PRN Tootie Leblanc PA-C      polyethylene glycol  17 g Oral Daily Gazal Jalinda, DO      propranolol  10 mg Oral Q8H PRN Jael Mendelson, DO      senna-docusate sodium  1 tablet Oral Daily PRN Jael Mendelson, DO          Risks/Benefits of Treatment:     Risks, benefits, and possible side effects of medications explained to patient and patient verbalizes understanding and agreement for treatment.    Progress Toward Goals: improving    Treatment Planning:     All current active medications have been reviewed.  Continue to monitor response to treatment and assess for potential side effects of medications.  Encourage group therapy, milieu therapy and occupational therapy  Collaboration with medical service for medical  comorbidities as indicated.  Behavioral Health checks for safety monitoring.  Long Stay Certification : Not Applicable  Estimated Discharge Day: 3/31/2025 5 days (4/1/2025)  Legal Status : Voluntary 201 commitment.    Subjective     Behavior over the last 24 hours: improved.    Juan Diego continues to improve and denies any suicidal ideation for the past 2 days.  He reports his mood is good but was anxious earlier as he was talking to his .  Patient explains that during the conversation his  insinuated that he was going into the hospital with frequent for mental health treatment because of violating his parole through drugs.  Patient insist that was not the case and he was requiring mental health treatment due to worsening depression.  I discussed with patient that per officer he will have to comply with any recommendations that we recommend in the outpatient setting.  I explained to him that I would like him to continue treatment in inpatient rehab on discharge and patient is agreeable.  Patient would prefer to go and live with his girlfriend but is agreeable to comply with our recommendations.      Patient was feeling anxious yesterday afternoon and was given Atarax 25 mg, effective.   He has been interactive with milieu and attending groups despite language barrier.    Sleep: normal  Appetite: normal  Medication side effects: No  ROS: review of systems as noted above in HPI/Subjective report, all other systems are negative    Objective :  Temp:  [97.6 °F (36.4 °C)-98.9 °F (37.2 °C)] 98.9 °F (37.2 °C)  HR:  [59-65] 59  BP: (103-106)/(64-67) 103/64  Resp:  [16] 16  SpO2:  [94 %-95 %] 95 %  O2 Device: None (Room air)    Temp:  [97.6 °F (36.4 °C)-98.9 °F (37.2 °C)] 98.9 °F (37.2 °C)  HR:  [59-65] 59  BP: (103-106)/(64-67) 103/64  Resp:  [16] 16  SpO2:  [94 %-95 %] 95 %  O2 Device: None (Room air)    Mental Status Evaluation:    Appearance:  Overtly appearing  male, glasses, age  appropriate, casually dressed, dressed appropriately   Behavior:  normal, pleasant, cooperative, calm   Speech:  normal rate, normal volume   Mood:  normal, euthymic   Affect:  normal range and intensity   Thought Process:  organized, goal directed, logical   Thought Content:  no overt delusions   Perceptual Disturbances: denies auditory hallucinations when asked   Risk Potential: Suicidal Ideation -   denies at present  Homicidal Ideation -   denies at present  Potential for Aggression - Not at present   Sensorium:  oriented to person, place, and time/date   Memory:  recent and remote memory grossly intact   Consciousness:  alert and awake   Attention/Concentration: attention span and concentration are age appropriate   Insight:  improved   Judgment: improved   Gait/Station: normal gait/station, normal balance   Motor Activity: no abnormal movements       Lab Results: I have reviewed the following results:  Most Recent Labs:   Lab Results   Component Value Date    WBC 11.90 (H) 03/20/2025    RBC 4.91 03/20/2025    HGB 13.4 03/20/2025    HCT 42.1 03/20/2025     (H) 03/20/2025    RDW 13.8 03/20/2025    NEUTROABS 7.80 (H) 03/20/2025    TOTANEUTABS 5.79 11/22/2016    SODIUM 140 03/20/2025    K 3.9 03/20/2025     03/20/2025    CO2 26 03/20/2025    BUN 15 03/20/2025    CREATININE 1.08 03/20/2025    GLUC 102 03/20/2025    CALCIUM 9.6 03/20/2025    AST 10 (L) 03/20/2025    ALT 7 03/20/2025    ALKPHOS 68 03/20/2025    TP 7.0 03/20/2025    ALB 3.9 03/20/2025    TBILI 0.22 03/20/2025    CHOLESTEROL 250 (H) 03/20/2025    HDL 41 03/20/2025    TRIG 123 03/20/2025    LDLCALC 184 (H) 03/20/2025    NONHDLC 209 03/20/2025    [id number] 0.259 (L) 03/20/2025    FREET4 1.07 03/20/2025    SYPHILISAB Non-reactive 01/22/2025    HGBA1C 5.9 (H) 01/01/2024     01/01/2024       Administrative Statements     Counseling / Coordination of Care:   Patient's progress discussed with staff in treatment team meeting.  Medication  changes reviewed with staff in treatment team meeting..    Magui Good DO 03/27/25

## 2025-03-28 PROCEDURE — 99233 SBSQ HOSP IP/OBS HIGH 50: CPT | Performed by: PSYCHIATRY & NEUROLOGY

## 2025-03-28 RX ORDER — HYDROXYZINE HYDROCHLORIDE 50 MG/1
50 TABLET, FILM COATED ORAL
Status: DISCONTINUED | OUTPATIENT
Start: 2025-03-28 | End: 2025-04-02

## 2025-03-28 RX ADMIN — ACETAMINOPHEN 650 MG: 325 TABLET, FILM COATED ORAL at 08:39

## 2025-03-28 RX ADMIN — GABAPENTIN 300 MG: 300 CAPSULE ORAL at 16:28

## 2025-03-28 RX ADMIN — BUPRENORPHINE HCL 8 MG: 8 TABLET SUBLINGUAL at 16:28

## 2025-03-28 RX ADMIN — ATORVASTATIN CALCIUM 10 MG: 10 TABLET, FILM COATED ORAL at 16:28

## 2025-03-28 RX ADMIN — Medication 2000 UNITS: at 08:27

## 2025-03-28 RX ADMIN — BUPRENORPHINE HCL 8 MG: 8 TABLET SUBLINGUAL at 08:27

## 2025-03-28 RX ADMIN — POLYETHYLENE GLYCOL 3350 17 G: 17 POWDER, FOR SOLUTION ORAL at 08:26

## 2025-03-28 RX ADMIN — ESCITALOPRAM OXALATE 20 MG: 10 TABLET ORAL at 08:27

## 2025-03-28 RX ADMIN — BUPRENORPHINE HCL 8 MG: 8 TABLET SUBLINGUAL at 21:02

## 2025-03-28 RX ADMIN — GABAPENTIN 600 MG: 300 CAPSULE ORAL at 21:02

## 2025-03-28 RX ADMIN — CYANOCOBALAMIN TAB 1000 MCG 1000 MCG: 1000 TAB at 08:27

## 2025-03-28 RX ADMIN — Medication 6 MG: at 21:02

## 2025-03-28 RX ADMIN — GABAPENTIN 300 MG: 300 CAPSULE ORAL at 08:27

## 2025-03-28 RX ADMIN — HYDROXYZINE HYDROCHLORIDE 50 MG: 50 TABLET, FILM COATED ORAL at 21:02

## 2025-03-28 RX ADMIN — ARIPIPRAZOLE 5 MG: 5 TABLET ORAL at 08:27

## 2025-03-28 NOTE — PROGRESS NOTES
03/28/25 0912   Team Meeting   Meeting Type Daily Rounds   Team Members Present   Team Members Present Physician;Nurse;   Physician Team Member Jennifer   Nursing Team Member Theresakamran   Care Management Team Member Virginia   Patient/Family Present   Patient Present No   Patient's Family Present No     Pt med/meal compliant. Visible intermittently. Phone call with PO yesterday, pt requires IP rehab. Bed search to begin Monday.
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
## 2025-03-28 NOTE — ASSESSMENT & PLAN NOTE
Juan Diego Horowitz is a 49 y.o. Stateless male from [facility],  single, currently homeless, unemployed, receives social security, w/ PMH of recent hernia surgery and kidney cancer s/p nephrectomy and PPH of depression, bipolar disorder, ADHD, 1 prior psychiatric admissions, 5 prior SA, h/o self-injurious behavior with hitting head, currently 201 voluntary commitment who presented to  ED for suicidal ideations, auditory hallucinations, and recent suicide attempt by overdose.     Plan:   Continue Lexapro 20 mg daily for depressive symptoms.  Most recent Qtc was 446 on 3/18/2025  Can recheck QTc again if patient begins to have cardiac like symptoms that may raise concern for prolonged QT  Will check ecg on 3/28  Discontinued Remeron after taper off from 30 mg nightly to 15 mg (due to previous suicide attempt overdosing on it)  Continue Abilify 5 mg daily started on 3/20  Consider transition to BAXTER Aristada once tolerated on PO, patient agrees with plan   Continue melatonin to 6 mg nightly for insomnia   Increased Atarax 50 mg hs for insomnia on 3/28/25  Patient currently on Gabapentin 300 mg tid, increased evening dose to 600 mg.   Patient asked for Seroquel or Remeron stating that medications like trazodone do not work for him.  In the setting of recent discontinuation of Remeron by primary psychiatry team, Remeron was not restarted or made a PRN .

## 2025-03-28 NOTE — NURSING NOTE
Pt reports not having a BM yet. PRN senokot not effective at this time. Encouraged pt to take scheduled miralax tomorrow morning, pt verbalized understanding.

## 2025-03-28 NOTE — NURSING NOTE
Pt denies SI/HI/AH/VH. Medication ( refused Miralax) and meal compliant. Present in dayroom and milieu. Isolative to self. Pt reported back pain and received Acetaminophen. Compliant with Lunch. No further concerns as of present. Plan of care ongoing.

## 2025-03-28 NOTE — NURSING NOTE
Pt calm and cooperative. Visible in the milieu watching tv, seclusive to self. Reporting mild anxiety but does not elaborate. Denies SI/HI/AH/VH. Medication and meal compliant. Requested PRN senokot 8.6-50mg po for constipation. Will monitor for effectiveness.

## 2025-03-28 NOTE — NURSING NOTE
Pt visible on unit in milieu and dayroom. He is observed interacting with select peers due to language barriers. Medication and meal compliant. He denies SI/HI/AH/VH. No PRN's requested or required. No unmet needs.

## 2025-03-28 NOTE — SOCIAL WORK
Email sent to pt's , Glenis Cavazos ([email or web address]), to advise of update regarding tx team recommendations for pt's care. SW advised tx team is recommending pt complete inpatient drug and alcohol treatment after completion of IP MH treatment. SUJEY advised bed search likely to begin Monday.

## 2025-03-28 NOTE — PROGRESS NOTES
03/28/25 1130   Activity/Group Checklist   Group Admission/Discharge  (relapse prevention plan)   Attendance Attended   Attendance Duration (min) 0-15   Interactions Interacted appropriately   Affect/Mood Appropriate   Goals Achieved Identified feelings;Identified triggers;Identified relapse prevention strategies;Identified medication adherence strategies;Discussed safety plan;Discussed coping strategies;Discussed discharge plans;Identified resources and support systems;Able to listen to others;Able to reflect/comment on own behavior;Able to self-disclose;Able to recieve feedback     Relapse prevention plan completed with pt. Pt pleasant and cooperative. Pt provided Lithuanian version and completed appropriately. Pt looking forward to discharge when appropriate.

## 2025-03-29 PROCEDURE — 99232 SBSQ HOSP IP/OBS MODERATE 35: CPT | Performed by: STUDENT IN AN ORGANIZED HEALTH CARE EDUCATION/TRAINING PROGRAM

## 2025-03-29 PROCEDURE — 93005 ELECTROCARDIOGRAM TRACING: CPT

## 2025-03-29 RX ADMIN — GABAPENTIN 300 MG: 300 CAPSULE ORAL at 16:10

## 2025-03-29 RX ADMIN — HYDROXYZINE HYDROCHLORIDE 100 MG: 50 TABLET, FILM COATED ORAL at 14:23

## 2025-03-29 RX ADMIN — BUPRENORPHINE HCL 8 MG: 8 TABLET SUBLINGUAL at 21:14

## 2025-03-29 RX ADMIN — HYDROXYZINE HYDROCHLORIDE 50 MG: 50 TABLET, FILM COATED ORAL at 21:14

## 2025-03-29 RX ADMIN — ESCITALOPRAM OXALATE 20 MG: 10 TABLET ORAL at 08:49

## 2025-03-29 RX ADMIN — GABAPENTIN 300 MG: 300 CAPSULE ORAL at 08:49

## 2025-03-29 RX ADMIN — BUPRENORPHINE HCL 8 MG: 8 TABLET SUBLINGUAL at 16:11

## 2025-03-29 RX ADMIN — ARIPIPRAZOLE 5 MG: 5 TABLET ORAL at 08:49

## 2025-03-29 RX ADMIN — Medication 6 MG: at 21:15

## 2025-03-29 RX ADMIN — BUPRENORPHINE HCL 8 MG: 8 TABLET SUBLINGUAL at 08:49

## 2025-03-29 RX ADMIN — Medication 2000 UNITS: at 08:49

## 2025-03-29 RX ADMIN — GABAPENTIN 600 MG: 300 CAPSULE ORAL at 21:15

## 2025-03-29 RX ADMIN — CYANOCOBALAMIN TAB 1000 MCG 1000 MCG: 1000 TAB at 08:49

## 2025-03-29 RX ADMIN — ATORVASTATIN CALCIUM 10 MG: 10 TABLET, FILM COATED ORAL at 16:10

## 2025-03-29 NOTE — PROGRESS NOTES
Progress Note - Behavioral Health   Name: Juan Diego Horowitz 49 y.o. male I MRN: [id number]  Unit/Bed#: -01 I Date of Admission: 3/18/2025   Date of Service: 3/29/2025 I Hospital Day: 10    Assessment & Plan  Unspecified mood (affective) disorder, MDD with or without psychotic features vs Bipolar disorder  Juan Diego Horowitz is a 49 y.o. [place] African male from [facility],  single, currently homeless, unemployed, receives social security, w/ PMH of recent hernia surgery and kidney cancer s/p nephrectomy and PPH of depression, bipolar disorder, ADHD, 1 prior psychiatric admissions, 5 prior SA, h/o self-injurious behavior with hitting head, currently 201 voluntary commitment who presented to  ED for suicidal ideations, auditory hallucinations, and recent suicide attempt by overdose.     Plan:   Continue Lexapro 20 mg daily for depressive symptoms.  Most recent Qtc was 446 on 3/18/2025  Can recheck QTc again if patient begins to have cardiac like symptoms that may raise concern for prolonged QT  Will check ecg today 3/29  Discontinued Remeron after taper off from 30 mg nightly to 15 mg (due to previous suicide attempt overdosing on it)  Continue Abilify 5 mg daily started on 3/20  Consider transition to BAXTER Aristada once tolerated on PO, patient agrees with plan   Continue melatonin to 6 mg nightly for insomnia   Increased Atarax 50 mg hs for insomnia on 3/28/25  Patient currently on Gabapentin 300 mg tid, increased evening dose to 600 mg.   Patient asked for Seroquel or Remeron stating that medications like trazodone do not work for him.  In the setting of recent discontinuation of Remeron by primary psychiatry team, Remeron was not restarted or made a PRN .   Opioid use disorder, severe, dependence (HCC)  Continue Suboxone to 8 mg 3 times daily   Patient is complaining of abdominal pain with Suboxone. Would like to switch back to Subutex. Reached out to toxicology for further management.    Discontinue clonidine as patient was placed on this in the past for withdrawal symptoms  Toxicology was consulted and provided recommendations  Substance cessation education and counseling   Patient spoke with his PO and she informed him that he may be in violation of his parole. She explained to patient that he must comply with any outpatient recommendations by psych treatment team. I explained to patient that we recommend he go to inpatient rehab on discharge and he is agreeable. Plan to do bed search in next few days with further stabilization.   Cocaine use  Per toxicology recommend contingency management or CBT   Substance cessation education and counseling   Right inguinal hernia  Recent inguinal and umbilical hernia surgery on 3/11/2025  Continue to monitor pain  Plan per medical   Tobacco use disorder  Nicotine patch daily   Medical clearance for psychiatric admission  Per medical   Hyperlipidemia  Per medical   Appetite loss  Patient complains of ongoing decreased appetite, unable to eat his meals. Placed a nutrition consult.  Patient also has infrequent bowel movements so ordered daily Miralax.     Current medications:  Current Facility-Administered Medications   Medication Dose Route Frequency Provider Last Rate    acetaminophen  650 mg Oral Q6H PRN Jael Mendelson, DO      acetaminophen  650 mg Oral Q4H PRN Brooke Mendelson, DO      acetaminophen  975 mg Oral Q6H PRN Brooke Mendelson, DO      aluminum-magnesium hydroxide-simethicone  30 mL Oral Q4H PRN Brooke Mendelson, DO      ARIPiprazole  5 mg Oral Daily Brooke Mendelson, DO      atorvastatin  10 mg Oral Daily With Dinner OLGA Whittington      benztropine  1 mg Intramuscular Q4H PRN Max 6/day Brooke Mendelson, DO      benztropine  1 mg Oral Q4H PRN Max 6/day Brooke Mendelson, DO      buprenorphine  8 mg Sublingual TID Magui Good, DO      Cholecalciferol  2,000 Units Oral Daily OLGA Whittington      cyanocobalamin  1,000 mcg  Oral Daily OLGA Whittington      hydrOXYzine HCL  50 mg Oral Q6H PRN Max 4/day Jael Mendelson, DO      Or    diphenhydrAMINE  50 mg Intramuscular Q6H PRN Jael Mendelson, DO      escitalopram  20 mg Oral Daily Gazal Jabir, DO      gabapentin  300 mg Oral BID Jose Rodriguez MD      gabapentin  600 mg Oral HS Jose Rodriguez MD      hydrOXYzine HCL  100 mg Oral Q6H PRN Max 4/day Jael Mendelson, DO      Or    LORazepam  2 mg Intramuscular Q6H PRN Jael Mendelson, DO      hydrOXYzine HCL  25 mg Oral Q6H PRN Max 4/day Jael Mendelson, DO      hydrOXYzine HCL  50 mg Oral HS Gazal Jalinda, DO      ibuprofen  400 mg Oral Q12H PRN OLGA Whittington      melatonin  3 mg Oral HS PRN Jael Mendelson, DO      melatonin  6 mg Oral HS Meera Bhatia, DO      nicotine  1 patch Transdermal Daily Jael Mendelson, DO      OLANZapine  10 mg Oral Q3H PRN Max 3/day Jael Mendelson, DO      Or    OLANZapine  10 mg Intramuscular Q3H PRN Max 3/day Jael Mendelson, DO      OLANZapine  5 mg Oral Q3H PRN Max 6/day Jael Mendelson, DO      Or    OLANZapine  5 mg Intramuscular Q3H PRN Max 6/day Jael Mendelson, DO      OLANZapine  2.5 mg Oral Q3H PRN Max 8/day Jael Mendelson, DO      ondansetron  4 mg Oral Q8H PRN Tootie Leblanc PA-C      polyethylene glycol  17 g Oral Daily Gazal Jabir, DO      propranolol  10 mg Oral Q8H PRN Jael Mendelson, DO      senna-docusate sodium  1 tablet Oral Daily PRN Jael Mendelson, DO          Risks/Benefits of Treatment:     Risks, benefits, and possible side effects of medications explained to patient and patient verbalizes understanding and agreement for treatment.    Progress Toward Goals: progressing    Treatment Planning:     All current active medications have been reviewed.  Continue to monitor response to treatment and assess for potential side effects of medications.  Encourage group therapy, milieu therapy and occupational therapy  Collaboration with  medical service for medical comorbidities as indicated.  Behavioral Health checks for safety monitoring.  Estimated Discharge Day: 3/31/2025   Legal Status : Voluntary 201 commitment.    Subjective     Behavior over the last 24 hours: improving.    Per nursing report, patient has been doing fairly well.  He has been calm and cooperative on the unit.  He remains medication compliant.  No acute behavioral episodes overnight.    On approach, patient interviewed with assistance of Macanese-speaking .  He reports that he feels well today.  He reports that his mood is overall good and he feels that he has had benefit from his medications.  He currently is not interested in pursuing rehab services.  He denies any SI, HI, or AVH.  He does not demonstrate any evidence of humberto on assessment.  He is organized, logical, and goal directed.  He denies any questions or concerns at this time.    Sleep: normal  Appetite: normal  Medication side effects: No  ROS: review of systems as noted above in HPI/Subjective report, all other systems are negative    Objective :  Temp:  [97.5 °F (36.4 °C)-98.6 °F (37 °C)] 97.5 °F (36.4 °C)  HR:  [61-69] 69  BP: (102-144)/(53-64) 144/64  Resp:  [16] 16  SpO2:  [95 %-96 %] 96 %  O2 Device: None (Room air)    Temp:  [97.5 °F (36.4 °C)-98.6 °F (37 °C)] 97.5 °F (36.4 °C)  HR:  [61-69] 69  BP: (102-144)/(53-64) 144/64  Resp:  [16] 16  SpO2:  [95 %-96 %] 96 %  O2 Device: None (Room air)    Mental Status Evaluation:    Appearance:  casually dressed, adequate grooming, dressed appropriately   Behavior:  pleasant, cooperative, calm   Speech:  normal rate, normal volume   Mood:  euthymic   Affect:  constricted   Thought Process:  organized, goal directed, logical   Thought Content:  no overt delusions   Perceptual Disturbances: no auditory hallucinations, no visual hallucinations   Risk Potential: Suicidal Ideation -  None  Homicidal Ideation -  None  Potential for Aggression - Not at present    Sensorium:  oriented to person, place, and time/date   Memory:  recent and remote memory grossly intact   Consciousness:  alert and awake   Attention/Concentration: attention span and concentration are age appropriate   Insight:  limited   Judgment: limited   Gait/Station: normal gait/station, normal balance   Motor Activity: no abnormal movements       Lab Results: I have reviewed the following results:  Most Recent Labs:   Lab Results   Component Value Date    WBC 11.90 (H) 03/20/2025    RBC 4.91 03/20/2025    HGB 13.4 03/20/2025    HCT 42.1 03/20/2025     (H) 03/20/2025    RDW 13.8 03/20/2025    NEUTROABS 7.80 (H) 03/20/2025    TOTANEUTABS 5.79 11/22/2016    SODIUM 140 03/20/2025    K 3.9 03/20/2025     03/20/2025    CO2 26 03/20/2025    BUN 15 03/20/2025    CREATININE 1.08 03/20/2025    GLUC 102 03/20/2025    CALCIUM 9.6 03/20/2025    AST 10 (L) 03/20/2025    ALT 7 03/20/2025    ALKPHOS 68 03/20/2025    TP 7.0 03/20/2025    ALB 3.9 03/20/2025    TBILI 0.22 03/20/2025    CHOLESTEROL 250 (H) 03/20/2025    HDL 41 03/20/2025    TRIG 123 03/20/2025    LDLCALC 184 (H) 03/20/2025    NONHDLC 209 03/20/2025    [id number] 0.259 (L) 03/20/2025    FREET4 1.07 03/20/2025    SYPHILISAB Non-reactive 01/22/2025    HGBA1C 5.9 (H) 01/01/2024     01/01/2024       Administrative Statements     Counseling / Coordination of Care:   Patient's progress discussed with staff in treatment team meeting.  Medication changes reviewed with staff in treatment team meeting..    Dereje Su MD 03/29/25

## 2025-03-29 NOTE — NURSING NOTE
Pt requested something fro severe anxiety. PRN atarax 100 mg given @ 1423. Reassessed @ 1523 and pt reports it was helpful. Visible on the unit watching tv.

## 2025-03-29 NOTE — NURSING NOTE
Pt is visible on the unit and social with Bengali speaking staff and peers. Med and meal compliant. Denies SI/HI/AH/VH at this time. Denies any unmet needs or complaints at this time.

## 2025-03-29 NOTE — ASSESSMENT & PLAN NOTE
Juan Diego Horowitz is a 49 y.o. [place] male from [facility],  single, currently homeless, unemployed, receives social security, w/ PMH of recent hernia surgery and kidney cancer s/p nephrectomy and PPH of depression, bipolar disorder, ADHD, 1 prior psychiatric admissions, 5 prior SA, h/o self-injurious behavior with hitting head, currently 201 voluntary commitment who presented to  ED for suicidal ideations, auditory hallucinations, and recent suicide attempt by overdose.     Plan:   Continue Lexapro 20 mg daily for depressive symptoms.  Most recent Qtc was 446 on 3/18/2025  Can recheck QTc again if patient begins to have cardiac like symptoms that may raise concern for prolonged QT  Will check ecg today 3/29  Discontinued Remeron after taper off from 30 mg nightly to 15 mg (due to previous suicide attempt overdosing on it)  Continue Abilify 5 mg daily started on 3/20  Consider transition to BAXTER Aristada once tolerated on PO, patient agrees with plan   Continue melatonin to 6 mg nightly for insomnia   Increased Atarax 50 mg hs for insomnia on 3/28/25  Patient currently on Gabapentin 300 mg tid, increased evening dose to 600 mg.   Patient asked for Seroquel or Remeron stating that medications like trazodone do not work for him.  In the setting of recent discontinuation of Remeron by primary psychiatry team, Remeron was not restarted or made a PRN .

## 2025-03-30 LAB
ATRIAL RATE: 59 BPM
ATRIAL RATE: 61 BPM
P AXIS: 42 DEGREES
P AXIS: 44 DEGREES
PR INTERVAL: 140 MS
PR INTERVAL: 148 MS
QRS AXIS: 13 DEGREES
QRS AXIS: 9 DEGREES
QRSD INTERVAL: 92 MS
QRSD INTERVAL: 94 MS
QT INTERVAL: 434 MS
QT INTERVAL: 434 MS
QTC INTERVAL: 430 MS
QTC INTERVAL: 438 MS
T WAVE AXIS: 44 DEGREES
T WAVE AXIS: 48 DEGREES
VENTRICULAR RATE: 59 BPM
VENTRICULAR RATE: 61 BPM

## 2025-03-30 PROCEDURE — 99232 SBSQ HOSP IP/OBS MODERATE 35: CPT | Performed by: STUDENT IN AN ORGANIZED HEALTH CARE EDUCATION/TRAINING PROGRAM

## 2025-03-30 PROCEDURE — 93010 ELECTROCARDIOGRAM REPORT: CPT | Performed by: INTERNAL MEDICINE

## 2025-03-30 RX ADMIN — HYDROXYZINE HYDROCHLORIDE 50 MG: 50 TABLET, FILM COATED ORAL at 21:16

## 2025-03-30 RX ADMIN — GABAPENTIN 600 MG: 300 CAPSULE ORAL at 21:22

## 2025-03-30 RX ADMIN — Medication 2000 UNITS: at 08:43

## 2025-03-30 RX ADMIN — BUPRENORPHINE HCL 8 MG: 8 TABLET SUBLINGUAL at 08:43

## 2025-03-30 RX ADMIN — BUPRENORPHINE HCL 8 MG: 8 TABLET SUBLINGUAL at 15:32

## 2025-03-30 RX ADMIN — GABAPENTIN 300 MG: 300 CAPSULE ORAL at 08:43

## 2025-03-30 RX ADMIN — ATORVASTATIN CALCIUM 10 MG: 10 TABLET, FILM COATED ORAL at 15:32

## 2025-03-30 RX ADMIN — ARIPIPRAZOLE 5 MG: 5 TABLET ORAL at 08:43

## 2025-03-30 RX ADMIN — POLYETHYLENE GLYCOL 3350 17 G: 17 POWDER, FOR SOLUTION ORAL at 08:43

## 2025-03-30 RX ADMIN — ESCITALOPRAM OXALATE 20 MG: 10 TABLET ORAL at 08:43

## 2025-03-30 RX ADMIN — GABAPENTIN 300 MG: 300 CAPSULE ORAL at 15:32

## 2025-03-30 RX ADMIN — BUPRENORPHINE HCL 8 MG: 8 TABLET SUBLINGUAL at 20:41

## 2025-03-30 RX ADMIN — CYANOCOBALAMIN TAB 1000 MCG 1000 MCG: 1000 TAB at 08:43

## 2025-03-30 RX ADMIN — Medication 6 MG: at 21:16

## 2025-03-30 RX ADMIN — HYDROXYZINE HYDROCHLORIDE 100 MG: 50 TABLET, FILM COATED ORAL at 13:27

## 2025-03-30 NOTE — NURSING NOTE
Patient has been in milieu all evening, primarily in the patient dayroom. He was cooperative with scheduled HS medication and he has not required any prn medication this evening. He denies S.I.H.I.A/HV/H

## 2025-03-30 NOTE — PROGRESS NOTES
Progress Note - Behavioral Health   Name: Juan Diego Horowitz 49 y.o. male I MRN: [id number]  Unit/Bed#: -01 I Date of Admission: 3/18/2025   Date of Service: 3/30/2025 I Hospital Day: 11    Assessment & Plan  Unspecified mood (affective) disorder, MDD with or without psychotic features vs Bipolar disorder  Juan Diego Horowitz is a 49 y.o. Vietnamese male from [facility],  single, currently homeless, unemployed, receives social security, w/ PMH of recent hernia surgery and kidney cancer s/p nephrectomy and PPH of depression, bipolar disorder, ADHD, 1 prior psychiatric admissions, 5 prior SA, h/o self-injurious behavior with hitting head, currently 201 voluntary commitment who presented to  ED for suicidal ideations, auditory hallucinations, and recent suicide attempt by overdose.     Plan:   Continue Lexapro 20 mg daily for depressive symptoms.  Most recent Qtc was 446 on 3/18/2025  Can recheck QTc again if patient begins to have cardiac like symptoms that may raise concern for prolonged QT  Repeat EKG on 3/29 showed QTc 438  Discontinued Remeron after taper off from 30 mg nightly to 15 mg (due to previous suicide attempt overdosing on it)  Continue Abilify 5 mg daily started on 3/20  Consider transition to BAXTER Aristada once tolerated on PO, patient agrees with plan   Continue melatonin to 6 mg nightly for insomnia   Increased Atarax 50 mg hs for insomnia on 3/28/25  Patient currently on Gabapentin 300 mg tid, increased evening dose to 600 mg.   Patient asked for Seroquel or Remeron stating that medications like trazodone do not work for him.  In the setting of recent discontinuation of Remeron by primary psychiatry team, Remeron was not restarted or made a PRN .   Opioid use disorder, severe, dependence (HCC)  Continue Suboxone to 8 mg 3 times daily   Patient is complaining of abdominal pain with Suboxone. Would like to switch back to Subutex. Reached out to toxicology for further management.    Discontinue clonidine as patient was placed on this in the past for withdrawal symptoms  Toxicology was consulted and provided recommendations  Substance cessation education and counseling   Patient spoke with his PO and she informed him that he may be in violation of his parole. She explained to patient that he must comply with any outpatient recommendations by psych treatment team. Patient is currently having some hesitation about inpatient rehab. Will continue to assess for agreement with plan for rehab disposition.  Cocaine use  Per toxicology recommend contingency management or CBT   Substance cessation education and counseling   Right inguinal hernia  Recent inguinal and umbilical hernia surgery on 3/11/2025  Continue to monitor pain  Plan per medical   Tobacco use disorder  Nicotine patch daily   Medical clearance for psychiatric admission  Per medical   Hyperlipidemia  Per medical   Appetite loss  Patient complains of ongoing decreased appetite, unable to eat his meals. Placed a nutrition consult.  Patient also has infrequent bowel movements so ordered daily Miralax.     Current medications:  Current Facility-Administered Medications   Medication Dose Route Frequency Provider Last Rate    acetaminophen  650 mg Oral Q6H PRN Jael Mendelson, DO      acetaminophen  650 mg Oral Q4H PRN Jael Mendelson, DO      acetaminophen  975 mg Oral Q6H PRN Jael Mendelson, DO      aluminum-magnesium hydroxide-simethicone  30 mL Oral Q4H PRN Jael Mendelson, DO      ARIPiprazole  5 mg Oral Daily Jaele MendelMendelson, DO      atorvastatin  10 mg Oral Daily With Dinner OLGA Whittington      benztropine  1 mg Intramuscular Q4H PRN Max 6/day Brooke Mendelson, DO      benztropine  1 mg Oral Q4H PRN Max 6/day Brooke Mendelson, DO      buprenorphine  8 mg Sublingual TID Magui Good, DO      Cholecalciferol  2,000 Units Oral Daily OLGA Whittington      cyanocobalamin  1,000 mcg Oral Daily Akanksha Posadas  OLGA Grant      hydrOXYzine HCL  50 mg Oral Q6H PRN Max 4/day Jael Mendelson, DO      Or    diphenhydrAMINE  50 mg Intramuscular Q6H PRN Jael Mendelson, DO      escitalopram  20 mg Oral Daily Gazal Jalinda, DO      gabapentin  300 mg Oral BID Jose Rodriguez MD      gabapentin  600 mg Oral HS Jose Rodriguez MD      hydrOXYzine HCL  100 mg Oral Q6H PRN Max 4/day Jael Mendelson, DO      Or    LORazepam  2 mg Intramuscular Q6H PRN Jael Mendelson, DO      hydrOXYzine HCL  25 mg Oral Q6H PRN Max 4/day Jael Mendelson, DO      hydrOXYzine HCL  50 mg Oral HS Gazal Jalinda, DO      ibuprofen  400 mg Oral Q12H PRN OLGA Whittington      melatonin  3 mg Oral HS PRN Jael Mendelson, DO      melatonin  6 mg Oral HS Meera Bhatia, DO      nicotine  1 patch Transdermal Daily Jael Mendelson, DO      OLANZapine  10 mg Oral Q3H PRN Max 3/day Jael Mendelson, DO      Or    OLANZapine  10 mg Intramuscular Q3H PRN Max 3/day Jael Mendelson, DO      OLANZapine  5 mg Oral Q3H PRN Max 6/day Jael Mendelson, DO      Or    OLANZapine  5 mg Intramuscular Q3H PRN Max 6/day Jael Mendelson, DO      OLANZapine  2.5 mg Oral Q3H PRN Max 8/day Jael Mendelson, DO      ondansetron  4 mg Oral Q8H PRN Tootie Leblanc PA-C      polyethylene glycol  17 g Oral Daily Gazal Jalinda, DO      propranolol  10 mg Oral Q8H PRN Jael Mendelson, DO      senna-docusate sodium  1 tablet Oral Daily PRN Jael Mendelson, DO          Risks/Benefits of Treatment:     Risks, benefits, and possible side effects of medications explained to patient and patient verbalizes understanding and agreement for treatment.    Progress Toward Goals: progressing    Treatment Planning:     All current active medications have been reviewed.  Continue to monitor response to treatment and assess for potential side effects of medications.  Encourage group therapy, milieu therapy and occupational therapy  Collaboration with medical service for medical  comorbidities as indicated.  Behavioral Health checks for safety monitoring.  Estimated Discharge Day: 3/31/2025   Legal Status : Voluntary 201 commitment.    Subjective     Behavior over the last 24 hours: unchanged.    Per nursing report, patient had some irritability yesterday, though has been otherwise, and generally cooperative.  He remains medication compliant.    On approach, patient is tired and limited in his engagement in interview.  However, he is calm and appropriate throughout interview.  Interview done with assistance of Palestinian-speaking .  Patient reports that he feels well and is tolerating his medications well.  He denies feeling depressed or anxious at this time.  He denies any SI, HI, or AVH.  He remains organized and logical throughout encounter.  He has hesitation about going to rehab at this time.  Encouraged patient to speak with his primary treating team about his thoughts on this when they return and patient was in agreement.  He denies any other questions or concerns at this time.    Sleep: normal  Appetite: normal  Medication side effects: No  ROS: review of systems as noted above in HPI/Subjective report, all other systems are negative    Objective :  Temp:  [97.5 °F (36.4 °C)] 97.5 °F (36.4 °C)  HR:  [61-69] 61  BP: (116-144)/(64-70) 116/70  Resp:  [16] 16  SpO2:  [94 %-96 %] 94 %  O2 Device: None (Room air)    Temp:  [97.5 °F (36.4 °C)] 97.5 °F (36.4 °C)  HR:  [61-69] 61  BP: (116-144)/(64-70) 116/70  Resp:  [16] 16  SpO2:  [94 %-96 %] 94 %  O2 Device: None (Room air)    Mental Status Evaluation:    Appearance:  casually dressed, adequate grooming, dressed appropriately   Behavior:  pleasant, cooperative, calm, guarded   Speech:  Speaks Palestinian, normal rate, normal volume   Mood:  euthymic   Affect:  constricted   Thought Process:  goal directed, logical, linear   Thought Content:  no overt delusions   Perceptual Disturbances: no auditory hallucinations, no visual  hallucinations   Risk Potential: Suicidal Ideation -  None  Homicidal Ideation -  None  Potential for Aggression - Not at present   Sensorium:  oriented to person, place, and time/date   Memory:  recent and remote memory grossly intact   Consciousness:  alert and awake   Attention/Concentration: attention span and concentration are age appropriate   Insight:  limited   Judgment: limited   Gait/Station: normal gait/station, normal balance   Motor Activity: no abnormal movements       Lab Results: I have reviewed the following results:  Most Recent Labs:   Lab Results   Component Value Date    WBC 11.90 (H) 03/20/2025    RBC 4.91 03/20/2025    HGB 13.4 03/20/2025    HCT 42.1 03/20/2025     (H) 03/20/2025    RDW 13.8 03/20/2025    NEUTROABS 7.80 (H) 03/20/2025    TOTANEUTABS 5.79 11/22/2016    SODIUM 140 03/20/2025    K 3.9 03/20/2025     03/20/2025    CO2 26 03/20/2025    BUN 15 03/20/2025    CREATININE 1.08 03/20/2025    GLUC 102 03/20/2025    CALCIUM 9.6 03/20/2025    AST 10 (L) 03/20/2025    ALT 7 03/20/2025    ALKPHOS 68 03/20/2025    TP 7.0 03/20/2025    ALB 3.9 03/20/2025    TBILI 0.22 03/20/2025    CHOLESTEROL 250 (H) 03/20/2025    HDL 41 03/20/2025    TRIG 123 03/20/2025    LDLCALC 184 (H) 03/20/2025    NONHDLC 209 03/20/2025    [id number] 0.259 (L) 03/20/2025    FREET4 1.07 03/20/2025    SYPHILISAB Non-reactive 01/22/2025    HGBA1C 5.9 (H) 01/01/2024     01/01/2024       Administrative Statements     Counseling / Coordination of Care:   Patient's progress discussed with staff in treatment team meeting.  Medication changes reviewed with staff in treatment team meeting..    Dereje Su MD 03/30/25

## 2025-03-30 NOTE — NURSING NOTE
Pt received atarax 100 mg @ 1327 for c/o of severe anxiety related to volume on the unit. Reassessed @ 1427 and pt is calmly watching tv and reports no anxiety at this time. Prn effective.

## 2025-03-30 NOTE — ASSESSMENT & PLAN NOTE
Continue Suboxone to 8 mg 3 times daily   Patient is complaining of abdominal pain with Suboxone. Would like to switch back to Subutex. Reached out to toxicology for further management.   Discontinue clonidine as patient was placed on this in the past for withdrawal symptoms  Toxicology was consulted and provided recommendations  Substance cessation education and counseling   Patient spoke with his PO and she informed him that he may be in violation of his parole. She explained to patient that he must comply with any outpatient recommendations by psych treatment team. Patient is currently having some hesitation about inpatient rehab. Will continue to assess for agreement with plan for rehab disposition.

## 2025-03-30 NOTE — ASSESSMENT & PLAN NOTE
Juan Diego Horowitz is a 49 y.o. [place]ian male from [facility],  single, currently homeless, unemployed, receives social security, w/ PMH of recent hernia surgery and kidney cancer s/p nephrectomy and PPH of depression, bipolar disorder, ADHD, 1 prior psychiatric admissions, 5 prior SA, h/o self-injurious behavior with hitting head, currently 201 voluntary commitment who presented to  ED for suicidal ideations, auditory hallucinations, and recent suicide attempt by overdose.     Plan:   Continue Lexapro 20 mg daily for depressive symptoms.  Most recent Qtc was 446 on 3/18/2025  Can recheck QTc again if patient begins to have cardiac like symptoms that may raise concern for prolonged QT  Repeat EKG on 3/29 showed QTc 438  Discontinued Remeron after taper off from 30 mg nightly to 15 mg (due to previous suicide attempt overdosing on it)  Continue Abilify 5 mg daily started on 3/20  Consider transition to BAXTER Aristada once tolerated on PO, patient agrees with plan   Continue melatonin to 6 mg nightly for insomnia   Increased Atarax 50 mg hs for insomnia on 3/28/25  Patient currently on Gabapentin 300 mg tid, increased evening dose to 600 mg.   Patient asked for Seroquel or Remeron stating that medications like trazodone do not work for him.  In the setting of recent discontinuation of Remeron by primary psychiatry team, Remeron was not restarted or made a PRN .

## 2025-03-30 NOTE — NURSING NOTE
Pt is visible on the unit and social with Swedish speaking peers. Denies SI/HI/AH/VH at this time. Med and meal compliant. Hopeful for discharge to home. Denies any unmet needs or complaints.

## 2025-03-31 PROCEDURE — 99232 SBSQ HOSP IP/OBS MODERATE 35: CPT | Performed by: PSYCHIATRY & NEUROLOGY

## 2025-03-31 RX ADMIN — HYDROXYZINE HYDROCHLORIDE 100 MG: 50 TABLET, FILM COATED ORAL at 10:52

## 2025-03-31 RX ADMIN — Medication 2000 UNITS: at 08:05

## 2025-03-31 RX ADMIN — BUPRENORPHINE HCL 8 MG: 8 TABLET SUBLINGUAL at 21:01

## 2025-03-31 RX ADMIN — ESCITALOPRAM OXALATE 20 MG: 10 TABLET ORAL at 08:05

## 2025-03-31 RX ADMIN — GABAPENTIN 300 MG: 300 CAPSULE ORAL at 16:18

## 2025-03-31 RX ADMIN — CYANOCOBALAMIN TAB 1000 MCG 1000 MCG: 1000 TAB at 08:05

## 2025-03-31 RX ADMIN — Medication 6 MG: at 21:01

## 2025-03-31 RX ADMIN — BUPRENORPHINE HCL 8 MG: 8 TABLET SUBLINGUAL at 08:05

## 2025-03-31 RX ADMIN — GABAPENTIN 300 MG: 300 CAPSULE ORAL at 08:05

## 2025-03-31 RX ADMIN — ATORVASTATIN CALCIUM 10 MG: 10 TABLET, FILM COATED ORAL at 16:18

## 2025-03-31 RX ADMIN — HYDROXYZINE HYDROCHLORIDE 50 MG: 50 TABLET, FILM COATED ORAL at 21:01

## 2025-03-31 RX ADMIN — GABAPENTIN 600 MG: 300 CAPSULE ORAL at 21:01

## 2025-03-31 RX ADMIN — ARIPIPRAZOLE 5 MG: 5 TABLET ORAL at 08:05

## 2025-03-31 RX ADMIN — BUPRENORPHINE HCL 8 MG: 8 TABLET SUBLINGUAL at 16:18

## 2025-03-31 NOTE — NURSING NOTE
Patient has been in the milieu in the company of peers. He is calm and has required no prn's. He is appropriate in interactions. He was cooperative with HS medications and he denied S.I.H.I.A/H V/H

## 2025-03-31 NOTE — ASSESSMENT & PLAN NOTE
Continue Subutex  8 mg 3 times daily   Patient transitioned to Subutex because he was endorsing abdominal pain with Suboxone   Discontinue clonidine as patient was placed on this in the past for withdrawal symptoms  Toxicology was consulted and provided recommendations  Substance cessation education and counseling   Patient spoke with his PO and she informed him that he may be in violation of his parole. She explained to patient that he must comply with any outpatient recommendations by psych treatment team. Patient is currently having some hesitation about inpatient rehab. Will continue to assess for agreement with plan for rehab disposition.

## 2025-03-31 NOTE — NURSING NOTE
Patient is calm and cooperative upon approach. Patient visible on unit, socializing with selective peers. Patient denies SI/HI/aH/VH. Patient compliant with meds and meals.

## 2025-03-31 NOTE — PROGRESS NOTES
Progress Note - Behavioral Health   Name: Juan Diego Horowitz 49 y.o. male I MRN: [id number]  Unit/Bed#: -01 I Date of Admission: 3/18/2025   Date of Service: 3/31/2025 I Hospital Day: 12    Assessment & Plan  Unspecified mood (affective) disorder, MDD with or without psychotic features vs Bipolar disorder  Juan Diego Horowitz is a 49 y.o. [place] male from [facility],  single, currently homeless, unemployed, receives social security, w/ PMH of recent hernia surgery and kidney cancer s/p nephrectomy and PPH of depression, bipolar disorder, ADHD, 1 prior psychiatric admissions, 5 prior SA, h/o self-injurious behavior with hitting head, currently 201 voluntary commitment who presented to  ED for suicidal ideations, auditory hallucinations, and recent suicide attempt by overdose.     Plan:   Continue Lexapro 20 mg daily for depressive symptoms.  Most recent Qtc was 446 on 3/18/2025  Can recheck QTc again if patient begins to have cardiac like symptoms that may raise concern for prolonged QT  Repeat EKG on 3/29 showed QTc 438  Discontinued Remeron after taper off from 30 mg nightly to 15 mg (due to previous suicide attempt overdosing on it)  Continue Abilify 5 mg daily started on 3/20  Consider transition to BAXTER Aristada once tolerated on PO, patient agrees with plan   Continue melatonin to 6 mg nightly for insomnia   Increased Atarax 50 mg hs for insomnia on 3/28/25  Patient currently on Gabapentin 300 mg tid, increased evening dose to 600 mg on 3/24/2025.   Patient asked for Seroquel or Remeron stating that medications like trazodone do not work for him.  In the setting of recent discontinuation of Remeron by primary psychiatry team, Remeron was not restarted or made a PRN .   Opioid use disorder, severe, dependence (HCC)  Continue Subutex  8 mg 3 times daily   Patient transitioned to Subutex because he was endorsing abdominal pain with Suboxone   Discontinue clonidine as patient was placed on  this in the past for withdrawal symptoms  Toxicology was consulted and provided recommendations  Substance cessation education and counseling   Patient spoke with his PO and she informed him that he may be in violation of his parole. She explained to patient that he must comply with any outpatient recommendations by psych treatment team. Patient is currently having some hesitation about inpatient rehab. Will continue to assess for agreement with plan for rehab disposition.  Cocaine use  Per toxicology recommend contingency management or CBT   Substance cessation education and counseling   Right inguinal hernia  Recent inguinal and umbilical hernia surgery on 3/11/2025  Continue to monitor pain  Plan per medical   Tobacco use disorder  Nicotine patch daily   Medical clearance for psychiatric admission  Per medical   Hyperlipidemia  Per medical   Appetite loss  Patient complains of ongoing decreased appetite, unable to eat his meals. Placed a nutrition consult.  Patient also has infrequent bowel movements so ordered daily Miralax.     Current medications:  Current Facility-Administered Medications   Medication Dose Route Frequency Provider Last Rate    acetaminophen  650 mg Oral Q6H PRN Jael Mendelson, DO      acetaminophen  650 mg Oral Q4H PRN Brooke Mendelson, DO      acetaminophen  975 mg Oral Q6H PRN Brooke Mendelson, DO      aluminum-magnesium hydroxide-simethicone  30 mL Oral Q4H PRN Brooke Mendelson, DO      ARIPiprazole  5 mg Oral Daily Jaele MendelMendelson, DO      atorvastatin  10 mg Oral Daily With Dinner OLGA Whittington      benztropine  1 mg Intramuscular Q4H PRN Max 6/day Brooke Mendelson, DO      benztropine  1 mg Oral Q4H PRN Max 6/day Brooke Mendelson, DO      buprenorphine  8 mg Sublingual TID Magui Good,       Cholecalciferol  2,000 Units Oral Daily OLGA Whittington      cyanocobalamin  1,000 mcg Oral Daily OLGA Whittington      hydrOXYzine HCL  50 mg Oral Q6H  PRN Max 4/day Jael Mendelson, DO      Or    diphenhydrAMINE  50 mg Intramuscular Q6H PRN Jael Mendelson, DO      escitalopram  20 mg Oral Daily Gazal Jalinda, DO      gabapentin  300 mg Oral BID Jose Rodriguez MD      gabapentin  600 mg Oral HS Jose Rodriguez MD      hydrOXYzine HCL  100 mg Oral Q6H PRN Max 4/day Jael Mendelson, DO      Or    LORazepam  2 mg Intramuscular Q6H PRN Jael Mendelson, DO      hydrOXYzine HCL  25 mg Oral Q6H PRN Max 4/day Jael Mendelson, DO      hydrOXYzine HCL  50 mg Oral HS Gazal Jalinda, DO      ibuprofen  400 mg Oral Q12H PRN OLGA Whittington      melatonin  3 mg Oral HS PRN Brooke Mendelson, DO      melatonin  6 mg Oral HS Meera Bhatia, DO      nicotine  1 patch Transdermal Daily Brooke Mendelson, DO      OLANZapine  10 mg Oral Q3H PRN Max 3/day Jael Mendelson, DO      Or    OLANZapine  10 mg Intramuscular Q3H PRN Max 3/day Jael Mendelson, DO      OLANZapine  5 mg Oral Q3H PRN Max 6/day Jael Mendelson, DO      Or    OLANZapine  5 mg Intramuscular Q3H PRN Max 6/day Jael Mendelson, DO      OLANZapine  2.5 mg Oral Q3H PRN Max 8/day Jael Mendelson, DO      ondansetron  4 mg Oral Q8H PRN Tootie Leblanc PA-C      polyethylene glycol  17 g Oral Daily Gazal Jalinda, DO      propranolol  10 mg Oral Q8H PRN Jael Mendelson, DO      senna-docusate sodium  1 tablet Oral Daily PRN Brooke Mendelson, DO          Risks/Benefits of Treatment:     Risks, benefits, and possible side effects of medications explained to patient and patient verbalizes understanding and agreement for treatment.    Progress Toward Goals: improving slowly    Treatment Planning:     All current active medications have been reviewed.  Continue to monitor response to treatment and assess for potential side effects of medications.  Encourage group therapy, milieu therapy and occupational therapy  Collaboration with medical service for medical comorbidities as indicated.  Behavioral Health  "checks for safety monitoring.  Estimated Discharge Day: 3/31/2025   Legal Status: Status of Admission  Status of Admission: 201  Release of Information Signed: Yes ([facility]).    Subjective     Behavior over the last 24 hours: slowly improving.    Per nursing, patient was compliant with medications and meals, with the exception of MiraLAX and nicotine patch this morning.  Patient remained in behavioral control for the past 24 hours.  Yesterday, patient required Atarax 100 mg for anxiety, which was effective.    On approach, patient was calm, cooperative, pleasant, but guarded at times.  Patient is primarily Dominican-speaking, and as such the language line was utilized ( #[id number]).  Patient reports improvement in depression since admission, stating that he feels \"better\" and mood.  Patient was constricted in affect, but reactive and bright at times.  Initially, today, patient stated that he would prefer to be discharged home to live with his girlfriend instead of going to rehab.  Case management reached out to his , who informed the patient that he would be going to [facility] if he did not go to rehab.  At this point in time, patient is now agreeable to discharge at drug and alcohol rehab.  Patient reports improved sleep, and denies any issues with appetite or energy.  Patient denies any current side effects from his medication regimen.  Patient denies any passive or active suicidal ideation, homicidal ideation, auditory or visual hallucinations.     Sleep: normal  Appetite: normal  Medication side effects: No  ROS: review of systems as noted above in HPI/Subjective report, all other systems are negative    Objective :  Temp:  [97.8 °F (36.6 °C)-97.9 °F (36.6 °C)] 97.9 °F (36.6 °C)  HR:  [57-66] 57  BP: (107-123)/(65-73) 107/73  Resp:  [16-18] 18  SpO2:  [96 %] 96 %  O2 Device: None (Room air)    Temp:  [97.8 °F (36.6 °C)-97.9 °F (36.6 °C)] 97.9 °F (36.6 °C)  HR:  [57-66] 57  BP: " "(107-123)/(65-73) 107/73  Resp:  [16-18] 18  SpO2:  [96 %] 96 %  O2 Device: None (Room air)    Mental Status Evaluation:    Appearance:  Overtly  male, dressed casually, adequate grooming and hygiene, appears stated age   Behavior:  Calm, cooperative, pleasant, guarded   Speech:  Soft, scant, normal rate   Mood:  \" Better\"   Affect:  Constricted, but reactive and bright at times   Thought Process:  organized, goal directed, logical   Thought Content:  No overtly delusional thought content expressed   Perceptual Disturbances: Denied any auditory or visual hallucinations.  Does not appear internally preoccupied at this time.   Risk Potential: Suicidal Ideation -denies  Homicidal Ideation -denies  Potential for Aggression - Not at present   Sensorium:  oriented to person, place, and time/date   Memory:  recent and remote memory grossly intact   Consciousness:  alert and awake   Attention/Concentration: attention span and concentration appear shorter than expected for age   Insight:  limited   Judgment: limited   Gait/Station: normal gait/station, normal balance   Motor Activity: no abnormal movements       Lab Results: I have reviewed the following results:      Administrative Statements     Counseling / Coordination of Care:   Patient's progress discussed with staff in treatment team meeting.  Medication changes reviewed with staff in treatment team meeting..    Dereje Nicolas MD 03/31/25  "

## 2025-03-31 NOTE — NURSING NOTE
Pleasant, calm and cooperative during interaction. Refused Nicotine patch and Miralax. Compliant with all other medications. Denies all symptoms at this time. Patient stated at the start of the shift he no longer wanted rehab and wanted to go home to his girlfriends home. Now agreeable to rehab.

## 2025-03-31 NOTE — ASSESSMENT & PLAN NOTE
Juan Diego Horowitz is a 49 y.o. [place] male from [facility],  single, currently homeless, unemployed, receives social security, w/ PMH of recent hernia surgery and kidney cancer s/p nephrectomy and PPH of depression, bipolar disorder, ADHD, 1 prior psychiatric admissions, 5 prior SA, h/o self-injurious behavior with hitting head, currently 201 voluntary commitment who presented to  ED for suicidal ideations, auditory hallucinations, and recent suicide attempt by overdose.     Plan:   Continue Lexapro 20 mg daily for depressive symptoms.  Most recent Qtc was 446 on 3/18/2025  Can recheck QTc again if patient begins to have cardiac like symptoms that may raise concern for prolonged QT  Repeat EKG on 3/29 showed QTc 438  Discontinued Remeron after taper off from 30 mg nightly to 15 mg (due to previous suicide attempt overdosing on it)  Continue Abilify 5 mg daily started on 3/20  Consider transition to BAXTER Aristada once tolerated on PO, patient agrees with plan   Continue melatonin to 6 mg nightly for insomnia   Increased Atarax 50 mg hs for insomnia on 3/28/25  Patient currently on Gabapentin 300 mg tid, increased evening dose to 600 mg on 3/24/2025.   Patient asked for Seroquel or Remeron stating that medications like trazodone do not work for him.  In the setting of recent discontinuation of Remeron by primary psychiatry team, Remeron was not restarted or made a PRN .

## 2025-03-31 NOTE — SOCIAL WORK
Cm called PO and confirmed that Pt is now refusing rehab. Po reported that if Pt does not go to rehab, a warrant will be placed. PO explained this to PO in english and Faroese. Pt understood but did not agree. Pt reported he only went to rehab before because he was homeless but now Pt has somewhere to live and does not want to go.   PO explained that pt will go to [facility] if he does not go to rehab. Pt reported he will go.   Pt is requesting [facility].     Cm submitted referral to [facility], They rejected Pt due to conflict of interest.    [facility] referral was placed [phone].    [facility] requested  facesheet and H&P sent to [email or web address].   Cm sent clinicals. Bed search is started.

## 2025-04-01 PROCEDURE — 99232 SBSQ HOSP IP/OBS MODERATE 35: CPT | Performed by: PSYCHIATRY & NEUROLOGY

## 2025-04-01 RX ORDER — GABAPENTIN 300 MG/1
600 CAPSULE ORAL 3 TIMES DAILY
Status: DISCONTINUED | OUTPATIENT
Start: 2025-04-01 | End: 2025-04-04 | Stop reason: HOSPADM

## 2025-04-01 RX ORDER — GABAPENTIN 300 MG/1
600 CAPSULE ORAL 2 TIMES DAILY
Status: DISCONTINUED | OUTPATIENT
Start: 2025-04-01 | End: 2025-04-01

## 2025-04-01 RX ADMIN — CYANOCOBALAMIN TAB 1000 MCG 1000 MCG: 1000 TAB at 08:16

## 2025-04-01 RX ADMIN — Medication 2000 UNITS: at 08:16

## 2025-04-01 RX ADMIN — GABAPENTIN 600 MG: 300 CAPSULE ORAL at 16:09

## 2025-04-01 RX ADMIN — BUPRENORPHINE HCL 8 MG: 8 TABLET SUBLINGUAL at 08:35

## 2025-04-01 RX ADMIN — HYDROXYZINE HYDROCHLORIDE 50 MG: 50 TABLET, FILM COATED ORAL at 14:12

## 2025-04-01 RX ADMIN — GABAPENTIN 300 MG: 300 CAPSULE ORAL at 08:16

## 2025-04-01 RX ADMIN — Medication 6 MG: at 21:03

## 2025-04-01 RX ADMIN — GABAPENTIN 600 MG: 300 CAPSULE ORAL at 21:04

## 2025-04-01 RX ADMIN — ATORVASTATIN CALCIUM 10 MG: 10 TABLET, FILM COATED ORAL at 16:09

## 2025-04-01 RX ADMIN — ESCITALOPRAM OXALATE 20 MG: 10 TABLET ORAL at 08:16

## 2025-04-01 RX ADMIN — ARIPIPRAZOLE 5 MG: 5 TABLET ORAL at 08:15

## 2025-04-01 RX ADMIN — BUPRENORPHINE HCL 8 MG: 8 TABLET SUBLINGUAL at 21:03

## 2025-04-01 RX ADMIN — BUPRENORPHINE HCL 8 MG: 8 TABLET SUBLINGUAL at 16:09

## 2025-04-01 NOTE — ASSESSMENT & PLAN NOTE
Appetite improving  Placed a nutrition consult  Patient also has infrequent bowel movements, now regular, continue senokot daily

## 2025-04-01 NOTE — ASSESSMENT & PLAN NOTE
Continue Subutex 8 mg 3 times daily   Patient transitioned to Subutex because he was endorsing abdominal pain with Suboxone   Discontinue clonidine as patient was placed on this in the past for withdrawal symptoms  Toxicology was consulted and provided recommendations  Substance cessation education and counseling   Patient spoke with his PO and she informed him that he may be in violation of his parole. She explained to patient that he must comply with any outpatient recommendations by psych treatment team. Patient in agreement with inpatient rehab, awaiting bed availability

## 2025-04-01 NOTE — SOCIAL WORK
Cm followed up with [facility]. Cm is waiting for response. HOST reported they are waiting to hear back from [facility].     Cm called [facility] and left voicemail with call back information.

## 2025-04-01 NOTE — NURSING NOTE
Pt is withdrawn to room. Calm and cooperative upon approach. Medication and meal compliant. Denies SI, HI, AH, and VH. Denies any needs at this time

## 2025-04-01 NOTE — ASSESSMENT & PLAN NOTE
Recent inguinal and umbilical hernia surgery on 3/11/2025  Continue to monitor pain  RUQ US 4/3 showing gallstones, follow up outpatient   Plan per medical

## 2025-04-01 NOTE — ASSESSMENT & PLAN NOTE
Appetite improving  Placed a nutrition consult  Patient also has infrequent bowel movements so ordered daily Miralax.

## 2025-04-01 NOTE — DISCHARGE SUMMARY
Discharge Summary - Behavioral Health   Name: Juan Diego Horowitz 49 y.o. male I MRN: [id number]  Unit/Bed#: -01 I Date of Admission: 3/18/2025   Date of Service: 4/4/2025 I Hospital Day: 16    Assessment & Plan  Unspecified mood (affective) disorder, MDD with or without psychotic features vs Bipolar disorder  Juan Diego Horowizt is a 49 y.o. [place] male from [facility],  single, currently homeless, unemployed, receives social security, w/ PMH of recent hernia surgery and kidney cancer s/p nephrectomy and PPH of depression, bipolar disorder, ADHD, 1 prior psychiatric admissions, 5 prior SA, h/o self-injurious behavior with hitting head, currently 201 voluntary commitment who presented to  ED for suicidal ideations, auditory hallucinations, and recent suicide attempt by overdose.      Plan:   Continue Lexapro 20 mg daily for depressive symptoms.  Most recent Qtc was 445 on 4/2/2025  Can recheck QTc again if patient begins to have cardiac like symptoms that may raise concern for prolonged QT  Discontinued Remeron after taper off from 30 mg nightly to 15 mg (due to previous suicide attempt overdosing on it)  Continue Abilify 5 mg daily started on 3/20  Continue Propranolol 10 mg BID for akathisia/restlessness  Continue melatonin to 6 mg nightly for insomnia   Continue Atarax 50 mg hs for anxiety  Continue Gabapentin to 600 mg TID for anxiety   Patient asked for Seroquel or Remeron stating that medications like trazodone do not work for him.  In the setting of recent discontinuation of Remeron by primary psychiatry team, Remeron was not restarted or made a PRN .   Opioid use disorder, severe, dependence (HCC)  Continue Subutex 8 mg 3 times daily   Patient transitioned to Subutex because he was endorsing abdominal pain with Suboxone   Discontinue clonidine as patient was placed on this in the past for withdrawal symptoms  Toxicology was consulted and provided recommendations  Substance cessation  education and counseling   Patient spoke with his PO and she informed him that he is in violation of his parole. She explained to patient that he must comply with any outpatient recommendations by psych treatment team. Patient in agreement with inpatient rehab, awaiting bed availability   Cocaine use  Per toxicology recommend contingency management or CBT   Substance cessation education and counseling   Right inguinal hernia  Recent inguinal and umbilical hernia surgery on 3/11/2025  Continue to monitor pain  RUQ US 4/3 showing gallstones, follow up outpatient   Plan per medical   Tobacco use disorder  Nicotine patch daily   Medical clearance for psychiatric admission  Per medical   Hyperlipidemia  Per medical   Appetite loss  Appetite improving  Placed a nutrition consult  Patient also has infrequent bowel movements, now regular, continue senokot daily     Admission Date: 3/18/2025       Discharge Date: 4/4/2025   Attending Psychiatrist: Jose Rodriguez MD    Admission Diagnosis:  Principal Problem:    Unspecified mood (affective) disorder, MDD with or without psychotic features vs Bipolar disorder  Active Problems:    Opioid use disorder, severe, dependence (HCC)    Right inguinal hernia    Tobacco use disorder    Medical clearance for psychiatric admission    Opioid use disorder    Cocaine use    Hyperlipidemia    Appetite loss    Discharge Diagnosis:   Principal Problem:    Unspecified mood (affective) disorder, MDD with or without psychotic features vs Bipolar disorder  Active Problems:    Opioid use disorder, severe, dependence (HCC)    Right inguinal hernia    Tobacco use disorder    Medical clearance for psychiatric admission    Opioid use disorder    Cocaine use    Hyperlipidemia    Appetite loss  Resolved Problems:    * No resolved hospital problems. *    Medical Problems       Resolved Problems  Date Reviewed: 4/3/2025   None         Reason for Admission/HPI:     Per H&P attestation by Jose Rodriguez MD  "on 3/20/2025:  \"Patient was seen and evaluated independent of resident physician for initial assessment. Patient is a 49-year-old male with a history per records of bipolar disorder who presents on a [address] inpatient commitment for worsening suicidal thoughts, suicidal ideation, and status post suicide attempt. Per record review, patient reported that he attempted to overdose on medications prior to admission. Patient while in the ED reports that he does not remember how many medications he attempted to take. Per ED documentation, patient has been having feelings of helplessness and depression. During interview today, patient was noted to be Romanian-speaking and was interviewed via a combination of  services and this writer. Patient reports that he did not attempt to harm himself by taking Remeron while at home. Patient on mental status examination was noted to be cooperative with interview, anxious appearing and at times guarded during interview, spontaneous in terms of his speech but soft at times. He was noted to be dysphoric and anxious in terms of his affect. He was noted to be concrete but goal directed in terms of his thought process. He did appear to be distracted during interview but did not appear to be internally preoccupied. He denies current active SI/HI/AVH and denies any plan or intent to harm himself or others. Patient reports that he has been experiencing decreased sleep, decreased motivation, feelings of hopelessness, helplessness, and guilt, and decreased energy. He denies any specific symptoms of humberto during interview. Per record review, patient has a history of mood swings.. He does report distractibility and does appear distractible during he does report decreased overall sleep but denies any worsening impulsivity, rapid pressured speech, or increase in goal oriented activity. Patient does report anxiety but denies any recurring panic attacks. He denies OCD " "symptoms. He denies eating disorders. Patient does report a history of hallucinations, which he states to admission that were derogatory in nature. He does report passive death wishes but is able to contract for safety on the unit. He denies any diagnosed seizure disorder. He denies any access to weapons or firearms.\"    Per H&P HPI by Brooke Mendelson, DO on 3/20/2025:  \"Juan Diego Horowitz is a 49 y.o. [place] male from [facility],  single, currently homeless, unemployed, receives social security, w/ PMH of recent hernia surgery and kidney cancer s/p nephrectomy and PPH of depression, bipolar disorder, ADHD, 1 prior psychiatric admissions, 5 prior SA, h/o self-injurious behavior with hitting head, currently 201 voluntary commitment who presented to  ED for suicidal ideations, auditory hallucinations, and recent suicide attempt by overdose. The patient was admitted to the inpatient psychiatry unit [facility] for further psychiatric stabilization.  was used during interview, Dereje [id number] and Angie [id number].     Per Crisis Worker note by Alla Griffith on 3/19/2025:   \"The patient is a 49 year old male presenting to the ED via self-referral for abdominal pain. During the ED provider's assessment, the patient disclosed that he's been having suicidal thoughts and two days ago attempted to overdose on medication. The patient is cooperative, well-appearing, and alert and oriented to all spheres. Introduced self and role, patient agrees to speak with this writer. The patient is primarily Hong Konger-speaking but does understand some english. This writer confirmed with the patient that he would prefer to use an interpretor. [email or web address] Interpretor #[id number] (Beni) utilized for entirety of assessment.      The patient reports recent thoughts to commit suicide over the past couple of days to weeks. He reports two days ago, he attempted to overdose on medication but he doesn't remember which medication " "it was. This writer asked if it would've been his psychotropic medications which he affirms it probably was. He also doesn't remember how many he attempted to take. He endorses active suicidal ideation to this writer but denies a plan at this time. He endorses ongoing feelings of depression and helplessness. He denies homicidal ideation and is experiencing no psychosis symptoms such as hallucinations. He reports that he does have outpatient psychiatry but doesn't remember who they are. Per chart review, the patient has been followed by street medicine in the past but it's unknown the last time that he saw them. He reports feeling like his current medication regimen needs adjustment. Sleep and appetite patterns are disrupted, he states he can't accurately tell me how much sleep he's getting. Food insecurity is primarily related to homelessness. Stressors are that he's currently unemployed, homeless, had a recent surgery, has a limited support system, and has had ongoing insurance issues when attempting to receive his medications. Patient reports that he's still taking Subutex (8mg), last fill date 3/6.      The patient states he is willing to sign himself in for inpatient treatment at this time.\"     Symptoms prior to admission included worsening depression, suicidal ideation, suicide attempt, passive death wish, hopelessness, poor concentration, poor appetite, difficulty sleeping, racing thoughts, auditory hallucinations, poor self-care, and memory difficulties. Patient reports 2 days prior to ED arrival he attempted overdose on medications to end his life by taking about 10 pills of mixed medications including Remeron, Melatonin, oxycodone, and clonidine. Onset of symptoms was gradual starting several months ago with slowly worsening course since that time. Stressors preceding admission included drug use problems, family problems, financial problems, legal problems, housing issues, being homeless, limited support, " "social difficulties, and chronic mental illness. Juan Diego did not understand anxiety when asked, he report worrying and racing thoughts about being alone and not having supports. He denies panic attacks. He reports history of bipolar disorder however during humberto screen patient was unable to describe a manic episode. He states \"I don't know\" and he doesn't remember ever having what writer described as a manic episode. He reports sleep has been difficult mainly due to homelessness however has had difficulty with him sleep in the past. Most recently 3-4 hours nightly. He reports history of hitting/punching his head when he is frustrated. He denied any other self harm. He currently denies active suicidal ideation however reports conditional passive SI/death wishes outside of the hospital due to stressors. He denies homicidal ideations, paranoia, delusions. He reports seeing dead people's shadows \"move around\" however has not seen any recently. He has heard a voice tell him \"Im trash\" and \"worthless\" however denies any recent auditory hallucinations.     On initial evaluation after admission to the inpatient psychiatric unit Juan Diego is calm, cooperative, with poor eye contact and guarded at times. Patient is dysphoric and tearful in terms of his affect with negative thinking in terms of his thought process. Patient held his head in his hands for half of the interview. He reports recent hernia surgery on 3/11 and after being discharged he became more upset knowing he is alone and does not have anyone to take care of him. He has one friend but they are unable to help him. He reports having 2 children but is not close with either and states \"I am a bad father\" and that he is \"not able to see his kids\" but does not provide details. He is currently on probation for drugs and wants to get the phone number out of his personal belongings to inform them of this hospitalization. He reports ongoing forgetfulness that has been " "worsening and becomes frustrated at times saying \"I don't know\" to many questions due to not remembering. He feels the psychosocial stressors have become too much for him to handle and just wants help. He states chronic depressive symptoms that have been worsening recently. He currently lives on the street and prefers to stay alone due to past instances of people breaking his trust in the community. He winces in pain during first half of interview, writer recommends patient receiving pain medication and to finish after he is feeling better. Patient agrees and is less guarded during follow up. He has tried to work with [facility] in the past but has been rejected due to needing \"a paper from a psychiatrist\" and would like to meet with them to try to work towards getting an apartment.      Patient has a history of polysubstance use disorder with crack cocaine and heroin. He reports taking Subutex for past 3 years and uses when he runs out of Subutex or has increased cravings. He currently reports increased cravings and would like to increase Subutex, patient assured writer would reach out to toxicology team for guidance. He denies every using IV drugs. Patient is unsure why he is on clonidine and agrees with discontinuing at this time.\"    Objective :  Temp:  [97.3 °F (36.3 °C)-98.2 °F (36.8 °C)] 98.2 °F (36.8 °C)  HR:  [58-66] 58  BP: (107-124)/(55-70) 124/70  Resp:  [16] 16  SpO2:  [96 %-98 %] 98 %  O2 Device: None (Room air)    Past Medical History:   Diagnosis Date    Addiction to drug (HCC)     Asthma     Back pain     Bipolar 1 disorder (HCC)     Chronic kidney disease     Depression     Gastritis     GERD (gastroesophageal reflux disease)     Hallucination     Kidney stones     Opioid withdrawal (HCC) 12/05/2023    Psychiatric illness     Renal cancer (HCC)     Substance abuse (HCC)     Suicide attempt (HCC)      Past Surgical History:   Procedure Laterality Date    ABDOMINAL SURGERY      " NEPHRECTOMY      AK CYSTOURETHROSCOPY W/URETERAL CATHETERIZATION Right 09/27/2016    Procedure: CYSTOSCOPY WITH RETROGRADE PYELOGRAM;  Surgeon: Petey Hernandez MD;  Location: [facility];  Service: Urology    AK LAPAROSCOPY RADICAL NEPHRECTOMY Right 11/23/2016    Procedure: HAND ASSISTED LAPAROSCOPIC NEPHRECTOMY, converted to open, lysis of adhesions,repair of  vena cava;  Surgeon: Petey Hernandez MD;  Location: [facility];  Service: Urology    AK LAPAROSCOPY SURG RPR INITIAL INGUINAL HERNIA Right 3/11/2025    Procedure: REPAIR HERNIA INGUINAL, LAPAROSCOPIC RIGHT;  Surgeon: Blayne Anguiano MD;  Location: [facility];  Service: General    AK RPR AA HERNIA 1ST < 3 CM REDUCIBLE N/A 3/11/2025    Procedure: REPAIR HERNIA UMBILICAL;  Surgeon: Blayne Anguiano MD;  Location: [facility];  Service: General    URETERAL STENT PLACEMENT Right 09/27/2016    Procedure: INSERTION STENT URETERAL;  Surgeon: Petey Hernandez MD;  Location: [facility];  Service:     VASCULAR SURGERY       Medications prior to Admission:  Prior to Admission Medications   Prescriptions Last Dose Informant Patient Reported? Taking?   ARIPiprazole (ABILIFY) 5 mg tablet Past Week  No Yes   Sig: Take 1 tablet (5 mg total) by mouth daily   acetaminophen (TYLENOL) 500 mg tablet Not Taking  Yes No   Sig: Take 1,000 mg by mouth every 6 (six) hours as needed for mild pain Not to exceed 3 doses   Patient not taking: Reported on 3/19/2025   bisacodyl (DULCOLAX) 5 mg EC tablet Not Taking Other (Specify) Yes No   Sig: Take 10 mg by mouth daily as needed for constipation   Patient not taking: Reported on 3/19/2025   buprenorphine (SUBUTEX) 8 mg 3/19/2025 at  9:04 AM Other (Specify) Yes Yes   Sig: Place 16 mg under the tongue every 24 hours   cloNIDine (CATAPRES) 0.1 mg tablet 3/18/2025  No Yes   Sig: Take 1 tablet (0.1 mg total) by mouth daily at bedtime   dicyclomine (BENTYL) 20 mg tablet Not Taking  Yes No   Sig: Take 20 mg by mouth 4 (four) times a day as needed    Patient not taking: Reported on 3/19/2025   diphenhydrAMINE (BENADRYL) 25 mg capsule Not Taking Other (Specify) Yes No   Sig: Take 25 mg by mouth 3 (three) times a day as needed for itching   Patient not taking: Reported on 3/19/2025   gabapentin (NEURONTIN) 300 mg capsule 3/19/2025 Morning  No Yes   Sig: Take 1 capsule (300 mg total) by mouth 3 (three) times a day   guaiFENesin (ROBITUSSIN) 100 MG/5ML oral liquid Not Taking Other (Specify) Yes No   Sig: Take 200 mg by mouth every 4 (four) hours as needed for cough   Patient not taking: Reported on 3/19/2025   hydrOXYzine pamoate (VISTARIL) 50 mg capsule Not Taking  Yes No   Sig: Take 50 mg by mouth 3 (three) times a day as needed for anxiety Severe anxiety or for S/S of withdrawal   Patient not taking: Reported on 3/18/2025   loperamide (IMODIUM A-D) 2 MG tablet Not Taking Other (Specify) Yes No   Sig: Take 2 mg by mouth every 2 (two) hours as needed for diarrhea Not to exceed 16 mg/24 hours   Patient not taking: Reported on 3/19/2025   melatonin 3 mg Past Week  No Yes   Sig: Take 1 tablet (3 mg total) by mouth daily at bedtime   Patient taking differently: Take 6 mg by mouth daily at bedtime as needed   mirtazapine (REMERON) 30 mg tablet 3/18/2025  No Yes   Sig: Take 1 tablet (30 mg total) by mouth daily at bedtime   ondansetron (ZOFRAN) 4 mg tablet Not Taking  Yes No   Sig: Take 4 mg by mouth every 8 (eight) hours as needed for nausea or vomiting   Patient not taking: Reported on 3/19/2025   oxyCODONE (Roxicodone) 5 immediate release tablet Past Week  No Yes   Sig: Take 1 tablet (5 mg total) by mouth every 6 (six) hours as needed for moderate pain or severe pain for up to 10 days Max Daily Amount: 20 mg      Facility-Administered Medications: None     No Known Allergies    Objective   Temp:  [97.3 °F (36.3 °C)-98.2 °F (36.8 °C)] 98.2 °F (36.8 °C)  HR:  [58-66] 58  BP: (107-124)/(55-70) 124/70  Resp:  [16] 16  SpO2:  [96 %-98 %] 98 %  O2 Device: None (Room  air)  Hospital Course:     Juan Diego was admitted to the inpatient psychiatric unit and started on Behavioral Health checks for safety monitoring. During the hospitalization he was encouraged to attend individual therapy, group therapy, milieu therapy and occupational therapy.     Psychiatric medications were started during the hospital stay. To address depressive symptoms, mood instability, irritability, racing thoughts, anxiety symptoms, and attention and concentration difficulties, Juan Diego was treated with antidepressant Lexapro, antipsychotic medication Abilify, anxiolytic medication gabapentin and atarax, medication to control opioid withdrawal Subutex, and propranolol for restlessness. Medication doses were adjusted during the hospital course. Prior to beginning of treatment medications risks and benefits and possible side effects including risk of parkinsonian symptoms, Tardive Dyskinesia and metabolic syndrome related to treatment with antipsychotic medications and risk of suicidality and serotonin syndrome related to treatment with antidepressants were reviewed with Juan Diego. He verbalized understanding and agreement for treatment. Upon admission Juan Diego was seen by medical service for medical clearance for inpatient treatment and medical follow up.     Juan Diego's symptoms gradually improved over the hospital course. Initially after admission he was still feeling depressed, anxious, and overwhelmed. With adjustment of medications and therapeutic milieu his symptoms gradually improved. At the end of treatment Juan Diego was improved. His mood was doing much better at the time of discharge. Juan Diego denied suicidal ideation, intent or plan at the time of discharge and denied homicidal ideation, intent or plan at the time of discharge. Patient denied having access to firearms or large weapons. There was no overt psychosis at the time of discharge. Juan Diego was participating appropriately in milieu at the time of  "discharge. Behavior was appropriate on the unit at the time of discharge. Sleep and appetite were improved. Juan Diego was tolerating medications and was not reporting any significant side effects at the time of discharge. Patient was discharged with medications filled at  pharmacy for 30 day supply.      Juan Diego expressed desire and motivation for an inpatient drug and alcohol treatment and was transferred to [facility] at the end of the hospitalization.     Juan Diego was discharged on a following medication regimen:  Abilify 5 mg daily   Subutex 8 mg TID   Vitamin D 2000 units daily   Vitamin b12 1000 mcg daily   Lexapro 20 mg daily   Gabapentin 600 mg TID   Atarax 50 mg daily at bedtime   Melatonin 6 mg daily at bedtime   Miralax 17 g daily   Lipitor 10 mg daily with dinner   Propranolol 10 mg BID     Mental Status at Time of Discharge:     Appearance:  casually dressed, marginal hygiene, looks older than stated age, overtly appearing  male    Behavior:  pleasant, cooperative, calm, fair eye contact    Speech:  normal rate, spontaneous, soft   Mood:  \"A little depressed\" became brighter once he knew about being accepted to rehab   Affect:  constricted, brighter than previous    Thought Process:  goal directed, linear   Thought Content:  no overt delusions   Perceptual Disturbances: denies auditory or visual hallucinations when asked, but appears distracted, does not appear responding to internal stimuli   Risk Potential: Suicidal Ideation -   denies at time of interview   Homicidal Ideation -   denies at time of interview   Potential for Aggression - Not at present   Sensorium:  oriented to person, place, time/date, and situation   Memory:  recent and remote memory grossly intact   Consciousness:  alert and awake   Attention/Concentration: attention span and concentration are improved   Insight:  fair   Judgment: fair   Gait/Station: in bed   Motor Activity: no abnormal movements "     Suicide/Homicide Risk Assessment:    Risk of Harm to Self:   The following ratings are based on assessment at the time of the interview  Nursing Suicide Risk Assessment Last 24 hours: C-SSRS Risk (Since Last Contact)  Calculated C-SSRS Risk Score (Since Last Contact): No Risk Indicated  Demographic Risk Factors include: lowest socioeconomic class, , male  Historical Risk Factors include: chronic depressive symptoms, history of depression, chronic anxiety symptoms, history of anxiety, chronic mood disorder, history of suicide attempts, history of self-abusive behavior, substance use, history of substance use, victim of abuse, history of traumatic experiences, history of legal problems  Current Specific Risk Factors include: recent inpatient psychiatric admission - being discharged today, recent suicide attempt, recent suicidal threats, recent suicidal ideation, diagnosis of depression, chronic depressive symptoms, chronic anxiety symptoms, lack of support, health problems, substance use  Protective Factors: no current suicidal ideation, stable mood, improved depressive symptoms, improved anxiety symptoms, improved impulse control, ability to make plans for the future, no current suicidal plan or intent, being discharged to a supportive environment (inpatient D&A rehab), being a parent, compliant with medications, having a desire to be alive, medical compliance, resiliency, restricted access to lethal means, ability to contract for safety with staff, ability to communicate with staff  Weapons/Firearms: none. The following steps have been taken to ensure weapons are properly secured: not applicable  Based on today's assessment, Juan Diego presents the following risk of harm to self: low    Risk of Harm to Others:  The following ratings are based on  assessment at the time of the interview  Nursing Homicide Risk Assessment: Violence Risk to Others: Denies within past 6 months  Demographic Risk Factors include:  male, unemployed  Historical Risk Factors include: drug abuse, history of substance use  Current Specific Risk Factors include: recent episode of mood instability, recent substance use, diagnosis of mood disorder, multiple stressors, social difficulties, recent substance use  Protective Factors: no current homicidal ideation, stable mood, no current psychotic symptoms, compliant with medications, compliant with mental health treatment, willing to continue psychiatric treatment, being discharged to a supportive environment (inpatient Drug and Alcohol Rehabilitation facility), ability to adapt to change, access to mental health treatment, responsibilities and duties to others, resilience, restricted access to lethal means  Weapons/Firearms: none. The following steps have been taken to ensure weapons are properly secured: not applicable  Based on today's assessment, Juan Diego presents the following risk of harm to others: low    The following interventions are recommended: referral for inpatient Drug and Alcohol treatment      Lab Results: I have reviewed the following results:  Most Recent Labs:   Lab Results   Component Value Date    WBC 11.90 (H) 03/20/2025    RBC 4.91 03/20/2025    HGB 13.4 03/20/2025    HCT 42.1 03/20/2025     (H) 03/20/2025    RDW 13.8 03/20/2025    NEUTROABS 7.80 (H) 03/20/2025    TOTANEUTABS 5.79 11/22/2016    SODIUM 140 03/20/2025    K 3.9 03/20/2025     03/20/2025    CO2 26 03/20/2025    BUN 15 03/20/2025    CREATININE 1.08 03/20/2025    GLUC 102 03/20/2025    CALCIUM 9.6 03/20/2025    AST 10 (L) 03/20/2025    ALT 7 03/20/2025    ALKPHOS 68 03/20/2025    TP 7.0 03/20/2025    ALB 3.9 03/20/2025    TBILI 0.22 03/20/2025    CHOLESTEROL 250 (H) 03/20/2025    HDL 41 03/20/2025    TRIG 123 03/20/2025    LDLCALC 184 (H) 03/20/2025    NONHDLC 209 03/20/2025    [id number] 0.259 (L) 03/20/2025    FREET4 1.07 03/20/2025    SYPHILISAB Non-reactive 01/22/2025    HGBA1C 5.9 (H) 01/01/2024      01/01/2024       Discharge Medications:  [unfilled]  Discharge instructions/Information to patient and family:   See After Visit Summary for information provided to patient and family.      Provisions for Follow-Up Care:  See after visit summary for information related to follow-up care and any pertinent home health orders.      Discharge Statement:    I have spent a total time of 60 minutes in caring for this patient on the day of the visit/encounter.   >30 minutes of time was spent on: Diagnostic results, Prognosis, Risks and benefits of tx options, Instructions for management, Patient and family education, Importance of tx compliance, Risk factor reductions, Impressions, Counseling / Coordination of care, Documenting in the medical record, Reviewing / ordering tests, medicine, procedures  , and Communicating with other healthcare professionals .  I reviewed with Juan Diego importance of compliance with medications and outpatient treatment after discharge.  I discussed the medication regimen and possible side effects of the medications with Juan Diego prior to discharge. At the time of discharge he was tolerating psychiatric medications.  I discussed outpatient follow up with Juan Diego.  I reviewed with Juan Diego crisis plan and safety plan upon discharge.  Juan Digeo was transferred to an inpatient drug and alcohol rehabilitation facility [place]     Discharge on Two Antipsychotic Medications: No Brooke Mendelson, DO 04/04/25

## 2025-04-01 NOTE — ASSESSMENT & PLAN NOTE
Continue Subutex 8 mg 3 times daily   Patient transitioned to Subutex because he was endorsing abdominal pain with Suboxone   Discontinue clonidine as patient was placed on this in the past for withdrawal symptoms  Toxicology was consulted and provided recommendations  Substance cessation education and counseling   Patient spoke with his PO and she informed him that he is in violation of his parole. She explained to patient that he must comply with any outpatient recommendations by psych treatment team. Patient in agreement with inpatient rehab, awaiting bed availability

## 2025-04-01 NOTE — PROGRESS NOTES
Progress Note - Behavioral Health   Name: Juan Diego Horowitz 49 y.o. male I MRN: [id number]  Unit/Bed#: -01 I Date of Admission: 3/18/2025   Date of Service: 4/1/2025 I Hospital Day: 13    Assessment & Plan  Unspecified mood (affective) disorder, MDD with or without psychotic features vs Bipolar disorder  Juan Diego Horowitz is a 49 y.o. [place] male from [facility],  single, currently homeless, unemployed, receives social security, w/ PMH of recent hernia surgery and kidney cancer s/p nephrectomy and PPH of depression, bipolar disorder, ADHD, 1 prior psychiatric admissions, 5 prior SA, h/o self-injurious behavior with hitting head, currently 201 voluntary commitment who presented to  ED for suicidal ideations, auditory hallucinations, and recent suicide attempt by overdose.     Plan:   Continue Lexapro 20 mg daily for depressive symptoms.  Most recent Qtc was 446 on 3/18/2025  Can recheck QTc again if patient begins to have cardiac like symptoms that may raise concern for prolonged QT  Repeat EKG on 3/29 showed QTc 438  Discontinued Remeron after taper off from 30 mg nightly to 15 mg (due to previous suicide attempt overdosing on it)  Continue Abilify 5 mg daily started on 3/20  Consider transition to BAXTER Aristada once tolerated on PO, patient agrees with plan  Continue melatonin to 6 mg nightly for insomnia   Continue Atarax 50 mg hs for insomnia  Increase Gabapentin to 600 mg TID for anxiety   Patient asked for Seroquel or Remeron stating that medications like trazodone do not work for him.  In the setting of recent discontinuation of Remeron by primary psychiatry team, Remeron was not restarted or made a PRN .   Opioid use disorder, severe, dependence (HCC)  Continue Subutex 8 mg 3 times daily   Patient transitioned to Subutex because he was endorsing abdominal pain with Suboxone   Discontinue clonidine as patient was placed on this in the past for withdrawal symptoms  Toxicology was  consulted and provided recommendations  Substance cessation education and counseling   Patient spoke with his PO and she informed him that he may be in violation of his parole. She explained to patient that he must comply with any outpatient recommendations by psych treatment team. Patient in agreement with inpatient rehab, awaiting bed availability   Cocaine use  Per toxicology recommend contingency management or CBT   Substance cessation education and counseling   Right inguinal hernia  Recent inguinal and umbilical hernia surgery on 3/11/2025  Continue to monitor pain  Plan per medical   Tobacco use disorder  Nicotine patch daily   Medical clearance for psychiatric admission  Per medical   Hyperlipidemia  Per medical   Appetite loss  Appetite improving  Placed a nutrition consult  Patient also has infrequent bowel movements so ordered daily Miralax.     Current medications:  Current Facility-Administered Medications   Medication Dose Route Frequency Provider Last Rate    acetaminophen  650 mg Oral Q6H PRN Jael Mendelson, DO      acetaminophen  650 mg Oral Q4H PRN Jael Mendelson, DO      acetaminophen  975 mg Oral Q6H PRN Jael Mendelson, DO      aluminum-magnesium hydroxide-simethicone  30 mL Oral Q4H PRN Jael Mendelson, DO      ARIPiprazole  5 mg Oral Daily Jael Mendelson, DO      atorvastatin  10 mg Oral Daily With Dinner OLGA Whittington      benztropine  1 mg Intramuscular Q4H PRN Max 6/day Jael Mendelson, DO      benztropine  1 mg Oral Q4H PRN Max 6/day Jael Mendelson, DO      buprenorphine  8 mg Sublingual TID Yawal Jalinda, DO      Cholecalciferol  2,000 Units Oral Daily OLGA Whittington      cyanocobalamin  1,000 mcg Oral Daily OLGA Whittington      hydrOXYzine HCL  50 mg Oral Q6H PRN Max 4/day Brooke Mendelson, DO      Or    diphenhydrAMINE  50 mg Intramuscular Q6H PRN Jael Mendelson, DO      escitalopram  20 mg Oral Daily Gazal Jalinda, DO      gabapentin   300 mg Oral BID Jose Rodriguez MD      gabapentin  600 mg Oral HS Jose Rodriguez MD      hydrOXYzine HCL  100 mg Oral Q6H PRN Max 4/day Jael Mendelson, DO      Or    LORazepam  2 mg Intramuscular Q6H PRN Jael Mendelson, DO      hydrOXYzine HCL  25 mg Oral Q6H PRN Max 4/day Jael Mendelson, DO      hydrOXYzine HCL  50 mg Oral HS Gazal Jabir, DO      ibuprofen  400 mg Oral Q12H PRN OLGA Whittington      melatonin  3 mg Oral HS PRN Jael Mendelson, DO      melatonin  6 mg Oral HS Meera Bhatia, DO      OLANZapine  10 mg Oral Q3H PRN Max 3/day Jael Mendelson, DO      Or    OLANZapine  10 mg Intramuscular Q3H PRN Max 3/day Jael Mendelson, DO      OLANZapine  5 mg Oral Q3H PRN Max 6/day Jael Mendelson, DO      Or    OLANZapine  5 mg Intramuscular Q3H PRN Max 6/day Jael Mendelson, DO      OLANZapine  2.5 mg Oral Q3H PRN Max 8/day Jael Mendelson, DO      ondansetron  4 mg Oral Q8H PRN Tootie Leblanc PA-C      polyethylene glycol  17 g Oral Daily Gazal Jabir, DO      propranolol  10 mg Oral Q8H PRN Jael Mendelson, DO      senna-docusate sodium  1 tablet Oral Daily PRN Jael Mendelson, DO          Risks/Benefits of Treatment:     Risks, benefits, and possible side effects of medications explained to patient and patient verbalizes understanding and agreement for treatment.    Progress Toward Goals: improving    Treatment Planning:     All current active medications have been reviewed.  Continue to monitor response to treatment and assess for potential side effects of medications.  Encourage group therapy, milieu therapy and occupational therapy  Collaboration with medical service for medical comorbidities as indicated.  Behavioral Health checks for safety monitoring.    Estimated Discharge Day: 3/31/2025       Subjective     Behavior over the last 24 hours: slowly improving.     Slick [id number] utilized during interview.   Patient was visited on unit for continuing  "care; chart reviewed and discussed with multidisciplinary treatment team.  On approach, the patient was calm and cooperative. Patient reports anxiety during the day, writer educated patient on current medications. He is in agreement to increase Gabapentin for anxiety symptoms. He does not want to take all the vitamins in the AM, writer discussed reasoning for vitamins and encouraged him to take them if possible. No problem initiating and maintaining sleep.  Denied A/VH currently.  Denied SI/HI, intent or plan upon direct inquiry at this time.    Patient continues to be intermittently visible in the milieu and interacts with select staff and peers. No reports of aggression or self-injurious behavior on unit. PRN atarax 100 mg yesterday for anxiety that was effective.     Patient accepted all offered medications and no adverse effects of medications noted or reported.    Sleep: normal, 8 hours   Appetite: improved  Medication side effects: No  ROS: review of systems as noted above in HPI/Subjective report, all other systems are negative    Objective :  Temp:  [97.4 °F (36.3 °C)-98 °F (36.7 °C)] 97.4 °F (36.3 °C)  HR:  [61-67] 61  BP: (110-124)/(66-82) 124/82  Resp:  [17] 17  SpO2:  [94 %-99 %] 99 %  O2 Device: None (Room air)    Temp:  [97.4 °F (36.3 °C)-98 °F (36.7 °C)] 97.4 °F (36.3 °C)  HR:  [61-67] 61  BP: (110-124)/(66-82) 124/82  Resp:  [17] 17  SpO2:  [94 %-99 %] 99 %  O2 Device: None (Room air)    Mental Status Evaluation:    Appearance:  casually dressed, marginal hygiene, looks older than stated age, bald, overtly appearing  male    Behavior:  pleasant, cooperative, calm, intermittent eye contact   Speech:  normal rate, clear, soft   Mood:  \"A little better\"   Affect:  constricted, slightly brighter    Thought Process:  goal directed, linear   Thought Content:  no overt delusions   Perceptual Disturbances: denies auditory or visual hallucinations when asked, but appears distracted, does not appear " responding to internal stimuli   Risk Potential: Suicidal Ideation -   denies  Homicidal Ideation -   denies  Potential for Aggression - Not at present   Sensorium:  oriented to person, place, and situation   Memory:  recent and remote memory grossly intact   Consciousness:  alert and awake   Attention/Concentration: attention span and concentration are improving   Insight:  improving   Judgment: improving   Gait/Station: normal gait/station, normal balance   Motor Activity: no abnormal movements       Lab Results: I have reviewed the following results:      Administrative Statements     Counseling / Coordination of Care:   Patient's progress discussed with staff in treatment team meeting.  Medication changes reviewed with staff in treatment team meeting..    Brooke Mendelson, DO 04/01/25

## 2025-04-01 NOTE — ASSESSMENT & PLAN NOTE
Juan Diego Horowitz is a 49 y.o. [place] male from [facility],  single, currently homeless, unemployed, receives social security, w/ PMH of recent hernia surgery and kidney cancer s/p nephrectomy and PPH of depression, bipolar disorder, ADHD, 1 prior psychiatric admissions, 5 prior SA, h/o self-injurious behavior with hitting head, currently 201 voluntary commitment who presented to  ED for suicidal ideations, auditory hallucinations, and recent suicide attempt by overdose.     Plan:   Continue Lexapro 20 mg daily for depressive symptoms.  Most recent Qtc was 446 on 3/18/2025  Can recheck QTc again if patient begins to have cardiac like symptoms that may raise concern for prolonged QT  Repeat EKG on 3/29 showed QTc 438  Discontinued Remeron after taper off from 30 mg nightly to 15 mg (due to previous suicide attempt overdosing on it)  Continue Abilify 5 mg daily started on 3/20  Consider transition to BAXTER Aristada once tolerated on PO, patient agrees with plan  Continue melatonin to 6 mg nightly for insomnia   Continue Atarax 50 mg hs for insomnia  Increase Gabapentin to 600 mg TID for anxiety   Patient asked for Seroquel or Remeron stating that medications like trazodone do not work for him.  In the setting of recent discontinuation of Remeron by primary psychiatry team, Remeron was not restarted or made a PRN .

## 2025-04-01 NOTE — ASSESSMENT & PLAN NOTE
Juan Diego Horowitz is a 49 y.o. [place] male from [facility],  single, currently homeless, unemployed, receives social security, w/ PMH of recent hernia surgery and kidney cancer s/p nephrectomy and PPH of depression, bipolar disorder, ADHD, 1 prior psychiatric admissions, 5 prior SA, h/o self-injurious behavior with hitting head, currently 201 voluntary commitment who presented to  ED for suicidal ideations, auditory hallucinations, and recent suicide attempt by overdose.      Plan:   Continue Lexapro 20 mg daily for depressive symptoms.  Most recent Qtc was 445 on 4/2/2025  Can recheck QTc again if patient begins to have cardiac like symptoms that may raise concern for prolonged QT  Discontinued Remeron after taper off from 30 mg nightly to 15 mg (due to previous suicide attempt overdosing on it)  Continue Abilify 5 mg daily started on 3/20  Continue Propranolol 10 mg BID for akathisia/restlessness  Continue melatonin to 6 mg nightly for insomnia   Continue Atarax 50 mg hs for anxiety  Continue Gabapentin to 600 mg TID for anxiety   Patient asked for Seroquel or Remeron stating that medications like trazodone do not work for him.  In the setting of recent discontinuation of Remeron by primary psychiatry team, Remeron was not restarted or made a PRN .

## 2025-04-01 NOTE — PROGRESS NOTES
04/01/25 1553   Team Meeting   Meeting Type Daily Rounds   Team Members Present   Team Members Present Physician;Nurse;   Physician Team Member Jennifer   [facility]   Care Management Team Member Olamide   Patient/Family Present   Patient Present No   Patient's Family Present No     -  Pt was given Atarax and that was affective. Pt denies SI/HI/AVH. Pt is medication and meal compliant. Pt's discharge is pending rehab placement.

## 2025-04-02 LAB
ATRIAL RATE: 67 BPM
P AXIS: 15 DEGREES
PR INTERVAL: 150 MS
QRS AXIS: 5 DEGREES
QRSD INTERVAL: 88 MS
QT INTERVAL: 416 MS
QTC INTERVAL: 440 MS
T WAVE AXIS: 44 DEGREES
VENTRICULAR RATE: 67 BPM

## 2025-04-02 PROCEDURE — 93010 ELECTROCARDIOGRAM REPORT: CPT | Performed by: INTERNAL MEDICINE

## 2025-04-02 PROCEDURE — 93005 ELECTROCARDIOGRAM TRACING: CPT

## 2025-04-02 PROCEDURE — 99232 SBSQ HOSP IP/OBS MODERATE 35: CPT | Performed by: PSYCHIATRY & NEUROLOGY

## 2025-04-02 RX ORDER — AMOXICILLIN 250 MG
1 CAPSULE ORAL
Qty: 30 TABLET | Refills: 0 | Status: SHIPPED | OUTPATIENT
Start: 2025-04-02 | End: 2025-04-04

## 2025-04-02 RX ORDER — PROPRANOLOL HYDROCHLORIDE 10 MG/1
10 TABLET ORAL DAILY PRN
Status: DISCONTINUED | OUTPATIENT
Start: 2025-04-02 | End: 2025-04-04 | Stop reason: HOSPADM

## 2025-04-02 RX ORDER — AMOXICILLIN 250 MG
1 CAPSULE ORAL
Status: DISCONTINUED | OUTPATIENT
Start: 2025-04-02 | End: 2025-04-04 | Stop reason: HOSPADM

## 2025-04-02 RX ORDER — HYDROXYZINE HYDROCHLORIDE 50 MG/1
50 TABLET, FILM COATED ORAL
Qty: 30 TABLET | Refills: 0 | Status: SHIPPED | OUTPATIENT
Start: 2025-04-02 | End: 2025-04-04

## 2025-04-02 RX ORDER — ARIPIPRAZOLE 5 MG/1
5 TABLET ORAL DAILY
Qty: 30 TABLET | Refills: 0 | Status: SHIPPED | OUTPATIENT
Start: 2025-04-02 | End: 2025-04-04

## 2025-04-02 RX ORDER — BUPRENORPHINE 8 MG/1
8 TABLET SUBLINGUAL 3 TIMES DAILY
Qty: 30 TABLET | Refills: 0 | Status: SHIPPED | OUTPATIENT
Start: 2025-04-02 | End: 2025-04-04

## 2025-04-02 RX ORDER — GABAPENTIN 300 MG/1
600 CAPSULE ORAL 3 TIMES DAILY
Qty: 180 CAPSULE | Refills: 0 | Status: SHIPPED | OUTPATIENT
Start: 2025-04-02 | End: 2025-04-04

## 2025-04-02 RX ORDER — PROPRANOLOL HYDROCHLORIDE 10 MG/1
10 TABLET ORAL EVERY 12 HOURS SCHEDULED
Status: DISCONTINUED | OUTPATIENT
Start: 2025-04-02 | End: 2025-04-03

## 2025-04-02 RX ORDER — ATORVASTATIN CALCIUM 10 MG/1
10 TABLET, FILM COATED ORAL
Qty: 30 TABLET | Refills: 0 | Status: SHIPPED | OUTPATIENT
Start: 2025-04-02 | End: 2025-04-04

## 2025-04-02 RX ORDER — ESCITALOPRAM OXALATE 20 MG/1
20 TABLET ORAL DAILY
Qty: 30 TABLET | Refills: 0 | Status: SHIPPED | OUTPATIENT
Start: 2025-04-03 | End: 2025-04-04

## 2025-04-02 RX ADMIN — GABAPENTIN 600 MG: 300 CAPSULE ORAL at 16:12

## 2025-04-02 RX ADMIN — PROPRANOLOL HYDROCHLORIDE 10 MG: 10 TABLET ORAL at 21:10

## 2025-04-02 RX ADMIN — ESCITALOPRAM OXALATE 20 MG: 10 TABLET ORAL at 08:43

## 2025-04-02 RX ADMIN — BUPRENORPHINE HCL 8 MG: 8 TABLET SUBLINGUAL at 21:10

## 2025-04-02 RX ADMIN — ATORVASTATIN CALCIUM 10 MG: 10 TABLET, FILM COATED ORAL at 16:12

## 2025-04-02 RX ADMIN — BUPRENORPHINE HCL 8 MG: 8 TABLET SUBLINGUAL at 16:12

## 2025-04-02 RX ADMIN — PROPRANOLOL HYDROCHLORIDE 10 MG: 10 TABLET ORAL at 11:31

## 2025-04-02 RX ADMIN — Medication 2000 UNITS: at 08:43

## 2025-04-02 RX ADMIN — CYANOCOBALAMIN TAB 1000 MCG 1000 MCG: 1000 TAB at 08:44

## 2025-04-02 RX ADMIN — Medication 6 MG: at 21:10

## 2025-04-02 RX ADMIN — SENNOSIDES AND DOCUSATE SODIUM 1 TABLET: 50; 8.6 TABLET ORAL at 21:10

## 2025-04-02 RX ADMIN — BUPRENORPHINE HCL 8 MG: 8 TABLET SUBLINGUAL at 08:43

## 2025-04-02 RX ADMIN — GABAPENTIN 600 MG: 300 CAPSULE ORAL at 08:43

## 2025-04-02 RX ADMIN — HYDROXYZINE HYDROCHLORIDE 50 MG: 50 TABLET, FILM COATED ORAL at 22:15

## 2025-04-02 RX ADMIN — ARIPIPRAZOLE 5 MG: 5 TABLET ORAL at 08:44

## 2025-04-02 RX ADMIN — GABAPENTIN 600 MG: 300 CAPSULE ORAL at 21:10

## 2025-04-02 NOTE — PROGRESS NOTES
04/02/25 0837   Team Meeting   Meeting Type Daily Rounds   Team Members Present   Team Members Present Physician;;Nurse   Physician Team Member Jennifer   [facility] Management Team Member Olamide   Patient/Family Present   Patient Present No   Patient's Family Present No     Pt reports decrease in anxiety after PRN. Pt denied SI/HI/AVH. Pt is calm and cooperative. Pt is medication and meal compliant. Pt's discharge is pending rehab placement.

## 2025-04-02 NOTE — BH TRANSITION RECORD
"Contact Information: If you have any questions, concerns, pended studies, tests and/or procedures, or emergencies regarding your inpatient behavioral health visit. Please contact [facility] [phone] and ask to speak to a , nurse or physician. A contact is available 24 hours/ 7 days a week at this number.     Summary of Procedures Performed During your Stay:  Below is a list of major procedures performed during your hospital stay and a summary of results:    - Cardiac Procedures/Studies:   EKG 3/18/2025: normal sinus rhythm qtc 446  EKG 3/29/2025 sinus bradycardia, incomplete RBBB, qtc 430  EKG 3/29/2025 normal sinus rhythm, incomplete RBBB, qtc 438  EKG 4/2/2025 normal sinus rhythm qtc 440    - Major Imaging Studies:   US scrotum and testicles 3/18/2025  \"bilateral testes unremarkable, trace right hydrocele containing debris, bilateral epidiymal appendices, no evidence of torsion\"    CT CAP with contrast 3/18/2025  \"No focal airspace consolidation or effusion. Postsurgical changes of recent right inguinal and umbilical hernia repair (POD day #7) with moderate degree of edematous/inflammatory fat stranding in the right lower quadrant extending into the inguinal canal as above. No free intraperitoneal air,   discrete organizing collection, or hematoma identified. Clinical correlation and surgical follow-up recommended. Subcutaneous infiltrative changes in the umbilical region with singular tiny focus of soft tissue gas, presumably postsurgical. Clinical correlation for superimposed infection/cellulitis recommended. Cholelithiasis.\"    US RUQ 4/3/2025   \"Multiple gallstones. No gallbladder wall thickening or pericholecystic fluid. Negative sonographic Barnett's sign.\"      If studies are pending at discharge, follow up with your PCP and/or referring provider.    "

## 2025-04-02 NOTE — NURSING NOTE
Patient is visible and social on unit. Denies SI/HI/AVH. Medication and meal compliant. Denies any unmet needs at this time.

## 2025-04-02 NOTE — NURSING NOTE
Pt withdrawn to room, able to verbalize needs. Calm and cooperative. Denies all symptoms. Adherent with scheduled medications with exception to atarax 50mg, which pt refused. Q15 minute checks maintained for safety.

## 2025-04-02 NOTE — PROGRESS NOTES
Progress Note - Behavioral Health   Name: Juan Diego Horowitz 49 y.o. male I MRN: [id number]  Unit/Bed#: -01 I Date of Admission: 3/18/2025   Date of Service: 4/2/2025 I Hospital Day: 14    Assessment & Plan  Unspecified mood (affective) disorder, MDD with or without psychotic features vs Bipolar disorder  Juan Diego Horowitz is a 49 y.o. Citizen of [place] male from [facility],  single, currently homeless, unemployed, receives social security, w/ PMH of recent hernia surgery and kidney cancer s/p nephrectomy and PPH of depression, bipolar disorder, ADHD, 1 prior psychiatric admissions, 5 prior SA, h/o self-injurious behavior with hitting head, currently 201 voluntary commitment who presented to  ED for suicidal ideations, auditory hallucinations, and recent suicide attempt by overdose.     Plan:   Continue Lexapro 20 mg daily for depressive symptoms.  Most recent Qtc was 445 on 4/2/2025  Can recheck QTc again if patient begins to have cardiac like symptoms that may raise concern for prolonged QT  Discontinued Remeron after taper off from 30 mg nightly to 15 mg (due to previous suicide attempt overdosing on it)  Continue Abilify 5 mg daily started on 3/20  Patient with signs of akathisia/restlessness today, will start scheduled propranolol due to effectiveness today at 10 mg BID   Continue melatonin to 6 mg nightly for insomnia   Discontinue Atarax 50 mg hs for insomnia as patient reports this medication has been making him feel more anxious, will leave PRN atarax if patient feels he requires this for anxiety   Continue Gabapentin to 600 mg TID for anxiety   Patient asked for Seroquel or Remeron stating that medications like trazodone do not work for him.  In the setting of recent discontinuation of Remeron by primary psychiatry team, Remeron was not restarted or made a PRN .   Opioid use disorder, severe, dependence (HCC)  Continue Subutex 8 mg 3 times daily   Patient transitioned to Subutex because he  was endorsing abdominal pain with Suboxone   Discontinue clonidine as patient was placed on this in the past for withdrawal symptoms  Toxicology was consulted and provided recommendations  Substance cessation education and counseling   Patient spoke with his PO and she informed him that he is in violation of his parole. She explained to patient that he must comply with any outpatient recommendations by psych treatment team. Patient in agreement with inpatient rehab, awaiting bed availability   Cocaine use  Per toxicology recommend contingency management or CBT   Substance cessation education and counseling   Right inguinal hernia  Recent inguinal and umbilical hernia surgery on 3/11/2025  Continue to monitor pain  Plan per medical   Tobacco use disorder  Nicotine patch daily   Medical clearance for psychiatric admission  Per medical   Hyperlipidemia  Per medical   Appetite loss  Appetite improving  Placed a nutrition consult  Patient also has infrequent bowel movements so ordered daily Miralax.     Current medications:  Current Facility-Administered Medications   Medication Dose Route Frequency Provider Last Rate    acetaminophen  650 mg Oral Q6H PRN Jael Mendelson, DO      acetaminophen  650 mg Oral Q4H PRN Jael Mendelson, DO      acetaminophen  975 mg Oral Q6H PRN Jael Mendelson, DO      aluminum-magnesium hydroxide-simethicone  30 mL Oral Q4H PRN Jael Mendelson, DO      ARIPiprazole  5 mg Oral Daily Jael Mendelson, DO      atorvastatin  10 mg Oral Daily With Dinner OLGA Whittington      benztropine  1 mg Intramuscular Q4H PRN Max 6/day Jael Mendelson, DO      benztropine  1 mg Oral Q4H PRN Max 6/day Jael Mendelson, DO      buprenorphine  8 mg Sublingual TID Magui Good, DO      Cholecalciferol  2,000 Units Oral Daily OLGA Whittington      cyanocobalamin  1,000 mcg Oral Daily OLGA Whittington      hydrOXYzine HCL  50 mg Oral Q6H PRN Max 4/day Jael Mendelson, DO       Or    diphenhydrAMINE  50 mg Intramuscular Q6H PRN Jael Mendelson, DO      escitalopram  20 mg Oral Daily Gazal Jalinda, DO      gabapentin  600 mg Oral TID Jael Mendelson, DO      hydrOXYzine HCL  100 mg Oral Q6H PRN Max 4/day Jael Mendelson, DO      Or    LORazepam  2 mg Intramuscular Q6H PRN Jael Mendelson, DO      hydrOXYzine HCL  25 mg Oral Q6H PRN Max 4/day Jael Mendelson, DO      hydrOXYzine HCL  50 mg Oral HS Gazal Jabir, DO      ibuprofen  400 mg Oral Q12H PRN OLGA Whittington      melatonin  3 mg Oral HS PRN Jael Mendelson, DO      melatonin  6 mg Oral HS Meera Bhatia, DO      OLANZapine  10 mg Oral Q3H PRN Max 3/day Jael Mendelson, DO      Or    OLANZapine  10 mg Intramuscular Q3H PRN Max 3/day Jael Mendelson, DO      OLANZapine  5 mg Oral Q3H PRN Max 6/day Jael Mendelson, DO      Or    OLANZapine  5 mg Intramuscular Q3H PRN Max 6/day Jael Mendelson, DO      OLANZapine  2.5 mg Oral Q3H PRN Max 8/day Jael Mendelson, DO      ondansetron  4 mg Oral Q8H PRN Tootie Leblanc PA-C      polyethylene glycol  17 g Oral Daily Gazal Jalinda, DO      propranolol  10 mg Oral Q8H PRN Jael Mendelson, DO      senna-docusate sodium  1 tablet Oral Daily PRN Jael Mendelson, DO          Risks/Benefits of Treatment:     Risks, benefits, and possible side effects of medications explained to patient and patient verbalizes understanding and agreement for treatment.    Progress Toward Goals: progressing    Treatment Planning:     All current active medications have been reviewed.  Continue to monitor response to treatment and assess for potential side effects of medications.  Encourage group therapy, milieu therapy and occupational therapy  Collaboration with medical service for medical comorbidities as indicated.  Behavioral Health checks for safety monitoring.    Estimated Discharge Day: 3/31/2025       Subjective     Behavior over the last 24 hours: slowly improving.    Utilized  " [id number].     Patient was visited on unit for continuing care; chart reviewed and discussed with multidisciplinary treatment team.  On approach, the patient was calm, pleasant and cooperative. He reports anxiety during day with inability to sit still, PRN propranolol effective today, patient agrees to schedule this medication. Patient reports as needed atarax or the \"small yellow pill\" makes his anxiety worse, will discontinue nighttime dose and monitor for insomnia. During the beginning of interview patient reports feeling calm however by the end of interview he felt more anxious rocking back and forth in chair. Last bowel movement yesterday. Patient agrees with senokot in place of miralax for bowel regimen. Denied any changes in appetite, and energy level. No problem initiating and maintaining sleep.  Denied A/VH currently.  Denied SI/HI, intent or plan upon direct inquiry at this time.    Patient continues to be intermittently visible in the milieu and interacts with select staff and peers. No reports of aggression or self-injurious behavior on unit.  Received prn propranolol for restlessness and anxiety.    Patient accepted all offered medications and no adverse effects of medications noted or reported.    Sleep: normal  Appetite: normal  Medication side effects: restlessness   ROS: review of systems as noted above in HPI/Subjective report, all other systems are negative    Objective :  Temp:  [97.6 °F (36.4 °C)-98.2 °F (36.8 °C)] 97.6 °F (36.4 °C)  HR:  [59-71] 59  BP: (112-137)/(58-74) 137/74  Resp:  [16] 16  SpO2:  [98 %] 98 %  O2 Device: None (Room air)    Temp:  [97.6 °F (36.4 °C)-98.2 °F (36.8 °C)] 97.6 °F (36.4 °C)  HR:  [59-71] 59  BP: (112-137)/(58-74) 137/74  Resp:  [16] 16  SpO2:  [98 %] 98 %  O2 Device: None (Room air)    Mental Status Evaluation:    Appearance:  casually dressed, marginal hygiene, looks older than stated age, bald, overtly appearing  male   Behavior: " " cooperative, calm, fair eye contact    Speech:  normal rate, normal volume, spontaneous, clear   Mood:  \"Good\"   Affect:  Anxious, constricted   Thought Process:  goal directed, linear   Thought Content:  no overt delusions   Perceptual Disturbances: denies auditory or visual hallucinations when asked, but appears distracted, does not appear responding to internal stimuli   Risk Potential: Suicidal Ideation -   denies at time of interview   Homicidal Ideation -   denies at time of interview   Potential for Aggression - Not at present   Sensorium:  oriented to person, place, and situation   Memory:  recent and remote memory grossly intact   Consciousness:  alert and awake   Attention/Concentration: attention span and concentration are improving   Insight:  fair   Judgment: limited   Gait/Station: normal gait/station, normal balance   Motor Activity: no abnormal movements       Lab Results: I have reviewed the following results:      Administrative Statements     Counseling / Coordination of Care:   Patient's progress discussed with staff in treatment team meeting.  Medication changes reviewed with staff in treatment team meeting..    Brooke Mendelson, DO 04/02/25  "

## 2025-04-02 NOTE — ASSESSMENT & PLAN NOTE
Juan Diego Horowitz is a 49 y.o. [place] male from [facility],  single, currently homeless, unemployed, receives social security, w/ PMH of recent hernia surgery and kidney cancer s/p nephrectomy and PPH of depression, bipolar disorder, ADHD, 1 prior psychiatric admissions, 5 prior SA, h/o self-injurious behavior with hitting head, currently 201 voluntary commitment who presented to  ED for suicidal ideations, auditory hallucinations, and recent suicide attempt by overdose.     Plan:   Continue Lexapro 20 mg daily for depressive symptoms.  Most recent Qtc was 445 on 4/2/2025  Can recheck QTc again if patient begins to have cardiac like symptoms that may raise concern for prolonged QT  Discontinued Remeron after taper off from 30 mg nightly to 15 mg (due to previous suicide attempt overdosing on it)  Continue Abilify 5 mg daily started on 3/20  Patient with signs of akathisia/restlessness today, will start scheduled propranolol due to effectiveness today at 10 mg BID   Continue melatonin to 6 mg nightly for insomnia   Discontinue Atarax 50 mg hs for insomnia as patient reports this medication has been making him feel more anxious, will leave PRN atarax if patient feels he requires this for anxiety   Continue Gabapentin to 600 mg TID for anxiety   Patient asked for Seroquel or Remeron stating that medications like trazodone do not work for him.  In the setting of recent discontinuation of Remeron by primary psychiatry team, Remeron was not restarted or made a PRN .

## 2025-04-02 NOTE — SOCIAL WORK
Cm informed Lashay Veloz that Pt is able to be [facility] funded for IP rehab.     Cm informed HOST that Pt is able to be [facility] funded.     Cm called [facility] and reported that pt is now [facility] funded. [facility] reported they would call Cm back.     Supervisor called and reported they do not contract with Medicare. Cm reported to supervisor that Pt is [facility] funded.  Supervisor reported she needs to confirm with [facility] that Pt is actually [facility] funded. Supervisor reported she will call back.     Supervisor called requesting updated clinicals and to speak with Pt. Pt was transferred on the phone and completed pre-screen with [facility].     [facility]ro faxed clinicals to [facility]. Fax # [phone].     [facility] denied Pt due to needing a higher level of care.     Negro sent updated clinicals to [facility].

## 2025-04-03 ENCOUNTER — APPOINTMENT (INPATIENT)
Dept: ULTRASOUND IMAGING | Facility: HOSPITAL | Age: 50
DRG: 885 | End: 2025-04-03
Payer: COMMERCIAL

## 2025-04-03 PROCEDURE — 76705 ECHO EXAM OF ABDOMEN: CPT

## 2025-04-03 PROCEDURE — 99232 SBSQ HOSP IP/OBS MODERATE 35: CPT | Performed by: PSYCHIATRY & NEUROLOGY

## 2025-04-03 RX ORDER — HYDROXYZINE HYDROCHLORIDE 50 MG/1
50 TABLET, FILM COATED ORAL
Status: DISCONTINUED | OUTPATIENT
Start: 2025-04-03 | End: 2025-04-04 | Stop reason: HOSPADM

## 2025-04-03 RX ORDER — PROPRANOLOL HYDROCHLORIDE 10 MG/1
10 TABLET ORAL EVERY 12 HOURS SCHEDULED
Qty: 60 TABLET | Refills: 0 | Status: SHIPPED | OUTPATIENT
Start: 2025-04-03 | End: 2025-04-04

## 2025-04-03 RX ORDER — PROPRANOLOL HYDROCHLORIDE 10 MG/1
10 TABLET ORAL EVERY 12 HOURS SCHEDULED
Status: DISCONTINUED | OUTPATIENT
Start: 2025-04-03 | End: 2025-04-04 | Stop reason: HOSPADM

## 2025-04-03 RX ADMIN — ESCITALOPRAM OXALATE 20 MG: 10 TABLET ORAL at 08:37

## 2025-04-03 RX ADMIN — SENNOSIDES AND DOCUSATE SODIUM 1 TABLET: 50; 8.6 TABLET ORAL at 21:08

## 2025-04-03 RX ADMIN — ATORVASTATIN CALCIUM 10 MG: 10 TABLET, FILM COATED ORAL at 15:49

## 2025-04-03 RX ADMIN — GABAPENTIN 600 MG: 300 CAPSULE ORAL at 08:37

## 2025-04-03 RX ADMIN — HYDROXYZINE HYDROCHLORIDE 50 MG: 50 TABLET, FILM COATED ORAL at 19:17

## 2025-04-03 RX ADMIN — ARIPIPRAZOLE 5 MG: 5 TABLET ORAL at 08:38

## 2025-04-03 RX ADMIN — CYANOCOBALAMIN TAB 1000 MCG 1000 MCG: 1000 TAB at 08:38

## 2025-04-03 RX ADMIN — BUPRENORPHINE HCL 8 MG: 8 TABLET SUBLINGUAL at 08:37

## 2025-04-03 RX ADMIN — BUPRENORPHINE HCL 8 MG: 8 TABLET SUBLINGUAL at 21:08

## 2025-04-03 RX ADMIN — HYDROXYZINE HYDROCHLORIDE 50 MG: 50 TABLET, FILM COATED ORAL at 22:04

## 2025-04-03 RX ADMIN — IBUPROFEN 400 MG: 400 TABLET, FILM COATED ORAL at 13:05

## 2025-04-03 RX ADMIN — PROPRANOLOL HYDROCHLORIDE 10 MG: 10 TABLET ORAL at 13:04

## 2025-04-03 RX ADMIN — PROPRANOLOL HYDROCHLORIDE 10 MG: 10 TABLET ORAL at 21:10

## 2025-04-03 RX ADMIN — GABAPENTIN 600 MG: 300 CAPSULE ORAL at 15:49

## 2025-04-03 RX ADMIN — GABAPENTIN 600 MG: 300 CAPSULE ORAL at 21:08

## 2025-04-03 RX ADMIN — Medication 2000 UNITS: at 08:37

## 2025-04-03 RX ADMIN — BUPRENORPHINE HCL 8 MG: 8 TABLET SUBLINGUAL at 15:48

## 2025-04-03 RX ADMIN — Medication 6 MG: at 21:08

## 2025-04-03 NOTE — NURSING NOTE
Patient is calm and cooperative this shift.  Medication and meal compliant.  Visible in the milieu.  Attending groups.  Denies depression, anxiety, SI/HI and AH/VH to this writer.  No behavioral issues noted.  Patient complained of right upper quadrant pain to his medical provider.  RUQ US ordered for 1700.  Patient given 400 mg of motrin at 1305 for pain level of 7/10 in that RUQ.  Upon reassessment the patient reports his pain level decreased to a 4.   Safety precautions maintained.

## 2025-04-03 NOTE — PROGRESS NOTES
Progress Note - Behavioral Health   Name: Juan Diego Horowitz 49 y.o. male I MRN: [id number]  Unit/Bed#: -01 I Date of Admission: 3/18/2025   Date of Service: 4/3/2025 I Hospital Day: 15    Assessment & Plan  Unspecified mood (affective) disorder, MDD with or without psychotic features vs Bipolar disorder  Juan Diego Horowitz is a 49 y.o. [place] male from [facility],  single, currently homeless, unemployed, receives social security, w/ PMH of recent hernia surgery and kidney cancer s/p nephrectomy and PPH of depression, bipolar disorder, ADHD, 1 prior psychiatric admissions, 5 prior SA, h/o self-injurious behavior with hitting head, currently 201 voluntary commitment who presented to  ED for suicidal ideations, auditory hallucinations, and recent suicide attempt by overdose.     Plan:   Continue Lexapro 20 mg daily for depressive symptoms.  Most recent Qtc was 445 on 4/2/2025  Can recheck QTc again if patient begins to have cardiac like symptoms that may raise concern for prolonged QT  Discontinued Remeron after taper off from 30 mg nightly to 15 mg (due to previous suicide attempt overdosing on it)  Continue Abilify 5 mg daily started on 3/20  Continue Propranolol 10 mg BID for akathisia/restlessness  Continue melatonin to 6 mg nightly for insomnia   Restart Atarax 50 mg hs for anxiety  Continue Gabapentin to 600 mg TID for anxiety   Patient asked for Seroquel or Remeron stating that medications like trazodone do not work for him.  In the setting of recent discontinuation of Remeron by primary psychiatry team, Remeron was not restarted or made a PRN .   Opioid use disorder, severe, dependence (HCC)  Continue Subutex 8 mg 3 times daily   Patient transitioned to Subutex because he was endorsing abdominal pain with Suboxone   Discontinue clonidine as patient was placed on this in the past for withdrawal symptoms  Toxicology was consulted and provided recommendations  Substance cessation  education and counseling   Patient spoke with his PO and she informed him that he is in violation of his parole. She explained to patient that he must comply with any outpatient recommendations by psych treatment team. Patient in agreement with inpatient rehab, awaiting bed availability   Cocaine use  Per toxicology recommend contingency management or CBT   Substance cessation education and counseling   Right inguinal hernia  Recent inguinal and umbilical hernia surgery on 3/11/2025  Continue to monitor pain  Plan per medical   Tobacco use disorder  Nicotine patch daily   Medical clearance for psychiatric admission  Per medical   Hyperlipidemia  Per medical   Appetite loss  Appetite improving  Placed a nutrition consult  Patient also has infrequent bowel movements so ordered daily Miralax.     Current medications:  Current Facility-Administered Medications   Medication Dose Route Frequency Provider Last Rate    acetaminophen  650 mg Oral Q6H PRN Jael Mendelson, DO      acetaminophen  650 mg Oral Q4H PRN Brooke Mendelson, DO      acetaminophen  975 mg Oral Q6H PRN Brooke Mendelson, DO      aluminum-magnesium hydroxide-simethicone  30 mL Oral Q4H PRN Brooke Mendelson, DO      ARIPiprazole  5 mg Oral Daily Brooke Mendelson, DO      atorvastatin  10 mg Oral Daily With Dinner OLGA Whittington      benztropine  1 mg Intramuscular Q4H PRN Max 6/day Brooke Mendelson, DO      benztropine  1 mg Oral Q4H PRN Max 6/day Brooke Mendelson, DO      buprenorphine  8 mg Sublingual TID Magui Good, DO      Cholecalciferol  2,000 Units Oral Daily OLGA Whittington      cyanocobalamin  1,000 mcg Oral Daily OLGA Whittington      hydrOXYzine HCL  50 mg Oral Q6H PRN Max 4/day Brooke Mendelson, DO      Or    diphenhydrAMINE  50 mg Intramuscular Q6H PRN Brooke Mendelson, DO      escitalopram  20 mg Oral Daily Yawal Jalinda, DO      gabapentin  600 mg Oral TID Brooke Mendelson, DO      hydrOXYzine HCL  100  mg Oral Q6H PRN Max 4/day Jael Mendelson, DO      Or    LORazepam  2 mg Intramuscular Q6H PRN Jael Mendelson, DO      hydrOXYzine HCL  25 mg Oral Q6H PRN Max 4/day Jael Mendelson, DO      ibuprofen  400 mg Oral Q12H PRN OLGA Whittington      melatonin  3 mg Oral HS PRN Jael Mendelson, DO      melatonin  6 mg Oral HS Meera Bhatia, DO      OLANZapine  10 mg Oral Q3H PRN Max 3/day Jael Mendelson, DO      Or    OLANZapine  10 mg Intramuscular Q3H PRN Max 3/day Jael Mendelson, DO      OLANZapine  5 mg Oral Q3H PRN Max 6/day Jael Mendelson, DO      Or    OLANZapine  5 mg Intramuscular Q3H PRN Max 6/day Jael Mendelson, DO      OLANZapine  2.5 mg Oral Q3H PRN Max 8/day Jael Mendelson, DO      ondansetron  4 mg Oral Q8H PRN Tootie Leblanc PA-C      propranolol  10 mg Oral Q12H IVANA Jael Mendelson, DO      propranolol  10 mg Oral Daily PRN Jael Mendelson, DO      senna-docusate sodium  1 tablet Oral Daily PRN Jael Mendelson, DO      senna-docusate sodium  1 tablet Oral HS Jael Mendelson, DO          Risks/Benefits of Treatment:     Risks, benefits, and possible side effects of medications explained to patient and patient verbalizes understanding and agreement for treatment.    Progress Toward Goals: progressing    Treatment Planning:     All current active medications have been reviewed.  Continue to monitor response to treatment and assess for potential side effects of medications.  Encourage group therapy, milieu therapy and occupational therapy  Collaboration with medical service for medical comorbidities as indicated.  Behavioral Health checks for safety monitoring.    Estimated Discharge Day: 4/4/2025       Subjective     Behavior over the last 24 hours: improving.    Patient was visited on unit for continuing care; chart reviewed and discussed with multidisciplinary treatment team.  On approach, the patient was calm, pleasant and cooperative. Endorsed better mood and less  "anxiety sxs. He reports Propranolol is going well and his restlessness has improved from yesterday. Denied any changes in appetite, and energy level. Patient utilized atarax 50 mg last night for anxiety, patient agrees to restart that medication at night. No problem initiating and maintaining sleep.  Denied A/VH currently.  Denied SI/HI, intent or plan upon direct inquiry at this time.    Patient continues to be intermittently visible in the milieu and interacts with select staff and peers. No reports of aggression or self-injurious behavior on unit.  Received Atarax 50 mg last night for anxiety before bed.    Patient accepted all offered medications and no adverse effects of medications noted or reported.    Sleep: improved  Appetite: normal  Medication side effects: No  ROS: review of systems as noted above in HPI/Subjective report, all other systems are negative    Objective :  Temp:  [97.7 °F (36.5 °C)-98.4 °F (36.9 °C)] 98.4 °F (36.9 °C)  HR:  [62-68] 66  BP: (106-127)/(63-68) 106/64  Resp:  [16] 16  SpO2:  [97 %-98 %] 98 %  O2 Device: None (Room air)    Temp:  [97.7 °F (36.5 °C)-98.4 °F (36.9 °C)] 98.4 °F (36.9 °C)  HR:  [62-68] 66  BP: (106-127)/(63-68) 106/64  Resp:  [16] 16  SpO2:  [97 %-98 %] 98 %  O2 Device: None (Room air)    Mental Status Evaluation:    Appearance:  age appropriate, casually dressed, marginal hygiene, tattooed, overtly appearing  male, in bed, looks older than stated age   Behavior:  pleasant, cooperative, calm, fair eye contact    Speech:  normal rate, spontaneous, soft   Mood:  \"I'm fine\"   Affect:  slightly brighter than previous, constricted but reactive at times, less anxious   Thought Process:  goal directed, linear   Thought Content:  no overt delusions   Perceptual Disturbances: denies auditory or visual hallucinations when asked, but appears distracted, does not appear responding to internal stimuli   Risk Potential: Suicidal Ideation -   denies at time of interview "   Homicidal Ideation -   denies at time of interview   Potential for Aggression - Not at present   Sensorium:  oriented to person, place, and situation   Memory:  recent and remote memory grossly intact   Consciousness:  alert and awake   Attention/Concentration: attention span and concentration are improved   Insight:  fair   Judgment: limited   Gait/Station: in bed   Motor Activity: no abnormal movements       Lab Results: I have reviewed the following results:      Administrative Statements     Counseling / Coordination of Care:   Patient's progress discussed with staff in treatment team meeting.  Medication changes reviewed with staff in treatment team meeting..    Brooke Mendelson, DO 04/03/25

## 2025-04-03 NOTE — SOCIAL WORK
Pt was denied from [facility] due to being [place] speaking.     Cm sent clinicals to [facility]. Pt was denied from [facility] due to needing a Dual Diagnosis program.       Cm sent clinicals to [facility]. [facility] denied Pt due to mental health acuity.     Cm updated [facility] with further clinicals.    Cm updated PO via email.     Cm called [facility] to inquire If they have beds available and about their admissions process.

## 2025-04-03 NOTE — QUICK NOTE
Patient remains medically cleared. He is s/p laparoscopic repair of right inguinal hernia with mesh on 3/11. His abdomen is soft, but he is tender in the RUQ with palpation. He has positive bowel sounds. He is eating and drinking without vomiting. He is passing flatus and having bowel movements. After speaking with psychiatrist and on-call general surgeon, will try motrin x1 and obtain RUQ US for further evaluation.

## 2025-04-03 NOTE — ASSESSMENT & PLAN NOTE
Juan Diego Horowitz is a 49 y.o. [place] male from [facility],  single, currently homeless, unemployed, receives social security, w/ PMH of recent hernia surgery and kidney cancer s/p nephrectomy and PPH of depression, bipolar disorder, ADHD, 1 prior psychiatric admissions, 5 prior SA, h/o self-injurious behavior with hitting head, currently 201 voluntary commitment who presented to  ED for suicidal ideations, auditory hallucinations, and recent suicide attempt by overdose.     Plan:   Continue Lexapro 20 mg daily for depressive symptoms.  Most recent Qtc was 445 on 4/2/2025  Can recheck QTc again if patient begins to have cardiac like symptoms that may raise concern for prolonged QT  Discontinued Remeron after taper off from 30 mg nightly to 15 mg (due to previous suicide attempt overdosing on it)  Continue Abilify 5 mg daily started on 3/20  Continue Propranolol 10 mg BID for akathisia/restlessness  Continue melatonin to 6 mg nightly for insomnia   Restart Atarax 50 mg hs for anxiety  Continue Gabapentin to 600 mg TID for anxiety   Patient asked for Seroquel or Remeron stating that medications like trazodone do not work for him.  In the setting of recent discontinuation of Remeron by primary psychiatry team, Remeron was not restarted or made a PRN .

## 2025-04-03 NOTE — PROGRESS NOTES
04/03/25 1053   Team Meeting   Meeting Type Daily Rounds   Team Members Present   Team Members Present Physician;Nurse;   Physician Team Member Jennifer   [facility]   Care Management Team Member Olamide/Magi   Patient/Family Present   Patient Present No   Patient's Family Present No     Pt is denying all symptoms. Pt is med and meal compliant. Pt was visible on the unit. He received PRNs - propranolol and atarax and they were both effective. SW will continue to look for a rehab placement.

## 2025-04-03 NOTE — NURSING NOTE
Patient visible on the unit,makes needs known. Patient social with select peers. Patient denies SI/HI/AH/VH at this time. Patient compliant with medications and routine vitals. Patient request medications for anxiety Ham score 18. Prn atarax 50 mg po given.

## 2025-04-04 VITALS
WEIGHT: 165.8 LBS | DIASTOLIC BLOOD PRESSURE: 70 MMHG | HEART RATE: 58 BPM | OXYGEN SATURATION: 98 % | SYSTOLIC BLOOD PRESSURE: 124 MMHG | HEIGHT: 68 IN | TEMPERATURE: 98.2 F | BODY MASS INDEX: 25.13 KG/M2 | RESPIRATION RATE: 16 BRPM

## 2025-04-04 PROBLEM — R63.0 APPETITE LOSS: Status: RESOLVED | Noted: 2025-03-25 | Resolved: 2025-04-04

## 2025-04-04 PROCEDURE — 99239 HOSP IP/OBS DSCHRG MGMT >30: CPT | Performed by: PSYCHIATRY & NEUROLOGY

## 2025-04-04 RX ORDER — AMOXICILLIN 250 MG
1 CAPSULE ORAL
Qty: 30 TABLET | Refills: 0 | Status: SHIPPED | OUTPATIENT
Start: 2025-04-04 | End: 2025-05-04

## 2025-04-04 RX ORDER — GABAPENTIN 300 MG/1
600 CAPSULE ORAL 3 TIMES DAILY
Qty: 180 CAPSULE | Refills: 0 | Status: SHIPPED | OUTPATIENT
Start: 2025-04-04 | End: 2025-05-04

## 2025-04-04 RX ORDER — HYDROXYZINE HYDROCHLORIDE 50 MG/1
50 TABLET, FILM COATED ORAL
Qty: 30 TABLET | Refills: 0 | Status: SHIPPED | OUTPATIENT
Start: 2025-04-04

## 2025-04-04 RX ORDER — ARIPIPRAZOLE 5 MG/1
5 TABLET ORAL DAILY
Qty: 30 TABLET | Refills: 0 | Status: SHIPPED | OUTPATIENT
Start: 2025-04-04 | End: 2025-05-04

## 2025-04-04 RX ORDER — BUPRENORPHINE 8 MG/1
8 TABLET SUBLINGUAL 3 TIMES DAILY
Qty: 30 TABLET | Refills: 0 | Status: SHIPPED | OUTPATIENT
Start: 2025-04-04 | End: 2025-04-14

## 2025-04-04 RX ORDER — PROPRANOLOL HYDROCHLORIDE 10 MG/1
10 TABLET ORAL EVERY 12 HOURS SCHEDULED
Qty: 60 TABLET | Refills: 0 | Status: SHIPPED | OUTPATIENT
Start: 2025-04-04 | End: 2025-05-04

## 2025-04-04 RX ORDER — ESCITALOPRAM OXALATE 20 MG/1
20 TABLET ORAL DAILY
Qty: 30 TABLET | Refills: 0 | Status: SHIPPED | OUTPATIENT
Start: 2025-04-04 | End: 2025-05-04

## 2025-04-04 RX ORDER — ATORVASTATIN CALCIUM 10 MG/1
10 TABLET, FILM COATED ORAL
Qty: 30 TABLET | Refills: 0 | Status: SHIPPED | OUTPATIENT
Start: 2025-04-04

## 2025-04-04 RX ADMIN — ARIPIPRAZOLE 5 MG: 5 TABLET ORAL at 08:11

## 2025-04-04 RX ADMIN — BUPRENORPHINE HCL 8 MG: 8 TABLET SUBLINGUAL at 15:06

## 2025-04-04 RX ADMIN — ESCITALOPRAM OXALATE 20 MG: 10 TABLET ORAL at 08:11

## 2025-04-04 RX ADMIN — PROPRANOLOL HYDROCHLORIDE 10 MG: 10 TABLET ORAL at 08:11

## 2025-04-04 RX ADMIN — GABAPENTIN 600 MG: 300 CAPSULE ORAL at 08:11

## 2025-04-04 RX ADMIN — Medication 2000 UNITS: at 08:11

## 2025-04-04 RX ADMIN — BUPRENORPHINE HCL 8 MG: 8 TABLET SUBLINGUAL at 08:11

## 2025-04-04 RX ADMIN — GABAPENTIN 600 MG: 300 CAPSULE ORAL at 15:06

## 2025-04-04 RX ADMIN — ATORVASTATIN CALCIUM 10 MG: 10 TABLET, FILM COATED ORAL at 15:33

## 2025-04-04 RX ADMIN — CYANOCOBALAMIN TAB 1000 MCG 1000 MCG: 1000 TAB at 08:11

## 2025-04-04 NOTE — NURSING NOTE
Pt quiet and isolative to room in am. Out for needs only. Does not attend groups. Appears flat and depressed. + meals and meds. Good appetite. Compliant w/ mouth checks. On assessment Pt endorses depression as unchanged. Denies SI/HI/AV/H. Increased visibility on the unit and participation in TX encouraged to improve mood. Pt does not seem receptive at this time. Q15 safety checks maintained. Will cont to offer support as needed.

## 2025-04-04 NOTE — ASSESSMENT & PLAN NOTE
Juan Diego Horowitz is a 49 y.o. [place] male from [facility],  single, currently homeless, unemployed, receives social security, w/ PMH of recent hernia surgery and kidney cancer s/p nephrectomy and PPH of depression, bipolar disorder, ADHD, 1 prior psychiatric admissions, 5 prior SA, h/o self-injurious behavior with hitting head, currently 201 voluntary commitment who presented to  ED for suicidal ideations, auditory hallucinations, and recent suicide attempt by overdose.     Plan:   Continue Lexapro 20 mg daily for depressive symptoms.  Most recent Qtc was 445 on 4/2/2025  Can recheck QTc again if patient begins to have cardiac like symptoms that may raise concern for prolonged QT  Discontinued Remeron after taper off from 30 mg nightly to 15 mg (due to previous suicide attempt overdosing on it)  Continue Abilify 5 mg daily started on 3/20  Continue Propranolol 10 mg BID for akathisia/restlessness  Continue melatonin to 6 mg nightly for insomnia   Continue Atarax 50 mg hs for anxiety  Continue Gabapentin to 600 mg TID for anxiety   Patient asked for Seroquel or Remeron stating that medications like trazodone do not work for him.  In the setting of recent discontinuation of Remeron by primary psychiatry team, Remeron was not restarted or made a PRN .

## 2025-04-04 NOTE — SOCIAL WORK
Pt was accepted to [facility] for Inpatient rehab.   [facility] later notified  that Pt was denied.

## 2025-04-04 NOTE — PLAN OF CARE
Pt does not attend groups frequently, but did attend two yesterday and participated appropriately.

## 2025-04-04 NOTE — NURSING NOTE
Patient escorted via wheel chair and security with this nurse. Ultra sound to RUQ completed.    53.1

## 2025-04-04 NOTE — NURSING NOTE
"Patient visible on the unit ,makes needs known. Patient reports \"some anxiety\" from being here too many days. Patient denies SI/HI/AH/VH. Compliant with medications and routine vitals. Prn atarax 50 mg po given.   "

## 2025-04-04 NOTE — SOCIAL WORK
Cm started referral to [facility].   Cm sent clinicals.       Cm faxed clinicals to [facility]. They reported they have beds available.       Cm called [facility] and they reported Pt needs to interview prior to coming in. They provided Cm with phone number [phone] and Cm called and it was out of service.   Cm called back and they reported the  will call Cm.

## 2025-04-04 NOTE — NURSING NOTE
Pt calm, pleasant and cooperative through DC process. Describes feeling anxious but ready for transfer to drug and alcohol rehab. Pt describes plans to follow up w/ aftercare and take medications as prescribed. AVS reviewed w/ Pt who has no questions at this time. Report called to receiving facility. This writer unable to contact nursing after multiple attempts.  Admission dept declined report. Pt walked off unit by staff; belongings accounted for and in hand.

## 2025-04-04 NOTE — NURSING NOTE
At Pt request credit card is retrieved from security and given to Pt's girlfriend Jose Fang prior to DC.

## 2025-04-04 NOTE — QUICK NOTE
Patient is status post laparoscopic repair of right inguinal hernia with mesh as well as open umbilical hernia repair on 3/11/2025.  His hernia incision sites are soft and nontender with no erythema.  Patient is asymptomatic.  No nausea or vomiting.  Eating and drinking well.  Passing flatus and having bowel movements.  Ambulating the hallways.     At this time, there is no urgent need to follow-up with general surgery for his hernia repair, however, he should follow-up in a timely manner.      Of note, patient did have some right upper quadrant tenderness with deep palpation yesterday for which a right upper quadrant ultrasound was obtained and showed multiple gallstones.  No gallbladder wall thickening or pericholecystic fluid.  Negative sonographic Barnett sign.  As patient is okay and asymptomatic, no need for further intervention at this time.  Continue routine follow-up with general surgery as above for both the hernia follow-up and multiple gallstones.

## 2025-04-04 NOTE — PROGRESS NOTES
Progress Note - Behavioral Health   Name: Juan Diego Horowitz 49 y.o. male I MRN: [id number]  Unit/Bed#: -01 I Date of Admission: 3/18/2025   Date of Service: 4/4/2025 I Hospital Day: 16    Assessment & Plan  Unspecified mood (affective) disorder, MDD with or without psychotic features vs Bipolar disorder  Juan Diego Horowitz is a 49 y.o. [place] male from [facility],  single, currently homeless, unemployed, receives social security, w/ PMH of recent hernia surgery and kidney cancer s/p nephrectomy and PPH of depression, bipolar disorder, ADHD, 1 prior psychiatric admissions, 5 prior SA, h/o self-injurious behavior with hitting head, currently 201 voluntary commitment who presented to  ED for suicidal ideations, auditory hallucinations, and recent suicide attempt by overdose.     Plan:   Continue Lexapro 20 mg daily for depressive symptoms.  Most recent Qtc was 445 on 4/2/2025  Can recheck QTc again if patient begins to have cardiac like symptoms that may raise concern for prolonged QT  Discontinued Remeron after taper off from 30 mg nightly to 15 mg (due to previous suicide attempt overdosing on it)  Continue Abilify 5 mg daily started on 3/20  Continue Propranolol 10 mg BID for akathisia/restlessness  Continue melatonin to 6 mg nightly for insomnia   Continue Atarax 50 mg hs for anxiety  Continue Gabapentin to 600 mg TID for anxiety   Patient asked for Seroquel or Remeron stating that medications like trazodone do not work for him.  In the setting of recent discontinuation of Remeron by primary psychiatry team, Remeron was not restarted or made a PRN .   Opioid use disorder, severe, dependence (HCC)  Continue Subutex 8 mg 3 times daily   Patient transitioned to Subutex because he was endorsing abdominal pain with Suboxone   Discontinue clonidine as patient was placed on this in the past for withdrawal symptoms  Toxicology was consulted and provided recommendations  Substance cessation  education and counseling   Patient spoke with his PO and she informed him that he is in violation of his parole. She explained to patient that he must comply with any outpatient recommendations by psych treatment team. Patient in agreement with inpatient rehab, awaiting bed availability   Cocaine use  Per toxicology recommend contingency management or CBT   Substance cessation education and counseling   Right inguinal hernia  Recent inguinal and umbilical hernia surgery on 3/11/2025  Continue to monitor pain  Plan per medical   Tobacco use disorder  Nicotine patch daily   Medical clearance for psychiatric admission  Per medical   Hyperlipidemia  Per medical   Appetite loss (Resolved: 4/4/2025)  Appetite improving  Placed a nutrition consult  Patient also has infrequent bowel movements so ordered daily Miralax.     Current medications:  Current Facility-Administered Medications   Medication Dose Route Frequency Provider Last Rate    acetaminophen  650 mg Oral Q6H PRN Jael Mendelson, DO      acetaminophen  650 mg Oral Q4H PRN Jael Mendelson, DO      acetaminophen  975 mg Oral Q6H PRN Jael Mendelson, DO      aluminum-magnesium hydroxide-simethicone  30 mL Oral Q4H PRN Jael Mendelson, DO      ARIPiprazole  5 mg Oral Daily Jael Mendelson, DO      atorvastatin  10 mg Oral Daily With Dinner OLGA Whittington      benztropine  1 mg Intramuscular Q4H PRN Max 6/day Jael Mendelson, DO      benztropine  1 mg Oral Q4H PRN Max 6/day Jael Mendelson, DO      buprenorphine  8 mg Sublingual TID Yawal Latisha, DO      Cholecalciferol  2,000 Units Oral Daily OLGA Whittington      cyanocobalamin  1,000 mcg Oral Daily OLGA Whittington      hydrOXYzine HCL  50 mg Oral Q6H PRN Max 4/day Brooke Mendelson, DO      Or    diphenhydrAMINE  50 mg Intramuscular Q6H PRN Jael Mendelson, DO      escitalopram  20 mg Oral Daily Gazal Jalinda, DO      gabapentin  600 mg Oral TID Brooke Mendelson, DO       hydrOXYzine HCL  100 mg Oral Q6H PRN Max 4/day Jael Mendelson, DO      Or    LORazepam  2 mg Intramuscular Q6H PRN Jael Mendelson, DO      hydrOXYzine HCL  25 mg Oral Q6H PRN Max 4/day Jael Mendelson, DO      hydrOXYzine HCL  50 mg Oral HS Jael Mendelson, DO      ibuprofen  400 mg Oral Q12H PRN OLGA Whittington      melatonin  3 mg Oral HS PRN Jael Mendelson, DO      melatonin  6 mg Oral HS Meera Bhatia, DO      OLANZapine  10 mg Oral Q3H PRN Max 3/day Jael Mendelson, DO      Or    OLANZapine  10 mg Intramuscular Q3H PRN Max 3/day Jael Mendelson, DO      OLANZapine  5 mg Oral Q3H PRN Max 6/day Jael Mendelson, DO      Or    OLANZapine  5 mg Intramuscular Q3H PRN Max 6/day Jael Mendelson, DO      OLANZapine  2.5 mg Oral Q3H PRN Max 8/day Jael Mendelson, DO      ondansetron  4 mg Oral Q8H PRN Tootie Leblanc PA-C      propranolol  10 mg Oral Daily PRN Jael Mendelson, DO      propranolol  10 mg Oral Q12H [facility] OLGA Whittington      senna-docusate sodium  1 tablet Oral Daily PRN Jael Mendelson, DO      senna-docusate sodium  1 tablet Oral HS Jael Mendelson, DO          Risks/Benefits of Treatment:     Risks, benefits, and possible side effects of medications explained to patient and patient verbalizes understanding and agreement for treatment.    Progress Toward Goals: improving    Treatment Planning:     All current active medications have been reviewed.  Continue to monitor response to treatment and assess for potential side effects of medications.  Encourage group therapy, milieu therapy and occupational therapy  Collaboration with medical service for medical comorbidities as indicated.  Behavioral Health checks for safety monitoring.    Estimated Discharge Day: 4/4/2025       Subjective     Behavior over the last 24 hours: improving.    Patient was visited on unit for continuing care; chart reviewed and discussed with multidisciplinary treatment team.  On  "approach, the patient was calm and cooperative. Patient reports feeling \"a little depressed\" regarding not being accepted to rehab facilities. Patient was initially accepted to [facility] rehab however then later was denied. Case management placing referrals to rehab facilities, pending placement. Patient states he is looking forward to living with his girlfriend once being discharged from rehab. He had good spirits once he was told he was accepted to rehab. Patient's abdominal pain is improved from yesterday, explained the US results showing gallstones and to monitor pain if worsens, patient agreed with plan to follow up with surgeon outpatient. No problem initiating and maintaining sleep.  Denied A/VH currently.  Denied SI/HI, intent or plan upon direct inquiry at this time.    Patient continues to be intermittently visible in the milieu and interacts with select staff and peers. No reports of aggression or self-injurious behavior on unit.  Received atarax 50 mg last night for anxiety.    Patient accepted all offered medications and no adverse effects of medications noted or reported.    Sleep: improved  Appetite: normal  Medication side effects: No  ROS:  abdominal pain improved today, all other systems are negative    Objective :  Temp:  [97.3 °F (36.3 °C)-98.2 °F (36.8 °C)] 98.2 °F (36.8 °C)  HR:  [58-66] 58  BP: (107-124)/(55-70) 124/70  Resp:  [16] 16  SpO2:  [96 %-98 %] 98 %  O2 Device: None (Room air)    Temp:  [97.3 °F (36.3 °C)-98.2 °F (36.8 °C)] 98.2 °F (36.8 °C)  HR:  [58-66] 58  BP: (107-124)/(55-70) 124/70  Resp:  [16] 16  SpO2:  [96 %-98 %] 98 %  O2 Device: None (Room air)    Mental Status Evaluation:    Appearance:  casually dressed, marginal hygiene, looks older than stated age, overtly appearing  male    Behavior:  pleasant, cooperative, calm, fair eye contact    Speech:  normal rate, spontaneous, soft   Mood:  \"A little depressed\" became brighter once he knew about being accepted " to rehab   Affect:  constricted, brighter than previous    Thought Process:  goal directed, linear   Thought Content:  no overt delusions   Perceptual Disturbances: denies auditory or visual hallucinations when asked, but appears distracted, does not appear responding to internal stimuli   Risk Potential: Suicidal Ideation -   denies at time of interview   Homicidal Ideation -   denies at time of interview   Potential for Aggression - Not at present   Sensorium:  oriented to person, place, time/date, and situation   Memory:  recent and remote memory grossly intact   Consciousness:  alert and awake   Attention/Concentration: attention span and concentration are improved   Insight:  fair   Judgment: fair   Gait/Station: in bed   Motor Activity: no abnormal movements       Lab Results: I have reviewed the following results:      Administrative Statements     Counseling / Coordination of Care:   Patient's progress discussed with staff in treatment team meeting.  Medication changes reviewed with staff in treatment team meeting..    Brooke Mendelson, DO 04/04/25

## 2025-05-02 ENCOUNTER — TELEPHONE (OUTPATIENT)
Age: 50
End: 2025-05-02

## 2025-05-02 NOTE — TELEPHONE ENCOUNTER
Appointment scheduled with provider.    Reason: New Patient    Symptoms: n/a     Provider: Vishal Dominguez MD    Date/Time: 5/6/25 @ 2:20pm

## 2025-05-12 ENCOUNTER — TELEPHONE (OUTPATIENT)
Age: 50
End: 2025-05-12

## 2025-05-12 NOTE — TELEPHONE ENCOUNTER
Patient has been added to the Medication Management wait list without a referral.    Insurance: [facility]   Insurance Type:    Commercial []   Medicaid [x]   County (if applicable)   Medicare []  Location Preference: see wait list notes   Provider Preference: see wait list notes   Virtual: Yes [x] No []  Were outside resources sent: Yes [x] No []

## 2025-05-14 ENCOUNTER — OFFICE VISIT (OUTPATIENT)
Dept: FAMILY MEDICINE CLINIC | Facility: CLINIC | Age: 50
End: 2025-05-14
Payer: MEDICARE

## 2025-05-14 VITALS
TEMPERATURE: 97.8 F | HEART RATE: 84 BPM | RESPIRATION RATE: 16 BRPM | SYSTOLIC BLOOD PRESSURE: 90 MMHG | BODY MASS INDEX: 28.62 KG/M2 | WEIGHT: 188.2 LBS | DIASTOLIC BLOOD PRESSURE: 60 MMHG | OXYGEN SATURATION: 96 %

## 2025-05-14 DIAGNOSIS — Z59.00 HOMELESS: ICD-10-CM

## 2025-05-14 DIAGNOSIS — Z76.0 MEDICATION REFILL: ICD-10-CM

## 2025-05-14 DIAGNOSIS — E53.8 B12 DEFICIENCY: ICD-10-CM

## 2025-05-14 DIAGNOSIS — D72.829 LEUKOCYTOSIS, UNSPECIFIED TYPE: ICD-10-CM

## 2025-05-14 DIAGNOSIS — E78.2 MIXED HYPERLIPIDEMIA: ICD-10-CM

## 2025-05-14 DIAGNOSIS — R79.89 LOW TSH LEVEL: ICD-10-CM

## 2025-05-14 DIAGNOSIS — F11.20 OPIOID USE DISORDER, SEVERE, DEPENDENCE (HCC): ICD-10-CM

## 2025-05-14 DIAGNOSIS — K59.03 DRUG-INDUCED CONSTIPATION: ICD-10-CM

## 2025-05-14 DIAGNOSIS — G25.71 AKATHISIA: ICD-10-CM

## 2025-05-14 DIAGNOSIS — E55.9 VITAMIN D DEFICIENCY: ICD-10-CM

## 2025-05-14 DIAGNOSIS — F14.90 COCAINE USE: ICD-10-CM

## 2025-05-14 DIAGNOSIS — F19.10 POLYSUBSTANCE ABUSE (HCC): ICD-10-CM

## 2025-05-14 DIAGNOSIS — D75.839 THROMBOCYTOSIS: ICD-10-CM

## 2025-05-14 DIAGNOSIS — Z76.89 ENCOUNTER TO ESTABLISH CARE: Primary | ICD-10-CM

## 2025-05-14 DIAGNOSIS — Z11.4 SCREENING FOR HIV (HUMAN IMMUNODEFICIENCY VIRUS): ICD-10-CM

## 2025-05-14 DIAGNOSIS — F39 UNSPECIFIED MOOD (AFFECTIVE) DISORDER (HCC): ICD-10-CM

## 2025-05-14 DIAGNOSIS — Z11.59 NEED FOR HEPATITIS C SCREENING TEST: ICD-10-CM

## 2025-05-14 PROBLEM — Z00.8 MEDICAL CLEARANCE FOR PSYCHIATRIC ADMISSION: Status: RESOLVED | Noted: 2025-01-22 | Resolved: 2025-05-14

## 2025-05-14 PROBLEM — R65.10 SIRS (SYSTEMIC INFLAMMATORY RESPONSE SYNDROME) (HCC): Status: RESOLVED | Noted: 2022-08-22 | Resolved: 2025-05-14

## 2025-05-14 PROBLEM — R26.2 AMBULATORY DYSFUNCTION: Status: RESOLVED | Noted: 2024-01-09 | Resolved: 2025-05-14

## 2025-05-14 PROBLEM — M77.10 LATERAL EPICONDYLITIS: Status: RESOLVED | Noted: 2017-10-20 | Resolved: 2025-05-14

## 2025-05-14 PROBLEM — J69.0 ASPIRATION PNEUMONITIS (HCC): Status: RESOLVED | Noted: 2022-08-22 | Resolved: 2025-05-14

## 2025-05-14 PROCEDURE — 99204 OFFICE O/P NEW MOD 45 MIN: CPT

## 2025-05-14 RX ORDER — AMOXICILLIN 250 MG
1 CAPSULE ORAL
Qty: 30 TABLET | Refills: 0 | Status: SHIPPED | OUTPATIENT
Start: 2025-05-14 | End: 2025-06-13

## 2025-05-14 RX ORDER — PROPRANOLOL HYDROCHLORIDE 10 MG/1
10 TABLET ORAL 2 TIMES DAILY
Qty: 60 TABLET | Refills: 2 | Status: SHIPPED | OUTPATIENT
Start: 2025-05-14 | End: 2025-08-12

## 2025-05-14 RX ORDER — ATORVASTATIN CALCIUM 10 MG/1
10 TABLET, FILM COATED ORAL
Qty: 30 TABLET | Refills: 0 | Status: SHIPPED | OUTPATIENT
Start: 2025-05-14

## 2025-05-14 RX ORDER — ESCITALOPRAM OXALATE 20 MG/1
20 TABLET ORAL DAILY
Qty: 30 TABLET | Refills: 2 | Status: SHIPPED | OUTPATIENT
Start: 2025-05-14 | End: 2025-08-12

## 2025-05-14 RX ORDER — HYDROXYZINE HYDROCHLORIDE 50 MG/1
50 TABLET, FILM COATED ORAL
Qty: 30 TABLET | Refills: 2 | Status: SHIPPED | OUTPATIENT
Start: 2025-05-14 | End: 2025-08-12

## 2025-05-14 RX ORDER — GABAPENTIN 600 MG/1
600 TABLET ORAL 3 TIMES DAILY
Qty: 90 TABLET | Refills: 2 | Status: SHIPPED | OUTPATIENT
Start: 2025-05-14 | End: 2025-08-12

## 2025-05-14 RX ORDER — ARIPIPRAZOLE 5 MG/1
5 TABLET ORAL DAILY
Qty: 30 TABLET | Refills: 2 | Status: SHIPPED | OUTPATIENT
Start: 2025-05-14 | End: 2025-08-12

## 2025-05-14 SDOH — ECONOMIC STABILITY - HOUSING INSECURITY: HOMELESSNESS UNSPECIFIED: Z59.00

## 2025-05-14 NOTE — PROGRESS NOTES
Name: Juan Diego Carr      : 1975      MRN: [id number]  Encounter Provider: Vishal Zafar MD  Encounter Date: 2025   Encounter department: [facility] PRIMARY CARE [facility]  :  Assessment & Plan  Encounter to establish care  EMR reviewed and updated. Return in one month for annual physical.       Unspecified mood (affective) disorder, MDD with or without psychotic features vs Bipolar disorder  Have reviewed recent admission and have sent refill for medications outlined on DCS:     Continue Lexapro 20 mg daily for depressive symptoms.  Most recent Qtc was 445 on 2025  Can recheck QTc again if patient begins to have cardiac like symptoms that may raise concern for prolonged QT  Discontinued Remeron after taper off from 30 mg nightly to 15 mg (due to previous suicide attempt overdosing on it)  Continue Abilify 5 mg daily started on 3/20  Continue Propranolol 10 mg BID for akathisia/restlessness  Continue melatonin to 6 mg nightly for insomnia   Continue Atarax 50 mg hs for anxiety  Continue Gabapentin to 600 mg TID for anxiety   Patient asked for Seroquel or Remeron stating that medications like trazodone do not work for him.  In the setting of recent discontinuation of Remeron by primary psychiatry team, Remeron was not restarted or made a PRN .     Pt on psych wait list. Enough medication given for 3 months.    Orders:    ARIPiprazole (ABILIFY) 5 mg tablet; Take 1 tablet (5 mg total) by mouth daily    gabapentin (NEURONTIN) 600 MG tablet; Take 1 tablet (600 mg total) by mouth 3 (three) times a day    hydrOXYzine HCL (ATARAX) 50 mg tablet; Take 1 tablet (50 mg total) by mouth daily at bedtime    escitalopram (LEXAPRO) 20 mg tablet; Take 1 tablet (20 mg total) by mouth daily    propranolol (INDERAL) 10 mg tablet; Take 1 tablet (10 mg total) by mouth 2 (two) times a day    melatonin 3 mg; Take 2 tablets (6 mg total) by mouth daily at bedtime    cyanocobalamin  (VITAMIN B-12) 1000 MCG tablet; Take 1 tablet (1,000 mcg total) by mouth daily    Cholecalciferol (VITAMIN D3) 1,000 units tablet; Take 2 tablets (2,000 Units total) by mouth daily    Polysubstance abuse (HCC)  Abstinence counseling given. Continue to monitor.       Cocaine use  Last use endorsed to be a few months ago. Though recent admission notes positive cocaine in [facility]         Opioid use disorder, severe, dependence (HCC)  Per admission notes pt was to be using Sutex 8mg TID due to abdominal discomfort with Suboxone. He went to [facility] where he was in rehab and per PDMP review was given suboxone 8-2mg TID. Recommend he reach out to center to get suboxone changed to Subutex. Pt will be signing consent to reach out to [facility] for notes.       Drug-induced constipation  Likely second to Suboxone, will trial Senokot S 8.6-50mg HS.     Orders:    senna-docusate sodium (SENOKOT S) 8.6-50 mg per tablet; Take 1 tablet by mouth daily at bedtime    Akathisia  Continue with propranolol 10mg BID  Orders:    propranolol (INDERAL) 10 mg tablet; Take 1 tablet (10 mg total) by mouth 2 (two) times a day    B12 deficiency  Lab Results   Component Value Date    [id number] 149 (L) 03/20/2025      Continue with supplementation: B12 1000mcg daily. Recheck level in 3 months.       Vitamin D deficiency  Seen on recent admission. Continue with supplementation: VitD3 2000U daily.       Mixed hyperlipidemia  Lab Results   Component Value Date    CHOLESTEROL 250 (H) 03/20/2025    CHOLESTEROL 196 01/22/2025    CHOLESTEROL 171 12/05/2022     Lab Results   Component Value Date    HDL 41 03/20/2025    HDL 41 01/22/2025    HDL 38 (L) 12/05/2022     Lab Results   Component Value Date    TRIG 123 03/20/2025    TRIG 125 01/22/2025    TRIG 129 12/05/2022     Lab Results   Component Value Date    NONHDLC 209 03/20/2025    NONHDLC 155 01/22/2025      Continue with atorvastatin 10mg daily. Repeat lipid panel in one year  Orders:     atorvastatin (LIPITOR) 10 mg tablet; Take 1 tablet (10 mg total) by mouth daily with dinner    Leukocytosis, unspecified type  Seen on recent admission lab work. Will repeat CBC to ensure resolution.  Orders:    CBC and differential; Future    Thrombocytosis  Seen on recent admission lab work. Will repeat CBC to ensure resolution.  Orders:    CBC and differential; Future    Low TSH level  Lab Results   Component Value Date    [id number] 0.259 (L) 03/20/2025      Repeat TSH ordered. Pt otherwise asymptomatic.  Orders:    TSH w/Reflex; Future    Homeless  Referral placed to  for further assistance.  Orders:    Ambulatory Referral to Social Work Care Management Program; Future    Need for hepatitis C screening test    Orders:    Hepatitis C antibody; Future    Screening for HIV (human immunodeficiency virus)    Orders:    HIV 1/2 AB/AG w Reflex [facility] for 2 yr old and above; Future    Medication refill  Have sent medication refills for 3 months. Meds reviewed a verified to match recent DCS.  Orders:    ARIPiprazole (ABILIFY) 5 mg tablet; Take 1 tablet (5 mg total) by mouth daily    gabapentin (NEURONTIN) 600 MG tablet; Take 1 tablet (600 mg total) by mouth 3 (three) times a day    hydrOXYzine HCL (ATARAX) 50 mg tablet; Take 1 tablet (50 mg total) by mouth daily at bedtime    escitalopram (LEXAPRO) 20 mg tablet; Take 1 tablet (20 mg total) by mouth daily    propranolol (INDERAL) 10 mg tablet; Take 1 tablet (10 mg total) by mouth 2 (two) times a day    melatonin 3 mg; Take 2 tablets (6 mg total) by mouth daily at bedtime    cyanocobalamin (VITAMIN B-12) 1000 MCG tablet; Take 1 tablet (1,000 mcg total) by mouth daily    Cholecalciferol (VITAMIN D3) 1,000 units tablet; Take 2 tablets (2,000 Units total) by mouth daily    atorvastatin (LIPITOR) 10 mg tablet; Take 1 tablet (10 mg total) by mouth daily with dinner           History of Present Illness   Juan Diego Carr is a 49 y.o. male presenting to clinic to  establish care.    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 Currently sexually active with single female partner and no hx of STD      Admission on 3/18/2025 to 4/4/2025: Unspecified mood affective disorder, MDD versus bipolar disorder.  Given 30-day supply of medication and 10-day supply of Subutex.  Patient to be transferred to [facility] for substance use treatment.      Review of Systems   Constitutional:  Negative for chills and fever.   Respiratory:  Negative for shortness of breath.    Cardiovascular:  Negative for chest pain.   Gastrointestinal:  Negative for abdominal pain, blood in stool, diarrhea, nausea and vomiting.   Genitourinary:  Negative for dysuria and hematuria.       Objective   BP 90/60 (BP Location: Left arm, Patient Position: Sitting, Cuff Size: Standard)   Pulse 84   Temp 97.8 °F (36.6 °C) (Tympanic)   Resp 16   Wt 85.4 kg (188 lb 3.2 oz)   SpO2 96%   BMI 28.62 kg/m²      Physical Exam  Vitals and nursing note reviewed.   Constitutional:       General: He is not in acute distress.     Appearance: Normal appearance. He is not ill-appearing, toxic-appearing or diaphoretic.     Eyes:      Conjunctiva/sclera: Conjunctivae normal.       Cardiovascular:      Rate and Rhythm: Normal rate and regular rhythm.      Pulses: Normal pulses.      Heart sounds: Normal heart sounds.   Pulmonary:      Effort: Pulmonary effort is normal.      Breath sounds: Normal breath sounds.     Skin:     General: Skin is warm and dry.     Neurological:      General: No focal deficit present.      Mental Status: He is alert.     Psychiatric:         Mood and Affect: Mood normal.          Behavior: Behavior normal.

## 2025-05-14 NOTE — ASSESSMENT & PLAN NOTE
Lab Results   Component Value Date    [id number] 149 (L) 03/20/2025      Continue with supplementation: B12 1000mcg daily. Recheck level in 3 months.

## 2025-05-14 NOTE — ASSESSMENT & PLAN NOTE
Have reviewed recent admission and have sent refill for medications outlined on DCS:     Continue Lexapro 20 mg daily for depressive symptoms.  Most recent Qtc was 445 on 4/2/2025  Can recheck QTc again if patient begins to have cardiac like symptoms that may raise concern for prolonged QT  Discontinued Remeron after taper off from 30 mg nightly to 15 mg (due to previous suicide attempt overdosing on it)  Continue Abilify 5 mg daily started on 3/20  Continue Propranolol 10 mg BID for akathisia/restlessness  Continue melatonin to 6 mg nightly for insomnia   Continue Atarax 50 mg hs for anxiety  Continue Gabapentin to 600 mg TID for anxiety   Patient asked for Seroquel or Remeron stating that medications like trazodone do not work for him.  In the setting of recent discontinuation of Remeron by primary psychiatry team, Remeron was not restarted or made a PRN .     Pt on psych wait list. Enough medication given for 3 months.    Orders:    ARIPiprazole (ABILIFY) 5 mg tablet; Take 1 tablet (5 mg total) by mouth daily    gabapentin (NEURONTIN) 600 MG tablet; Take 1 tablet (600 mg total) by mouth 3 (three) times a day    hydrOXYzine HCL (ATARAX) 50 mg tablet; Take 1 tablet (50 mg total) by mouth daily at bedtime    escitalopram (LEXAPRO) 20 mg tablet; Take 1 tablet (20 mg total) by mouth daily    propranolol (INDERAL) 10 mg tablet; Take 1 tablet (10 mg total) by mouth 2 (two) times a day    melatonin 3 mg; Take 2 tablets (6 mg total) by mouth daily at bedtime    cyanocobalamin (VITAMIN B-12) 1000 MCG tablet; Take 1 tablet (1,000 mcg total) by mouth daily    Cholecalciferol (VITAMIN D3) 1,000 units tablet; Take 2 tablets (2,000 Units total) by mouth daily

## 2025-05-14 NOTE — ASSESSMENT & PLAN NOTE
Lab Results   Component Value Date    CHOLESTEROL 250 (H) 03/20/2025    CHOLESTEROL 196 01/22/2025    CHOLESTEROL 171 12/05/2022     Lab Results   Component Value Date    HDL 41 03/20/2025    HDL 41 01/22/2025    HDL 38 (L) 12/05/2022     Lab Results   Component Value Date    TRIG 123 03/20/2025    TRIG 125 01/22/2025    TRIG 129 12/05/2022     Lab Results   Component Value Date    NONHDLC 209 03/20/2025    NONHDLC 155 01/22/2025      Continue with atorvastatin 10mg daily. Repeat lipid panel in one year  Orders:    atorvastatin (LIPITOR) 10 mg tablet; Take 1 tablet (10 mg total) by mouth daily with dinner

## 2025-05-14 NOTE — ASSESSMENT & PLAN NOTE
Likely second to Suboxone, will trial Senokot S 8.6-50mg HS.     Orders:    senna-docusate sodium (SENOKOT S) 8.6-50 mg per tablet; Take 1 tablet by mouth daily at bedtime

## 2025-05-14 NOTE — LETTER
May 14, 2025     Patient: Juan Diego Carr  YOB: 1975  Date of Visit: 5/14/2025    This letter was created with pt's permission.    To Whom it May Concern:    Juan Diego Carr is under my professional care. Juan Diego was seen in my office on 5/14/2025. Juan Diego was recently in the hospital where he was evaluated by a MH specialist and diagnosed with unspecified mood (affective) disorder, MDD with or without psychotic features versus bipolar disorder (code F39).    If you have any questions or concerns, please don't hesitate to call.         Sincerely,          Vishal Zafar MD        CC: No Recipients

## 2025-05-14 NOTE — ASSESSMENT & PLAN NOTE
Seen on recent admission lab work. Will repeat CBC to ensure resolution.  Orders:    CBC and differential; Future

## 2025-05-14 NOTE — ASSESSMENT & PLAN NOTE
Continue with propranolol 10mg BID  Orders:    propranolol (INDERAL) 10 mg tablet; Take 1 tablet (10 mg total) by mouth 2 (two) times a day

## 2025-05-14 NOTE — ASSESSMENT & PLAN NOTE
Per admission notes pt was to be using Sutex 8mg TID due to abdominal discomfort with Suboxone. He went to [facility] where he was in rehab and per PDMP review was given suboxone 8-2mg TID. Recommend he reach out to center to get suboxone changed to Subutex. Pt will be signing consent to reach out to [facility] for notes.

## 2025-05-14 NOTE — ASSESSMENT & PLAN NOTE
Last use endorsed to be a few months ago. Though recent admission notes positive cocaine in [place]

## 2025-05-14 NOTE — ASSESSMENT & PLAN NOTE
Lab Results   Component Value Date    [id number] 0.259 (L) 03/20/2025      Repeat TSH ordered. Pt otherwise asymptomatic.  Orders:    TSH w/Reflex; Future

## 2025-05-15 ENCOUNTER — PATIENT OUTREACH (OUTPATIENT)
Dept: CASE MANAGEMENT | Facility: OTHER | Age: 50
End: 2025-05-15

## 2025-05-15 RX ORDER — BUPRENORPHINE HYDROCHLORIDE AND NALOXONE HYDROCHLORIDE DIHYDRATE 8; 2 MG/1; MG/1
1 TABLET SUBLINGUAL 3 TIMES DAILY
COMMUNITY

## 2025-05-15 NOTE — TELEPHONE ENCOUNTER
A third party  was utilized for the below encounter:    The patient called for an appointment update, regarding the wait list. Writer reviewed the wait list time frame, and advised of 6+ months out.     The writer emailed over outside resources to assist further.

## 2025-05-15 NOTE — PROGRESS NOTES
OP CM rcvd referral for pt due to homelessness.     Pt was recently at [place] rehab for substance abuse.  Pt is on Suboxone.  Pt has also been added to [facility] psych wait list.  Pt has a dx of MDD, unspecified mood disorder.      OP EZIO called to pt with tatianna line [phone] [address] and called to pt and he states that his friend lets him stay at her house and shower and rest there.  Pt states he already has worked with [facility] for a security deposit and down payment for an apartment.  Pt states they will be assisting him.  Pt does get SSD.  Pt states he also gets food stamps.  Pt states that he gets his Suboxone from [facility] SHARE program.      Pt denies any other needs.

## 2025-06-11 ENCOUNTER — RA CDI HCC (OUTPATIENT)
Dept: OTHER | Facility: HOSPITAL | Age: 50
End: 2025-06-11

## 2025-06-11 NOTE — PROGRESS NOTES
HCC coding opportunities       Chart reviewed, no opportunity found: CHART REVIEWED, NO OPPORTUNITY FOUND        Patients Insurance     Medicare Insurance: Medicare / [facility]

## 2025-07-21 ENCOUNTER — APPOINTMENT (EMERGENCY)
Dept: RADIOLOGY | Facility: HOSPITAL | Age: 50
End: 2025-07-21
Payer: OTHER GOVERNMENT

## 2025-07-21 ENCOUNTER — HOSPITAL ENCOUNTER (EMERGENCY)
Facility: HOSPITAL | Age: 50
Discharge: HOME | End: 2025-07-21
Attending: EMERGENCY MEDICINE | Admitting: EMERGENCY MEDICINE
Payer: OTHER GOVERNMENT

## 2025-07-21 VITALS
OXYGEN SATURATION: 96 % | SYSTOLIC BLOOD PRESSURE: 124 MMHG | HEART RATE: 82 BPM | BODY MASS INDEX: 21.99 KG/M2 | WEIGHT: 144.62 LBS | DIASTOLIC BLOOD PRESSURE: 77 MMHG | RESPIRATION RATE: 18 BRPM | TEMPERATURE: 98 F

## 2025-07-21 DIAGNOSIS — Z76.5 MALINGERING: ICD-10-CM

## 2025-07-21 DIAGNOSIS — F41.9 ANXIETY: ICD-10-CM

## 2025-07-21 DIAGNOSIS — Z00.8 MEDICAL CLEARANCE FOR INCARCERATION: Primary | ICD-10-CM

## 2025-07-21 LAB
ANION GAP SERPL CALCULATED.3IONS-SCNC: 9 MMOL/L (ref 4–13)
ATRIAL RATE: 71 BPM
BASOPHILS # BLD AUTO: 0.05 THOUSANDS/ÂΜL (ref 0–0.1)
BASOPHILS NFR BLD AUTO: 1 % (ref 0–1)
BUN SERPL-MCNC: 10 MG/DL (ref 5–25)
CALCIUM SERPL-MCNC: 9.4 MG/DL (ref 8.4–10.2)
CARDIAC TROPONIN I PNL SERPL HS: 3 NG/L (ref ?–50)
CHLORIDE SERPL-SCNC: 105 MMOL/L (ref 96–108)
CO2 SERPL-SCNC: 26 MMOL/L (ref 21–32)
CREAT SERPL-MCNC: 1.51 MG/DL (ref 0.6–1.3)
EOSINOPHIL # BLD AUTO: 0.5 THOUSAND/ÂΜL (ref 0–0.61)
EOSINOPHIL NFR BLD AUTO: 5 % (ref 0–6)
ERYTHROCYTE [DISTWIDTH] IN BLOOD BY AUTOMATED COUNT: 15.6 % (ref 11.6–15.1)
GFR SERPL CREATININE-BSD FRML MDRD: 54 ML/MIN/1.73SQ M
GLUCOSE SERPL-MCNC: 99 MG/DL (ref 65–140)
HCT VFR BLD AUTO: 44.5 % (ref 36.5–49.3)
HGB BLD-MCNC: 14.3 G/DL (ref 12–17)
IMM GRANULOCYTES # BLD AUTO: 0.04 THOUSAND/UL (ref 0–0.2)
IMM GRANULOCYTES NFR BLD AUTO: 0 % (ref 0–2)
LYMPHOCYTES # BLD AUTO: 3.2 THOUSANDS/ÂΜL (ref 0.6–4.47)
LYMPHOCYTES NFR BLD AUTO: 34 % (ref 14–44)
MCH RBC QN AUTO: 27.6 PG (ref 26.8–34.3)
MCHC RBC AUTO-ENTMCNC: 32.1 G/DL (ref 31.4–37.4)
MCV RBC AUTO: 86 FL (ref 82–98)
MONOCYTES # BLD AUTO: 0.76 THOUSAND/ÂΜL (ref 0.17–1.22)
MONOCYTES NFR BLD AUTO: 8 % (ref 4–12)
NEUTROPHILS # BLD AUTO: 4.85 THOUSANDS/ÂΜL (ref 1.85–7.62)
NEUTS SEG NFR BLD AUTO: 52 % (ref 43–75)
NRBC BLD AUTO-RTO: 0 /100 WBCS
P AXIS: 26 DEGREES
PLATELET # BLD AUTO: 355 THOUSANDS/UL (ref 149–390)
PMV BLD AUTO: 9 FL (ref 8.9–12.7)
POTASSIUM SERPL-SCNC: 3.5 MMOL/L (ref 3.5–5.3)
PR INTERVAL: 146 MS
QRS AXIS: 20 DEGREES
QRSD INTERVAL: 94 MS
QT INTERVAL: 394 MS
QTC INTERVAL: 429 MS
RBC # BLD AUTO: 5.18 MILLION/UL (ref 3.88–5.62)
SODIUM SERPL-SCNC: 140 MMOL/L (ref 135–147)
T WAVE AXIS: 46 DEGREES
VENTRICULAR RATE: 71 BPM
WBC # BLD AUTO: 9.4 THOUSAND/UL (ref 4.31–10.16)

## 2025-07-21 PROCEDURE — 99285 EMERGENCY DEPT VISIT HI MDM: CPT

## 2025-07-21 PROCEDURE — 85025 COMPLETE CBC W/AUTO DIFF WBC: CPT

## 2025-07-21 PROCEDURE — 36415 COLL VENOUS BLD VENIPUNCTURE: CPT

## 2025-07-21 PROCEDURE — 99284 EMERGENCY DEPT VISIT MOD MDM: CPT

## 2025-07-21 PROCEDURE — 80048 BASIC METABOLIC PNL TOTAL CA: CPT

## 2025-07-21 PROCEDURE — 93010 ELECTROCARDIOGRAM REPORT: CPT | Performed by: INTERNAL MEDICINE

## 2025-07-21 PROCEDURE — 93005 ELECTROCARDIOGRAM TRACING: CPT

## 2025-07-21 PROCEDURE — 71045 X-RAY EXAM CHEST 1 VIEW: CPT

## 2025-07-21 PROCEDURE — 84484 ASSAY OF TROPONIN QUANT: CPT

## 2025-07-21 RX ORDER — HYDROXYZINE HYDROCHLORIDE 25 MG/1
25 TABLET, FILM COATED ORAL ONCE
Status: COMPLETED | OUTPATIENT
Start: 2025-07-21 | End: 2025-07-21

## 2025-07-21 RX ORDER — SODIUM CHLORIDE 9 MG/ML
3 INJECTION INTRAVENOUS
Status: DISCONTINUED | OUTPATIENT
Start: 2025-07-21 | End: 2025-07-21 | Stop reason: HOSPADM

## 2025-07-21 RX ADMIN — HYDROXYZINE HYDROCHLORIDE 25 MG: 25 TABLET, FILM COATED ORAL at 14:57

## 2025-07-21 NOTE — ED NOTES
+ UDS at [facility] prior to arrival- Bup, fent, cocaine, TCA, and K2. Provider aware.      Manuel Cooper RN  07/21/25 2146

## 2025-07-21 NOTE — Clinical Note
Juan Diego Carr was seen and treated in our emergency department on 7/21/2025.    No restrictions            Diagnosis:     Juan Diego  .    He may return on this date: 07/22/2025         If you have any questions or concerns, please don't hesitate to call.      Phil Garcia PA-C    ______________________________           _______________          _______________  Hospital Representative                              Date                                Time

## 2025-07-22 NOTE — ED PROVIDER NOTES
Time reflects when diagnosis was documented in both MDM as applicable and the Disposition within this note       Time User Action Codes Description Comment    7/21/2025  3:29 PM GarciaPhil turner Add [F41.9] Anxiety     7/21/2025  3:29 PM Garcia, Phil Weeks [Z00.8] Medical clearance for incarceration     7/21/2025  3:29 PM Garcia, Phil Weeks [Z76.5] Malingering     7/21/2025  3:29 PM Garcia, Phil Modify [F41.9] Anxiety     7/21/2025  3:29 PM Phil Garcia Modify [Z00.8] Medical clearance for incarceration           ED Disposition       ED Disposition   Discharge    Condition   Stable    Date/Time   Mon Jul 21, 2025  3:29 PM    Comment   Juan Diego Carr discharge to home/self care.                   Assessment & Plan       Medical Decision Making  Patient is a 49-year-old male coming in for evaluation of medical clearance for incarceration.  Lab work is overall within the limits, EKG shows no sign of acute ischemia, troponin is less than 5.  Chest x-ray as read by myself shows no sign of acute cardiopulmonary disease.  Patient is in no acute distress at this time.  Patient's chest pain is likely secondary to anxiety.  patient was discharged into police custody for incarceration.    Amount and/or Complexity of Data Reviewed  Labs: ordered.  Radiology: ordered and independent interpretation performed.    Risk  Prescription drug management.             Medications   hydrOXYzine HCL (ATARAX) tablet 25 mg (25 mg Oral Given 7/21/25 1457)       ED Risk Strat Scores   HEART Risk Score      Flowsheet Row Most Recent Value   Heart Score Risk Calculator    History 0 Filed at: 07/21/2025 1526   ECG 0 Filed at: 07/21/2025 1526   Age 1 Filed at: 07/21/2025 1526   Risk Factors 1 Filed at: 07/21/2025 1526   Troponin 0 Filed at: 07/21/2025 1526   HEART Score 2 Filed at: 07/21/2025 1526          HEART Risk Score      Flowsheet Row Most Recent Value   Heart Score Risk Calculator    History 0 Filed at: 07/21/2025 1526  "  ECG 0 Filed at: 07/21/2025 1526   Age 1 Filed at: 07/21/2025 1526   Risk Factors 1 Filed at: 07/21/2025 1526   Troponin 0 Filed at: 07/21/2025 1526   HEART Score 2 Filed at: 07/21/2025 1526                      No data recorded        SBIRT 22yo+      Flowsheet Row Most Recent Value   Initial Alcohol Screen: US AUDIT-C     1. How often do you have a drink containing alcohol? 1 Filed at: 07/21/2025 1449   2. How many drinks containing alcohol do you have on a typical day you are drinking?  1 Filed at: 07/21/2025 1449   3a. Male UNDER 65: How often do you have five or more drinks on one occasion? 0 Filed at: 07/21/2025 1449   Audit-C Score 2 Filed at: 07/21/2025 1449   OLEG: How many times in the past year have you...    Used an illegal drug or used a prescription medication for non-medical reasons? Daily or Almost Daily Filed at: 07/21/2025 1449   DAST-10: In the past 12 months...    1. Have you used drugs other than those required for medical reasons? 1 Filed at: 07/21/2025 1449   2. Do you use more than one drug at a time? 1 Filed at: 07/21/2025 1449   3. Have you had medical problems as a result of your drug use (e.g., memory loss, hepatitis, convulsions, bleeding, etc.)? 1 Filed at: 07/21/2025 1449   4. Have you had \"blackouts\" or \"flashbacks\" as a result of drug use?YesNo 1 Filed at: 07/21/2025 1449   5. Do you ever feel bad or guilty about your drug use? 1 Filed at: 07/21/2025 1449   6. Does your spouse (or parent) ever complain about your involvement with drugs? 1 Filed at: 07/21/2025 1449   7. Have you neglected your family because of your use of drugs? 1 Filed at: 07/21/2025 1449   8. Have you engaged in illegal activities in order to obtain drugs? 1 Filed at: 07/21/2025 1449   9. Have you ever experienced withdrawal symptoms (felt sick) when you stopped taking drugs? 1 Filed at: 07/21/2025 1449   10. Are you always able to stop using drugs when you want to? 1 Filed at: 07/21/2025 1449   DAST-10 Score 10 " Filed at: 07/21/2025 2523                            History of Present Illness       Chief Complaint   Patient presents with    Chest Pain     From [facility] when sentenced fell over and stated he had chest pain and headache. + heroin (nasal), + cocaine + K2 this morning.        Past Medical History[1]   Past Surgical History[2]   Family History[3]   Social History[4]   E-Cigarette/Vaping    E-Cigarette Use Never User       E-Cigarette/Vaping Substances    Nicotine No     THC No     CBD No     Flavoring No     Other No     Unknown No       I have reviewed and agree with the history as documented.     Patient is a 49-year-old male coming in by way of [employer], for concerns about chest pain that started after patient was put in handcuffs, after he failed a drug test for his .  Patient this time is speaking in full sentences, is no acute distress, has no significant leg swelling.  Patient does have a extensive past medical history for vascular disease.  No nausea, no vomiting, diaphoresis      Chest Pain  Associated symptoms: no abdominal pain, no diaphoresis, no fatigue, no fever, no nausea, no palpitations, no shortness of breath and not vomiting        Review of Systems   Constitutional:  Negative for chills, diaphoresis, fatigue and fever.   HENT:  Negative for ear pain.    Respiratory:  Negative for chest tightness and shortness of breath.    Cardiovascular:  Positive for chest pain. Negative for palpitations and leg swelling.   Gastrointestinal:  Negative for abdominal pain, nausea and vomiting.           Objective       ED Triage Vitals [07/21/25 1448]   Temperature Pulse Blood Pressure Respirations SpO2 Patient Position - Orthostatic VS   98 °F (36.7 °C) 82 124/77 18 96 % Lying      Temp Source Heart Rate Source BP Location FiO2 (%) Pain Score    Oral Monitor Right arm -- --      Vitals      Date and Time Temp Pulse SpO2 Resp BP Pain Score FACES Pain Rating User   07/21/25 1448 98 °F (36.7 °C) 82 96 % 18  124/77 -- -- RB            Physical Exam  Vitals reviewed.   Constitutional:       Appearance: Normal appearance. He is normal weight.   HENT:      Head: Normocephalic and atraumatic.      Right Ear: External ear normal.      Left Ear: External ear normal.      Nose: Nose normal.     Eyes:      Conjunctiva/sclera: Conjunctivae normal.       Cardiovascular:      Rate and Rhythm: Normal rate and regular rhythm.   Pulmonary:      Effort: Pulmonary effort is normal.      Breath sounds: Normal breath sounds.     Musculoskeletal:         General: Normal range of motion.      Cervical back: Normal range of motion.     Skin:     General: Skin is warm and dry.     Neurological:      Mental Status: He is alert.         Results Reviewed       Procedure Component Value Units Date/Time    HS Troponin 0hr (reflex protocol) [[id number]]  (Normal) Collected: 07/21/25 1453    Lab Status: Final result Specimen: Blood from Arm, Right Updated: 07/21/25 1523     hs TnI 0hr 3 ng/L     CBC and differential [[id number]]  (Abnormal) Collected: 07/21/25 1453    Lab Status: Final result Specimen: Blood from Arm, Right Updated: 07/21/25 1520     WBC 9.40 Thousand/uL      RBC 5.18 Million/uL      Hemoglobin 14.3 g/dL      Hematocrit 44.5 %      MCV 86 fL      MCH 27.6 pg      MCHC 32.1 g/dL      RDW 15.6 %      MPV 9.0 fL      Platelets 355 Thousands/uL      nRBC 0 /100 WBCs      Segmented % 52 %      Immature Grans % 0 %      Lymphocytes % 34 %      Monocytes % 8 %      Eosinophils Relative 5 %      Basophils Relative 1 %      Absolute Neutrophils 4.85 Thousands/µL      Absolute Immature Grans 0.04 Thousand/uL      Absolute Lymphocytes 3.20 Thousands/µL      Absolute Monocytes 0.76 Thousand/µL      Eosinophils Absolute 0.50 Thousand/µL      Basophils Absolute 0.05 Thousands/µL     Basic metabolic panel [[id number]]  (Abnormal) Collected: 07/21/25 1453    Lab Status: Final result Specimen: Blood from Arm, Right Updated: 07/21/25 1517     Sodium  140 mmol/L      Potassium 3.5 mmol/L      Chloride 105 mmol/L      CO2 26 mmol/L      ANION GAP 9 mmol/L      BUN 10 mg/dL      Creatinine 1.51 mg/dL      Glucose 99 mg/dL      Calcium 9.4 mg/dL      eGFR 54 ml/min/1.73sq m     Narrative:      National Kidney Disease Foundation guidelines for Chronic Kidney Disease (CKD):     Stage 1 with normal or high GFR (GFR > 90 mL/min/1.73 square meters)    Stage 2 Mild CKD (GFR = 60-89 mL/min/1.73 square meters)    Stage 3A Moderate CKD (GFR = 45-59 mL/min/1.73 square meters)    Stage 3B Moderate CKD (GFR = 30-44 mL/min/1.73 square meters)    Stage 4 Severe CKD (GFR = 15-29 mL/min/1.73 square meters)    Stage 5 End Stage CKD (GFR <15 mL/min/1.73 square meters)  Note: GFR calculation is accurate only with a steady state creatinine            X-ray chest 1 view portable   ED Interpretation by Phil Garcia PA-C (07/21 1531)   No acute cardiopulmonary disease            Procedures    ED Medication and Procedure Management   Prior to Admission Medications   Prescriptions Last Dose Informant Patient Reported? Taking?   ARIPiprazole (ABILIFY) 5 mg tablet   No No   Sig: Take 1 tablet (5 mg total) by mouth daily   Cholecalciferol (VITAMIN D3) 1,000 units tablet   No No   Sig: Take 2 tablets (2,000 Units total) by mouth daily   albuterol (PROVENTIL HFA,VENTOLIN HFA) 90 mcg/act inhaler   Yes No   atorvastatin (LIPITOR) 10 mg tablet   No No   Sig: Take 1 tablet (10 mg total) by mouth daily with dinner   buprenorphine-naloxone (SUBOXONE) 8-2 mg per SL tablet   Yes No   Sig: Place 1 tablet under the tongue in the morning and 1 tablet in the evening and 1 tablet before bedtime.   cyanocobalamin (VITAMIN B-12) 1000 MCG tablet   No No   Sig: Take 1 tablet (1,000 mcg total) by mouth daily   escitalopram (LEXAPRO) 20 mg tablet   No No   Sig: Take 1 tablet (20 mg total) by mouth daily   gabapentin (NEURONTIN) 600 MG tablet   No No   Sig: Take 1 tablet (600 mg total) by mouth 3 (three)  times a day   hydrOXYzine HCL (ATARAX) 50 mg tablet   No No   Sig: Take 1 tablet (50 mg total) by mouth daily at bedtime   melatonin 3 mg   No No   Sig: Take 2 tablets (6 mg total) by mouth daily at bedtime   propranolol (INDERAL) 10 mg tablet   No No   Sig: Take 1 tablet (10 mg total) by mouth 2 (two) times a day   senna-docusate sodium (SENOKOT S) 8.6-50 mg per tablet   No No   Sig: Take 1 tablet by mouth daily at bedtime      Facility-Administered Medications: None     Discharge Medication List as of 7/21/2025  3:31 PM        CONTINUE these medications which have NOT CHANGED    Details   albuterol (PROVENTIL HFA,VENTOLIN HFA) 90 mcg/act inhaler Historical Med      ARIPiprazole (ABILIFY) 5 mg tablet Take 1 tablet (5 mg total) by mouth daily, Starting Wed 5/14/2025, Until Tue 8/12/2025, Normal      atorvastatin (LIPITOR) 10 mg tablet Take 1 tablet (10 mg total) by mouth daily with dinner, Starting Wed 5/14/2025, Normal      buprenorphine-naloxone (SUBOXONE) 8-2 mg per SL tablet Place 1 tablet under the tongue in the morning and 1 tablet in the evening and 1 tablet before bedtime., Historical Med      Cholecalciferol (VITAMIN D3) 1,000 units tablet Take 2 tablets (2,000 Units total) by mouth daily, Starting Wed 5/14/2025, Until Fri 6/13/2025, Normal      cyanocobalamin (VITAMIN B-12) 1000 MCG tablet Take 1 tablet (1,000 mcg total) by mouth daily, Starting Wed 5/14/2025, Until Fri 6/13/2025, Normal      escitalopram (LEXAPRO) 20 mg tablet Take 1 tablet (20 mg total) by mouth daily, Starting Wed 5/14/2025, Until Tue 8/12/2025, Normal      gabapentin (NEURONTIN) 600 MG tablet Take 1 tablet (600 mg total) by mouth 3 (three) times a day, Starting Wed 5/14/2025, Until Tue 8/12/2025, Normal      hydrOXYzine HCL (ATARAX) 50 mg tablet Take 1 tablet (50 mg total) by mouth daily at bedtime, Starting Wed 5/14/2025, Until Tue 8/12/2025, Normal      melatonin 3 mg Take 2 tablets (6 mg total) by mouth daily at bedtime, Starting  Wed 5/14/2025, Until Tue 8/12/2025, Normal      propranolol (INDERAL) 10 mg tablet Take 1 tablet (10 mg total) by mouth 2 (two) times a day, Starting Wed 5/14/2025, Until Tue 8/12/2025, Normal      senna-docusate sodium (SENOKOT S) 8.6-50 mg per tablet Take 1 tablet by mouth daily at bedtime, Starting Wed 5/14/2025, Until Fri 6/13/2025, Normal           No discharge procedures on file.  ED SEPSIS DOCUMENTATION   Time reflects when diagnosis was documented in both MDM as applicable and the Disposition within this note       Time User Action Codes Description Comment    7/21/2025  3:29 PM Phil Garcia [F41.9] Anxiety     7/21/2025  3:29 PM Phil Garcia [Z00.8] Medical clearance for incarceration     7/21/2025  3:29 PM Phil Garcia [Z76.5] Malingering     7/21/2025  3:29 PM Phil Garcia [F41.9] Anxiety     7/21/2025  3:29 PM Phil Garcia [Z00.8] Medical clearance for incarceration                    [1]   Past Medical History:  Diagnosis Date    Abrasion of right knee 09/26/2016    Abrasion of right shoulder 09/26/2016    Acute kidney injury (HCC) 09/26/2016    Addiction to drug ([facility])     Ambulatory dysfunction 01/09/2024    Aspiration pneumonitis ([facility]) 08/22/2022    Asthma     Back pain     Bipolar 1 disorder ([facility])     Cancer ([facility]ly [facility])     renal    Chronic kidney disease     Depression     Gastritis     GERD (gastroesophageal reflux disease)     Hallucination     Hydronephrosis of right kidney 09/26/2016    Kidney stones     Lateral epicondylitis 10/20/2017    Medical clearance for psychiatric admission 01/22/2025    Microscopic hematuria 09/26/2016    Opioid withdrawal ([facility]) 12/05/2023    Pre-diabetes     Psychiatric illness     Renal cancer ([facility])     Renal contusion 09/26/2016    Right flank pain 10/04/2016    SIRS (systemic inflammatory response syndrome) ([facility]) 08/22/2022    Substance abuse ([facility])     Suicide attempt ([facility])    [2]   Past Surgical History:  Procedure  "Laterality Date    ABDOMINAL SURGERY      NEPHRECTOMY      GA CYSTOURETHROSCOPY W/URETERAL CATHETERIZATION Right 09/27/2016    Procedure: CYSTOSCOPY WITH RETROGRADE PYELOGRAM;  Surgeon: Petey Hernandez MD;  Location: [facility];  Service: Urology    GA LAPAROSCOPY RADICAL NEPHRECTOMY Right 11/23/2016    Procedure: HAND ASSISTED LAPAROSCOPIC NEPHRECTOMY, converted to open, lysis of adhesions,repair of  vena cava;  Surgeon: Petey Hernandez MD;  Location: [facility];  Service: Urology    GA LAPAROSCOPY SURG RPR INITIAL INGUINAL HERNIA Right 3/11/2025    Procedure: REPAIR HERNIA INGUINAL, LAPAROSCOPIC RIGHT;  Surgeon: Blayne Anguiano MD;  Location: [facility];  Service: General    GA RPR AA HERNIA 1ST < 3 CM REDUCIBLE N/A 3/11/2025    Procedure: REPAIR HERNIA UMBILICAL;  Surgeon: Blayne Anguiano MD;  Location: [facility];  Service: General    URETERAL STENT PLACEMENT Right 09/27/2016    Procedure: INSERTION STENT URETERAL;  Surgeon: Petey Hernandez MD;  Location: [facility];  Service:     VASCULAR SURGERY     [3]   Family History  Problem Relation Name Age of Onset    Stroke Mother      Heart disease Mother      HIV Father     [4]   Social History  Tobacco Use    Smoking status: Every Day     Current packs/day: 1.00     Average packs/day: 0.6 packs/day for 12.6 years (7.1 ttl pk-yrs)     Types: Cigarettes     Start date: 1/26/2012     Last attempt to quit: 1/26/2023     Passive exposure: Current    Smokeless tobacco: Never   Vaping Use    Vaping status: Never Used   Substance Use Topics    Alcohol use: Not Currently    Drug use: Yes     Types: Heroin, Fentanyl, \"Crack\" cocaine, Marijuana     Comment: catherine Garcia PA-C  07/21/25 2043    "

## 2025-07-31 ENCOUNTER — TELEPHONE (OUTPATIENT)
Dept: FAMILY MEDICINE CLINIC | Facility: CLINIC | Age: 50
End: 2025-07-31

## (undated) DEVICE — SUT PROLENE 2-0 CT-2 30 IN 8411H

## (undated) DEVICE — REM POLYHESIVE ADULT PATIENT RETURN ELECTRODE: Brand: VALLEYLAB

## (undated) DEVICE — SYRINGE CATH TIP 50ML

## (undated) DEVICE — GLOVE SRG BIOGEL ECLIPSE 8

## (undated) DEVICE — GLOVE SRG BIOGEL 6.5

## (undated) DEVICE — TROCARS: Brand: KII® BALLOON BLUNT TIP SYSTEM

## (undated) DEVICE — ANTIBACTERIAL UNDYED BRAIDED (POLYGLACTIN 910), SYNTHETIC ABSORBABLE SUTURE: Brand: COATED VICRYL

## (undated) DEVICE — SUT VICRYL 0 UR-6 27 IN J603H

## (undated) DEVICE — EXOFIN PRECISION PEN HIGH VISCOSITY TOPICAL SKIN ADHESIVE: Brand: EXOFIN PRECISION PEN, 1G

## (undated) DEVICE — TUBING SUCTION 5MM X 12 FT

## (undated) DEVICE — INTENDED FOR TISSUE SEPARATION, AND OTHER PROCEDURES THAT REQUIRE A SHARP SURGICAL BLADE TO PUNCTURE OR CUT.: Brand: BARD-PARKER SAFETY BLADES SIZE 11, STERILE

## (undated) DEVICE — GLOVE INDICATOR PI UNDERGLOVE SZ 8 BLUE

## (undated) DEVICE — INSUFFLATION TUBING PRIMFLO

## (undated) DEVICE — NEEDLE HYPO 23G X 1-1/2 IN

## (undated) DEVICE — ALLENTOWN LAP CHOLE APP PACK: Brand: CARDINAL HEALTH

## (undated) DEVICE — GLOVE INDICATOR PI UNDERGLOVE SZ 6.5 BLUE

## (undated) DEVICE — DECANTER: Brand: UNBRANDED

## (undated) DEVICE — TROCAR: Brand: KII FIOS FIRST ENTRY

## (undated) DEVICE — METZENBAUM ADTEC SINGLE USE DISSECTING SCISSORS, SHAFT ONLY, MONOPOLAR, CURVED TO LEFT, WORKING LENGTH: 12 1/4", (310 MM), DIAM. 5 MM, INSULATED, DOUBLE ACTION, STERILE, DISPOSABLE, PACKAGE OF 10 PIECES: Brand: AESCULAP

## (undated) DEVICE — SCD SEQUENTIAL COMPRESSION COMFORT SLEEVE MEDIUM KNEE LENGTH: Brand: KENDALL SCD

## (undated) DEVICE — BLUE HEAT SCOPE WARMER

## (undated) DEVICE — ELECTRODE BLADE MOD E-Z CLEAN  2.75IN 7CM -0012AM

## (undated) DEVICE — BETHLEHEM UNIVERSAL MINOR GEN: Brand: CARDINAL HEALTH

## (undated) DEVICE — NEPTUNE E-SEP SMOKE EVACUATION PENCIL, COATED, 70MM BLADE, PUSH BUTTON SWITCH: Brand: NEPTUNE E-SEP

## (undated) DEVICE — ENDOPATH 5MM ENDOSCOPIC BLUNT TIP DISSECTORS (12 POUCHES CONTAINING 3 DISSECTORS EACH): Brand: ENDOPATH

## (undated) DEVICE — TROCAR: Brand: KII® SLEEVE

## (undated) DEVICE — MEDI-VAC YANKAUER SUCTION HANDLE W/BULBOUS AND CONTROL VENT: Brand: CARDINAL HEALTH

## (undated) DEVICE — INTENDED FOR TISSUE SEPARATION, AND OTHER PROCEDURES THAT REQUIRE A SHARP SURGICAL BLADE TO PUNCTURE OR CUT.: Brand: BARD-PARKER SAFETY BLADES SIZE 15, STERILE

## (undated) DEVICE — ABSORBABLE WOUND CLOSURE DEVICE: Brand: V-LOC 90

## (undated) DEVICE — SUT MONOCRYL 4-0 PS-2 27 IN Y426H

## (undated) DEVICE — STRAIGHT HERNIA STAPLER: Brand: MULTIFIRE ENDO HERNIA

## (undated) DEVICE — SYRINGE 10ML LL

## (undated) DEVICE — TRAY FOLEY 16FR URIMETER SURESTEP